# Patient Record
Sex: MALE | Race: WHITE | Employment: OTHER | URBAN - NONMETROPOLITAN AREA
[De-identification: names, ages, dates, MRNs, and addresses within clinical notes are randomized per-mention and may not be internally consistent; named-entity substitution may affect disease eponyms.]

---

## 2017-01-12 ENCOUNTER — OFFICE VISIT (OUTPATIENT)
Dept: UROLOGY | Age: 82
End: 2017-01-12

## 2017-01-12 ENCOUNTER — TELEPHONE (OUTPATIENT)
Dept: UROLOGY | Age: 82
End: 2017-01-12

## 2017-01-12 VITALS
DIASTOLIC BLOOD PRESSURE: 70 MMHG | SYSTOLIC BLOOD PRESSURE: 122 MMHG | BODY MASS INDEX: 19.18 KG/M2 | WEIGHT: 134 LBS | HEIGHT: 70 IN

## 2017-01-12 DIAGNOSIS — R39.198 URINARY STREAM SLOWING: Primary | ICD-10-CM

## 2017-01-12 DIAGNOSIS — R30.0 DYSURIA: ICD-10-CM

## 2017-01-12 LAB
BILIRUBIN, POC: NORMAL
BLOOD URINE, POC: NORMAL
CLARITY, POC: NORMAL
COLOR, POC: NORMAL
GLUCOSE URINE, POC: NORMAL
KETONES, POC: NORMAL
LEUKOCYTE EST, POC: NORMAL
NITRITE, POC: NORMAL
PH, POC: NORMAL
POST VOID RESIDUAL (PVR): 0 ML
PROTEIN, POC: NORMAL
SPECIFIC GRAVITY, POC: NORMAL
UROBILINOGEN, POC: NORMAL

## 2017-01-12 PROCEDURE — 81003 URINALYSIS AUTO W/O SCOPE: CPT | Performed by: NURSE PRACTITIONER

## 2017-01-12 PROCEDURE — 99213 OFFICE O/P EST LOW 20 MIN: CPT | Performed by: NURSE PRACTITIONER

## 2017-01-12 PROCEDURE — 1123F ACP DISCUSS/DSCN MKR DOCD: CPT | Performed by: NURSE PRACTITIONER

## 2017-01-12 PROCEDURE — G8419 CALC BMI OUT NRM PARAM NOF/U: HCPCS | Performed by: NURSE PRACTITIONER

## 2017-01-12 PROCEDURE — G8484 FLU IMMUNIZE NO ADMIN: HCPCS | Performed by: NURSE PRACTITIONER

## 2017-01-12 PROCEDURE — 51798 US URINE CAPACITY MEASURE: CPT | Performed by: NURSE PRACTITIONER

## 2017-01-12 PROCEDURE — G8427 DOCREV CUR MEDS BY ELIG CLIN: HCPCS | Performed by: NURSE PRACTITIONER

## 2017-01-12 PROCEDURE — 4040F PNEUMOC VAC/ADMIN/RCVD: CPT | Performed by: NURSE PRACTITIONER

## 2017-01-12 PROCEDURE — G8598 ASA/ANTIPLAT THER USED: HCPCS | Performed by: NURSE PRACTITIONER

## 2017-01-12 PROCEDURE — 1036F TOBACCO NON-USER: CPT | Performed by: NURSE PRACTITIONER

## 2017-01-12 ASSESSMENT — ENCOUNTER SYMPTOMS
EYE PAIN: 0
DIARRHEA: 0
CONSTIPATION: 0
BACK PAIN: 0
SHORTNESS OF BREATH: 1
EYE REDNESS: 0
CHEST TIGHTNESS: 0

## 2017-01-14 LAB — URINE CULTURE, ROUTINE: NORMAL

## 2017-01-18 ENCOUNTER — TELEPHONE (OUTPATIENT)
Dept: UROLOGY | Age: 82
End: 2017-01-18

## 2017-01-18 DIAGNOSIS — N21.0 BLADDER CALCULI: ICD-10-CM

## 2017-01-18 DIAGNOSIS — R31.9 HEMATURIA: Primary | ICD-10-CM

## 2017-01-25 ENCOUNTER — TELEPHONE (OUTPATIENT)
Dept: UROLOGY | Age: 82
End: 2017-01-25

## 2017-01-25 DIAGNOSIS — N21.0 BLADDER STONES: Primary | ICD-10-CM

## 2017-01-26 ENCOUNTER — TELEPHONE (OUTPATIENT)
Dept: UROLOGY | Age: 82
End: 2017-01-26

## 2017-01-26 DIAGNOSIS — N40.0 ENLARGED PROSTATE: Primary | ICD-10-CM

## 2017-01-26 DIAGNOSIS — R39.12 WEAK URINARY STREAM: ICD-10-CM

## 2017-01-26 RX ORDER — FINASTERIDE 5 MG/1
5 TABLET, FILM COATED ORAL DAILY
Qty: 90 TABLET | Refills: 3 | Status: SHIPPED | OUTPATIENT
Start: 2017-01-26 | End: 2018-01-16 | Stop reason: SDUPTHER

## 2017-01-31 ENCOUNTER — TELEPHONE (OUTPATIENT)
Dept: UROLOGY | Age: 82
End: 2017-01-31

## 2017-04-06 ENCOUNTER — OFFICE VISIT (OUTPATIENT)
Dept: FAMILY MEDICINE CLINIC | Age: 82
End: 2017-04-06

## 2017-04-06 VITALS
WEIGHT: 137 LBS | DIASTOLIC BLOOD PRESSURE: 70 MMHG | HEIGHT: 70 IN | SYSTOLIC BLOOD PRESSURE: 138 MMHG | RESPIRATION RATE: 16 BRPM | HEART RATE: 72 BPM | BODY MASS INDEX: 19.61 KG/M2

## 2017-04-06 DIAGNOSIS — R06.09 DYSPNEA ON EXERTION: ICD-10-CM

## 2017-04-06 DIAGNOSIS — R53.83 FATIGUE, UNSPECIFIED TYPE: Primary | ICD-10-CM

## 2017-04-06 PROCEDURE — 99213 OFFICE O/P EST LOW 20 MIN: CPT | Performed by: FAMILY MEDICINE

## 2017-04-06 PROCEDURE — 93000 ELECTROCARDIOGRAM COMPLETE: CPT | Performed by: FAMILY MEDICINE

## 2017-04-07 LAB
ABSOLUTE BASO #: 0 K/UL (ref 0–0.1)
ABSOLUTE EOS #: 0.1 K/UL (ref 0.1–0.4)
ABSOLUTE LYMPH #: 1.2 K/UL (ref 0.8–5.2)
ABSOLUTE MONO #: 0.5 K/UL (ref 0.1–0.9)
ABSOLUTE NEUT #: 4.1 K/UL (ref 1.3–9.1)
ALBUMIN SERPL-MCNC: 4.4 G/DL (ref 3.2–5.3)
ALK PHOSPHATASE: 82 IU/L (ref 35–121)
ALT SERPL-CCNC: 14 IU/L (ref 5–59)
ANION GAP SERPL CALCULATED.3IONS-SCNC: 14 MMOL/L
AST SERPL-CCNC: 25 IU/L (ref 10–42)
BASOPHILS RELATIVE PERCENT: 0.5 %
BILIRUB SERPL-MCNC: 1 MG/DL (ref 0.2–1.3)
BUN BLDV-MCNC: 22 MG/DL (ref 10–25)
CALCIUM SERPL-MCNC: 9.7 MG/DL (ref 8.7–10.8)
CHLORIDE BLD-SCNC: 103 MMOL/L (ref 95–111)
CO2: 31 MMOL/L (ref 21–32)
CREAT SERPL-MCNC: 1.1 MG/DL (ref 0.7–1.5)
EGFR AFRICAN AMERICAN: 76
EGFR IF NONAFRICAN AMERICAN: 63
EOSINOPHILS RELATIVE PERCENT: 1 %
GLUCOSE: 96 MG/DL (ref 70–100)
HCT VFR BLD CALC: 44.1 % (ref 41.4–51)
HEMOGLOBIN: 15 G/DL (ref 13.8–17)
LYMPHOCYTE %: 21.1 %
MCH RBC QN AUTO: 33.8 PG (ref 27–34)
MCHC RBC AUTO-ENTMCNC: 34 G/DL (ref 31–36)
MCV RBC AUTO: 99.3 FL (ref 80–100)
MONOCYTES # BLD: 7.8 %
NEUTROPHILS RELATIVE PERCENT: 69.3 %
PDW BLD-RTO: 11.8 % (ref 10.8–14.8)
PLATELETS: 203 K/UL (ref 150–450)
POTASSIUM SERPL-SCNC: 4.1 MMOL/L (ref 3.5–5.4)
RBC: 4.44 M/UL (ref 4–5.5)
SODIUM BLD-SCNC: 144 MMOL/L (ref 134–147)
TOTAL PROTEIN: 6.8 G/DL (ref 5.8–8)
TSH SERPL DL<=0.05 MIU/L-ACNC: 2.48 UIU/ML (ref 0.4–4.4)
VITAMIN B-12: 509 PG/ML (ref 211–911)
WBC: 5.9 K/UL (ref 3.7–10.8)

## 2017-04-10 ENCOUNTER — TELEPHONE (OUTPATIENT)
Dept: FAMILY MEDICINE CLINIC | Age: 82
End: 2017-04-10

## 2017-04-15 ASSESSMENT — ENCOUNTER SYMPTOMS
CONSTIPATION: 0
SHORTNESS OF BREATH: 1
SINUS PRESSURE: 0

## 2017-04-21 ENCOUNTER — TELEPHONE (OUTPATIENT)
Dept: FAMILY MEDICINE CLINIC | Age: 82
End: 2017-04-21

## 2017-04-27 ENCOUNTER — OFFICE VISIT (OUTPATIENT)
Dept: FAMILY MEDICINE CLINIC | Age: 82
End: 2017-04-27

## 2017-04-27 VITALS
DIASTOLIC BLOOD PRESSURE: 66 MMHG | WEIGHT: 137.25 LBS | SYSTOLIC BLOOD PRESSURE: 112 MMHG | HEIGHT: 70 IN | RESPIRATION RATE: 16 BRPM | BODY MASS INDEX: 19.65 KG/M2 | HEART RATE: 76 BPM

## 2017-04-27 DIAGNOSIS — J44.9 CHRONIC OBSTRUCTIVE PULMONARY DISEASE, UNSPECIFIED COPD TYPE (HCC): ICD-10-CM

## 2017-04-27 PROCEDURE — 99213 OFFICE O/P EST LOW 20 MIN: CPT | Performed by: FAMILY MEDICINE

## 2017-04-27 ASSESSMENT — ENCOUNTER SYMPTOMS
SHORTNESS OF BREATH: 1
CONSTIPATION: 0
SINUS PRESSURE: 0

## 2017-09-11 RX ORDER — TERAZOSIN 5 MG/1
CAPSULE ORAL
Qty: 90 CAPSULE | Refills: 3 | Status: SHIPPED | OUTPATIENT
Start: 2017-09-11 | End: 2018-09-10 | Stop reason: SDUPTHER

## 2017-10-26 ENCOUNTER — OFFICE VISIT (OUTPATIENT)
Dept: FAMILY MEDICINE CLINIC | Age: 82
End: 2017-10-26

## 2017-10-26 VITALS
BODY MASS INDEX: 19.76 KG/M2 | SYSTOLIC BLOOD PRESSURE: 102 MMHG | HEIGHT: 70 IN | DIASTOLIC BLOOD PRESSURE: 66 MMHG | WEIGHT: 138 LBS | RESPIRATION RATE: 16 BRPM | HEART RATE: 80 BPM

## 2017-10-26 DIAGNOSIS — Z23 NEED FOR IMMUNIZATION AGAINST INFLUENZA: ICD-10-CM

## 2017-10-26 DIAGNOSIS — J44.9 CHRONIC OBSTRUCTIVE PULMONARY DISEASE, UNSPECIFIED COPD TYPE (HCC): Primary | ICD-10-CM

## 2017-10-26 PROCEDURE — 99213 OFFICE O/P EST LOW 20 MIN: CPT | Performed by: FAMILY MEDICINE

## 2017-10-26 PROCEDURE — G0008 ADMIN INFLUENZA VIRUS VAC: HCPCS | Performed by: FAMILY MEDICINE

## 2017-10-26 RX ORDER — ALBUTEROL SULFATE 90 UG/1
2 AEROSOL, METERED RESPIRATORY (INHALATION) 2 TIMES DAILY
Qty: 1 INHALER | Refills: 11 | Status: SHIPPED | OUTPATIENT
Start: 2017-10-26 | End: 2019-01-29 | Stop reason: SDUPTHER

## 2017-10-26 ASSESSMENT — ENCOUNTER SYMPTOMS
CONSTIPATION: 0
SHORTNESS OF BREATH: 1
SINUS PRESSURE: 0

## 2017-10-26 NOTE — PROGRESS NOTES
Subjective:      Patient ID: Wellington Taveras is a 80 y.o. male. HPI  1. He states that the breathing seems no worse than 6 months ago. There is no daily sputum  2. His family would like him to have an inhaler as he seems SOB at times with exertion  Review of Systems   Constitutional: Negative for fatigue. HENT: Negative for sinus pressure. Eyes: Negative for visual disturbance. Respiratory: Positive for shortness of breath. Cardiovascular: Negative for chest pain. Gastrointestinal: Negative for constipation. Genitourinary: Positive for difficulty urinating. Musculoskeletal: Negative for arthralgias. Skin: Negative for rash. Neurological: Negative for headaches. The patient's medications, allergies, past medical problems, surgical, social, and family histories were reviewed and updated as needed. Objective:   Physical Exam   Constitutional: He is oriented to person, place, and time. He appears well-developed and well-nourished. No distress. HENT:   Head: Normocephalic and atraumatic. Eyes: Conjunctivae are normal. No scleral icterus. Neck: No tracheal deviation present. Cardiovascular: Normal rate, regular rhythm and normal heart sounds. Pulmonary/Chest: Effort normal and breath sounds normal.   Musculoskeletal: He exhibits no edema. Neurological: He is alert and oriented to person, place, and time. Skin: Skin is warm and dry. Psychiatric: He has a normal mood and affect. His behavior is normal.   Blood pressure 102/66, pulse 80, resp. rate 16, height 5' 10\" (1.778 m), weight 138 lb (62.6 kg). Assessment:          1. Chronic obstructive pulmonary disease, unspecified COPD type (HCC)  albuterol sulfate HFA (PROAIR HFA) 108 (90 Base) MCG/ACT inhaler   2.  Need for immunization against influenza  INFLUENZA, MDCK QUADV, 4 YRS AND OLDER, IM, PF, PREFILL SYR OR SDV, 0.5ML (FLUCELVAX QUADV, PF)       Plan:      See me in 6 months

## 2018-01-16 RX ORDER — FINASTERIDE 5 MG/1
5 TABLET, FILM COATED ORAL DAILY
Qty: 90 TABLET | Refills: 3 | Status: SHIPPED | OUTPATIENT
Start: 2018-01-16 | End: 2019-02-04 | Stop reason: SDUPTHER

## 2018-02-01 ENCOUNTER — TELEPHONE (OUTPATIENT)
Dept: FAMILY MEDICINE CLINIC | Age: 83
End: 2018-02-01

## 2018-02-01 RX ORDER — AZITHROMYCIN 250 MG/1
TABLET, FILM COATED ORAL
Qty: 1 PACKET | Refills: 0 | Status: SHIPPED | OUTPATIENT
Start: 2018-02-01 | End: 2018-02-11

## 2018-02-02 RX ORDER — DEXTROMETHORPHAN HYDROBROMIDE AND PROMETHAZINE HYDROCHLORIDE 15; 6.25 MG/5ML; MG/5ML
5 SYRUP ORAL 4 TIMES DAILY PRN
Qty: 150 ML | Refills: 0 | Status: SHIPPED | OUTPATIENT
Start: 2018-02-02 | End: 2018-02-09

## 2018-02-02 NOTE — TELEPHONE ENCOUNTER
Daughter called, pt took initial dose of Zithromax last night and he was up all night with diarrhea. Doesn't want to take anymore. Would like something different. Please call Arsalan So [phone].   Uses [facility] [place].

## 2018-04-17 ENCOUNTER — OFFICE VISIT (OUTPATIENT)
Dept: FAMILY MEDICINE CLINIC | Age: 83
End: 2018-04-17

## 2018-04-17 VITALS
HEART RATE: 72 BPM | BODY MASS INDEX: 19.9 KG/M2 | SYSTOLIC BLOOD PRESSURE: 110 MMHG | HEIGHT: 70 IN | WEIGHT: 139 LBS | DIASTOLIC BLOOD PRESSURE: 64 MMHG | RESPIRATION RATE: 16 BRPM

## 2018-04-17 DIAGNOSIS — J44.9 CHRONIC OBSTRUCTIVE PULMONARY DISEASE, UNSPECIFIED COPD TYPE (HCC): Primary | ICD-10-CM

## 2018-04-17 PROCEDURE — 99213 OFFICE O/P EST LOW 20 MIN: CPT | Performed by: FAMILY MEDICINE

## 2018-04-17 ASSESSMENT — ENCOUNTER SYMPTOMS
SINUS PRESSURE: 0
SHORTNESS OF BREATH: 1
CONSTIPATION: 0

## 2018-05-07 ENCOUNTER — OFFICE VISIT (OUTPATIENT)
Dept: FAMILY MEDICINE CLINIC | Age: 83
End: 2018-05-07

## 2018-05-07 VITALS
SYSTOLIC BLOOD PRESSURE: 126 MMHG | HEIGHT: 70 IN | BODY MASS INDEX: 19.98 KG/M2 | HEART RATE: 72 BPM | DIASTOLIC BLOOD PRESSURE: 70 MMHG | WEIGHT: 139.6 LBS | RESPIRATION RATE: 16 BRPM

## 2018-05-07 DIAGNOSIS — S33.6XXA SACROILIAC (LIGAMENT) SPRAIN, INITIAL ENCOUNTER: Primary | ICD-10-CM

## 2018-05-07 PROCEDURE — 99213 OFFICE O/P EST LOW 20 MIN: CPT | Performed by: FAMILY MEDICINE

## 2018-05-07 RX ORDER — TIZANIDINE HYDROCHLORIDE 2 MG/1
2 CAPSULE, GELATIN COATED ORAL 3 TIMES DAILY PRN
Qty: 30 CAPSULE | Refills: 1 | Status: SHIPPED | OUTPATIENT
Start: 2018-05-07 | End: 2018-07-23 | Stop reason: SDUPTHER

## 2018-05-07 RX ORDER — PREDNISONE 20 MG/1
TABLET ORAL
Qty: 10 TABLET | Refills: 0 | Status: SHIPPED | OUTPATIENT
Start: 2018-05-07 | End: 2018-12-13

## 2018-05-07 ASSESSMENT — ENCOUNTER SYMPTOMS
CONSTIPATION: 0
BLOOD IN STOOL: 0
CHEST TIGHTNESS: 0
SHORTNESS OF BREATH: 0
EYE PAIN: 0
COUGH: 0
SORE THROAT: 0
TROUBLE SWALLOWING: 0
BACK PAIN: 0
NAUSEA: 0
ABDOMINAL PAIN: 0

## 2018-07-23 ENCOUNTER — TELEPHONE (OUTPATIENT)
Dept: FAMILY MEDICINE CLINIC | Age: 83
End: 2018-07-23

## 2018-07-23 DIAGNOSIS — S33.6XXA SACROILIAC (LIGAMENT) SPRAIN, INITIAL ENCOUNTER: ICD-10-CM

## 2018-07-23 RX ORDER — TIZANIDINE HYDROCHLORIDE 2 MG/1
2 CAPSULE, GELATIN COATED ORAL 3 TIMES DAILY PRN
Qty: 30 CAPSULE | Refills: 1 | Status: SHIPPED | OUTPATIENT
Start: 2018-07-23 | End: 2018-12-13 | Stop reason: SDUPTHER

## 2018-07-23 NOTE — TELEPHONE ENCOUNTER
Vishnu Oleary called req a ref of Aviva to [facility].  He picked up a bag of garbage the other day.

## 2018-09-10 RX ORDER — TERAZOSIN 5 MG/1
CAPSULE ORAL
Qty: 90 CAPSULE | Refills: 0 | Status: SHIPPED | OUTPATIENT
Start: 2018-09-10 | End: 2018-12-05 | Stop reason: SDUPTHER

## 2018-09-21 ENCOUNTER — TELEPHONE (OUTPATIENT)
Dept: FAMILY MEDICINE CLINIC | Age: 83
End: 2018-09-21

## 2018-10-18 ENCOUNTER — OFFICE VISIT (OUTPATIENT)
Dept: FAMILY MEDICINE CLINIC | Age: 83
End: 2018-10-18

## 2018-10-18 VITALS
HEART RATE: 64 BPM | HEIGHT: 70 IN | WEIGHT: 133 LBS | SYSTOLIC BLOOD PRESSURE: 124 MMHG | DIASTOLIC BLOOD PRESSURE: 74 MMHG | RESPIRATION RATE: 14 BRPM | BODY MASS INDEX: 19.04 KG/M2

## 2018-10-18 DIAGNOSIS — L57.0 ACTINIC KERATOSIS: Primary | ICD-10-CM

## 2018-10-18 DIAGNOSIS — H61.23 BILATERAL IMPACTED CERUMEN: ICD-10-CM

## 2018-10-18 PROCEDURE — 1101F PT FALLS ASSESS-DOCD LE1/YR: CPT | Performed by: FAMILY MEDICINE

## 2018-10-18 PROCEDURE — G0008 ADMIN INFLUENZA VIRUS VAC: HCPCS | Performed by: FAMILY MEDICINE

## 2018-10-18 PROCEDURE — 90688 IIV4 VACCINE SPLT 0.5 ML IM: CPT | Performed by: FAMILY MEDICINE

## 2018-10-18 PROCEDURE — 69210 REMOVE IMPACTED EAR WAX UNI: CPT | Performed by: FAMILY MEDICINE

## 2018-10-18 PROCEDURE — 99214 OFFICE O/P EST MOD 30 MIN: CPT | Performed by: FAMILY MEDICINE

## 2018-10-18 ASSESSMENT — ENCOUNTER SYMPTOMS
SINUS PRESSURE: 0
CONSTIPATION: 0
SHORTNESS OF BREATH: 0

## 2018-10-18 NOTE — PROGRESS NOTES
Immunizations     Name Date Dose Route    Influenza, [place] Clamp, 6 mo and older, IM (Flulaval) 10/18/2018 0.5 mL Intramuscular    Site: Deltoid- Right    Lot: Ana Luisa Buenos    NDC: 19540-900-00

## 2018-10-18 NOTE — PROGRESS NOTES
Subjective:      Patient ID: Jose Lawrence is a 80 y.o. male. HPI  1. He has some arthritis troubles  2. There has been a spot on the back of the left hand for several months that is looking some better. Review of Systems   Constitutional: Negative for fatigue. HENT: Negative for sinus pressure. Eyes: Negative for visual disturbance. Respiratory: Negative for shortness of breath. Cardiovascular: Negative for chest pain. Gastrointestinal: Negative for constipation. Genitourinary: Negative. Musculoskeletal: Negative for arthralgias. Skin: Negative for rash. Neurological: Negative for headaches. The patient's medications, allergies, past medical problems, surgical, social, and family histories were reviewed and updated as needed. Objective:   Physical Exam   Constitutional: He is oriented to person, place, and time. He appears well-developed and well-nourished. No distress. HENT:   Head: Normocephalic and atraumatic. Bilateral cerumem impaction. Wax was removed from the affected ear(s) with a combination of the nurse using the water bottle, and myself using the curette. Inspection of the canal, and the tympanic membrane by myself afterward found no sign of injury. Eyes: Conjunctivae are normal. No scleral icterus. Neck: No tracheal deviation present. Cardiovascular: Normal rate, regular rhythm and normal heart sounds. Pulmonary/Chest: Effort normal and breath sounds normal.   Musculoskeletal: He exhibits no edema. Hands:  Neurological: He is alert and oriented to person, place, and time. Skin: Skin is warm and dry. Psychiatric: He has a normal mood and affect. His behavior is normal.   Blood pressure 124/74, pulse 64, resp. rate 14, height 5' 10\" (1.778 m), weight 133 lb (60.3 kg). Assessment:       Diagnosis Orders   1. Actinic keratosis     2.  Bilateral impacted cerumen  53612 - [place] REMOVE IMPACTED EAR WAX           Plan:      See me in 6 months Phoebe Plummer MD

## 2018-10-25 ENCOUNTER — NURSE ONLY (OUTPATIENT)
Dept: FAMILY MEDICINE CLINIC | Age: 83
End: 2018-10-25

## 2018-10-25 DIAGNOSIS — R35.0 URINARY FREQUENCY: Primary | ICD-10-CM

## 2018-10-25 LAB
BACTERIA URINE, POC: ABNORMAL
BILIRUBIN URINE: 0 MG/DL
BLOOD, URINE: POSITIVE
CASTS URINE, POC: ABNORMAL
CLARITY: ABNORMAL
COLOR: YELLOW
CRYSTALS URINE, POC: ABNORMAL
EPI CELLS URINE, POC: ABNORMAL
GLUCOSE URINE: NEGATIVE
KETONES, URINE: NEGATIVE
LEUKOCYTE EST, POC: POSITIVE
NITRITE, URINE: NEGATIVE
PH UA: 9 (ref 4.5–8)
PROTEIN UA: POSITIVE
RBC URINE, POC: ABNORMAL
SPECIFIC GRAVITY UA: 1.01 (ref 1–1.03)
UROBILINOGEN, URINE: NORMAL
WBC URINE, POC: ABNORMAL
YEAST URINE, POC: ABNORMAL

## 2018-10-25 PROCEDURE — 81000 URINALYSIS NONAUTO W/SCOPE: CPT | Performed by: FAMILY MEDICINE

## 2018-10-27 LAB — URINE CULTURE, ROUTINE: NORMAL

## 2018-10-29 ENCOUNTER — TELEPHONE (OUTPATIENT)
Dept: FAMILY MEDICINE CLINIC | Age: 83
End: 2018-10-29

## 2018-12-13 ENCOUNTER — OFFICE VISIT (OUTPATIENT)
Dept: FAMILY MEDICINE CLINIC | Age: 83
End: 2018-12-13

## 2018-12-13 VITALS
WEIGHT: 131.13 LBS | DIASTOLIC BLOOD PRESSURE: 70 MMHG | BODY MASS INDEX: 18.77 KG/M2 | RESPIRATION RATE: 14 BRPM | HEIGHT: 70 IN | HEART RATE: 80 BPM | SYSTOLIC BLOOD PRESSURE: 126 MMHG

## 2018-12-13 DIAGNOSIS — M53.3 SACROILIAC JOINT PAIN: ICD-10-CM

## 2018-12-13 DIAGNOSIS — N23 KIDNEY PAIN: ICD-10-CM

## 2018-12-13 DIAGNOSIS — M54.50 MIDLINE LOW BACK PAIN WITHOUT SCIATICA, UNSPECIFIED CHRONICITY: Primary | ICD-10-CM

## 2018-12-13 DIAGNOSIS — S33.6XXA SACROILIAC (LIGAMENT) SPRAIN, INITIAL ENCOUNTER: ICD-10-CM

## 2018-12-13 LAB
BACTERIA URINE, POC: ABNORMAL
BILIRUBIN URINE: 0 MG/DL
BLOOD, URINE: POSITIVE
CASTS URINE, POC: ABNORMAL
CLARITY: ABNORMAL
COLOR: YELLOW
CRYSTALS URINE, POC: ABNORMAL
EPI CELLS URINE, POC: ABNORMAL
GLUCOSE URINE: NEGATIVE
KETONES, URINE: POSITIVE
LEUKOCYTE EST, POC: NEGATIVE
NITRITE, URINE: POSITIVE
PH UA: 8 (ref 4.5–8)
PROTEIN UA: POSITIVE
RBC URINE, POC: ABNORMAL
SPECIFIC GRAVITY UA: 1.01 (ref 1–1.03)
UROBILINOGEN, URINE: NORMAL
WBC URINE, POC: ABNORMAL
YEAST URINE, POC: ABNORMAL

## 2018-12-13 PROCEDURE — 96372 THER/PROPH/DIAG INJ SC/IM: CPT | Performed by: EMERGENCY MEDICINE

## 2018-12-13 PROCEDURE — 99213 OFFICE O/P EST LOW 20 MIN: CPT | Performed by: EMERGENCY MEDICINE

## 2018-12-13 PROCEDURE — 1101F PT FALLS ASSESS-DOCD LE1/YR: CPT | Performed by: EMERGENCY MEDICINE

## 2018-12-13 PROCEDURE — 81000 URINALYSIS NONAUTO W/SCOPE: CPT | Performed by: EMERGENCY MEDICINE

## 2018-12-13 RX ORDER — TIZANIDINE HYDROCHLORIDE 2 MG/1
2 CAPSULE, GELATIN COATED ORAL 3 TIMES DAILY PRN
Qty: 30 CAPSULE | Refills: 0 | Status: SHIPPED | OUTPATIENT
Start: 2018-12-13 | End: 2019-01-23

## 2018-12-13 RX ORDER — KETOROLAC TROMETHAMINE 30 MG/ML
30 INJECTION, SOLUTION INTRAMUSCULAR; INTRAVENOUS ONCE
Status: COMPLETED | OUTPATIENT
Start: 2018-12-13 | End: 2018-12-13

## 2018-12-13 RX ORDER — METHYLPREDNISOLONE ACETATE 80 MG/ML
80 INJECTION, SUSPENSION INTRA-ARTICULAR; INTRALESIONAL; INTRAMUSCULAR; SOFT TISSUE ONCE
Status: COMPLETED | OUTPATIENT
Start: 2018-12-13 | End: 2018-12-13

## 2018-12-13 RX ORDER — PREDNISONE 10 MG/1
TABLET ORAL
Qty: 15 TABLET | Refills: 0 | Status: ON HOLD | OUTPATIENT
Start: 2018-12-13 | End: 2019-01-10 | Stop reason: HOSPADM

## 2018-12-13 RX ADMIN — METHYLPREDNISOLONE ACETATE 80 MG: 80 INJECTION, SUSPENSION INTRA-ARTICULAR; INTRALESIONAL; INTRAMUSCULAR; SOFT TISSUE at 16:25

## 2018-12-13 RX ADMIN — KETOROLAC TROMETHAMINE 30 MG: 30 INJECTION, SOLUTION INTRAMUSCULAR; INTRAVENOUS at 16:23

## 2018-12-13 ASSESSMENT — ENCOUNTER SYMPTOMS
DIARRHEA: 0
CONSTIPATION: 0
SINUS PRESSURE: 0
SORE THROAT: 0
TROUBLE SWALLOWING: 0
WHEEZING: 0
BACK PAIN: 1
VOMITING: 0
CHEST TIGHTNESS: 0
ABDOMINAL PAIN: 0
NAUSEA: 0
RHINORRHEA: 0
COUGH: 0
VOICE CHANGE: 0
SHORTNESS OF BREATH: 0

## 2018-12-13 NOTE — PROGRESS NOTES
Administrations This Visit     ketorolac (TORADOL) injection 30 mg     Admin Date  12/13/2018  16:23 Action  Given Dose  30 mg Route  Intramuscular Site  Dorsogluteal Left Administered By  Lupis Mobley    Ordering Provider:  Dilia De La Garza MD    Patient Supplied?:  No    Comments:  TFQ [id number]           methylPREDNISolone acetate (DEPO-MEDROL) injection 80 mg     Admin Date  12/13/2018  16:25 Action  Given Dose  80 mg Route  Intramuscular Site  Dorsogluteal Right Administered By  Lupis Mobley    Ordering Provider:  Dilia De La Garza MD    NDC:  4841-5431-83    Lot#:  [id number]    :  Ely [employer] [employer]    Patient Supplied?:  No

## 2018-12-17 ENCOUNTER — ANESTHESIA (OUTPATIENT)
Dept: OPERATING ROOM | Age: 83
DRG: 356 | End: 2018-12-17
Payer: MEDICARE

## 2018-12-17 ENCOUNTER — ANESTHESIA EVENT (OUTPATIENT)
Dept: OPERATING ROOM | Age: 83
DRG: 356 | End: 2018-12-17
Payer: MEDICARE

## 2018-12-17 ENCOUNTER — APPOINTMENT (OUTPATIENT)
Dept: GENERAL RADIOLOGY | Age: 83
DRG: 356 | End: 2018-12-17
Payer: MEDICARE

## 2018-12-17 ENCOUNTER — APPOINTMENT (OUTPATIENT)
Dept: CT IMAGING | Age: 83
DRG: 356 | End: 2018-12-17
Payer: MEDICARE

## 2018-12-17 ENCOUNTER — HOSPITAL ENCOUNTER (INPATIENT)
Age: 83
LOS: 7 days | Discharge: NURSING, REHAB OR LONG-TERM CARE | DRG: 356 | End: 2018-12-24
Attending: INTERNAL MEDICINE | Admitting: INTERNAL MEDICINE
Payer: MEDICARE

## 2018-12-17 VITALS
OXYGEN SATURATION: 85 % | SYSTOLIC BLOOD PRESSURE: 112 MMHG | TEMPERATURE: 97.2 F | DIASTOLIC BLOOD PRESSURE: 83 MMHG | RESPIRATION RATE: 7 BRPM

## 2018-12-17 DIAGNOSIS — K65.0 ACUTE PERITONITIS (HCC): Primary | ICD-10-CM

## 2018-12-17 LAB
ALBUMIN SERPL-MCNC: 4.3 G/DL (ref 3.5–5.1)
ALP BLD-CCNC: 137 U/L (ref 38–126)
ALT SERPL-CCNC: 19 U/L (ref 11–66)
ANION GAP SERPL CALCULATED.3IONS-SCNC: 18 MEQ/L (ref 8–16)
AST SERPL-CCNC: 35 U/L (ref 5–40)
BASOPHILS # BLD: 0.1 %
BASOPHILS ABSOLUTE: 0 THOU/MM3 (ref 0–0.1)
BILIRUB SERPL-MCNC: 0.8 MG/DL (ref 0.3–1.2)
BILIRUBIN DIRECT: < 0.2 MG/DL (ref 0–0.3)
BUN BLDV-MCNC: 40 MG/DL (ref 7–22)
CALCIUM SERPL-MCNC: 9.3 MG/DL (ref 8.5–10.5)
CHLORIDE BLD-SCNC: 92 MEQ/L (ref 98–111)
CO2: 28 MEQ/L (ref 23–33)
CREAT SERPL-MCNC: 1.3 MG/DL (ref 0.4–1.2)
EKG ATRIAL RATE: 105 BPM
EKG P AXIS: 85 DEGREES
EKG P-R INTERVAL: 132 MS
EKG Q-T INTERVAL: 356 MS
EKG QRS DURATION: 86 MS
EKG QTC CALCULATION (BAZETT): 470 MS
EKG R AXIS: 75 DEGREES
EKG T AXIS: 74 DEGREES
EKG VENTRICULAR RATE: 105 BPM
EOSINOPHIL # BLD: 0 %
EOSINOPHILS ABSOLUTE: 0 THOU/MM3 (ref 0–0.4)
ERYTHROCYTE [DISTWIDTH] IN BLOOD BY AUTOMATED COUNT: 13.7 % (ref 11.5–14.5)
ERYTHROCYTE [DISTWIDTH] IN BLOOD BY AUTOMATED COUNT: 52.3 FL (ref 35–45)
GFR SERPL CREATININE-BSD FRML MDRD: 52 ML/MIN/1.73M2
GLUCOSE BLD-MCNC: 209 MG/DL (ref 70–108)
HCT VFR BLD CALC: 44.1 % (ref 42–52)
HEMOGLOBIN: 14.7 GM/DL (ref 14–18)
IMMATURE GRANS (ABS): 0.01 THOU/MM3 (ref 0–0.07)
IMMATURE GRANULOCYTES: 0.1 %
LACTIC ACID: 3.9 MMOL/L (ref 0.5–2.2)
LIPASE: 22.3 U/L (ref 5.6–51.3)
LYMPHOCYTES # BLD: 5.9 %
LYMPHOCYTES ABSOLUTE: 0.5 THOU/MM3 (ref 1–4.8)
MCH RBC QN AUTO: 34 PG (ref 26–33)
MCHC RBC AUTO-ENTMCNC: 33.3 GM/DL (ref 32.2–35.5)
MCV RBC AUTO: 102.1 FL (ref 80–94)
MONOCYTES # BLD: 1.8 %
MONOCYTES ABSOLUTE: 0.1 THOU/MM3 (ref 0.4–1.3)
MRSA SCREEN RT-PCR: NEGATIVE
NUCLEATED RED BLOOD CELLS: 0 /100 WBC
OSMOLALITY CALCULATION: 291.6 MOSMOL/KG (ref 275–300)
PLATELET # BLD: 246 THOU/MM3 (ref 130–400)
PMV BLD AUTO: 9.8 FL (ref 9.4–12.4)
POTASSIUM SERPL-SCNC: 3.6 MEQ/L (ref 3.5–5.2)
PROSTATE SPECIFIC ANTIGEN: 1594 NG/ML (ref 0–1)
RBC # BLD: 4.32 MILL/MM3 (ref 4.7–6.1)
SEG NEUTROPHILS: 92.1 %
SEGMENTED NEUTROPHILS ABSOLUTE COUNT: 7.3 THOU/MM3 (ref 1.8–7.7)
SODIUM BLD-SCNC: 138 MEQ/L (ref 135–145)
TOTAL PROTEIN: 7.1 G/DL (ref 6.1–8)
VANCOMYCIN RESISTANT ENTEROCOCCUS: NEGATIVE
WBC # BLD: 7.9 THOU/MM3 (ref 4.8–10.8)

## 2018-12-17 PROCEDURE — 2000000000 HC ICU R&B

## 2018-12-17 PROCEDURE — 99222 1ST HOSP IP/OBS MODERATE 55: CPT | Performed by: SURGERY

## 2018-12-17 PROCEDURE — 74177 CT ABD & PELVIS W/CONTRAST: CPT

## 2018-12-17 PROCEDURE — 2580000003 HC RX 258: Performed by: SURGERY

## 2018-12-17 PROCEDURE — 74018 RADEX ABDOMEN 1 VIEW: CPT

## 2018-12-17 PROCEDURE — 93010 ELECTROCARDIOGRAM REPORT: CPT | Performed by: INTERNAL MEDICINE

## 2018-12-17 PROCEDURE — 0W9G00Z DRAINAGE OF PERITONEAL CAVITY WITH DRAINAGE DEVICE, OPEN APPROACH: ICD-10-PCS | Performed by: SURGERY

## 2018-12-17 PROCEDURE — 36415 COLL VENOUS BLD VENIPUNCTURE: CPT

## 2018-12-17 PROCEDURE — 87081 CULTURE SCREEN ONLY: CPT

## 2018-12-17 PROCEDURE — 85025 COMPLETE CBC W/AUTO DIFF WBC: CPT

## 2018-12-17 PROCEDURE — 3600000004 HC SURGERY LEVEL 4 BASE: Performed by: SURGERY

## 2018-12-17 PROCEDURE — 87086 URINE CULTURE/COLONY COUNT: CPT

## 2018-12-17 PROCEDURE — 49084 PERITONEAL LAVAGE: CPT | Performed by: SURGERY

## 2018-12-17 PROCEDURE — 2500000003 HC RX 250 WO HCPCS: Performed by: NURSE PRACTITIONER

## 2018-12-17 PROCEDURE — 83605 ASSAY OF LACTIC ACID: CPT

## 2018-12-17 PROCEDURE — 7100000001 HC PACU RECOVERY - ADDTL 15 MIN: Performed by: SURGERY

## 2018-12-17 PROCEDURE — 3700000000 HC ANESTHESIA ATTENDED CARE: Performed by: SURGERY

## 2018-12-17 PROCEDURE — 2700000000 HC OXYGEN THERAPY PER DAY

## 2018-12-17 PROCEDURE — 3700000001 HC ADD 15 MINUTES (ANESTHESIA): Performed by: SURGERY

## 2018-12-17 PROCEDURE — C9113 INJ PANTOPRAZOLE SODIUM, VIA: HCPCS | Performed by: SURGERY

## 2018-12-17 PROCEDURE — 2580000003 HC RX 258: Performed by: INTERNAL MEDICINE

## 2018-12-17 PROCEDURE — 99285 EMERGENCY DEPT VISIT HI MDM: CPT

## 2018-12-17 PROCEDURE — 83690 ASSAY OF LIPASE: CPT

## 2018-12-17 PROCEDURE — 2580000003 HC RX 258: Performed by: NURSE PRACTITIONER

## 2018-12-17 PROCEDURE — 2709999900 HC NON-CHARGEABLE SUPPLY

## 2018-12-17 PROCEDURE — 6360000002 HC RX W HCPCS: Performed by: ANESTHESIOLOGY

## 2018-12-17 PROCEDURE — 87641 MR-STAPH DNA AMP PROBE: CPT

## 2018-12-17 PROCEDURE — 6360000002 HC RX W HCPCS: Performed by: NURSE ANESTHETIST, CERTIFIED REGISTERED

## 2018-12-17 PROCEDURE — 94640 AIRWAY INHALATION TREATMENT: CPT

## 2018-12-17 PROCEDURE — G0103 PSA SCREENING: HCPCS

## 2018-12-17 PROCEDURE — 6360000002 HC RX W HCPCS: Performed by: NURSE PRACTITIONER

## 2018-12-17 PROCEDURE — 7100000000 HC PACU RECOVERY - FIRST 15 MIN: Performed by: SURGERY

## 2018-12-17 PROCEDURE — 96375 TX/PRO/DX INJ NEW DRUG ADDON: CPT

## 2018-12-17 PROCEDURE — 96374 THER/PROPH/DIAG INJ IV PUSH: CPT

## 2018-12-17 PROCEDURE — 2500000003 HC RX 250 WO HCPCS: Performed by: NURSE ANESTHETIST, CERTIFIED REGISTERED

## 2018-12-17 PROCEDURE — 6360000002 HC RX W HCPCS: Performed by: SURGERY

## 2018-12-17 PROCEDURE — 82248 BILIRUBIN DIRECT: CPT

## 2018-12-17 PROCEDURE — 6370000000 HC RX 637 (ALT 250 FOR IP): Performed by: INTERNAL MEDICINE

## 2018-12-17 PROCEDURE — 6360000004 HC RX CONTRAST MEDICATION: Performed by: NURSE PRACTITIONER

## 2018-12-17 PROCEDURE — 93005 ELECTROCARDIOGRAM TRACING: CPT | Performed by: INTERNAL MEDICINE

## 2018-12-17 PROCEDURE — P9045 ALBUMIN (HUMAN), 5%, 250 ML: HCPCS | Performed by: NURSE ANESTHETIST, CERTIFIED REGISTERED

## 2018-12-17 PROCEDURE — 3600000014 HC SURGERY LEVEL 4 ADDTL 15MIN: Performed by: SURGERY

## 2018-12-17 PROCEDURE — 80053 COMPREHEN METABOLIC PANEL: CPT

## 2018-12-17 PROCEDURE — 2709999900 HC NON-CHARGEABLE SUPPLY: Performed by: SURGERY

## 2018-12-17 PROCEDURE — 49000 EXPLORATION OF ABDOMEN: CPT | Performed by: SURGERY

## 2018-12-17 PROCEDURE — 2500000003 HC RX 250 WO HCPCS: Performed by: INTERNAL MEDICINE

## 2018-12-17 PROCEDURE — 87500 VANOMYCIN DNA AMP PROBE: CPT

## 2018-12-17 RX ORDER — LABETALOL HYDROCHLORIDE 5 MG/ML
INJECTION, SOLUTION INTRAVENOUS PRN
Status: DISCONTINUED | OUTPATIENT
Start: 2018-12-17 | End: 2018-12-17 | Stop reason: SDUPTHER

## 2018-12-17 RX ORDER — LIDOCAINE HYDROCHLORIDE 20 MG/ML
INJECTION, SOLUTION INFILTRATION; PERINEURAL PRN
Status: DISCONTINUED | OUTPATIENT
Start: 2018-12-17 | End: 2018-12-17 | Stop reason: SDUPTHER

## 2018-12-17 RX ORDER — SODIUM CHLORIDE 9 MG/ML
INJECTION, SOLUTION INTRAVENOUS CONTINUOUS
Status: DISCONTINUED | OUTPATIENT
Start: 2018-12-17 | End: 2018-12-17 | Stop reason: SDUPTHER

## 2018-12-17 RX ORDER — PANTOPRAZOLE SODIUM 40 MG/10ML
40 INJECTION, POWDER, LYOPHILIZED, FOR SOLUTION INTRAVENOUS DAILY
Status: DISCONTINUED | OUTPATIENT
Start: 2018-12-17 | End: 2018-12-17

## 2018-12-17 RX ORDER — PROPOFOL 10 MG/ML
INJECTION, EMULSION INTRAVENOUS PRN
Status: DISCONTINUED | OUTPATIENT
Start: 2018-12-17 | End: 2018-12-17 | Stop reason: SDUPTHER

## 2018-12-17 RX ORDER — METOPROLOL TARTRATE 5 MG/5ML
2.5 INJECTION INTRAVENOUS EVERY 8 HOURS
Status: DISCONTINUED | OUTPATIENT
Start: 2018-12-17 | End: 2018-12-22

## 2018-12-17 RX ORDER — ACETAMINOPHEN 325 MG/1
650 TABLET ORAL EVERY 4 HOURS PRN
Status: DISCONTINUED | OUTPATIENT
Start: 2018-12-17 | End: 2018-12-24 | Stop reason: HOSPADM

## 2018-12-17 RX ORDER — PANTOPRAZOLE SODIUM 40 MG/10ML
40 INJECTION, POWDER, LYOPHILIZED, FOR SOLUTION INTRAVENOUS DAILY
Status: DISCONTINUED | OUTPATIENT
Start: 2018-12-17 | End: 2018-12-20

## 2018-12-17 RX ORDER — SODIUM CHLORIDE 0.9 % (FLUSH) 0.9 %
10 SYRINGE (ML) INJECTION EVERY 12 HOURS SCHEDULED
Status: DISCONTINUED | OUTPATIENT
Start: 2018-12-17 | End: 2018-12-24 | Stop reason: HOSPADM

## 2018-12-17 RX ORDER — LABETALOL HYDROCHLORIDE 5 MG/ML
10 INJECTION, SOLUTION INTRAVENOUS EVERY 10 MIN PRN
Status: DISCONTINUED | OUTPATIENT
Start: 2018-12-17 | End: 2018-12-17 | Stop reason: HOSPADM

## 2018-12-17 RX ORDER — PROMETHAZINE HYDROCHLORIDE 25 MG/ML
12.5 INJECTION, SOLUTION INTRAMUSCULAR; INTRAVENOUS
Status: DISCONTINUED | OUTPATIENT
Start: 2018-12-17 | End: 2018-12-17 | Stop reason: HOSPADM

## 2018-12-17 RX ORDER — ONDANSETRON 2 MG/ML
4 INJECTION INTRAMUSCULAR; INTRAVENOUS ONCE
Status: COMPLETED | OUTPATIENT
Start: 2018-12-17 | End: 2018-12-17

## 2018-12-17 RX ORDER — MORPHINE SULFATE 2 MG/ML
2 INJECTION, SOLUTION INTRAMUSCULAR; INTRAVENOUS ONCE
Status: COMPLETED | OUTPATIENT
Start: 2018-12-17 | End: 2018-12-17

## 2018-12-17 RX ORDER — ONDANSETRON 2 MG/ML
4 INJECTION INTRAMUSCULAR; INTRAVENOUS EVERY 6 HOURS PRN
Status: DISCONTINUED | OUTPATIENT
Start: 2018-12-17 | End: 2018-12-24 | Stop reason: HOSPADM

## 2018-12-17 RX ORDER — ALBUMIN, HUMAN INJ 5% 5 %
SOLUTION INTRAVENOUS PRN
Status: DISCONTINUED | OUTPATIENT
Start: 2018-12-17 | End: 2018-12-17 | Stop reason: SDUPTHER

## 2018-12-17 RX ORDER — FENTANYL CITRATE 50 UG/ML
50 INJECTION, SOLUTION INTRAMUSCULAR; INTRAVENOUS EVERY 5 MIN PRN
Status: DISCONTINUED | OUTPATIENT
Start: 2018-12-17 | End: 2018-12-17 | Stop reason: HOSPADM

## 2018-12-17 RX ORDER — FENTANYL CITRATE 50 UG/ML
INJECTION, SOLUTION INTRAMUSCULAR; INTRAVENOUS PRN
Status: DISCONTINUED | OUTPATIENT
Start: 2018-12-17 | End: 2018-12-17 | Stop reason: SDUPTHER

## 2018-12-17 RX ORDER — ONDANSETRON 2 MG/ML
INJECTION INTRAMUSCULAR; INTRAVENOUS PRN
Status: DISCONTINUED | OUTPATIENT
Start: 2018-12-17 | End: 2018-12-17 | Stop reason: SDUPTHER

## 2018-12-17 RX ORDER — SODIUM CHLORIDE 9 MG/ML
INJECTION, SOLUTION INTRAVENOUS CONTINUOUS
Status: DISCONTINUED | OUTPATIENT
Start: 2018-12-17 | End: 2018-12-21

## 2018-12-17 RX ORDER — VASOPRESSIN 20 U/ML
INJECTION PARENTERAL PRN
Status: DISCONTINUED | OUTPATIENT
Start: 2018-12-17 | End: 2018-12-17 | Stop reason: SDUPTHER

## 2018-12-17 RX ORDER — IPRATROPIUM BROMIDE AND ALBUTEROL SULFATE 2.5; .5 MG/3ML; MG/3ML
1 SOLUTION RESPIRATORY (INHALATION) EVERY 4 HOURS PRN
Status: DISCONTINUED | OUTPATIENT
Start: 2018-12-17 | End: 2018-12-24 | Stop reason: HOSPADM

## 2018-12-17 RX ORDER — SUCCINYLCHOLINE CHLORIDE 20 MG/ML
INJECTION INTRAMUSCULAR; INTRAVENOUS PRN
Status: DISCONTINUED | OUTPATIENT
Start: 2018-12-17 | End: 2018-12-17 | Stop reason: SDUPTHER

## 2018-12-17 RX ORDER — IPRATROPIUM BROMIDE AND ALBUTEROL SULFATE 2.5; .5 MG/3ML; MG/3ML
1 SOLUTION RESPIRATORY (INHALATION) 4 TIMES DAILY
Status: DISCONTINUED | OUTPATIENT
Start: 2018-12-17 | End: 2018-12-19

## 2018-12-17 RX ORDER — SODIUM CHLORIDE 9 MG/ML
INJECTION, SOLUTION INTRAVENOUS CONTINUOUS
Status: DISCONTINUED | OUTPATIENT
Start: 2018-12-17 | End: 2018-12-18

## 2018-12-17 RX ORDER — 0.9 % SODIUM CHLORIDE 0.9 %
10 VIAL (ML) INJECTION DAILY
Status: DISCONTINUED | OUTPATIENT
Start: 2018-12-17 | End: 2018-12-23

## 2018-12-17 RX ORDER — SODIUM CHLORIDE 0.9 % (FLUSH) 0.9 %
10 SYRINGE (ML) INJECTION PRN
Status: DISCONTINUED | OUTPATIENT
Start: 2018-12-17 | End: 2018-12-24 | Stop reason: HOSPADM

## 2018-12-17 RX ORDER — 0.9 % SODIUM CHLORIDE 0.9 %
1000 INTRAVENOUS SOLUTION INTRAVENOUS ONCE
Status: COMPLETED | OUTPATIENT
Start: 2018-12-17 | End: 2018-12-17

## 2018-12-17 RX ORDER — ROCURONIUM BROMIDE 10 MG/ML
INJECTION, SOLUTION INTRAVENOUS PRN
Status: DISCONTINUED | OUTPATIENT
Start: 2018-12-17 | End: 2018-12-17 | Stop reason: SDUPTHER

## 2018-12-17 RX ADMIN — Medication 10 ML: at 20:24

## 2018-12-17 RX ADMIN — PHENYLEPHRINE HYDROCHLORIDE 200 MCG: 10 INJECTION INTRAVENOUS at 12:32

## 2018-12-17 RX ADMIN — SODIUM CHLORIDE: 9 INJECTION, SOLUTION INTRAVENOUS at 15:43

## 2018-12-17 RX ADMIN — PHENYLEPHRINE HYDROCHLORIDE 100 MCG: 10 INJECTION INTRAVENOUS at 12:28

## 2018-12-17 RX ADMIN — IPRATROPIUM BROMIDE AND ALBUTEROL SULFATE 1 AMPULE: .5; 3 SOLUTION RESPIRATORY (INHALATION) at 20:45

## 2018-12-17 RX ADMIN — MORPHINE SULFATE 2 MG: 2 INJECTION, SOLUTION INTRAMUSCULAR; INTRAVENOUS at 03:48

## 2018-12-17 RX ADMIN — FENTANYL CITRATE 50 MCG: 50 INJECTION, SOLUTION INTRAMUSCULAR; INTRAVENOUS at 14:18

## 2018-12-17 RX ADMIN — HYDROMORPHONE HYDROCHLORIDE 0.5 MG: 1 INJECTION, SOLUTION INTRAMUSCULAR; INTRAVENOUS; SUBCUTANEOUS at 22:55

## 2018-12-17 RX ADMIN — METRONIDAZOLE 500 MG: 500 INJECTION, SOLUTION INTRAVENOUS at 05:53

## 2018-12-17 RX ADMIN — MORPHINE SULFATE 2 MG: 2 INJECTION, SOLUTION INTRAMUSCULAR; INTRAVENOUS at 06:46

## 2018-12-17 RX ADMIN — IOPAMIDOL 80 ML: 755 INJECTION, SOLUTION INTRAVENOUS at 02:55

## 2018-12-17 RX ADMIN — FENTANYL CITRATE 50 MCG: 50 INJECTION, SOLUTION INTRAMUSCULAR; INTRAVENOUS at 14:23

## 2018-12-17 RX ADMIN — METRONIDAZOLE 500 MG: 500 INJECTION, SOLUTION INTRAVENOUS at 21:53

## 2018-12-17 RX ADMIN — PHENYLEPHRINE HYDROCHLORIDE 200 MCG: 10 INJECTION INTRAVENOUS at 12:30

## 2018-12-17 RX ADMIN — METRONIDAZOLE 500 MG: 500 INJECTION, SOLUTION INTRAVENOUS at 13:08

## 2018-12-17 RX ADMIN — PANTOPRAZOLE SODIUM 40 MG: 40 INJECTION, POWDER, FOR SOLUTION INTRAVENOUS at 18:33

## 2018-12-17 RX ADMIN — PROPOFOL 100 MG: 10 INJECTION, EMULSION INTRAVENOUS at 12:28

## 2018-12-17 RX ADMIN — FENTANYL CITRATE 50 MCG: 50 INJECTION INTRAMUSCULAR; INTRAVENOUS at 14:06

## 2018-12-17 RX ADMIN — ONDANSETRON 4 MG: 2 INJECTION INTRAMUSCULAR; INTRAVENOUS at 03:48

## 2018-12-17 RX ADMIN — HYDROCORTISONE SODIUM SUCCINATE 100 MG: 100 INJECTION, POWDER, FOR SOLUTION INTRAMUSCULAR; INTRAVENOUS at 13:27

## 2018-12-17 RX ADMIN — PIPERACILLIN SODIUM,TAZOBACTAM SODIUM 3.38 G: 3; .375 INJECTION, POWDER, FOR SOLUTION INTRAVENOUS at 06:47

## 2018-12-17 RX ADMIN — LIDOCAINE HYDROCHLORIDE 100 MG: 20 INJECTION, SOLUTION INFILTRATION; PERINEURAL at 12:28

## 2018-12-17 RX ADMIN — SUGAMMADEX 200 MG: 100 INJECTION, SOLUTION INTRAVENOUS at 13:48

## 2018-12-17 RX ADMIN — Medication 10 ML: at 18:33

## 2018-12-17 RX ADMIN — ALBUMIN (HUMAN) 12.5 G: 12.5 SOLUTION INTRAVENOUS at 12:57

## 2018-12-17 RX ADMIN — IPRATROPIUM BROMIDE AND ALBUTEROL SULFATE 1 AMPULE: .5; 3 SOLUTION RESPIRATORY (INHALATION) at 17:01

## 2018-12-17 RX ADMIN — METRONIDAZOLE 500 MG: 500 INJECTION, SOLUTION INTRAVENOUS at 14:00

## 2018-12-17 RX ADMIN — HYDROMORPHONE HYDROCHLORIDE 0.5 MG: 1 INJECTION, SOLUTION INTRAMUSCULAR; INTRAVENOUS; SUBCUTANEOUS at 17:57

## 2018-12-17 RX ADMIN — LABETALOL 20 MG/4 ML (5 MG/ML) INTRAVENOUS SYRINGE 5 MG: at 13:56

## 2018-12-17 RX ADMIN — SODIUM CHLORIDE 1000 ML: 9 INJECTION, SOLUTION INTRAVENOUS at 05:53

## 2018-12-17 RX ADMIN — FENTANYL CITRATE 50 MCG: 50 INJECTION INTRAMUSCULAR; INTRAVENOUS at 12:52

## 2018-12-17 RX ADMIN — ROCURONIUM BROMIDE 10 MG: 10 INJECTION INTRAVENOUS at 13:37

## 2018-12-17 RX ADMIN — FENTANYL CITRATE 100 MCG: 50 INJECTION INTRAMUSCULAR; INTRAVENOUS at 12:28

## 2018-12-17 RX ADMIN — SODIUM CHLORIDE: 9 INJECTION, SOLUTION INTRAVENOUS at 12:55

## 2018-12-17 RX ADMIN — HYDROMORPHONE HYDROCHLORIDE 0.5 MG: 1 INJECTION, SOLUTION INTRAMUSCULAR; INTRAVENOUS; SUBCUTANEOUS at 20:22

## 2018-12-17 RX ADMIN — VASOPRESSIN 2 UNITS: 20 INJECTION INTRAVENOUS at 12:35

## 2018-12-17 RX ADMIN — SUCCINYLCHOLINE CHLORIDE 120 MG: 20 INJECTION, SOLUTION INTRAMUSCULAR; INTRAVENOUS at 12:28

## 2018-12-17 RX ADMIN — ONDANSETRON HYDROCHLORIDE 4 MG: 4 INJECTION, SOLUTION INTRAMUSCULAR; INTRAVENOUS at 13:37

## 2018-12-17 RX ADMIN — PHENYLEPHRINE HYDROCHLORIDE 300 MCG: 10 INJECTION INTRAVENOUS at 12:35

## 2018-12-17 RX ADMIN — FLUCONAZOLE 100 MG: 2 INJECTION, SOLUTION INTRAVENOUS at 18:30

## 2018-12-17 ASSESSMENT — PAIN DESCRIPTION - LOCATION
LOCATION: ABDOMEN

## 2018-12-17 ASSESSMENT — PULMONARY FUNCTION TESTS
PIF_VALUE: 18
PIF_VALUE: 15
PIF_VALUE: 14
PIF_VALUE: 15
PIF_VALUE: 15
PIF_VALUE: 2
PIF_VALUE: 12
PIF_VALUE: 15
PIF_VALUE: 12
PIF_VALUE: 15
PIF_VALUE: 14
PIF_VALUE: 15
PIF_VALUE: 14
PIF_VALUE: 2
PIF_VALUE: 14
PIF_VALUE: 15
PIF_VALUE: 16
PIF_VALUE: 14
PIF_VALUE: 15
PIF_VALUE: 3
PIF_VALUE: 15
PIF_VALUE: 14
PIF_VALUE: 8
PIF_VALUE: 14
PIF_VALUE: 3
PIF_VALUE: 14
PIF_VALUE: 14
PIF_VALUE: 15
PIF_VALUE: 14
PIF_VALUE: 16
PIF_VALUE: 12
PIF_VALUE: 6
PIF_VALUE: 15
PIF_VALUE: 15
PIF_VALUE: 12
PIF_VALUE: 14
PIF_VALUE: 2
PIF_VALUE: 15
PIF_VALUE: 14
PIF_VALUE: 14
PIF_VALUE: 12
PIF_VALUE: 15
PIF_VALUE: 14
PIF_VALUE: 15
PIF_VALUE: 2
PIF_VALUE: 15
PIF_VALUE: 15
PIF_VALUE: 11
PIF_VALUE: 14
PIF_VALUE: 14
PIF_VALUE: 13
PIF_VALUE: 0
PIF_VALUE: 15
PIF_VALUE: 3
PIF_VALUE: 15
PIF_VALUE: 15
PIF_VALUE: 14
PIF_VALUE: 15
PIF_VALUE: 15
PIF_VALUE: 14
PIF_VALUE: 14
PIF_VALUE: 15
PIF_VALUE: 39
PIF_VALUE: 14
PIF_VALUE: 2
PIF_VALUE: 12
PIF_VALUE: 2
PIF_VALUE: 14
PIF_VALUE: 12
PIF_VALUE: 3
PIF_VALUE: 3
PIF_VALUE: 12
PIF_VALUE: 14
PIF_VALUE: 10
PIF_VALUE: 6
PIF_VALUE: 14
PIF_VALUE: 15
PIF_VALUE: 14
PIF_VALUE: 14
PIF_VALUE: 9
PIF_VALUE: 15
PIF_VALUE: 14
PIF_VALUE: 15
PIF_VALUE: 14
PIF_VALUE: 15
PIF_VALUE: 3
PIF_VALUE: 15
PIF_VALUE: 7
PIF_VALUE: 14
PIF_VALUE: 3
PIF_VALUE: 15
PIF_VALUE: 15
PIF_VALUE: 16
PIF_VALUE: 14
PIF_VALUE: 12

## 2018-12-17 ASSESSMENT — PAIN SCALES - GENERAL
PAINLEVEL_OUTOF10: 4
PAINLEVEL_OUTOF10: 9
PAINLEVEL_OUTOF10: 8
PAINLEVEL_OUTOF10: 8
PAINLEVEL_OUTOF10: 3
PAINLEVEL_OUTOF10: 8
PAINLEVEL_OUTOF10: 9
PAINLEVEL_OUTOF10: 7
PAINLEVEL_OUTOF10: 8
PAINLEVEL_OUTOF10: 8
PAINLEVEL_OUTOF10: 9

## 2018-12-17 ASSESSMENT — PAIN DESCRIPTION - PAIN TYPE
TYPE: ACUTE PAIN
TYPE: SURGICAL PAIN
TYPE: ACUTE PAIN

## 2018-12-17 ASSESSMENT — PAIN DESCRIPTION - ORIENTATION
ORIENTATION: LOWER

## 2018-12-17 ASSESSMENT — COPD QUESTIONNAIRES: CAT_SEVERITY: MODERATE

## 2018-12-17 NOTE — CONSULTS
evaluated and felt to have a muscle strain. Was given steroids. No change with lower back pain but developed new onset of generalized abdominal pain last night. Associated with nausea but no emesis. No change with bowel function. Admits to having had taken milk of magnesia prior to onset of pain. Pain is generalized. Severe. Worse with movement and palpation. No fever, chills or sweats. No shortness of breath. No chest pain. No lightheadedness or dizziness. No new urinary complaints. No hematochezia or melena. 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 Also some intraperitoneal fluid and upper abdomen with one small bubble of intraperitoneal free air. Patient and daughter deny previous gastric ulcerations or major abdominal surgery other than previous hernias. Patient and daughter unsure what they would like to do given all of the information as they did not know about the cancer either. Past Medical History:      Diagnosis Date    CAD (coronary artery disease)     CAD (coronary artery disease) 9/2/2014    COPD (chronic obstructive pulmonary disease) ([facility] [address]     Diverticulitis     Hiatal hernia     Hyperlipemia 8/12/2013    Hyperlipidemia     Pleural plaque 8/12/2013     Past Surgical History:      Procedure Laterality Date    APPENDECTOMY      CARDIAC CATHETERIZATION      CYSTOSCOPY  03/17/2015    Litholapaxy-Large    EYE SURGERY      cataracts    HERNIA REPAIR       Medications:  Prior to Admission medications    Medication Sig Start Date End Date Taking?  Authorizing Provider   predniSONE (DELTASONE) 10 MG tablet 2 tablets 2 times a day for 2 days then 1  Tablet 2 times a day for 2 days, then 1 tablet daily till gone 12/13/18  Yes Lori Rosario MD   tiZANidine (ZANAFLEX) 2 MG capsule Take 1 capsule by mouth 3 times daily as needed for Muscle spasms 12/13/18  Yes Lori Rosario MD   terazosin (HYTRIN) 5 MG capsule TAKE 1 CAPSULE BY MOUTH ONCE DAILY 12/6/18  Yes Silvano Machado MD   finasteride (PROSCAR) 5 MG tablet Take 1 tablet by mouth daily 1/16/18  Yes JAILYN Jimenez - CNP   albuterol sulfate HFA (PROAIR HFA) 108 (90 Base) MCG/ACT inhaler Inhale 2 puffs into the lungs 2 times daily 10/26/17  Yes Silvano Machado MD   aspirin 325 MG EC tablet Take 325 mg by mouth daily   Yes Historical Provider, MD   Multiple Vitamins-Minerals (THERAPEUTIC MULTIVITAMIN-MINERALS) tablet Take 1 tablet by mouth daily    Historical Provider, MD    Scheduled Meds:   piperacillin-tazobactam  3.375 g Intravenous Once     Continuous Infusions:  PRN Meds:. Allergies:  has No Known Allergies. Family History:  family history is not on file. Social History:   reports that he quit smoking about 52 years ago. His smoking use included Cigarettes. He smoked 1.00 pack per day. He has never used smokeless tobacco. He reports that he drinks alcohol. He reports that he does not use drugs. Review of Systems:  General Denies any fever or chills. No significant unexpected weight change. HEENT Denies any diplopia, tinnitus or vertigo. No chronic headaches. Resp Denies any shortness of breath, cough or wheezing. COPD. Cardiac Denies any chest pain, palpitations, claudication or edema. Coronary artery disease. GI Denies any melena, hematochezia, hematemesis or pyrosis. Abdominal pain as mentioned above.  Denies any new frequency, urgency, hesitancy or incontinence  Heme Denies bruising or bleeding easily  Endocrine Denies any history of diabetes or thyroid disease  Neuro Denies any new focal motor or sensory deficits  Musculoskeletal  Osteoarthritis. No gout. No weakness. Psychiatric  Denies any severe depression or agitation. No panic attacks. No suicidal ideation. OBJECTIVE:   CURRENT VITALS:  height is 5' 9\" (1.753 m) and weight is 132 lb (59.9 kg). His oral temperature is 97.5 °F (36.4 °C). His blood pressure is 106/78 and his pulse is 55. Stomach and duodenum are unremarkable. There is colonic diverticulosis without diverticulitis. There are mildly dilated air and fluid-filled small bowel loops throughout the abdomen and pelvis with mild wall thickening consistent with an ileus and/or enteritis.       Appendix: The appendix is absent and there are staples adjacent to the wall of the cecum consistent with prior appendectomy.       Omentum and mesentery: Unremarkable       Aorta, vascular: No aortic aneurysm or dissection. There are aortoiliofemoral atherosclerotic calcifications.        Reproductive: There is nonspecific enlargement of the prostate gland, correlate clinically. It projects into the base of the urinary bladder. There are nonspecific prostate calcifications.       Bladder: There is layering hyperdense material in the dependent portion of the bladder which may represent calculi, blood, or other hyperdense material.. No wall thickening or obvious mass.        Intraperitoneal/retroperitoneal Space: There is mild ascites in the abdomen adjacent to the liver and in the pelvis. There is a minimal pneumoperitoneum of uncertain etiology with a solitary air bubble in the ascites fluid anterior to the liver. There is    no abscess. There is bilateral pelvic adenopathy worrisome for metastatic disease. This may be secondary to metastatic prostate carcinoma.        Abdominal and pelvic body wall soft tissues: There is symmetric soft tissue masslike prominence in the inguinal canals which may be due to fluid or may be due to multiple testicles ascending into the inguinal canals which were not present previously.       Musculoskeletal structures: There is a sclerotic lesion in the posterior left 10th rib which was not imaged previously and is worrisome for a sclerotic metastasis. There are also new sclerotic lesions with probable pathologic compression fractures of L1,    L2, and L3, most severely involving L1.  Sclerotic foci are also seen in the

## 2018-12-17 NOTE — ANESTHESIA POSTPROCEDURE EVALUATION
Department of Anesthesiology  Postprocedure Note    Patient: Rosanna Redmond  MRN: [id number]  YOB: 1926  Date of evaluation: 12/17/2018  Time:  3:52 PM     Procedure Summary     Date:  12/17/18 Room / Location:  [facility] OR [address] [place] [address]    Anesthesia Start:  1215 Anesthesia Stop:  1406    Procedure:  EXPLORATORY LAPAROTOMY, WITH ABDOMINAL WASHOUT (N/A Abdomen) Diagnosis:  (PERITONITIS, ABDOMINAL PAIN)    Surgeon:  Annelle Spurling, MD Responsible Provider:  Rosi Oakes DO    Anesthesia Type:  general ASA Status:  3 - Emergent          Anesthesia Type: general    Sebas Phase I: Sebas Score: 7    Sebas Phase II:      Last vitals: Reviewed and per EMR flowsheets. Anesthesia Post Evaluation    Comments: Eric[address]gabbi [address]  POST-ANESTHESIA NOTE       Name:  Rosanna Redmond                                         Age:  80 y.o.   MRN:  [id number]      Last Vitals:  BP 93/64   Pulse 81   Temp 99.5 °F (37.5 °C) (Temporal)   Resp 20   Ht 5' 9\" (1.753 m)   Wt 133 lb (60.3 kg)   SpO2 95%   BMI 19.64 kg/m²   Patient Vitals in the past 4 hrs:  12/17/18 1515, BP:93/64, Pulse:81, Resp:20, SpO2:95 %  12/17/18 1510, Pulse:81, Resp:18  12/17/18 1505, BP:(!) 98/54, Pulse:78, Resp:24, SpO2:97 %  12/17/18 1500, BP:(!) 92/50, Pulse:79, Resp:16, SpO2:97 %  12/17/18 1455, BP:(!) 97/53, Pulse:85, Resp:12, SpO2:95 %  12/17/18 1450, BP:(!) 95/53, Pulse:97, Resp:20, SpO2:96 %  12/17/18 1445, BP:(!) 96/54, Pulse:77, Resp:24, SpO2:97 %  12/17/18 1440, BP:(!) 90/53, Pulse:81, Resp:10, SpO2:97 %  12/17/18 1435, BP:116/66, Pulse:85, Resp:11, SpO2:90 %  12/17/18 1430, BP:117/66, Pulse:91, Resp:13, SpO2:94 %  12/17/18 1425, BP:113/65, Pulse:88, Resp:19, SpO2:95 %  12/17/18 1420, Pulse:100, Resp:26, SpO2:92 %  12/17/18 1415, BP:(!) 107/57, Pulse:89, Resp:14, SpO2:96 %  12/17/18 1410, BP:112/73, Pulse:89, Resp:27, SpO2:96 %  12/17/18 1405, BP:(!) 109/54, Pulse:85, Resp:16, SpO2:97 %  12/17/18 1401, Temp:99.5 °F (37.5 °C), Temp src:Temporal, Pulse:85, Resp:14, SpO2:97 %    Level of Consciousness:  Awake    Respiratory:  Stable    Oxygen Saturation:  Stable    Cardiovascular:  Stable    Hydration:  Adequate    PONV:  Stable    Post-op Pain:  Adequate analgesia    Post-op Assessment:  No apparent anesthetic complications    Additional Follow-Up / Treatment / Comment:  None    Noemi Farris DO  December 17, 2018   3:52 PM

## 2018-12-17 NOTE — ED NOTES
Pt presents to the ED for abdominal pain with daughter. Pt states the pain started around 2100 tonight. Pt states he took milk of magnesium after the pain started. Pt states he is nauseous but denies emesis. Pt denies chest pain and SOB at this time. Respirations easy and unlabored.       Christi Mckeon RN  12/17/18 [id number]

## 2018-12-17 NOTE — H&P
[address] [place] [address]                              HISTORY AND PHYSICAL    PATIENT NAME: Anai Lynn                 :        1926  MED REC NO:   [id number]                           ROOM:       0019  ACCOUNT NO:   [de-identified]                           ADMIT DATE: 2018  PROVIDER:     Lili Primrose, M.D.    CHIEF COMPLAINT:  Abdominal pain. HISTORY OF PRESENT ILLNESS:  This is a 80-year-old male with past  medical history of COPD, previous history of coronary artery disease,  had one stent placed more than 10 years back, along with hyperlipidemia,  statins were stopped a few years back, has been complaining of pain in  his back radiating to his left groin for a week. He had seen his family  physician, was given steroid and a pain shot and placed on oral  steroids. The patient finally felt that his back pain was getting  better yesterday last night, but the patient then started having  significant abdominal pain and called his daughter at around 1:30 in the  morning, who eventually brought him to the hospital.  Workup in the ER,  his white count was normal at 7.9. CAT scan of the abdomen and pelvis  showed mild ascites adjacent to the liver and in the pelvis and minimal  pneumoperitoneum of uncertain etiology, with a solitary air bubble in  the ascitic fluid anterior to the liver was noted. There was no  abscess. There was bilateral pelvic adenopathy worrisome for metastatic  disease, multiple sclerotic metastatic compression fractures of L1 to L3  noted. Family initially informed ER that they did not want anything  aggressive done, but since he did have an acute abdomen, I did request  that at least give him the option of a surgical input. Dr. Chris Kendall  was consulted who had seen the patient right away and did give them the  option of starting him on fluids and antibiotics.   He did re-evaluate  the Bowel  sounds not appreciated. EXTREMITIES:  Warm. NEUROLOGIC:  He is oriented to person, place, and time. He is following  instructions. LABORATORY DATA:  His EKG is pending. Sodium was 138, potassium 3.6,  chloride of 92, CO2 is 28, BUN of 40, creatinine of 1.3. Lactic acid  was 3.9, calcium was 9.3, ALT was 19, AST was 13, alkaline phosphatase  was 137. WBC was 7.9, hemoglobin 14.7, hematocrit of 44.1, platelets of  534. IMPRESSION:  1. Acute abdomen with bowel perforation. The patient is now agreeable  for surgery. 2.  History of coronary artery disease with previous stent placement  years back. 3.  History of COPD. 4.  History of hyperlipidemia. 5.  Lytic compression fractures, need to rule out prostate cancer. RECOMMENDATIONS:  1. Continue antibiotics, fluids. The patient is being planned to go to  OR soon. 2.  We will get an EKG. 3.  GI prophylaxis, adequate pain control, antiemetics.         Lugenia Romberg, M.D.    D: 12/17/2018 10:12:31       T: 12/17/2018 11:11:20     [email or web address]  Job#: [id number]     Doc#: [id number]    CC:

## 2018-12-17 NOTE — BRIEF OP NOTE
Brief Postoperative Note  ______________________________________________________________    Patient: Elly Whitehead  YOB: 1926  MRN: [id number]  Date of Procedure: 12/17/2018    Pre-Op Diagnosis: PERITONITIS, ABDOMINAL PAIN    Post-Op Diagnosis: Same       Procedure(s):  EXPLORATORY LAPAROTOMY, WITH ABDOMINAL WASHOUT    Anesthesia: General    Surgeon(s):  Darion Ansari MD    Assistant: none    Estimated Blood Loss (mL): 50    Complications: None    Specimens:   * No specimens in log *    Implants:  * No implants in log *      Drains:   Closed/Suction Drain (Active)       Urethral Catheter (Active)       [REMOVED] Urethral Catheter 18 fr (Removed)       Findings: no bowel perforation found    Darion Ansari MD  Date: 12/17/2018  Time: 1:49 PM

## 2018-12-18 PROBLEM — E43 SEVERE MALNUTRITION (HCC): Status: ACTIVE | Noted: 2018-12-18

## 2018-12-18 LAB
ANION GAP SERPL CALCULATED.3IONS-SCNC: 12 MEQ/L (ref 8–16)
ANISOCYTOSIS: PRESENT
BASOPHILIA: ABNORMAL
BASOPHILS # BLD: 0.1 %
BASOPHILS ABSOLUTE: 0 THOU/MM3 (ref 0–0.1)
BUN BLDV-MCNC: 29 MG/DL (ref 7–22)
CALCIUM SERPL-MCNC: 8.1 MG/DL (ref 8.5–10.5)
CHLORIDE BLD-SCNC: 105 MEQ/L (ref 98–111)
CO2: 23 MEQ/L (ref 23–33)
CREAT SERPL-MCNC: 0.9 MG/DL (ref 0.4–1.2)
DIFFERENTIAL TYPE: ABNORMAL
EOSINOPHIL # BLD: 0 %
EOSINOPHILS ABSOLUTE: 0 THOU/MM3 (ref 0–0.4)
ERYTHROCYTE [DISTWIDTH] IN BLOOD BY AUTOMATED COUNT: 14.2 % (ref 11.5–14.5)
ERYTHROCYTE [DISTWIDTH] IN BLOOD BY AUTOMATED COUNT: 53.9 FL (ref 35–45)
GFR SERPL CREATININE-BSD FRML MDRD: 79 ML/MIN/1.73M2
GLUCOSE BLD-MCNC: 135 MG/DL (ref 70–108)
HCT VFR BLD CALC: 39.3 % (ref 42–52)
HEMOGLOBIN: 13 GM/DL (ref 14–18)
IMMATURE GRANS (ABS): 0.03 THOU/MM3 (ref 0–0.07)
IMMATURE GRANULOCYTES: 0.3 %
LYMPHOCYTES # BLD: 3.4 %
LYMPHOCYTES ABSOLUTE: 0.3 THOU/MM3 (ref 1–4.8)
MCH RBC QN AUTO: 34.2 PG (ref 26–33)
MCHC RBC AUTO-ENTMCNC: 33.1 GM/DL (ref 32.2–35.5)
MCV RBC AUTO: 103.4 FL (ref 80–94)
MONOCYTES # BLD: 2.5 %
MONOCYTES ABSOLUTE: 0.3 THOU/MM3 (ref 0.4–1.3)
NUCLEATED RED BLOOD CELLS: 0 /100 WBC
PATHOLOGIST REVIEW: ABNORMAL
PLATELET # BLD: 183 THOU/MM3 (ref 130–400)
PMV BLD AUTO: 10.1 FL (ref 9.4–12.4)
POTASSIUM SERPL-SCNC: 4.3 MEQ/L (ref 3.5–5.2)
RBC # BLD: 3.8 MILL/MM3 (ref 4.7–6.1)
SCAN OF BLOOD SMEAR: NORMAL
SEG NEUTROPHILS: 93.7 %
SEGMENTED NEUTROPHILS ABSOLUTE COUNT: 9.5 THOU/MM3 (ref 1.8–7.7)
SODIUM BLD-SCNC: 140 MEQ/L (ref 135–145)
WBC # BLD: 10.1 THOU/MM3 (ref 4.8–10.8)

## 2018-12-18 PROCEDURE — 36415 COLL VENOUS BLD VENIPUNCTURE: CPT

## 2018-12-18 PROCEDURE — C9113 INJ PANTOPRAZOLE SODIUM, VIA: HCPCS | Performed by: SURGERY

## 2018-12-18 PROCEDURE — 94640 AIRWAY INHALATION TREATMENT: CPT

## 2018-12-18 PROCEDURE — 6360000002 HC RX W HCPCS: Performed by: SURGERY

## 2018-12-18 PROCEDURE — 6360000002 HC RX W HCPCS: Performed by: PHARMACIST

## 2018-12-18 PROCEDURE — 2700000000 HC OXYGEN THERAPY PER DAY

## 2018-12-18 PROCEDURE — 2060000000 HC ICU INTERMEDIATE R&B

## 2018-12-18 PROCEDURE — 2580000003 HC RX 258: Performed by: SURGERY

## 2018-12-18 PROCEDURE — 85025 COMPLETE CBC W/AUTO DIFF WBC: CPT

## 2018-12-18 PROCEDURE — 80048 BASIC METABOLIC PNL TOTAL CA: CPT

## 2018-12-18 PROCEDURE — 99024 POSTOP FOLLOW-UP VISIT: CPT | Performed by: NURSE PRACTITIONER

## 2018-12-18 PROCEDURE — 2709999900 HC NON-CHARGEABLE SUPPLY

## 2018-12-18 PROCEDURE — 6360000002 HC RX W HCPCS: Performed by: NURSE PRACTITIONER

## 2018-12-18 PROCEDURE — 6370000000 HC RX 637 (ALT 250 FOR IP): Performed by: INTERNAL MEDICINE

## 2018-12-18 PROCEDURE — 2500000003 HC RX 250 WO HCPCS: Performed by: INTERNAL MEDICINE

## 2018-12-18 PROCEDURE — APPSS30 APP SPLIT SHARED TIME 16-30 MINUTES: Performed by: NURSE PRACTITIONER

## 2018-12-18 RX ADMIN — HYDROMORPHONE HYDROCHLORIDE 0.25 MG: 1 INJECTION, SOLUTION INTRAMUSCULAR; INTRAVENOUS; SUBCUTANEOUS at 20:53

## 2018-12-18 RX ADMIN — IPRATROPIUM BROMIDE AND ALBUTEROL SULFATE 1 AMPULE: .5; 3 SOLUTION RESPIRATORY (INHALATION) at 20:10

## 2018-12-18 RX ADMIN — IPRATROPIUM BROMIDE AND ALBUTEROL SULFATE 1 AMPULE: .5; 3 SOLUTION RESPIRATORY (INHALATION) at 08:19

## 2018-12-18 RX ADMIN — METOPROLOL TARTRATE 2.5 MG: 1 INJECTION, SOLUTION INTRAVENOUS at 19:10

## 2018-12-18 RX ADMIN — HYDROMORPHONE HYDROCHLORIDE 0.5 MG: 1 INJECTION, SOLUTION INTRAMUSCULAR; INTRAVENOUS; SUBCUTANEOUS at 11:30

## 2018-12-18 RX ADMIN — SODIUM CHLORIDE: 9 INJECTION, SOLUTION INTRAVENOUS at 18:30

## 2018-12-18 RX ADMIN — Medication 10 ML: at 20:54

## 2018-12-18 RX ADMIN — HYDROMORPHONE HYDROCHLORIDE 0.5 MG: 1 INJECTION, SOLUTION INTRAMUSCULAR; INTRAVENOUS; SUBCUTANEOUS at 03:23

## 2018-12-18 RX ADMIN — HYDROMORPHONE HYDROCHLORIDE 0.5 MG: 1 INJECTION, SOLUTION INTRAMUSCULAR; INTRAVENOUS; SUBCUTANEOUS at 08:56

## 2018-12-18 RX ADMIN — Medication 10 ML: at 08:57

## 2018-12-18 RX ADMIN — IPRATROPIUM BROMIDE AND ALBUTEROL SULFATE 1 AMPULE: .5; 3 SOLUTION RESPIRATORY (INHALATION) at 11:23

## 2018-12-18 RX ADMIN — ENOXAPARIN SODIUM 30 MG: 30 INJECTION SUBCUTANEOUS at 08:57

## 2018-12-18 RX ADMIN — HYDROMORPHONE HYDROCHLORIDE 0.5 MG: 1 INJECTION, SOLUTION INTRAMUSCULAR; INTRAVENOUS; SUBCUTANEOUS at 17:03

## 2018-12-18 RX ADMIN — HYDROMORPHONE HYDROCHLORIDE 0.5 MG: 1 INJECTION, SOLUTION INTRAMUSCULAR; INTRAVENOUS; SUBCUTANEOUS at 05:52

## 2018-12-18 RX ADMIN — METRONIDAZOLE 500 MG: 500 INJECTION, SOLUTION INTRAVENOUS at 05:52

## 2018-12-18 RX ADMIN — HYDROMORPHONE HYDROCHLORIDE 0.5 MG: 1 INJECTION, SOLUTION INTRAMUSCULAR; INTRAVENOUS; SUBCUTANEOUS at 14:28

## 2018-12-18 RX ADMIN — FLUCONAZOLE 100 MG: 2 INJECTION, SOLUTION INTRAVENOUS at 17:02

## 2018-12-18 RX ADMIN — IPRATROPIUM BROMIDE AND ALBUTEROL SULFATE 1 AMPULE: .5; 3 SOLUTION RESPIRATORY (INHALATION) at 15:35

## 2018-12-18 RX ADMIN — PANTOPRAZOLE SODIUM 40 MG: 40 INJECTION, POWDER, FOR SOLUTION INTRAVENOUS at 08:57

## 2018-12-18 RX ADMIN — PIPERACILLIN SODIUM,TAZOBACTAM SODIUM 3.38 G: 3; .375 INJECTION, POWDER, FOR SOLUTION INTRAVENOUS at 12:08

## 2018-12-18 RX ADMIN — PIPERACILLIN SODIUM,TAZOBACTAM SODIUM 3.38 G: 3; .375 INJECTION, POWDER, FOR SOLUTION INTRAVENOUS at 19:08

## 2018-12-18 ASSESSMENT — PAIN DESCRIPTION - PAIN TYPE
TYPE: SURGICAL PAIN

## 2018-12-18 ASSESSMENT — PAIN SCALES - GENERAL
PAINLEVEL_OUTOF10: 4
PAINLEVEL_OUTOF10: 4
PAINLEVEL_OUTOF10: 6
PAINLEVEL_OUTOF10: 8
PAINLEVEL_OUTOF10: 5
PAINLEVEL_OUTOF10: 7
PAINLEVEL_OUTOF10: 8
PAINLEVEL_OUTOF10: 8
PAINLEVEL_OUTOF10: 9
PAINLEVEL_OUTOF10: 4
PAINLEVEL_OUTOF10: 7
PAINLEVEL_OUTOF10: 4
PAINLEVEL_OUTOF10: 3
PAINLEVEL_OUTOF10: 3

## 2018-12-18 ASSESSMENT — PAIN DESCRIPTION - ONSET
ONSET: ON-GOING

## 2018-12-18 ASSESSMENT — PAIN DESCRIPTION - ORIENTATION
ORIENTATION: MID

## 2018-12-18 ASSESSMENT — PAIN DESCRIPTION - FREQUENCY
FREQUENCY: CONTINUOUS

## 2018-12-18 ASSESSMENT — PAIN DESCRIPTION - DESCRIPTORS
DESCRIPTORS: ACHING

## 2018-12-18 ASSESSMENT — PAIN DESCRIPTION - LOCATION
LOCATION: ABDOMEN

## 2018-12-18 ASSESSMENT — PAIN DESCRIPTION - PROGRESSION
CLINICAL_PROGRESSION: NOT CHANGED
CLINICAL_PROGRESSION: NOT CHANGED
CLINICAL_PROGRESSION: GRADUALLY WORSENING

## 2018-12-18 NOTE — OP NOTE
bleeding, infection,  the need for reoperation, severe chronic postoperative pain or numbness,  major vascular or nerve injury, cardiopulmonary complications,  anesthetic complications, seroma/hematoma formation, missed injury,  chronic pain, and death. After all of the questions were answered in  their entirety and the patient was completely aware of the current  situation, he and family wished to proceed with surgery. DESCRIPTION OF PROCEDURE:  After informed consent was signed and placed  on the chart, the patient was taken back to the operating room and  placed supine on the operating room table. General anesthesia was  induced. He tolerated this well throughout the case. All pressure  points were padded. He was on preoperative antibiotics. Bilateral  lower extremity sequential compression devices were placed prior to  incision. Arterial line placed by Anesthesia. His abdomen and pelvis  were prepped and draped in the usual sterile standard fashion. A  timeout occurred prior to the operation, which not only identified the  patient, but also the planned procedure to be performed. At the end of  the timeout, there were no questions or concerns. I began the operation first by making a vertical midline incision. Intraabdominal cavity was entered into with sharp dissection. There was  really no free air, but there was a fair amount of intraperitoneal  fluid. This was turbid in color. No foul smell in regards to stool. This appeared to be an upper GI source. The Bookwalter was placed for  better exposure after the incision was opened up with electrocautery. Fluid was throughout the abdomen in all quadrants including down in the  pelvis. There was some omentum that was tethered up to the anterior  abdominal wall and down in the right pelvis. In this area, there was a  lot of small bowel that was very inflamed. It was hyperemic and mildly  edematous.   There was a lot of film on the small

## 2018-12-18 NOTE — FLOWSHEET NOTE
1600 Mr Jose Roberto Sellers has nathaniel transferred to ICU from the OR. He is awake and alert, able to demonstrate orientation X 4. His family have come to see. He has a midline abd incision with 2 JULIO C drains. All dressings are clear. He has a corey cath patent to gravity. His abd is distended and firm. He has pain with light palpation. Ice has been applied to his incision. 1700 He appears to be resting without distress. His family have gone. His VS appear stable. 1800 He was medicated with dilaudid. He appears to be tolerating this well.

## 2018-12-18 NOTE — FLOWSHEET NOTE
12/18/18 1738   Encounter Summary   Services provided to: Patient and family together   Referral/Consult From: Rounding   Continue Visiting Yes  (12/18/18)   Complexity of Encounter Moderate   Length of Encounter 15 minutes   Routine   Type Initial   Assessment Calm; Approachable   Intervention Nurtured hope   Outcome Comfort   Advance Directives (For Healthcare)   Healthcare Directive No, patient does not have an advance directive for healthcare treatment   S- During my contact with the patient I wanted to assess the spiritual and emotional        needs. And to verify if the pt has a Advance Directive. O- The patient was in bed and was receptive to my presences. The pt didnt share any             major concerns at the time. The pt does have support through their family. A- I offered emotional support as well as words of encouragement. P-  Continued support would be helpful in order to meet the future Spiritual needs of the         patient. The pt does not have a Advance Directive on file at this time. Follow up would be helpful to see if          the pt is interested in completing the forms.

## 2018-12-19 LAB
ANION GAP SERPL CALCULATED.3IONS-SCNC: 10 MEQ/L (ref 8–16)
BUN BLDV-MCNC: 29 MG/DL (ref 7–22)
CALCIUM SERPL-MCNC: 8.2 MG/DL (ref 8.5–10.5)
CHLORIDE BLD-SCNC: 108 MEQ/L (ref 98–111)
CO2: 24 MEQ/L (ref 23–33)
CREAT SERPL-MCNC: 0.9 MG/DL (ref 0.4–1.2)
GFR SERPL CREATININE-BSD FRML MDRD: 79 ML/MIN/1.73M2
GLUCOSE BLD-MCNC: 110 MG/DL (ref 70–108)
HCT VFR BLD CALC: 36.9 % (ref 42–52)
HEMOGLOBIN: 11.9 GM/DL (ref 14–18)
MRSA SCREEN: NORMAL
POTASSIUM SERPL-SCNC: 4.1 MEQ/L (ref 3.5–5.2)
SODIUM BLD-SCNC: 142 MEQ/L (ref 135–145)
URINE CULTURE, ROUTINE: ABNORMAL
URINE CULTURE, ROUTINE: NORMAL

## 2018-12-19 PROCEDURE — G8979 MOBILITY GOAL STATUS: HCPCS

## 2018-12-19 PROCEDURE — G8987 SELF CARE CURRENT STATUS: HCPCS

## 2018-12-19 PROCEDURE — 97110 THERAPEUTIC EXERCISES: CPT

## 2018-12-19 PROCEDURE — 85018 HEMOGLOBIN: CPT

## 2018-12-19 PROCEDURE — 99223 1ST HOSP IP/OBS HIGH 75: CPT | Performed by: INTERNAL MEDICINE

## 2018-12-19 PROCEDURE — APPSS30 APP SPLIT SHARED TIME 16-30 MINUTES: Performed by: NURSE PRACTITIONER

## 2018-12-19 PROCEDURE — 97162 PT EVAL MOD COMPLEX 30 MIN: CPT

## 2018-12-19 PROCEDURE — 97530 THERAPEUTIC ACTIVITIES: CPT

## 2018-12-19 PROCEDURE — 6360000002 HC RX W HCPCS: Performed by: SURGERY

## 2018-12-19 PROCEDURE — 99024 POSTOP FOLLOW-UP VISIT: CPT | Performed by: NURSE PRACTITIONER

## 2018-12-19 PROCEDURE — 97166 OT EVAL MOD COMPLEX 45 MIN: CPT

## 2018-12-19 PROCEDURE — 2700000000 HC OXYGEN THERAPY PER DAY

## 2018-12-19 PROCEDURE — 2580000003 HC RX 258: Performed by: INTERNAL MEDICINE

## 2018-12-19 PROCEDURE — 6360000002 HC RX W HCPCS: Performed by: NURSE PRACTITIONER

## 2018-12-19 PROCEDURE — 6360000002 HC RX W HCPCS: Performed by: INTERNAL MEDICINE

## 2018-12-19 PROCEDURE — 99222 1ST HOSP IP/OBS MODERATE 55: CPT | Performed by: PHYSICIAN ASSISTANT

## 2018-12-19 PROCEDURE — 2580000003 HC RX 258: Performed by: SURGERY

## 2018-12-19 PROCEDURE — 99221 1ST HOSP IP/OBS SF/LOW 40: CPT | Performed by: NURSE PRACTITIONER

## 2018-12-19 PROCEDURE — 85014 HEMATOCRIT: CPT

## 2018-12-19 PROCEDURE — G8978 MOBILITY CURRENT STATUS: HCPCS

## 2018-12-19 PROCEDURE — C9113 INJ PANTOPRAZOLE SODIUM, VIA: HCPCS | Performed by: SURGERY

## 2018-12-19 PROCEDURE — 2500000003 HC RX 250 WO HCPCS: Performed by: INTERNAL MEDICINE

## 2018-12-19 PROCEDURE — 2060000000 HC ICU INTERMEDIATE R&B

## 2018-12-19 PROCEDURE — 6370000000 HC RX 637 (ALT 250 FOR IP): Performed by: NURSE PRACTITIONER

## 2018-12-19 PROCEDURE — 2709999900 HC NON-CHARGEABLE SUPPLY

## 2018-12-19 PROCEDURE — 80048 BASIC METABOLIC PNL TOTAL CA: CPT

## 2018-12-19 PROCEDURE — 36415 COLL VENOUS BLD VENIPUNCTURE: CPT

## 2018-12-19 PROCEDURE — G8988 SELF CARE GOAL STATUS: HCPCS

## 2018-12-19 PROCEDURE — 94640 AIRWAY INHALATION TREATMENT: CPT

## 2018-12-19 PROCEDURE — 6370000000 HC RX 637 (ALT 250 FOR IP): Performed by: INTERNAL MEDICINE

## 2018-12-19 RX ORDER — BICALUTAMIDE 50 MG/1
50 TABLET, FILM COATED ORAL DAILY
Status: DISCONTINUED | OUTPATIENT
Start: 2018-12-20 | End: 2018-12-24 | Stop reason: HOSPADM

## 2018-12-19 RX ORDER — FORMOTEROL FUMARATE 20 UG/2ML
20 SOLUTION RESPIRATORY (INHALATION) 2 TIMES DAILY
Status: DISCONTINUED | OUTPATIENT
Start: 2018-12-19 | End: 2018-12-24 | Stop reason: HOSPADM

## 2018-12-19 RX ORDER — FINASTERIDE 5 MG/1
5 TABLET, FILM COATED ORAL DAILY
Status: DISCONTINUED | OUTPATIENT
Start: 2018-12-19 | End: 2018-12-24 | Stop reason: HOSPADM

## 2018-12-19 RX ORDER — ALBUTEROL SULFATE 2.5 MG/3ML
2.5 SOLUTION RESPIRATORY (INHALATION)
Status: DISCONTINUED | OUTPATIENT
Start: 2018-12-19 | End: 2018-12-24

## 2018-12-19 RX ORDER — DOXAZOSIN MESYLATE 4 MG/1
4 TABLET ORAL DAILY
Status: DISCONTINUED | OUTPATIENT
Start: 2018-12-19 | End: 2018-12-24 | Stop reason: HOSPADM

## 2018-12-19 RX ADMIN — IPRATROPIUM BROMIDE AND ALBUTEROL SULFATE 1 AMPULE: .5; 3 SOLUTION RESPIRATORY (INHALATION) at 16:10

## 2018-12-19 RX ADMIN — HYDROMORPHONE HYDROCHLORIDE 0.25 MG: 1 INJECTION, SOLUTION INTRAMUSCULAR; INTRAVENOUS; SUBCUTANEOUS at 12:52

## 2018-12-19 RX ADMIN — DOXAZOSIN 4 MG: 4 TABLET ORAL at 17:07

## 2018-12-19 RX ADMIN — FORMOTEROL FUMARATE DIHYDRATE 20 MCG: 20 SOLUTION RESPIRATORY (INHALATION) at 22:15

## 2018-12-19 RX ADMIN — SODIUM CHLORIDE: 9 INJECTION, SOLUTION INTRAVENOUS at 23:31

## 2018-12-19 RX ADMIN — FINASTERIDE 5 MG: 5 TABLET, FILM COATED ORAL at 17:07

## 2018-12-19 RX ADMIN — SODIUM CHLORIDE: 9 INJECTION, SOLUTION INTRAVENOUS at 02:57

## 2018-12-19 RX ADMIN — FLUCONAZOLE 100 MG: 2 INJECTION, SOLUTION INTRAVENOUS at 17:06

## 2018-12-19 RX ADMIN — ALBUTEROL SULFATE 2.5 MG: 2.5 SOLUTION RESPIRATORY (INHALATION) at 22:10

## 2018-12-19 RX ADMIN — HYDROMORPHONE HYDROCHLORIDE 0.25 MG: 1 INJECTION, SOLUTION INTRAMUSCULAR; INTRAVENOUS; SUBCUTANEOUS at 09:22

## 2018-12-19 RX ADMIN — Medication 10 ML: at 11:42

## 2018-12-19 RX ADMIN — PIPERACILLIN SODIUM,TAZOBACTAM SODIUM 3.38 G: 3; .375 INJECTION, POWDER, FOR SOLUTION INTRAVENOUS at 20:14

## 2018-12-19 RX ADMIN — HYDROMORPHONE HYDROCHLORIDE 0.5 MG: 1 INJECTION, SOLUTION INTRAMUSCULAR; INTRAVENOUS; SUBCUTANEOUS at 06:56

## 2018-12-19 RX ADMIN — HYDROMORPHONE HYDROCHLORIDE 0.25 MG: 1 INJECTION, SOLUTION INTRAMUSCULAR; INTRAVENOUS; SUBCUTANEOUS at 03:42

## 2018-12-19 RX ADMIN — PIPERACILLIN SODIUM,TAZOBACTAM SODIUM 3.38 G: 3; .375 INJECTION, POWDER, FOR SOLUTION INTRAVENOUS at 03:41

## 2018-12-19 RX ADMIN — PANTOPRAZOLE SODIUM 40 MG: 40 INJECTION, POWDER, FOR SOLUTION INTRAVENOUS at 11:41

## 2018-12-19 RX ADMIN — SODIUM CHLORIDE: 9 INJECTION, SOLUTION INTRAVENOUS at 11:39

## 2018-12-19 RX ADMIN — IPRATROPIUM BROMIDE AND ALBUTEROL SULFATE 1 AMPULE: .5; 3 SOLUTION RESPIRATORY (INHALATION) at 08:26

## 2018-12-19 RX ADMIN — IPRATROPIUM BROMIDE AND ALBUTEROL SULFATE 1 AMPULE: .5; 3 SOLUTION RESPIRATORY (INHALATION) at 12:22

## 2018-12-19 RX ADMIN — METOPROLOL TARTRATE 2.5 MG: 1 INJECTION, SOLUTION INTRAVENOUS at 11:41

## 2018-12-19 RX ADMIN — Medication 10 ML: at 11:41

## 2018-12-19 RX ADMIN — HYDROMORPHONE HYDROCHLORIDE 0.5 MG: 1 INJECTION, SOLUTION INTRAMUSCULAR; INTRAVENOUS; SUBCUTANEOUS at 22:02

## 2018-12-19 RX ADMIN — ENOXAPARIN SODIUM 40 MG: 40 INJECTION SUBCUTANEOUS at 18:48

## 2018-12-19 RX ADMIN — METOPROLOL TARTRATE 2.5 MG: 1 INJECTION, SOLUTION INTRAVENOUS at 03:43

## 2018-12-19 RX ADMIN — PIPERACILLIN SODIUM,TAZOBACTAM SODIUM 3.38 G: 3; .375 INJECTION, POWDER, FOR SOLUTION INTRAVENOUS at 11:40

## 2018-12-19 RX ADMIN — METOPROLOL TARTRATE 2.5 MG: 1 INJECTION, SOLUTION INTRAVENOUS at 17:12

## 2018-12-19 ASSESSMENT — PAIN DESCRIPTION - FREQUENCY
FREQUENCY: CONTINUOUS
FREQUENCY: CONTINUOUS

## 2018-12-19 ASSESSMENT — PAIN SCALES - GENERAL
PAINLEVEL_OUTOF10: 0
PAINLEVEL_OUTOF10: 4
PAINLEVEL_OUTOF10: 7
PAINLEVEL_OUTOF10: 4
PAINLEVEL_OUTOF10: 5
PAINLEVEL_OUTOF10: 0
PAINLEVEL_OUTOF10: 7
PAINLEVEL_OUTOF10: 4

## 2018-12-19 ASSESSMENT — PAIN DESCRIPTION - PAIN TYPE
TYPE: SURGICAL PAIN

## 2018-12-19 ASSESSMENT — PAIN DESCRIPTION - PROGRESSION
CLINICAL_PROGRESSION: NOT CHANGED
CLINICAL_PROGRESSION: NOT CHANGED

## 2018-12-19 ASSESSMENT — PAIN DESCRIPTION - LOCATION
LOCATION: ABDOMEN

## 2018-12-19 ASSESSMENT — PAIN DESCRIPTION - ONSET
ONSET: ON-GOING
ONSET: ON-GOING

## 2018-12-19 ASSESSMENT — PAIN DESCRIPTION - DESCRIPTORS
DESCRIPTORS: ACHING
DESCRIPTORS: ACHING

## 2018-12-19 ASSESSMENT — PAIN DESCRIPTION - ORIENTATION
ORIENTATION: MID
ORIENTATION: MID

## 2018-12-19 NOTE — CONSULTS
[facility]    Patient - Allan Jean-Baptiste   MRN -  [id number]   [facility] # - [de-identified]   - 1926      Date of Admission -  2018  2:08 AM  Date of evaluation -  2018  Room - [facility]011-A   Hospital Day - Heriberto De La Cruz MD Primary Care Physician - Charisse Helms MD     Problem List      Active Hospital Problems    Diagnosis Date Noted    S/P exploratory laparotomy [Z98.890]     Prostate cancer metastatic to bone ([address] [C61, C79.51]     Elevated PSA [R97.20]     BPH with obstruction/lower urinary tract symptoms [N40.1, N13.8]     Trauma of urethra [S37.30XA]     Acute midline low back pain [M54.5]     Severe malnutrition ([address] [E43] 2018     Class: Acute    Acute peritonitis ([address] [K65.0] 2018     Reason for Consult    For management of COPD  HPI   History Obtained From: Patient and electronic medical record. Allan Jean-Baptiste is a 80 y.o. male  was initially admitted under Dr. Adria Ramsey service. Pulmonary medicine was consulted for further management of COPD. The patient is a 80 y.o. male who presented initially to ED with abdominal pain. He underwent CT of abdomen and Pelvis. He was found to have Intraperitoneal free air/peritonitis during exploratory laparotomy. He developed Acute post operative pulmonary insufficiency. Pulmonary medicine was called for further evalaution. 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 He used to smoke with 1PPD for 20years. He quit smoking in . He was diagnosed with severe COPD by Dr. Fadi Corrales in the past. He is currently using only Albuterol HFA 2 puffs Q6h prn. He is currently not using any oxygen supplementation at rest, exercise or during sleep/at night time. At the time of my initial evaluation, he denies any shortness of breath. No cough or wheezing. He denies any history of hemoptysis.  He denies any fever, chills or rigors at this time.       PMHx   Past Medical History      Diagnosis Date    CAD (coronary artery disease)     CAD (coronary artery disease) 9/2/2014    [facility]) [facility]     Diverticulitis     Hiatal hernia     Hyperlipemia 8/12/2013    Hyperlipidemia     Pleural plaque 8/12/2013      Past Surgical History        Procedure Laterality Date    APPENDECTOMY      CARDIAC CATHETERIZATION      CYSTOSCOPY  03/17/2015    Litholapaxy-Large    EYE SURGERY      cataracts    HERNIA REPAIR      LAPAROTOMY EXPLORATORY N/A 12/17/2018    EXPLORATORY LAPAROTOMY, WITH ABDOMINAL WASHOUT performed by Jenniffer White MD at [facility]    Current Medications    finasteride  5 mg Oral Daily    doxazosin  4 mg Oral Daily    [START ON 12/20/2018] bicalutamide  50 mg Oral Daily    piperacillin-tazobactam  3.375 g Intravenous Q8H    enoxaparin  40 mg Subcutaneous Daily    ipratropium-albuterol  1 ampule Inhalation 4x daily    metoprolol  2.5 mg Intravenous Q8H    sodium chloride flush  10 mL Intravenous 2 times per day    pantoprazole  40 mg Intravenous Daily    And    sodium chloride (PF)  10 mL Intravenous Daily    fluconazole  100 mg Intravenous Q24H     HYDROmorphone **OR** HYDROmorphone, ondansetron, ipratropium-albuterol, sodium chloride flush, acetaminophen, ondansetron  IV Drips/Infusions   sodium chloride 125 mL/hr at 12/19/18 1139     Home Medications  Prescriptions Prior to Admission: predniSONE (DELTASONE) 10 MG tablet, 2 tablets 2 times a day for 2 days then 1  Tablet 2 times a day for 2 days, then 1 tablet daily till gone  tiZANidine (ZANAFLEX) 2 MG capsule, Take 1 capsule by mouth 3 times daily as needed for Muscle spasms  terazosin (HYTRIN) 5 MG capsule, TAKE 1 CAPSULE BY MOUTH ONCE DAILY  finasteride (PROSCAR) 5 MG tablet, Take 1 tablet by mouth daily  albuterol sulfate HFA (PROAIR HFA) 108 (90 Base) MCG/ACT inhaler, Inhale 2 puffs into the lungs 2 times

## 2018-12-19 NOTE — CONSULTS
Date: 12/17/2018  PROCEDURE: XR ABDOMEN FOR NG/OG/NE TUBE PLACEMENT CLINICAL INFORMATION: Left mid abdominal pain for 3 weeks. COMPARISON: No prior study. TECHNIQUE: A single supine image of the abdomen was obtained to evaluate NG tube position. FINDINGS: Intestinal gas pattern is within normal limits. Postsurgical changes are present in the abdomen. Skin staples are present and there are drainage tubes. In addition, an NG tube has been inserted and passes into the stomach. A small amount of contrast is seen in the kidneys from recent CT scan. Kim Sieve Postoperative changes of the abdomen. NG tube passes into stomach. **This report has been created using voice recognition software. It may contain minor errors which are inherent in voice recognition technology. ** Final report electronically signed by Dr. Rashard Bazan on 12/17/2018 2:41 PM      Assessment/Recommendations    1. Probable Prostate Cancer with metastasis - PSA 1594.00, CT of ab/pelvis with enlarged heterogenous prostate and pelvic adenopathy with multiple skeletal sclerotic metastasis with pathologic fractures of L1-L3. Urology following, patient started on Casodex for 12/20/18 and to have prostate biopsy and begin Firmagon injections in office. Reviewed with Dr. Yared Garcia, agree with plan. Discussed with patient and daughters, any treatment may be palliative and that metastatic prostate cancer is not curable. -Patient will have follow up with Dr. Yared Garcia on 1/9/19. Discussed follow up in office once patient has recovered from surgery and hospitalization. Patient and daughters are in agreement with plan. 2. Pathologic Compression Fracture of L1-L3 - noted on CT of ab/pelvis. Calcium is 8.2. Recommend Radiation Oncology consult - Discussed with Attending Dr. Lindsay Miranda.  3. Peritonitis - S/P exploratory lap by Dr. Héctor Salamanca on 12/17/18. General surgery following. 4. Acute Respiratory Failure with Hypoxia - currently on 6L/min NC.  Did not require oxygen

## 2018-12-19 NOTE — PROGRESS NOTES
[address] ICU STEPDOWN TELEMETRY 4K  EVALUATION    Time:  Time In: 4030  Time Out: 1116  Timed Code Treatment Minutes: 19 Minutes  Minutes: 34          Date: 2018  Patient Name: Nasrin Zapata,   Gender: male      MRN: [id number]  : 1926  (80 y.o.)  Referring Practitioner: Dr. Bozena Mathews  Diagnosis: acute peritonitis  Additional Pertinent Hx: Per ER note on 18: Pt presents to the ED for abdominal pain with daughter.    s/p Intraperitoneal free air/peritonitis -  status post exploratory laparotomy with abdominal washout and drain placement on 18    Restrictions/Precautions:  Fall Risk, Surgical Protocols               Other position/activity restrictions: NG tube, confusion, abdominal incision, 2 JULIO C drains, bleeding around penis d/t Pt having pulled out corey       Past Medical History:   Diagnosis Date    CAD (coronary artery disease)     CAD (coronary artery disease) 2014    [facility]) [facility]     Diverticulitis     Hiatal hernia     Hyperlipemia 2013    Hyperlipidemia     Pleural plaque 2013     Past Surgical History:   Procedure Laterality Date    APPENDECTOMY      CARDIAC CATHETERIZATION      CYSTOSCOPY  2015    Litholapaxy-Large    EYE SURGERY      cataracts    HERNIA REPAIR      LAPAROTOMY EXPLORATORY N/A 2018    EXPLORATORY LAPAROTOMY, WITH ABDOMINAL WASHOUT performed by Estle Gilford, MD at [place] [address]  Chart Reviewed: Yes (orders, progress notes)  Patient assessed for rehabilitation services?: Yes  Family / Caregiver Present: Yes    Subjective: cooperative    General:     Vision: Within Functional Limits (with glasses)    Hearing: Within functional limits         Pain:  Pain Assessment  Patient Currently in Pain: No (\"not a lot\")       Social/Functional History:  Lives With: Alone  Type of Home: House  Home Layout: One level (with

## 2018-12-19 NOTE — PROGRESS NOTES
Will continue to monitor patients progress with therapy. Possible TCU when patient is more medically stable.

## 2018-12-19 NOTE — PROGRESS NOTES
0221   AST  35   ALT  19   BILITOT  0.8   ALKPHOS  137*     Troponin: No results for input(s): TROPONINI in the last 72 hours. BNP: No results for input(s): BNP in the last 72 hours. Lipids: No results for input(s): CHOL, HDL in the last 72 hours. Invalid input(s): LDLCALCU  INR: No results for input(s): INR in the last 72 hours.     Radiology    Objective:   Vitals: BP (!) 160/74   Pulse 96   Temp 99.3 °F (37.4 °C) (Oral)   Resp 18   Ht 5' 9\" (1.753 m)   Wt 136 lb 12.8 oz (62.1 kg)   SpO2 90%   BMI 20.20 kg/m²   HEENT: Head:pupils react  Neck: supple  Lungs: air entry appreciated bilat  Heart: regular rate and rhythm   Abdomen: soft BS not  heard dressing and drain appreciated tenderness improved  Extremities: warm  edema  Neurologic:  Alert, oriented X3    Impression:   :   Acute abd s/p exp lap with bowel washout  CAD h/o stent  COPD  Hyperlipidemia  Compression fracture with lytic lesion prob from mets  Probable Prostate cancer  Delirium prob metabolic encephalopathy  Acute pulm insufficiency        Plan:    Decrease IVF if ok with surgery   Watch urine output as corey out  Cont antibiotics and B Blockers  Cont bronchodilators  Consult pulm   Will have oncology consulted for palliative treatment in view of his back pain with  elevated PSA   PT Sabra Minaya MD

## 2018-12-19 NOTE — CONSULTS
external inspection. Pulmonary/Chest:  Normal respiratory rate and rhthym. No use of accessory muscles. Musculoskeletal:   Normal range of motion. He exhibits no edema or tenderness of lower extremities. Extremities:   No cyanosis, clubbing, or edema present. Neurological:   Alert and oriented. DATA:  CBC:   Lab Results   Component Value Date    WBC 10.1 12/18/2018    RBC 3.80 12/18/2018    RBC 4.44 04/06/2017    HGB 11.9 12/19/2018    HCT 36.9 12/19/2018    .4 12/18/2018    MCH 34.2 12/18/2018    MCHC 33.1 12/18/2018    RDW 11.8 04/06/2017     12/18/2018    MPV 10.1 12/18/2018     BMP:    Lab Results   Component Value Date     12/19/2018    K 4.1 12/19/2018     12/19/2018    CO2 24 12/19/2018    BUN 29 12/19/2018    CREATININE 0.9 12/19/2018    CALCIUM 8.2 12/19/2018    LABGLOM 79 12/19/2018    GLUCOSE 110 12/19/2018    GLUCOSE 96 04/06/2017     BUN/Creatinine:    Lab Results   Component Value Date    BUN 29 12/19/2018    CREATININE 0.9 12/19/2018     Magnesium:  No results found for: MG  Phosphorus:  No results found for: PHOS  PT/INR:  No results found for: PROTIME, INR  U/A:    Lab Results   Component Value Date    NITRITE pos 01/12/2017    COLORU Yellow 12/13/2018    PHUR 8.0 12/13/2018    RBCUA 0-1/HPO 12/13/2018    YEAST none 12/13/2018    BACTERIA moderate 12/13/2018    BACTERIA NEGATIVE 12/09/2016    CLARITYU Cloudy 12/13/2018    SPECGRAV 1.015 12/13/2018    LEUKOCYTESUR negative 12/13/2018    LEUKOCYTESUR 2+ 12/09/2016    UROBILINOGEN Normal 12/13/2018    BILIRUBINUR 0 12/13/2018    BLOODU Positive 12/13/2018    GLUCOSEU negative 12/13/2018       Imaging: The patient has had a CT Abdomen Pelvis with contrast which I have reviewed along with its accompanying report. The study demonstrates   FINDINGS:       Lower chest: There is bilateral lower lobe atelectasis and/or scarring. There is fluid in the distal esophagus likely due to GE reflux.       Liver:  There calculi, blood, or other hyperdense material.. No wall thickening or obvious mass.        Intraperitoneal/retroperitoneal Space: There is mild ascites in the abdomen adjacent to the liver and in the pelvis. There is a minimal pneumoperitoneum of uncertain etiology with a solitary air bubble in the ascites fluid anterior to the liver. There is    no abscess. There is bilateral pelvic adenopathy worrisome for metastatic disease. This may be secondary to metastatic prostate carcinoma.        Abdominal and pelvic body wall soft tissues: There is symmetric soft tissue masslike prominence in the inguinal canals which may be due to fluid or may be due to multiple testicles ascending into the inguinal canals which were not present previously.       Musculoskeletal structures: There is a sclerotic lesion in the posterior left 10th rib which was not imaged previously and is worrisome for a sclerotic metastasis. There are also new sclerotic lesions with probable pathologic compression fractures of L1,    L2, and L3, most severely involving L1. Sclerotic foci are also seen in the bilateral iliac bones,, the left T11 and T12 pedicles, the left superior pubic ramus, and the second sacral segment. Findings are consistent with metastatic disease.           Impression   Multiple skeletal sclerotic metastases with pathologic compression fractures of L1-L3. This may be on the basis of metastatic prostate carcinoma given the enlarged heterogeneous prostate and pelvic adenopathy. Mild ascites with a tiny pneumoperitoneum with a tiny air bubble anterior to the liver, etiology uncertain. Abnormal small bowel consistent with an ileus and/or enteritis. Colonic diverticulosis without diverticulitis. Distended gallbladder with 2 small gallstones. New mild intra and extrahepatic biliary dilatation. Small nonobstructing left renal calculi.    Layering dense material in the urinary bladder which may represent stones, blood, or other

## 2018-12-20 ENCOUNTER — APPOINTMENT (OUTPATIENT)
Dept: GENERAL RADIOLOGY | Age: 83
DRG: 356 | End: 2018-12-20
Payer: MEDICARE

## 2018-12-20 ENCOUNTER — HOSPITAL ENCOUNTER (OUTPATIENT)
Dept: RADIATION ONCOLOGY | Age: 83
Discharge: HOME | End: 2018-12-20
Payer: MEDICARE

## 2018-12-20 LAB
ANION GAP SERPL CALCULATED.3IONS-SCNC: 11 MEQ/L (ref 8–16)
BASOPHILS # BLD: 0.2 %
BASOPHILS ABSOLUTE: 0 THOU/MM3 (ref 0–0.1)
BUN BLDV-MCNC: 25 MG/DL (ref 7–22)
CALCIUM SERPL-MCNC: 8.3 MG/DL (ref 8.5–10.5)
CHLORIDE BLD-SCNC: 108 MEQ/L (ref 98–111)
CO2: 24 MEQ/L (ref 23–33)
CREAT SERPL-MCNC: 0.9 MG/DL (ref 0.4–1.2)
EOSINOPHIL # BLD: 0 %
EOSINOPHILS ABSOLUTE: 0 THOU/MM3 (ref 0–0.4)
ERYTHROCYTE [DISTWIDTH] IN BLOOD BY AUTOMATED COUNT: 14.2 % (ref 11.5–14.5)
ERYTHROCYTE [DISTWIDTH] IN BLOOD BY AUTOMATED COUNT: 55.1 FL (ref 35–45)
GFR SERPL CREATININE-BSD FRML MDRD: 79 ML/MIN/1.73M2
GLUCOSE BLD-MCNC: 130 MG/DL (ref 70–108)
HCT VFR BLD CALC: 34 % (ref 42–52)
HEMOGLOBIN: 11.2 GM/DL (ref 14–18)
IMMATURE GRANS (ABS): 0.15 THOU/MM3 (ref 0–0.07)
IMMATURE GRANULOCYTES: 1.2 %
LYMPHOCYTES # BLD: 3.2 %
LYMPHOCYTES ABSOLUTE: 0.4 THOU/MM3 (ref 1–4.8)
MCH RBC QN AUTO: 34.8 PG (ref 26–33)
MCHC RBC AUTO-ENTMCNC: 32.9 GM/DL (ref 32.2–35.5)
MCV RBC AUTO: 105.6 FL (ref 80–94)
MONOCYTES # BLD: 2.7 %
MONOCYTES ABSOLUTE: 0.4 THOU/MM3 (ref 0.4–1.3)
NUCLEATED RED BLOOD CELLS: 0 /100 WBC
PLATELET # BLD: 177 THOU/MM3 (ref 130–400)
PMV BLD AUTO: 9.9 FL (ref 9.4–12.4)
POTASSIUM SERPL-SCNC: 3.5 MEQ/L (ref 3.5–5.2)
RBC # BLD: 3.22 MILL/MM3 (ref 4.7–6.1)
SEG NEUTROPHILS: 92.7 %
SEGMENTED NEUTROPHILS ABSOLUTE COUNT: 12.1 THOU/MM3 (ref 1.8–7.7)
SODIUM BLD-SCNC: 143 MEQ/L (ref 135–145)
WBC # BLD: 13 THOU/MM3 (ref 4.8–10.8)

## 2018-12-20 PROCEDURE — 2060000000 HC ICU INTERMEDIATE R&B

## 2018-12-20 PROCEDURE — 85025 COMPLETE CBC W/AUTO DIFF WBC: CPT

## 2018-12-20 PROCEDURE — 6360000002 HC RX W HCPCS: Performed by: SURGERY

## 2018-12-20 PROCEDURE — 2700000000 HC OXYGEN THERAPY PER DAY

## 2018-12-20 PROCEDURE — 36415 COLL VENOUS BLD VENIPUNCTURE: CPT

## 2018-12-20 PROCEDURE — APPSS30 APP SPLIT SHARED TIME 16-30 MINUTES: Performed by: NURSE PRACTITIONER

## 2018-12-20 PROCEDURE — 97110 THERAPEUTIC EXERCISES: CPT

## 2018-12-20 PROCEDURE — 71045 X-RAY EXAM CHEST 1 VIEW: CPT

## 2018-12-20 PROCEDURE — 97530 THERAPEUTIC ACTIVITIES: CPT

## 2018-12-20 PROCEDURE — 94640 AIRWAY INHALATION TREATMENT: CPT

## 2018-12-20 PROCEDURE — 94761 N-INVAS EAR/PLS OXIMETRY MLT: CPT

## 2018-12-20 PROCEDURE — 6370000000 HC RX 637 (ALT 250 FOR IP): Performed by: NURSE PRACTITIONER

## 2018-12-20 PROCEDURE — 6360000002 HC RX W HCPCS: Performed by: INTERNAL MEDICINE

## 2018-12-20 PROCEDURE — 6360000002 HC RX W HCPCS: Performed by: NURSE PRACTITIONER

## 2018-12-20 PROCEDURE — 99232 SBSQ HOSP IP/OBS MODERATE 35: CPT | Performed by: INTERNAL MEDICINE

## 2018-12-20 PROCEDURE — C9113 INJ PANTOPRAZOLE SODIUM, VIA: HCPCS | Performed by: SURGERY

## 2018-12-20 PROCEDURE — 99024 POSTOP FOLLOW-UP VISIT: CPT | Performed by: NURSE PRACTITIONER

## 2018-12-20 PROCEDURE — 2580000003 HC RX 258: Performed by: SURGERY

## 2018-12-20 PROCEDURE — 80048 BASIC METABOLIC PNL TOTAL CA: CPT

## 2018-12-20 PROCEDURE — 2709999900 HC NON-CHARGEABLE SUPPLY

## 2018-12-20 PROCEDURE — 2580000003 HC RX 258: Performed by: INTERNAL MEDICINE

## 2018-12-20 PROCEDURE — 99232 SBSQ HOSP IP/OBS MODERATE 35: CPT | Performed by: NURSE PRACTITIONER

## 2018-12-20 PROCEDURE — 2500000003 HC RX 250 WO HCPCS: Performed by: INTERNAL MEDICINE

## 2018-12-20 PROCEDURE — 6370000000 HC RX 637 (ALT 250 FOR IP): Performed by: INTERNAL MEDICINE

## 2018-12-20 RX ORDER — PANTOPRAZOLE SODIUM 40 MG/1
40 TABLET, DELAYED RELEASE ORAL
Status: DISCONTINUED | OUTPATIENT
Start: 2018-12-21 | End: 2018-12-24 | Stop reason: HOSPADM

## 2018-12-20 RX ORDER — TRAMADOL HYDROCHLORIDE 50 MG/1
50 TABLET ORAL EVERY 6 HOURS PRN
Status: DISCONTINUED | OUTPATIENT
Start: 2018-12-20 | End: 2018-12-24 | Stop reason: HOSPADM

## 2018-12-20 RX ADMIN — HYDROMORPHONE HYDROCHLORIDE 0.25 MG: 1 INJECTION, SOLUTION INTRAMUSCULAR; INTRAVENOUS; SUBCUTANEOUS at 08:17

## 2018-12-20 RX ADMIN — Medication 10 ML: at 08:33

## 2018-12-20 RX ADMIN — FORMOTEROL FUMARATE DIHYDRATE 20 MCG: 20 SOLUTION RESPIRATORY (INHALATION) at 20:30

## 2018-12-20 RX ADMIN — FORMOTEROL FUMARATE DIHYDRATE 20 MCG: 20 SOLUTION RESPIRATORY (INHALATION) at 07:43

## 2018-12-20 RX ADMIN — PIPERACILLIN SODIUM,TAZOBACTAM SODIUM 3.38 G: 3; .375 INJECTION, POWDER, FOR SOLUTION INTRAVENOUS at 18:43

## 2018-12-20 RX ADMIN — SODIUM CHLORIDE: 9 INJECTION, SOLUTION INTRAVENOUS at 11:34

## 2018-12-20 RX ADMIN — ENOXAPARIN SODIUM 40 MG: 40 INJECTION SUBCUTANEOUS at 08:38

## 2018-12-20 RX ADMIN — ALBUTEROL SULFATE 2.5 MG: 2.5 SOLUTION RESPIRATORY (INHALATION) at 17:14

## 2018-12-20 RX ADMIN — PIPERACILLIN SODIUM,TAZOBACTAM SODIUM 3.38 G: 3; .375 INJECTION, POWDER, FOR SOLUTION INTRAVENOUS at 11:30

## 2018-12-20 RX ADMIN — BICALUTAMIDE 50 MG: 50 TABLET, FILM COATED ORAL at 08:33

## 2018-12-20 RX ADMIN — NALOXEGOL OXALATE 12.5 MG: 12.5 TABLET, FILM COATED ORAL at 11:30

## 2018-12-20 RX ADMIN — METOPROLOL TARTRATE 2.5 MG: 1 INJECTION, SOLUTION INTRAVENOUS at 11:30

## 2018-12-20 RX ADMIN — DOXAZOSIN 4 MG: 4 TABLET ORAL at 08:33

## 2018-12-20 RX ADMIN — METOPROLOL TARTRATE 2.5 MG: 1 INJECTION, SOLUTION INTRAVENOUS at 18:43

## 2018-12-20 RX ADMIN — ALBUTEROL SULFATE 2.5 MG: 2.5 SOLUTION RESPIRATORY (INHALATION) at 12:23

## 2018-12-20 RX ADMIN — FLUCONAZOLE 100 MG: 2 INJECTION, SOLUTION INTRAVENOUS at 17:15

## 2018-12-20 RX ADMIN — SODIUM CHLORIDE: 9 INJECTION, SOLUTION INTRAVENOUS at 08:31

## 2018-12-20 RX ADMIN — ALBUTEROL SULFATE 2.5 MG: 2.5 SOLUTION RESPIRATORY (INHALATION) at 07:41

## 2018-12-20 RX ADMIN — FINASTERIDE 5 MG: 5 TABLET, FILM COATED ORAL at 08:33

## 2018-12-20 RX ADMIN — PANTOPRAZOLE SODIUM 40 MG: 40 INJECTION, POWDER, FOR SOLUTION INTRAVENOUS at 08:33

## 2018-12-20 RX ADMIN — PIPERACILLIN SODIUM,TAZOBACTAM SODIUM 3.38 G: 3; .375 INJECTION, POWDER, FOR SOLUTION INTRAVENOUS at 03:10

## 2018-12-20 RX ADMIN — METOPROLOL TARTRATE 2.5 MG: 1 INJECTION, SOLUTION INTRAVENOUS at 04:21

## 2018-12-20 RX ADMIN — ALBUTEROL SULFATE 2.5 MG: 2.5 SOLUTION RESPIRATORY (INHALATION) at 20:27

## 2018-12-20 RX ADMIN — TIOTROPIUM BROMIDE 18 MCG: 18 CAPSULE ORAL; RESPIRATORY (INHALATION) at 07:44

## 2018-12-20 ASSESSMENT — PAIN DESCRIPTION - FREQUENCY
FREQUENCY: CONTINUOUS
FREQUENCY: CONTINUOUS

## 2018-12-20 ASSESSMENT — PAIN DESCRIPTION - DESCRIPTORS
DESCRIPTORS: ACHING
DESCRIPTORS: DISCOMFORT

## 2018-12-20 ASSESSMENT — PAIN DESCRIPTION - ORIENTATION
ORIENTATION: MID
ORIENTATION: MID

## 2018-12-20 ASSESSMENT — PAIN SCALES - GENERAL
PAINLEVEL_OUTOF10: 5
PAINLEVEL_OUTOF10: 1
PAINLEVEL_OUTOF10: 2
PAINLEVEL_OUTOF10: 0
PAINLEVEL_OUTOF10: 4
PAINLEVEL_OUTOF10: 0
PAINLEVEL_OUTOF10: 6

## 2018-12-20 ASSESSMENT — PAIN DESCRIPTION - PROGRESSION
CLINICAL_PROGRESSION: NOT CHANGED
CLINICAL_PROGRESSION: NOT CHANGED

## 2018-12-20 ASSESSMENT — PAIN DESCRIPTION - ONSET
ONSET: ON-GOING
ONSET: ON-GOING

## 2018-12-20 ASSESSMENT — PAIN DESCRIPTION - LOCATION
LOCATION: ABDOMEN

## 2018-12-20 ASSESSMENT — PAIN DESCRIPTION - PAIN TYPE
TYPE: SURGICAL PAIN

## 2018-12-20 NOTE — PROGRESS NOTES
Palliative care in to see pt. Pt currently sitting up in bedside chair, awake and alert, oriented to all spheres. Pt's sons are here. Pt's had delirium the previous days, that has cleared. NG tube is out, pt taking CL diet without difficulty. Symptoms reviewed, pt states that his pain is very well controlled, that he feels better each day. Plan of care reviewed, pt and family express understanding and are able to accurately explain plan of care for surgery recovery and plan of treatment treatment. Conversation concluded with pt and family stating understanding of issues discussed and no further questions. Support and encouragement given. Palliative care will continue to follow and assist with advance care planning as appropriate. Case discussed with pt's nurse,SEBASTIAN Lou- no needs from palliative care.

## 2018-12-20 NOTE — PROGRESS NOTES
Assessment  Patient Currently in Pain: Yes  Pain Level: 5 (wehn moving )  Pain Type: Surgical pain  Pain Location: Abdomen       Objective  Overall Cognitive Status: WFL     ADL  Grooming: Contact guard assistance (wash face sitting up in chair )          Bed mobility  Supine to Sit: Moderate assistance (cues for log rolling )    Transfers  Sit to stand: Contact guard assistance (from EOB )  Stand to sit: Minimal assistance (to recliner )       Balance  Sitting Balance: Contact guard assistance  Standing Balance: Contact guard assistance     Time: x1 min   Activity: prep for mobility      Functional Mobility  Functional - Mobility Device: Rolling Walker  Activity: Other  Assist Level: Contact guard assistance  Functional Mobility Comments: pt ambulated from EOB to door of room and back to recliner. Pt very SOB and required x2 standing rest breaks due to SOB and fatigue. Pt had no LOB, unsteady         Type of ROM/Therapeutic Exercise: AROM  Comment: BUE AROM sitting in chair using red theraband x10 reps x1 set. pt completed to increase sterngth and endurance for ADLS and transfers      Activity Tolerance:  Activity Tolerance: Patient limited by fatigue;Patient limited by pain    Assessment:     Performance deficits / Impairments: Decreased functional mobility , Decreased cognition, Decreased endurance, Decreased ADL status, Decreased balance, Decreased safe awareness  Prognosis: Fair    Discharge Recommendations:  Discharge Recommendations: [address], Patient would benefit from continued therapy after discharge (TCU)    Patient Education:  Patient Education: safety with mobility and endurance   Barriers to Learning: decreased cognition    Equipment Recommendations: Other: Continue to assess pending progress. Safety:  Safety Devices in place: Yes  Type of devices:  All fall risk precautions in place, Call light within reach, Gait belt, Left in chair, Chair alarm in place, Nurse

## 2018-12-20 NOTE — PROGRESS NOTES
[facility]    Patient - Cameron Huber,  Age - 80 y.o.    - 1926      Room Number - -11/011-A   Consulting - Goyo Avalos MD Primary Care Physician - Wyatt Trammell MD   MRN -  [id number]   [facility]  Date of Admission -  2018  2:08 AM  Hospital Day - 3    Chief Complaint   Following for hypoxia  HPI   Cameron Huber is a 80 y.o. male  was initially admitted under Dr. Shantell Ellis service. Pulmonary medicine was consulted for further management of COPD.     The patient is a 80 y.o. male who presented initially to ED with abdominal pain. He underwent CT of abdomen and Pelvis. He was found to have Intraperitoneal free air/peritonitis during exploratory laparotomy. He developed Acute post operative pulmonary insufficiency. Pulmonary medicine was called for further evalaution. 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 He used to smoke with 1PPD for 20years. He quit smoking in . He was diagnosed with severe COPD by Dr. Marquis Maldonado in the past. He is currently using only Albuterol HFA 2 puffs Q6h prn. He is currently not using any oxygen supplementation at rest, exercise or during sleep/at night time.     At the time of my initial evaluation, he denies any shortness of breath. No cough or wheezing. He denies any history of hemoptysis. He denies any fever, chills or rigors at this time.     Past 24 hours, ROS   Chart reviewed  Up in chair, doing well  94% on 4 Liters NC (no home 02)  Afebrile  Minimal cough    All other systems reviewed  Objective    Vitals    height is 5' 9\" (1.753 m) and weight is 141 lb (64 kg). His oral temperature is 98.1 °F (36.7 °C). His blood pressure is 144/88 (abnormal) and his pulse is 80. His respiration is 18 and oxygen saturation is 94%.      O2 Flow Rate (L/min): 4 L/min  I/O    Intake/Output Summary (Last 24 hours) at 18 5784  Last data filed at 12/20/18 1400   Gross per 24 hour   Intake          2466.55 ml   Output              820 ml   Net          1646.55 ml     Patient Vitals for the past 96 hrs (Last 3 readings):   Weight   12/20/18 0400 141 lb (64 kg)   12/19/18 0330 136 lb 12.8 oz (62.1 kg)   12/17/18 0915 133 lb (60.3 kg)     Exam    Physical Exam  Constitutional: Patient appears moderately built and moderately nourished. Head: Normocephalic and atraumatic. Neck: Neck supple. No JVD or tracheal deviation present. Cardiovascular: Regular rate, regular rhythm, S1 and S2 with no murmur. No peripheral edema. Pulmonary/Chest: Normal effort with diminished but clear breath sounds. No stridor. No respiratory distress. Abdominal: Soft. Bowel sounds audible. No distension or tenderness to palpation. Merril Lambert in place. Musculoskeletal: Moves all extremities. Neurological: Patient is alert and oriented to person, place, and time. Skin: Skin is warm and dry.     Meds       [START ON 12/21/2018] pantoprazole  40 mg Oral QAM AC    naloxegol  12.5 mg Oral QAM    finasteride  5 mg Oral Daily    doxazosin  4 mg Oral Daily    bicalutamide  50 mg Oral Daily    albuterol  2.5 mg Nebulization Q4H While awake    tiotropium  18 mcg Inhalation Daily    formoterol  20 mcg Nebulization BID    piperacillin-tazobactam  3.375 g Intravenous Q8H    enoxaparin  40 mg Subcutaneous Daily    metoprolol  2.5 mg Intravenous Q8H    sodium chloride flush  10 mL Intravenous 2 times per day    sodium chloride (PF)  10 mL Intravenous Daily    fluconazole  100 mg Intravenous Q24H      sodium chloride 50 mL/hr at 12/20/18 1134     traMADol, HYDROmorphone **OR** HYDROmorphone, ondansetron, ipratropium-albuterol, sodium chloride flush, acetaminophen, ondansetron  Labs   ABG  Lab Results   Component Value Date    PH 8.0 12/09/2016     No results found for: DIONE Zazueta  CBC  Recent Labs      12/18/18   0545  12/19/18   0414  12/20/18   0423   WBC

## 2018-12-20 NOTE — PROGRESS NOTES
ambulation. Patient requried min to mod A with bed mobility. Patient would benefit from contiued therapy to increase strength, balance, endurance, and functional mobility. Prognosis: Good     REQUIRES PT FOLLOW UP: Yes    Discharge Recommendations:  Discharge Recommendations: [address] (TCU)    Patient Education:  Patient Education: Role of PT, POC, transfers, gait, safety, log rolling    Equipment Recommendations:  Equipment Needed: No    Safety:  Type of devices: All fall risk precautions in place, Call light within reach, Nurse notified, Gait belt, Bed alarm in place, Left in bed    Plan:  Times per week: 5x GM  Times per day: Daily  Current Treatment Recommendations: Strengthening, Balance Training, Safety Education & Training, Endurance Training, Patient/Caregiver Education & Training, Functional Mobility Training, Equipment Evaluation, Education, & procurement, Transfer Training, Gait Training    Goals:  Patient goals : Go home    Short term goals  Time Frame for Short term goals: 2 weeks  Short term goal 1: Patient will transfer supine <--> sit with SBA in order to get into/out of bed. Short term goal 2: Patient will transfer sit <--> stand with SBA in order to get up to ambulate. Short term goal 3: Patient will ambulate 48' with SBA and a RW for household navigation.     Long term goals  Time Frame for Long term goals : no LTGs due to short ELOS

## 2018-12-20 NOTE — PROGRESS NOTES
in the last 72 hours. Troponin: No results for input(s): TROPONINI in the last 72 hours. BNP: No results for input(s): BNP in the last 72 hours. Lipids: No results for input(s): CHOL, HDL in the last 72 hours. Invalid input(s): LDLCALCU  INR: No results for input(s): INR in the last 72 hours.     Radiology    Objective:   Vitals: BP (!) 160/74   Pulse 90   Temp 98.1 °F (36.7 °C) (Oral)   Resp 18   Ht 5' 9\" (1.753 m)   Wt 141 lb (64 kg)   SpO2 92%   BMI 20.82 kg/m²   HEENT: Head:pupils react  Neck: supple  Lungs: air entry appreciated bilat  Heart: regular rate and rhythm   Abdomen: soft BS appreciated, sutures and  drain noted not much tender today  Extremities: warm  edema  Neurologic:  Alert, oriented X3    Impression:   :   Acute abd s/p exp lap with bowel washout  CAD h/o stent  COPD  Hyperlipidemia  Compression fracture with lytic lesion prob from mets  Probable Prostate cancer  Delirium prob metabolic encephalopathy  Acute pulm insufficiency        Plan:    Decrease IVF   PT OT  Radiation oncology consult while inpt aware pt with recent bowel surgery   Cont bronchodilators      Bruna Lawler MD

## 2018-12-21 ENCOUNTER — APPOINTMENT (OUTPATIENT)
Dept: MRI IMAGING | Age: 83
DRG: 356 | End: 2018-12-21
Payer: MEDICARE

## 2018-12-21 PROCEDURE — 97110 THERAPEUTIC EXERCISES: CPT

## 2018-12-21 PROCEDURE — 6360000002 HC RX W HCPCS: Performed by: SURGERY

## 2018-12-21 PROCEDURE — 99024 POSTOP FOLLOW-UP VISIT: CPT | Performed by: NURSE PRACTITIONER

## 2018-12-21 PROCEDURE — 97116 GAIT TRAINING THERAPY: CPT

## 2018-12-21 PROCEDURE — 2709999900 HC NON-CHARGEABLE SUPPLY

## 2018-12-21 PROCEDURE — 6370000000 HC RX 637 (ALT 250 FOR IP): Performed by: NURSE PRACTITIONER

## 2018-12-21 PROCEDURE — 2580000003 HC RX 258: Performed by: INTERNAL MEDICINE

## 2018-12-21 PROCEDURE — 94760 N-INVAS EAR/PLS OXIMETRY 1: CPT

## 2018-12-21 PROCEDURE — APPSS30 APP SPLIT SHARED TIME 16-30 MINUTES: Performed by: NURSE PRACTITIONER

## 2018-12-21 PROCEDURE — 72148 MRI LUMBAR SPINE W/O DYE: CPT

## 2018-12-21 PROCEDURE — 2580000003 HC RX 258: Performed by: SURGERY

## 2018-12-21 PROCEDURE — 97530 THERAPEUTIC ACTIVITIES: CPT

## 2018-12-21 PROCEDURE — 94640 AIRWAY INHALATION TREATMENT: CPT

## 2018-12-21 PROCEDURE — 6360000002 HC RX W HCPCS: Performed by: INTERNAL MEDICINE

## 2018-12-21 PROCEDURE — 99232 SBSQ HOSP IP/OBS MODERATE 35: CPT | Performed by: INTERNAL MEDICINE

## 2018-12-21 PROCEDURE — 2060000000 HC ICU INTERMEDIATE R&B

## 2018-12-21 PROCEDURE — 2500000003 HC RX 250 WO HCPCS: Performed by: INTERNAL MEDICINE

## 2018-12-21 PROCEDURE — 6370000000 HC RX 637 (ALT 250 FOR IP): Performed by: INTERNAL MEDICINE

## 2018-12-21 PROCEDURE — 2700000000 HC OXYGEN THERAPY PER DAY

## 2018-12-21 RX ADMIN — SODIUM CHLORIDE: 9 INJECTION, SOLUTION INTRAVENOUS at 11:52

## 2018-12-21 RX ADMIN — PIPERACILLIN SODIUM,TAZOBACTAM SODIUM 3.38 G: 3; .375 INJECTION, POWDER, FOR SOLUTION INTRAVENOUS at 11:55

## 2018-12-21 RX ADMIN — METOPROLOL TARTRATE 2.5 MG: 1 INJECTION, SOLUTION INTRAVENOUS at 04:48

## 2018-12-21 RX ADMIN — ENOXAPARIN SODIUM 40 MG: 40 INJECTION SUBCUTANEOUS at 08:12

## 2018-12-21 RX ADMIN — METOPROLOL TARTRATE 2.5 MG: 1 INJECTION, SOLUTION INTRAVENOUS at 20:07

## 2018-12-21 RX ADMIN — FLUCONAZOLE 100 MG: 2 INJECTION, SOLUTION INTRAVENOUS at 17:15

## 2018-12-21 RX ADMIN — TIOTROPIUM BROMIDE 18 MCG: 18 CAPSULE ORAL; RESPIRATORY (INHALATION) at 07:48

## 2018-12-21 RX ADMIN — FORMOTEROL FUMARATE DIHYDRATE 20 MCG: 20 SOLUTION RESPIRATORY (INHALATION) at 07:41

## 2018-12-21 RX ADMIN — TRAMADOL HYDROCHLORIDE 50 MG: 50 TABLET, FILM COATED ORAL at 08:12

## 2018-12-21 RX ADMIN — ALBUTEROL SULFATE 2.5 MG: 2.5 SOLUTION RESPIRATORY (INHALATION) at 20:52

## 2018-12-21 RX ADMIN — ALBUTEROL SULFATE 2.5 MG: 2.5 SOLUTION RESPIRATORY (INHALATION) at 11:08

## 2018-12-21 RX ADMIN — PIPERACILLIN SODIUM,TAZOBACTAM SODIUM 3.38 G: 3; .375 INJECTION, POWDER, FOR SOLUTION INTRAVENOUS at 02:50

## 2018-12-21 RX ADMIN — DOXAZOSIN 4 MG: 4 TABLET ORAL at 08:12

## 2018-12-21 RX ADMIN — PIPERACILLIN SODIUM,TAZOBACTAM SODIUM 3.38 G: 3; .375 INJECTION, POWDER, FOR SOLUTION INTRAVENOUS at 20:08

## 2018-12-21 RX ADMIN — Medication 10 ML: at 20:00

## 2018-12-21 RX ADMIN — PANTOPRAZOLE SODIUM 40 MG: 40 TABLET, DELAYED RELEASE ORAL at 06:34

## 2018-12-21 RX ADMIN — METOPROLOL TARTRATE 2.5 MG: 1 INJECTION, SOLUTION INTRAVENOUS at 11:52

## 2018-12-21 RX ADMIN — FORMOTEROL FUMARATE DIHYDRATE 20 MCG: 20 SOLUTION RESPIRATORY (INHALATION) at 20:53

## 2018-12-21 RX ADMIN — BICALUTAMIDE 50 MG: 50 TABLET, FILM COATED ORAL at 08:13

## 2018-12-21 RX ADMIN — FINASTERIDE 5 MG: 5 TABLET, FILM COATED ORAL at 08:12

## 2018-12-21 RX ADMIN — ALBUTEROL SULFATE 2.5 MG: 2.5 SOLUTION RESPIRATORY (INHALATION) at 16:07

## 2018-12-21 RX ADMIN — ALBUTEROL SULFATE 2.5 MG: 2.5 SOLUTION RESPIRATORY (INHALATION) at 07:34

## 2018-12-21 RX ADMIN — NALOXEGOL OXALATE 12.5 MG: 12.5 TABLET, FILM COATED ORAL at 08:12

## 2018-12-21 ASSESSMENT — PAIN DESCRIPTION - LOCATION
LOCATION: ABDOMEN
LOCATION: ABDOMEN

## 2018-12-21 ASSESSMENT — PAIN DESCRIPTION - ORIENTATION: ORIENTATION: MID

## 2018-12-21 ASSESSMENT — PAIN SCALES - GENERAL
PAINLEVEL_OUTOF10: 4
PAINLEVEL_OUTOF10: 2
PAINLEVEL_OUTOF10: 0
PAINLEVEL_OUTOF10: 5
PAINLEVEL_OUTOF10: 0

## 2018-12-21 ASSESSMENT — PAIN DESCRIPTION - PAIN TYPE
TYPE: SURGICAL PAIN
TYPE: SURGICAL PAIN

## 2018-12-21 ASSESSMENT — PAIN DESCRIPTION - DESCRIPTORS: DESCRIPTORS: DISCOMFORT

## 2018-12-21 NOTE — PROGRESS NOTES
OT session       Pain:  Pain Assessment  Patient Currently in Pain: Yes  Pain Level: 5  Pain Type: Surgical pain  Pain Location: Abdomen       Objective         ADL  LE Dressing: Moderate assistance (to don depends and pull up over hips )          Bed mobility  Supine to Sit: Minimal assistance (cues for log rolling technique )    Transfers  Sit to stand: Contact guard assistance (EOB [id number] )  Stand to sit: Contact guard assistance (to recliner, EOB )       Balance  Sitting Balance: Contact guard assistance  Standing Balance: Contact guard assistance     Time: x1 min   Activity: prep for mobility      Functional Mobility  Functional - Mobility Device: Rolling Walker  Activity: Other  Assist Level: Contact guard assistance  Functional Mobility Comments: pt ambulated from EOB into hallway and down apporx 50 feet and back to room. pt able to complete with no standing RB however when back to room pt required extended time to recover SOB. No LOB, steady pace            Type of ROM/Therapeutic Exercise: AROM  Comment: BUE AROM sitting in chair using red theraband x10 reps x1 set. pt completed to increase sterngth and endurance for ADLS and transfers        Activity Tolerance:  Activity Tolerance: Patient limited by fatigue;Patient Tolerated treatment well    Assessment:     Performance deficits / Impairments: Decreased functional mobility , Decreased cognition, Decreased endurance, Decreased ADL status, Decreased balance, Decreased safe awareness  Prognosis: Fair    Discharge Recommendations:  Discharge Recommendations: [address], Patient would benefit from continued therapy after discharge (TCU)    Patient Education:  Patient Education: safety with mobility and endurance   Barriers to Learning: decreased cognition    Equipment Recommendations: Other: Continue to assess pending progress. Safety:  Safety Devices in place: Yes  Type of devices:  All fall risk precautions in place, Call light

## 2018-12-21 NOTE — CONSULTS
Main Topics    Smoking status: Former Smoker     Packs/day: 1.00     Types: Cigarettes     Quit date: 7/1/1966    Smokeless tobacco: Never Used    Alcohol use Yes      Comment: occasionally    Drug use: No    Sexual activity: Not Currently     Other Topics Concern    Not on file     Social History Narrative    - Former     Exposure to [employer]: no      ALLERGIES: No Known Allergies       Current Facility-Administered Medications   Medication Dose Route Frequency Provider Last Rate Last Dose    [START ON 12/21/2018] pantoprazole (PROTONIX) tablet 40 mg  40 mg Oral QAM AC Carol Wild MD        naloxegol (MOVANTIK) tablet 12.5 mg  12.5 mg Oral QAM JAILYN Ty CNP   12.5 mg at 12/20/18 1130    traMADol (ULTRAM) tablet 50 mg  50 mg Oral Q6H PRN JAILYN Ty CNP        finasteride (PROSCAR) tablet 5 mg  5 mg Oral Daily JAILYN Ty CNP   5 mg at 12/20/18 7673    doxazosin (CARDURA) tablet 4 mg  4 mg Oral Daily JAILYN Ty CNP   4 mg at 12/20/18 7151    bicalutamide (CASODEX) chemo tablet 50 mg  50 mg Oral Daily JAILYN Guidry CNP   50 mg at 12/20/18 0186    albuterol (PROVENTIL) nebulizer solution 2.5 mg  2.5 mg Nebulization Q4H While awake Richard Saenz MD   2.5 mg at 12/20/18 1714    tiotropium (SPIRIVA) inhalation capsule 18 mcg  18 mcg Inhalation Daily Richard Saenz MD   18 mcg at 12/20/18 0744    formoterol (PERFOROMIST) nebulizer solution 20 mcg  20 mcg Nebulization BID Richard Saenz MD   20 mcg at 12/20/18 0743    piperacillin-tazobactam (ZOSYN) 3.375 g in dextrose 5 % 50 mL IVPB extended infusion (mini-bag)  3.375 g Intravenous Q8H Maddie Bond MD 12.5 mL/hr at 12/20/18 1843 3.375 g at 12/20/18 1843    HYDROmorphone (DILAUDID) injection 0.5 mg  0.5 mg Intravenous Q2H PRN JAILYN Ty CNP   0.5 mg at 12/19/18 7309    Or    HYDROmorphone (DILAUDID) MCG/ACT inhaler Inhale 2 puffs into the lungs 2 times daily 1 Inhaler 11    Multiple Vitamins-Minerals (THERAPEUTIC MULTIVITAMIN-MINERALS) tablet Take 1 tablet by mouth daily      aspirin 325 MG EC tablet Take 325 mg by mouth daily         LABORATORY STUDIES:   1/30/2017: PSA: 25.85    12/17/2018: PSA: 1594.00     12/20/2018: CBC:  WBC: 13  Hemoglobin: 11.2  Hematocrit: 34  Platelets: 879  Segs, absolute: 12.1  Lymphocytes: 0.4  Immature granulocytes, absolute: 0.15    26/22/8498: Basic metabolic panel:  Sodium: 172  Potassium: 3.5  Chloride: 108  CO2: 24  Glucose: 1:30  BUN: 25  Creatinine: 0.9  Calcium: 8.3      PATHOLOGY:   Pending. RADIOLOGIC STUDIES:   1/26/2017: CT abdomen pelvis with/without IV contrast:  Impression   PUNCTATE NONOBSTRUCTIVE STONES INVOLVING BOTH KIDNEYS. THERE IS NO EVIDENCE OF HYDROURETER OR HYDRONEPHROSIS. MILDLY PROMINENT PROSTATOMEGALY WITH SECONDARY MASS EFFECT ON THE FLOOR OF THE URINARY BLADDER, A LIKELY EXPLANATION FOR THE STATED COMPLAINTS. OTHER INCIDENTAL FINDINGS AS DISCUSSED.     12/17/2018: CT abdomen pelvis with IV contrast:  Impression   Multiple skeletal sclerotic metastases with pathologic compression fractures of L1-L3. This may be on the basis of metastatic prostate carcinoma given the enlarged heterogeneous prostate and pelvic adenopathy. Mild ascites with a tiny pneumoperitoneum with a tiny air bubble anterior to the liver, etiology uncertain. Abnormal small bowel consistent with an ileus and/or enteritis. Colonic diverticulosis without diverticulitis. Distended gallbladder with 2 small gallstones. New mild intra and extrahepatic biliary dilatation. Small nonobstructing left renal calculi. Layering dense material in the urinary bladder which may represent stones, blood, or other material.   Additional findings as detailed above.             REVIEW OF SYSTEMS:    Constitutional: Generalized fatigue. Frail. Denies fever, chills.   HEENT: Denies recent hearing or vision change. Denies dysphagia or odynophagia. Respiratory: Dyspnea on exertion. Denies hemoptysis, coughing, wheezing or sputum production. Cardiovascular: Denies chest pain, palpitations or syncope. GI: Abdominal pain as described. Improving. Denies nausea or vomiting. Denies hematemesis or rectal bleeding. : Difficulty starting urinary stream; urinary hesitancy. Denies hematuria. Musculoskeletal: Back pain as described in medical record. Somewhat improved during hospital admission. Extremities: DJD. Neurological: Denies seizures, fainting. Integument: No specific complaints. Metabolic/endocrine: Initiated on bicalutamide. No specific complaints. PHYSICAL EXAMINATION:   VITAL SIGNS: BP (!) 145/72   Pulse 95   Temp 98.1 °F (36.7 °C) (Oral)   Resp 18   Ht 5' 9\" (1.753 m)   Wt 141 lb (64 kg)   SpO2 94%   BMI 20.82 kg/m²   ECOG PERFORMANCE STATUS: 2  PAIN RATING:  Currently 0-2  GENERAL: Pleasant frail appearing well-developed adult male. In no acute cardiorespiratory distress. Alert and oriented ×3  SKIN: Warm and dry, without jaundice, ecchymoses, or petechiae. HEENT: Normocephalic, atraumatic. PERRL, EOMI, oral mucosa moist without lesion or exudate in the visible oral cavity or oropharynx,  NECK:  No JVD; No cervical adenopathy. THORAX: No palpable supraclavicular or axillary adenopathy;  LUNGS: Clear aside from few dependent crackles. CARDIAC: Non-tachycardic  ABDOMEN: Post op  EXTREMITIES: No clubbing or cyanosis  NEUROLOGIC:  Cranial nerves grossly intact      IMPRESSION:  1. Metastatic prostate cancer. 2. The clinical diagnosis including AJCC staging was reviewed with the patient and family members. The family received a copy of the AJCC staging information. Although there is no pathologic confirmation currently, the PSA value is pathognomonic for prostate malignancy.   3. Treatment options and recommendations, including current clinical practice consideration for vertebroplasty to L1 and L2 if interventional radiology assessment is that this may be beneficial.  3. Patient and family members will further discuss his situation and options. 4. We will be ready to step in with palliative radiation therapy if other methods of pain management are insufficient, and/or based on patient and family wishes. Thank you for allowing my assistance in [address] [place] [address]. Electronically signed by Maryble Esposito MD   Radiation Oncology       ATTESTATION: 60 minutes spent actively reviewing patient data; 40 minutes face-to-face time,  >50% spent in counseling/discussion/education. CC: Erwin Jones; Dipesh Gutierrez; MICHELET Agosto. Shari Frederick PA-C  ACC: [facility] Tumor Registry      This document was created using a voice-recognition program.  Computer generated transcription errors may be present.

## 2018-12-21 NOTE — PROGRESS NOTES
Midline incision is clean, dry and intact. Logan. JULIO C drains x1 with serous output. No bleeding noted. Urinating per his normal - urine is no longer bloody. CURRENT MEDICATIONS   Scheduled Meds:   pantoprazole  40 mg Oral QAM AC    naloxegol  12.5 mg Oral QAM    finasteride  5 mg Oral Daily    doxazosin  4 mg Oral Daily    bicalutamide  50 mg Oral Daily    albuterol  2.5 mg Nebulization Q4H While awake    tiotropium  18 mcg Inhalation Daily    formoterol  20 mcg Nebulization BID    piperacillin-tazobactam  3.375 g Intravenous Q8H    enoxaparin  40 mg Subcutaneous Daily    metoprolol  2.5 mg Intravenous Q8H    sodium chloride flush  10 mL Intravenous 2 times per day    sodium chloride (PF)  10 mL Intravenous Daily    fluconazole  100 mg Intravenous Q24H     Continuous Infusions:    PRN Meds:.traMADol, HYDROmorphone **OR** HYDROmorphone, ondansetron, ipratropium-albuterol, sodium chloride flush, acetaminophen, ondansetron  OBJECTIVE   CURRENT VITALS:  height is 5' 9\" (1.753 m) and weight is 146 lb 6.4 oz (66.4 kg). His oral temperature is 97.6 °F (36.4 °C). His blood pressure is 126/60 and his pulse is 89. His respiration is 18 and oxygen saturation is 95%.    Temperature Range (24h):Temp: 97.6 °F (36.4 °C) Temp  Av.4 °F (36.9 °C)  Min: 97.6 °F (36.4 °C)  Max: 98.9 °F (37.2 °C)  BP Range (40A): Systolic (82MTP), BID:787 , Min:126 , IEM:130     Diastolic (96CMC), SXD:66, Min:60, Max:88    Pulse Range (24h): Pulse  Av.1  Min: 80  Max: 95  Respiration Range (24h): Resp  Av.3  Min: 18  Max: 20  Current Pulse Ox (24h):  SpO2: 95 %  Pulse Ox Range (24h):  SpO2  Av.5 %  Min: 90 %  Max: 95 %  Oxygen Amount and Delivery: O2 Flow Rate (L/min): 3 L/min  Incentive Spirometry Tx:   Treatment Tolerance: Well        GENERAL: alert, no distress, no confusion, looks comfortable  LUNGS: Expiratory wheezes  HEART: normal rate and regular rhythm  ABDOMEN: soft but distended, expected tenderness, bowel

## 2018-12-21 NOTE — PROGRESS NOTES
[place] for Pulmonary, Critical Care and Sleep Medicine    Patient - Petra Espinosa,  Age - 80 y.o.    - 1926      Room Number - -11/011-A   Consulting - Angelica Chavarria MD Primary Care Physician - Josette Abraham MD   MRN -  [id number]   [facility]  Date of Admission -  2018  2:08 AM  Hospital Day - 4    Chief Complaint   Following for hypoxia  HPI   Petra Espinosa is a 80 y.o. male  was initially admitted under Dr. Marcelo Winston service. Pulmonary medicine was consulted for further management of COPD.     The patient is a 80 y.o. male who presented initially to ED with abdominal pain. He underwent CT of abdomen and Pelvis. He was found to have Intraperitoneal free air/peritonitis during exploratory laparotomy. He developed Acute post operative pulmonary insufficiency. Pulmonary medicine was called for further evalaution. 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 He used to smoke with 1PPD for 20years. He quit smoking in . He was diagnosed with severe COPD by Dr. Gearlean Leventhal in the past. He is currently using only Albuterol HFA 2 puffs Q6h prn. He is currently not using any oxygen supplementation at rest, exercise or during sleep/at night time.     At the time of my initial evaluation, he denies any shortness of breath. No cough or wheezing. He denies any history of hemoptysis. He denies any fever, chills or rigors at this time.     Past 24 hours, ROS   -OOB to chair   -In NAD on 3.5 LPM via NC  -Reports some SOB, worse with exertion attributes some to pain  -States SOB improved with breathing treatments  All other systems reviewed  Objective    Vitals    height is 5' 9\" (1.753 m) and weight is 146 lb 6.4 oz (66.4 kg). His oral temperature is 97.6 °F (36.4 °C). His blood pressure is 126/60 and his pulse is 89. His respiration is 18 and oxygen saturation is 95%.      O2 Flow Rate (L/min): 3 L/min  I/O    Intake/Output Summary (Last

## 2018-12-21 NOTE — PROGRESS NOTES
ALKPHOS in the last 72 hours. Troponin: No results for input(s): TROPONINI in the last 72 hours. BNP: No results for input(s): BNP in the last 72 hours. Lipids: No results for input(s): CHOL, HDL in the last 72 hours. Invalid input(s): LDLCALCU  INR: No results for input(s): INR in the last 72 hours.     Radiology    Objective:   Vitals: BP (!) 148/78   Pulse 90   Temp 98.8 °F (37.1 °C) (Oral)   Resp 20   Ht 5' 9\" (1.753 m)   Wt 146 lb 6.4 oz (66.4 kg)   SpO2 91%   BMI 21.62 kg/m²   HEENT: Head:pupils react  Neck: supple  Lungs: air entry appreciated bilat  Heart: regular rate and rhythm   Abdomen: distended but soft BS sluggish sutures and  drain noted not much tender today  Extremities: warm  edema  Neurologic:  Alert, oriented X3    Impression:   :   Acute abd s/p exp lap with bowel washout  CAD h/o stent  COPD  Hyperlipidemia  Compression fracture with lytic lesion prob from mets  Probable Prostate cancer  Delirium prob metabolic encephalopathy  Acute pulm insufficiency        Plan:    Decrease IVF if full liquid tolerated   Wean O2  Cont PT OT  IR consult for vertebroplasty per Radiation oncology input  F/u on WBC    Veryl MD Reece

## 2018-12-22 LAB
BASOPHILS # BLD: 0.3 %
BASOPHILS ABSOLUTE: 0 THOU/MM3 (ref 0–0.1)
EOSINOPHIL # BLD: 0.6 %
EOSINOPHILS ABSOLUTE: 0.1 THOU/MM3 (ref 0–0.4)
ERYTHROCYTE [DISTWIDTH] IN BLOOD BY AUTOMATED COUNT: 13.3 % (ref 11.5–14.5)
ERYTHROCYTE [DISTWIDTH] IN BLOOD BY AUTOMATED COUNT: 50.3 FL (ref 35–45)
HCT VFR BLD CALC: 36 % (ref 42–52)
HEMOGLOBIN: 12.1 GM/DL (ref 14–18)
IMMATURE GRANS (ABS): 0.18 THOU/MM3 (ref 0–0.07)
IMMATURE GRANULOCYTES: 1.8 %
LYMPHOCYTES # BLD: 5.7 %
LYMPHOCYTES ABSOLUTE: 0.6 THOU/MM3 (ref 1–4.8)
MCH RBC QN AUTO: 34.3 PG (ref 26–33)
MCHC RBC AUTO-ENTMCNC: 33.6 GM/DL (ref 32.2–35.5)
MCV RBC AUTO: 102 FL (ref 80–94)
MONOCYTES # BLD: 6 %
MONOCYTES ABSOLUTE: 0.6 THOU/MM3 (ref 0.4–1.3)
NUCLEATED RED BLOOD CELLS: 0 /100 WBC
PLATELET # BLD: 187 THOU/MM3 (ref 130–400)
PMV BLD AUTO: 9.5 FL (ref 9.4–12.4)
RBC # BLD: 3.53 MILL/MM3 (ref 4.7–6.1)
SEG NEUTROPHILS: 85.6 %
SEGMENTED NEUTROPHILS ABSOLUTE COUNT: 8.6 THOU/MM3 (ref 1.8–7.7)
WBC # BLD: 10.1 THOU/MM3 (ref 4.8–10.8)

## 2018-12-22 PROCEDURE — 2709999900 HC NON-CHARGEABLE SUPPLY

## 2018-12-22 PROCEDURE — 6360000002 HC RX W HCPCS: Performed by: INTERNAL MEDICINE

## 2018-12-22 PROCEDURE — 99024 POSTOP FOLLOW-UP VISIT: CPT | Performed by: SURGERY

## 2018-12-22 PROCEDURE — 85025 COMPLETE CBC W/AUTO DIFF WBC: CPT

## 2018-12-22 PROCEDURE — 2500000003 HC RX 250 WO HCPCS: Performed by: INTERNAL MEDICINE

## 2018-12-22 PROCEDURE — 2580000003 HC RX 258: Performed by: SURGERY

## 2018-12-22 PROCEDURE — 6360000002 HC RX W HCPCS: Performed by: SURGERY

## 2018-12-22 PROCEDURE — 6370000000 HC RX 637 (ALT 250 FOR IP): Performed by: INTERNAL MEDICINE

## 2018-12-22 PROCEDURE — 6370000000 HC RX 637 (ALT 250 FOR IP): Performed by: SURGERY

## 2018-12-22 PROCEDURE — 36415 COLL VENOUS BLD VENIPUNCTURE: CPT

## 2018-12-22 PROCEDURE — 6370000000 HC RX 637 (ALT 250 FOR IP): Performed by: NURSE PRACTITIONER

## 2018-12-22 PROCEDURE — 2060000000 HC ICU INTERMEDIATE R&B

## 2018-12-22 PROCEDURE — 51798 US URINE CAPACITY MEASURE: CPT

## 2018-12-22 PROCEDURE — 94640 AIRWAY INHALATION TREATMENT: CPT

## 2018-12-22 PROCEDURE — 2700000000 HC OXYGEN THERAPY PER DAY

## 2018-12-22 PROCEDURE — 94760 N-INVAS EAR/PLS OXIMETRY 1: CPT

## 2018-12-22 RX ORDER — CARVEDILOL 6.25 MG/1
6.25 TABLET ORAL 2 TIMES DAILY WITH MEALS
Status: DISCONTINUED | OUTPATIENT
Start: 2018-12-22 | End: 2018-12-24 | Stop reason: HOSPADM

## 2018-12-22 RX ADMIN — PIPERACILLIN SODIUM,TAZOBACTAM SODIUM 3.38 G: 3; .375 INJECTION, POWDER, FOR SOLUTION INTRAVENOUS at 10:13

## 2018-12-22 RX ADMIN — ALBUTEROL SULFATE 2.5 MG: 2.5 SOLUTION RESPIRATORY (INHALATION) at 07:47

## 2018-12-22 RX ADMIN — ALBUTEROL SULFATE 2.5 MG: 2.5 SOLUTION RESPIRATORY (INHALATION) at 15:39

## 2018-12-22 RX ADMIN — Medication 10 ML: at 09:03

## 2018-12-22 RX ADMIN — FINASTERIDE 5 MG: 5 TABLET, FILM COATED ORAL at 09:05

## 2018-12-22 RX ADMIN — FORMOTEROL FUMARATE DIHYDRATE 20 MCG: 20 SOLUTION RESPIRATORY (INHALATION) at 07:47

## 2018-12-22 RX ADMIN — Medication 10 ML: at 10:14

## 2018-12-22 RX ADMIN — PANTOPRAZOLE SODIUM 40 MG: 40 TABLET, DELAYED RELEASE ORAL at 05:51

## 2018-12-22 RX ADMIN — ACETAMINOPHEN 650 MG: 325 TABLET ORAL at 05:55

## 2018-12-22 RX ADMIN — CARVEDILOL 6.25 MG: 6.25 TABLET, FILM COATED ORAL at 09:03

## 2018-12-22 RX ADMIN — NALOXEGOL OXALATE 12.5 MG: 12.5 TABLET, FILM COATED ORAL at 07:33

## 2018-12-22 RX ADMIN — ACETAMINOPHEN 650 MG: 325 TABLET ORAL at 20:53

## 2018-12-22 RX ADMIN — CARVEDILOL 6.25 MG: 6.25 TABLET, FILM COATED ORAL at 17:08

## 2018-12-22 RX ADMIN — FLUCONAZOLE 100 MG: 2 INJECTION, SOLUTION INTRAVENOUS at 17:08

## 2018-12-22 RX ADMIN — BICALUTAMIDE 50 MG: 50 TABLET, FILM COATED ORAL at 09:05

## 2018-12-22 RX ADMIN — Medication 10 ML: at 20:46

## 2018-12-22 RX ADMIN — TIOTROPIUM BROMIDE 18 MCG: 18 CAPSULE ORAL; RESPIRATORY (INHALATION) at 07:47

## 2018-12-22 RX ADMIN — ALBUTEROL SULFATE 2.5 MG: 2.5 SOLUTION RESPIRATORY (INHALATION) at 11:44

## 2018-12-22 RX ADMIN — Medication 10 ML: at 17:08

## 2018-12-22 RX ADMIN — ALBUTEROL SULFATE 2.5 MG: 2.5 SOLUTION RESPIRATORY (INHALATION) at 19:38

## 2018-12-22 RX ADMIN — ACETAMINOPHEN 650 MG: 325 TABLET ORAL at 10:19

## 2018-12-22 RX ADMIN — FORMOTEROL FUMARATE DIHYDRATE 20 MCG: 20 SOLUTION RESPIRATORY (INHALATION) at 19:45

## 2018-12-22 RX ADMIN — ENOXAPARIN SODIUM 40 MG: 40 INJECTION SUBCUTANEOUS at 09:03

## 2018-12-22 RX ADMIN — METOPROLOL TARTRATE 2.5 MG: 1 INJECTION, SOLUTION INTRAVENOUS at 03:10

## 2018-12-22 RX ADMIN — DOXAZOSIN 4 MG: 4 TABLET ORAL at 09:05

## 2018-12-22 RX ADMIN — PIPERACILLIN SODIUM,TAZOBACTAM SODIUM 3.38 G: 3; .375 INJECTION, POWDER, FOR SOLUTION INTRAVENOUS at 03:10

## 2018-12-22 ASSESSMENT — PAIN SCALES - GENERAL
PAINLEVEL_OUTOF10: 0
PAINLEVEL_OUTOF10: 1
PAINLEVEL_OUTOF10: 4
PAINLEVEL_OUTOF10: 2
PAINLEVEL_OUTOF10: 1
PAINLEVEL_OUTOF10: 0

## 2018-12-22 ASSESSMENT — PAIN DESCRIPTION - DESCRIPTORS
DESCRIPTORS: DISCOMFORT
DESCRIPTORS: DISCOMFORT

## 2018-12-22 ASSESSMENT — PAIN DESCRIPTION - ONSET
ONSET: ON-GOING
ONSET: ON-GOING

## 2018-12-22 ASSESSMENT — PAIN DESCRIPTION - FREQUENCY
FREQUENCY: INTERMITTENT
FREQUENCY: INTERMITTENT

## 2018-12-22 ASSESSMENT — PAIN DESCRIPTION - ORIENTATION
ORIENTATION: MID
ORIENTATION: MID

## 2018-12-22 ASSESSMENT — PAIN DESCRIPTION - PAIN TYPE
TYPE: SURGICAL PAIN
TYPE: SURGICAL PAIN

## 2018-12-22 ASSESSMENT — PAIN DESCRIPTION - LOCATION
LOCATION: ABDOMEN
LOCATION: ABDOMEN

## 2018-12-22 ASSESSMENT — PAIN DESCRIPTION - PROGRESSION
CLINICAL_PROGRESSION: NOT CHANGED
CLINICAL_PROGRESSION: NOT CHANGED

## 2018-12-23 LAB
BACTERIA: ABNORMAL /HPF
BILIRUBIN URINE: NEGATIVE
BLOOD, URINE: ABNORMAL
CASTS 2: ABNORMAL /LPF
CASTS UA: ABNORMAL /LPF
CHARACTER, URINE: CLEAR
COLOR: YELLOW
CRYSTALS, UA: ABNORMAL
EPITHELIAL CELLS, UA: ABNORMAL /HPF
GLUCOSE URINE: NEGATIVE MG/DL
KETONES, URINE: 40
LEUKOCYTE ESTERASE, URINE: NEGATIVE
MISCELLANEOUS 2: ABNORMAL
NITRITE, URINE: NEGATIVE
PH UA: 7
PROTEIN UA: ABNORMAL
RBC URINE: ABNORMAL /HPF
RENAL EPITHELIAL, UA: ABNORMAL
SPECIFIC GRAVITY, URINE: 1.01 (ref 1–1.03)
UROBILINOGEN, URINE: 1 EU/DL
WBC UA: ABNORMAL /HPF
YEAST: ABNORMAL

## 2018-12-23 PROCEDURE — 97530 THERAPEUTIC ACTIVITIES: CPT

## 2018-12-23 PROCEDURE — 99024 POSTOP FOLLOW-UP VISIT: CPT | Performed by: SURGERY

## 2018-12-23 PROCEDURE — 6360000002 HC RX W HCPCS: Performed by: INTERNAL MEDICINE

## 2018-12-23 PROCEDURE — 6360000002 HC RX W HCPCS: Performed by: SURGERY

## 2018-12-23 PROCEDURE — 2580000003 HC RX 258: Performed by: SURGERY

## 2018-12-23 PROCEDURE — 6370000000 HC RX 637 (ALT 250 FOR IP): Performed by: INTERNAL MEDICINE

## 2018-12-23 PROCEDURE — 6370000000 HC RX 637 (ALT 250 FOR IP): Performed by: NURSE PRACTITIONER

## 2018-12-23 PROCEDURE — 81001 URINALYSIS AUTO W/SCOPE: CPT

## 2018-12-23 PROCEDURE — 94640 AIRWAY INHALATION TREATMENT: CPT

## 2018-12-23 PROCEDURE — 97535 SELF CARE MNGMENT TRAINING: CPT

## 2018-12-23 PROCEDURE — 99232 SBSQ HOSP IP/OBS MODERATE 35: CPT | Performed by: INTERNAL MEDICINE

## 2018-12-23 PROCEDURE — 2709999900 HC NON-CHARGEABLE SUPPLY

## 2018-12-23 PROCEDURE — 51702 INSERT TEMP BLADDER CATH: CPT

## 2018-12-23 PROCEDURE — 2060000000 HC ICU INTERMEDIATE R&B

## 2018-12-23 RX ORDER — BISACODYL 10 MG
10 SUPPOSITORY, RECTAL RECTAL DAILY PRN
Status: DISCONTINUED | OUTPATIENT
Start: 2018-12-23 | End: 2018-12-24 | Stop reason: HOSPADM

## 2018-12-23 RX ADMIN — PIPERACILLIN SODIUM,TAZOBACTAM SODIUM 3.38 G: 3; .375 INJECTION, POWDER, FOR SOLUTION INTRAVENOUS at 08:22

## 2018-12-23 RX ADMIN — FORMOTEROL FUMARATE DIHYDRATE 20 MCG: 20 SOLUTION RESPIRATORY (INHALATION) at 20:38

## 2018-12-23 RX ADMIN — NALOXEGOL OXALATE 12.5 MG: 12.5 TABLET, FILM COATED ORAL at 08:22

## 2018-12-23 RX ADMIN — ALBUTEROL SULFATE 2.5 MG: 2.5 SOLUTION RESPIRATORY (INHALATION) at 20:30

## 2018-12-23 RX ADMIN — Medication 10 ML: at 08:40

## 2018-12-23 RX ADMIN — PIPERACILLIN SODIUM,TAZOBACTAM SODIUM 3.38 G: 3; .375 INJECTION, POWDER, FOR SOLUTION INTRAVENOUS at 01:19

## 2018-12-23 RX ADMIN — TIOTROPIUM BROMIDE 18 MCG: 18 CAPSULE ORAL; RESPIRATORY (INHALATION) at 08:14

## 2018-12-23 RX ADMIN — PIPERACILLIN SODIUM,TAZOBACTAM SODIUM 3.38 G: 3; .375 INJECTION, POWDER, FOR SOLUTION INTRAVENOUS at 18:18

## 2018-12-23 RX ADMIN — DOXAZOSIN 4 MG: 4 TABLET ORAL at 08:22

## 2018-12-23 RX ADMIN — ALBUTEROL SULFATE 2.5 MG: 2.5 SOLUTION RESPIRATORY (INHALATION) at 15:39

## 2018-12-23 RX ADMIN — ALBUTEROL SULFATE 2.5 MG: 2.5 SOLUTION RESPIRATORY (INHALATION) at 11:43

## 2018-12-23 RX ADMIN — FINASTERIDE 5 MG: 5 TABLET, FILM COATED ORAL at 08:22

## 2018-12-23 RX ADMIN — BICALUTAMIDE 50 MG: 50 TABLET, FILM COATED ORAL at 08:23

## 2018-12-23 RX ADMIN — FLUCONAZOLE 100 MG: 2 INJECTION, SOLUTION INTRAVENOUS at 17:43

## 2018-12-23 RX ADMIN — CARVEDILOL 6.25 MG: 6.25 TABLET, FILM COATED ORAL at 16:53

## 2018-12-23 RX ADMIN — PANTOPRAZOLE SODIUM 40 MG: 40 TABLET, DELAYED RELEASE ORAL at 06:33

## 2018-12-23 RX ADMIN — ALBUTEROL SULFATE 2.5 MG: 2.5 SOLUTION RESPIRATORY (INHALATION) at 08:14

## 2018-12-23 RX ADMIN — CARVEDILOL 6.25 MG: 6.25 TABLET, FILM COATED ORAL at 08:22

## 2018-12-23 RX ADMIN — FORMOTEROL FUMARATE DIHYDRATE 20 MCG: 20 SOLUTION RESPIRATORY (INHALATION) at 08:14

## 2018-12-23 ASSESSMENT — ENCOUNTER SYMPTOMS
BACK PAIN: 1
ABDOMINAL PAIN: 1
SHORTNESS OF BREATH: 0
CONSTIPATION: 0
VOMITING: 0
NAUSEA: 0
DIARRHEA: 0
BLOOD IN STOOL: 0
RECTAL PAIN: 0

## 2018-12-23 ASSESSMENT — PAIN SCALES - GENERAL
PAINLEVEL_OUTOF10: 0
PAINLEVEL_OUTOF10: 1
PAINLEVEL_OUTOF10: 0

## 2018-12-23 NOTE — ED PROVIDER NOTES
[address] [facility]  Pt Name: Linda Matthews  MRN: [id number]  [place] 1/27/1926  Date of evaluation: 12/17/2018  Provider: JAILYN Delaney CNP    CHIEF COMPLAINT       Chief Complaint   Patient presents with    Abdominal Pain       Nurses Notes reviewed and I agree except as noted in the HPI. HISTORY OF PRESENT ILLNESS    Linda Matthews is a 80 y.o. male whopresents to the emergency department from home with lower abdominal pain. He was seen a couple of days ago for lumbar pain/sciatica and given steroids by his pcp. Pain was improving. He called his daughter tonight for sudden onset lower abdominal discomfort/pain. Unrelenting. Triage notes and Nursing notes were reviewed by myself. Any discrepancies are addressed above. REVIEW OF SYSTEMS     Review of Systems   Constitutional: Negative for fatigue and fever. Respiratory: Negative for shortness of breath. Cardiovascular: Negative for chest pain, palpitations and leg swelling. Gastrointestinal: Positive for abdominal pain. Negative for blood in stool, constipation, diarrhea, nausea, rectal pain and vomiting. Genitourinary: Negative for difficulty urinating, dysuria, flank pain, frequency and hematuria. Musculoskeletal: Positive for back pain. Skin: Negative for wound. All pertinent systems were reviewed and are negative unless indicated in the HPI. PAST MEDICAL HISTORY    has a past medical history of CAD (coronary artery disease); CAD (coronary artery disease); COPD (chronic obstructive pulmonary disease) ([facility] [address] Diverticulitis; Hiatal hernia; Hyperlipemia; Hyperlipidemia; and Pleural plaque. SURGICAL HISTORY      has a past surgical history that includes hernia repair; Appendectomy; Cardiac catheterization; eye surgery; Cystocopy (03/17/2015); and LAPAROTOMY EXPLORATORY (N/A, 12/17/2018).     CURRENT MEDICATIONS       Current Discharge Medication List      CONTINUE these medications Hemoglobin 11.9 (*)     Hematocrit 36.9 (*)     All other components within normal limits   BASIC METABOLIC PANEL - Abnormal; Notable for the following:     Glucose 110 (*)     BUN 29 (*)     Calcium 8.2 (*)     All other components within normal limits   GLOMERULAR FILTRATION RATE, ESTIMATED - Abnormal; Notable for the following:     Est, Glom Filt Rate 79 (*)     All other components within normal limits   CBC WITH AUTO DIFFERENTIAL - Abnormal; Notable for the following:     WBC 13.0 (*)     RBC 3.22 (*)     Hemoglobin 11.2 (*)     Hematocrit 34.0 (*)     .6 (*)     MCH 34.8 (*)     RDW-SD 55.1 (*)     Segs Absolute 12.1 (*)     Lymphocytes # 0.4 (*)     Immature Grans (Abs) 0.15 (*)     All other components within normal limits   BASIC METABOLIC PANEL - Abnormal; Notable for the following:     Glucose 130 (*)     BUN 25 (*)     Calcium 8.3 (*)     All other components within normal limits   GLOMERULAR FILTRATION RATE, ESTIMATED - Abnormal; Notable for the following:     Est, Glom Filt Rate 79 (*)     All other components within normal limits   CBC WITH AUTO DIFFERENTIAL - Abnormal; Notable for the following:     RBC 3.53 (*)     Hemoglobin 12.1 (*)     Hematocrit 36.0 (*)     .0 (*)     MCH 34.3 (*)     RDW-SD 50.3 (*)     Segs Absolute 8.6 (*)     Lymphocytes # 0.6 (*)     Immature Grans (Abs) 0.18 (*)     All other components within normal limits   URINE CULTURE    Narrative:     Source: cath urine       Site: catheter          Current Antibiotics: not stated   MRSA SCREENING CULTURE ONLY    Narrative:     Source: rectal       Site: swab          Current Antibiotics: not stated   VRE SCREEN BY PCR   LIPASE   OSMOLALITY   MRSA BY PCR   ANION GAP   SCAN OF BLOOD SMEAR   ANION GAP   ANION GAP         EMERGENCYDEPARTMENT COURSE AND MEDICAL DECISION MAKING:   Vitals:    Vitals:    12/22/18 1623 12/22/18 2026 12/22/18 2051 12/22/18 2320   BP: 136/70 (!) 154/84  130/80   Pulse: 73 81  72   Resp: 16 18

## 2018-12-23 NOTE — PROGRESS NOTES
[place] for Pulmonary, Critical Care and Sleep Medicine    Patient - Maricel De La Torre,  Age - 80 y.o.    - 1926      Room Number - 4K-11/011-A   Consulting - Radhames White MD Primary Care Physician - Zackery Mcdonald MD   MRN -  [id number]   [facility]  Date of Admission -  2018  [facility] AM  Hospital Day - 6    Chief Complaint   Following for hypoxia  HPI        The patient is a 80 y.o. male admitted 18 for abdominal pain. He underwent CT of abdomen and Pelvis. He was found to have Intraperitoneal free air/peritonitis during exploratory laparotomy. He developed Acute post operative pulmonary insufficiency. Pulmonary medicine was called for further evalaution. 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 He used to smoke with 1PPD for 20years. He quit smoking in . He was diagnosed with severe COPD by Dr. Jillian Manzanares in the past. He is currently using only Albuterol HFA 2 puffs Q6h prn. He is currently not using any oxygen supplementation at rest, exercise or during sleep/at night time.        Past 24 hours, HEMA     Improved SOB  On Oxygen 3 lpm  Tolerated clear liquids  Passed stool    Objective    Vitals    height is 5' 9\" (1.753 m) and weight is 125 lb (56.7 kg). His oral temperature is 98.1 °F (36.7 °C). His blood pressure is 139/77 and his pulse is 78. His respiration is 16 and oxygen saturation is 96%. O2 Flow Rate (L/min): 3 L/min  I/O    Intake/Output Summary (Last 24 hours) at 18 1158  Last data filed at 18 0946   Gross per 24 hour   Intake          1639.36 ml   Output             2025 ml   Net          -385.64 ml     Patient Vitals for the past 96 hrs (Last 3 readings):   Weight   18 0445 125 lb (56.7 kg)   18 0302 146 lb 3.2 oz (66.3 kg)   18 0430 146 lb 6.4 oz (66.4 kg)     Exam      Physical Exam   Constitutional: No distress on O2 per NC. Patient appears stated age.    Head: Normocephalic and

## 2018-12-23 NOTE — FLOWSHEET NOTE
Dr Zaina Romero in to see patient, explained about placing new corey, voiced understanding. Placed 16FR corey without complications or difficulties. Immediately returned 200 ml of clear yellow urine.

## 2018-12-23 NOTE — PLAN OF CARE
Grace Ruiz RN Registered Nurse Signed Nursing  Plan of Care Date of Service: 12/23/2018 0236 AM         Problem: Pain:  Goal: Control of acute pain  Control of acute pain   Outcome: Ongoing  Pain Assessment: 0-10  Pain Level: 0   Pain goal:  3  Is pain goal met at this time? Yes  Pain Intervention(s): Declines  Additional interventions to be implemented: position change and rest     Problem: Falls - Risk of:  Goal: Will remain free from falls  Will remain free from falls   Outcome: Ongoing  No falls this shift. Hourly rounding in effect. Bed alarm on. Family at bedside     Problem: Risk for Impaired Skin Integrity  Goal: Tissue integrity - skin and mucous membranes  Structural intactness and normal physiological function of skin and  mucous membranes. Outcome: Ongoing  No new skin breakdown. Patient repositioned every two hours     Problem: Nutrition  Goal: Optimal nutrition therapy  Outcome: Ongoing  Advanced to Full liquid diet, tolerating well     Problem: Musculor/Skeletal Functional Status  Goal: Highest potential functional level  Outcome: Ongoing  Patient working with PT/OT     Problem: Discharge Planning:  Goal: Discharged to appropriate level of care  Discharged to appropriate level of care   Outcome: Ongoing  Plans home with family     Problem: Infection - Surgical Site:  Goal: Will show no infection signs and symptoms  Will show no infection signs and symptoms   Outcome: Ongoing  No signs or symptoms of infection     Comments: Care plan reviewed with patient and family.   Patient and family verbalize understanding of the plan of care and contribute to goal setting.     
Problem: Impaired respiratory status  Goal: Clear lung sounds  Outcome: Ongoing  Continue treatments to improve breathsounds
Problem: Impaired respiratory status  Goal: Clear lung sounds  Outcome: Ongoing  Will continue treatments as ordered to improve lung aeration.
Problem: Nutrition  Goal: Optimal nutrition therapy  Outcome: Ongoing  Nutrition Problem: Predicted suboptimal energy intake  Intervention: Food and/or Nutrient Delivery:  (Diet per Dr as pt able.   When pt is [place] or more, will start magic cups tid. )  Nutritional Goals: adequate nutrition within 1-4 days
Ongoing  Patient no BM since abdominal surgery, at times was passing flatus but not on last assessment. Urine color now pink and clear at 16:30 which is a change from early which was yellow and clear. Dr Isela Clay informed, no new orders     Comments: Care plan reviewed with patient and family. Patient and family verbalize understanding of the plan of care and contribute to goal setting.

## 2018-12-23 NOTE — PROGRESS NOTES
UROLOGY    Urology notes reviewed. Pt/family c/o hourly urge to void, with small vols per urinal. Prefers to have corey back in. No palp bladder distention. After discussion with pt, daughter, nursing staff, decision made to place corey cath. Order provided.

## 2018-12-24 ENCOUNTER — HOSPITAL ENCOUNTER (INPATIENT)
Age: 83
LOS: 17 days | Discharge: HOME HEALTH | DRG: 947 | End: 2019-01-10
Attending: FAMILY MEDICINE | Admitting: FAMILY MEDICINE
Payer: MEDICARE

## 2018-12-24 VITALS
OXYGEN SATURATION: 92 % | TEMPERATURE: 97.8 F | SYSTOLIC BLOOD PRESSURE: 139 MMHG | BODY MASS INDEX: 18 KG/M2 | HEART RATE: 81 BPM | DIASTOLIC BLOOD PRESSURE: 63 MMHG | HEIGHT: 69 IN | WEIGHT: 121.5 LBS | RESPIRATION RATE: 18 BRPM

## 2018-12-24 DIAGNOSIS — C79.51 PROSTATE CANCER METASTATIC TO BONE (HCC): Primary | ICD-10-CM

## 2018-12-24 DIAGNOSIS — C61 PROSTATE CANCER METASTATIC TO BONE (HCC): Primary | ICD-10-CM

## 2018-12-24 PROBLEM — N18.2 CKD (CHRONIC KIDNEY DISEASE) STAGE 2, GFR 60-89 ML/MIN: Status: ACTIVE | Noted: 2018-12-24

## 2018-12-24 PROBLEM — J98.11 MILD BIBASILAR ATELECTASIS: Status: ACTIVE | Noted: 2018-12-24

## 2018-12-24 PROBLEM — M84.58XA PATHOLOGICAL FRACTURE OF SACRAL VERTEBRA DUE TO NEOPLASTIC DISEASE: Status: ACTIVE | Noted: 2018-12-24

## 2018-12-24 PROBLEM — M84.58XA PATHOLOGICAL FRACTURE OF LUMBAR VERTEBRA DUE TO NEOPLASTIC DISEASE: Status: ACTIVE | Noted: 2018-12-24

## 2018-12-24 PROBLEM — R53.81 DEBILITATED: Status: ACTIVE | Noted: 2018-12-24

## 2018-12-24 PROCEDURE — 94640 AIRWAY INHALATION TREATMENT: CPT

## 2018-12-24 PROCEDURE — 2709999900 HC NON-CHARGEABLE SUPPLY

## 2018-12-24 PROCEDURE — 6360000002 HC RX W HCPCS: Performed by: SURGERY

## 2018-12-24 PROCEDURE — 97110 THERAPEUTIC EXERCISES: CPT

## 2018-12-24 PROCEDURE — 6370000000 HC RX 637 (ALT 250 FOR IP): Performed by: INTERNAL MEDICINE

## 2018-12-24 PROCEDURE — 97530 THERAPEUTIC ACTIVITIES: CPT

## 2018-12-24 PROCEDURE — 0220000000 HC SKILLED NURSING FACILITY

## 2018-12-24 PROCEDURE — 6370000000 HC RX 637 (ALT 250 FOR IP): Performed by: NURSE PRACTITIONER

## 2018-12-24 PROCEDURE — APPSS30 APP SPLIT SHARED TIME 16-30 MINUTES: Performed by: NURSE PRACTITIONER

## 2018-12-24 PROCEDURE — 6360000002 HC RX W HCPCS: Performed by: INTERNAL MEDICINE

## 2018-12-24 PROCEDURE — 94760 N-INVAS EAR/PLS OXIMETRY 1: CPT

## 2018-12-24 PROCEDURE — 99232 SBSQ HOSP IP/OBS MODERATE 35: CPT | Performed by: INTERNAL MEDICINE

## 2018-12-24 PROCEDURE — 2580000003 HC RX 258: Performed by: SURGERY

## 2018-12-24 PROCEDURE — 99024 POSTOP FOLLOW-UP VISIT: CPT | Performed by: SURGERY

## 2018-12-24 PROCEDURE — 97535 SELF CARE MNGMENT TRAINING: CPT

## 2018-12-24 PROCEDURE — 1290000000 HC SEMI PRIVATE OTHER R&B

## 2018-12-24 PROCEDURE — 2700000000 HC OXYGEN THERAPY PER DAY

## 2018-12-24 RX ORDER — TRAMADOL HYDROCHLORIDE 50 MG/1
50 TABLET ORAL EVERY 6 HOURS PRN
Status: DISCONTINUED | OUTPATIENT
Start: 2018-12-24 | End: 2018-12-30

## 2018-12-24 RX ORDER — CARVEDILOL 6.25 MG/1
6.25 TABLET ORAL 2 TIMES DAILY WITH MEALS
Status: CANCELLED | OUTPATIENT
Start: 2018-12-24

## 2018-12-24 RX ORDER — ALBUTEROL SULFATE 2.5 MG/3ML
2.5 SOLUTION RESPIRATORY (INHALATION) EVERY 6 HOURS PRN
Status: DISCONTINUED | OUTPATIENT
Start: 2018-12-24 | End: 2018-12-24 | Stop reason: HOSPADM

## 2018-12-24 RX ORDER — DOXAZOSIN MESYLATE 4 MG/1
4 TABLET ORAL DAILY
Status: CANCELLED | OUTPATIENT
Start: 2018-12-25

## 2018-12-24 RX ORDER — BISACODYL 10 MG
10 SUPPOSITORY, RECTAL RECTAL DAILY PRN
Status: CANCELLED | OUTPATIENT
Start: 2018-12-24

## 2018-12-24 RX ORDER — ACETAMINOPHEN 325 MG/1
650 TABLET ORAL EVERY 4 HOURS PRN
Status: DISCONTINUED | OUTPATIENT
Start: 2018-12-24 | End: 2019-01-10 | Stop reason: HOSPADM

## 2018-12-24 RX ORDER — BICALUTAMIDE 50 MG/1
50 TABLET, FILM COATED ORAL DAILY
Status: DISCONTINUED | OUTPATIENT
Start: 2018-12-25 | End: 2019-01-10 | Stop reason: HOSPADM

## 2018-12-24 RX ORDER — ACETAMINOPHEN 325 MG/1
650 TABLET ORAL EVERY 4 HOURS PRN
Status: CANCELLED | OUTPATIENT
Start: 2018-12-24

## 2018-12-24 RX ORDER — FINASTERIDE 5 MG/1
5 TABLET, FILM COATED ORAL DAILY
Status: CANCELLED | OUTPATIENT
Start: 2018-12-25

## 2018-12-24 RX ORDER — IPRATROPIUM BROMIDE AND ALBUTEROL SULFATE 2.5; .5 MG/3ML; MG/3ML
1 SOLUTION RESPIRATORY (INHALATION) EVERY 4 HOURS PRN
Status: CANCELLED | OUTPATIENT
Start: 2018-12-24

## 2018-12-24 RX ORDER — ALBUTEROL SULFATE 2.5 MG/3ML
2.5 SOLUTION RESPIRATORY (INHALATION) EVERY 6 HOURS PRN
Status: CANCELLED | OUTPATIENT
Start: 2018-12-24

## 2018-12-24 RX ORDER — FINASTERIDE 5 MG/1
5 TABLET, FILM COATED ORAL DAILY
Status: DISCONTINUED | OUTPATIENT
Start: 2018-12-25 | End: 2019-01-10 | Stop reason: HOSPADM

## 2018-12-24 RX ORDER — CARVEDILOL 6.25 MG/1
6.25 TABLET ORAL 2 TIMES DAILY WITH MEALS
Status: DISCONTINUED | OUTPATIENT
Start: 2018-12-24 | End: 2019-01-10 | Stop reason: HOSPADM

## 2018-12-24 RX ORDER — ACETAMINOPHEN 325 MG/1
650 TABLET ORAL EVERY 4 HOURS PRN
Status: DISCONTINUED | OUTPATIENT
Start: 2018-12-24 | End: 2018-12-24 | Stop reason: SDUPTHER

## 2018-12-24 RX ORDER — DOXAZOSIN MESYLATE 4 MG/1
4 TABLET ORAL DAILY
Status: DISCONTINUED | OUTPATIENT
Start: 2018-12-25 | End: 2019-01-10 | Stop reason: HOSPADM

## 2018-12-24 RX ORDER — PANTOPRAZOLE SODIUM 40 MG/1
40 TABLET, DELAYED RELEASE ORAL
Status: CANCELLED | OUTPATIENT
Start: 2018-12-25

## 2018-12-24 RX ORDER — FORMOTEROL FUMARATE 20 UG/2ML
20 SOLUTION RESPIRATORY (INHALATION) 2 TIMES DAILY
Status: CANCELLED | OUTPATIENT
Start: 2018-12-24

## 2018-12-24 RX ORDER — FORMOTEROL FUMARATE 20 UG/2ML
20 SOLUTION RESPIRATORY (INHALATION) 2 TIMES DAILY
Status: DISCONTINUED | OUTPATIENT
Start: 2018-12-24 | End: 2018-12-28

## 2018-12-24 RX ORDER — IPRATROPIUM BROMIDE AND ALBUTEROL SULFATE 2.5; .5 MG/3ML; MG/3ML
1 SOLUTION RESPIRATORY (INHALATION) EVERY 4 HOURS PRN
Status: DISCONTINUED | OUTPATIENT
Start: 2018-12-24 | End: 2018-12-28

## 2018-12-24 RX ORDER — SENNA PLUS 8.6 MG/1
1 TABLET ORAL NIGHTLY PRN
Status: DISCONTINUED | OUTPATIENT
Start: 2018-12-24 | End: 2019-01-10 | Stop reason: HOSPADM

## 2018-12-24 RX ORDER — BICALUTAMIDE 50 MG/1
50 TABLET, FILM COATED ORAL DAILY
Status: CANCELLED | OUTPATIENT
Start: 2018-12-25

## 2018-12-24 RX ORDER — PANTOPRAZOLE SODIUM 40 MG/1
40 TABLET, DELAYED RELEASE ORAL
Status: DISCONTINUED | OUTPATIENT
Start: 2018-12-25 | End: 2019-01-10 | Stop reason: HOSPADM

## 2018-12-24 RX ORDER — TRAMADOL HYDROCHLORIDE 50 MG/1
50 TABLET ORAL EVERY 6 HOURS PRN
Status: CANCELLED | OUTPATIENT
Start: 2018-12-24

## 2018-12-24 RX ORDER — DOCUSATE SODIUM 100 MG/1
100 CAPSULE, LIQUID FILLED ORAL DAILY PRN
Status: DISCONTINUED | OUTPATIENT
Start: 2018-12-24 | End: 2019-01-10 | Stop reason: HOSPADM

## 2018-12-24 RX ORDER — ALBUTEROL SULFATE 2.5 MG/3ML
2.5 SOLUTION RESPIRATORY (INHALATION) EVERY 6 HOURS PRN
Status: DISCONTINUED | OUTPATIENT
Start: 2018-12-24 | End: 2019-01-10 | Stop reason: HOSPADM

## 2018-12-24 RX ORDER — BISACODYL 10 MG
10 SUPPOSITORY, RECTAL RECTAL DAILY PRN
Status: DISCONTINUED | OUTPATIENT
Start: 2018-12-24 | End: 2019-01-10 | Stop reason: HOSPADM

## 2018-12-24 RX ORDER — POLYETHYLENE GLYCOL 3350 17 G/17G
17 POWDER, FOR SOLUTION ORAL DAILY PRN
Status: DISCONTINUED | OUTPATIENT
Start: 2018-12-24 | End: 2019-01-10 | Stop reason: HOSPADM

## 2018-12-24 RX ADMIN — CARVEDILOL 6.25 MG: 6.25 TABLET, FILM COATED ORAL at 08:18

## 2018-12-24 RX ADMIN — ALBUTEROL SULFATE 2.5 MG: 2.5 SOLUTION RESPIRATORY (INHALATION) at 09:08

## 2018-12-24 RX ADMIN — PIPERACILLIN SODIUM,TAZOBACTAM SODIUM 3.38 G: 3; .375 INJECTION, POWDER, FOR SOLUTION INTRAVENOUS at 08:18

## 2018-12-24 RX ADMIN — FORMOTEROL FUMARATE DIHYDRATE 20 MCG: 20 SOLUTION RESPIRATORY (INHALATION) at 09:13

## 2018-12-24 RX ADMIN — PANTOPRAZOLE SODIUM 40 MG: 40 TABLET, DELAYED RELEASE ORAL at 06:18

## 2018-12-24 RX ADMIN — FINASTERIDE 5 MG: 5 TABLET, FILM COATED ORAL at 08:18

## 2018-12-24 RX ADMIN — ENOXAPARIN SODIUM 40 MG: 40 INJECTION SUBCUTANEOUS at 08:17

## 2018-12-24 RX ADMIN — DOXAZOSIN 4 MG: 4 TABLET ORAL at 08:18

## 2018-12-24 RX ADMIN — NALOXEGOL OXALATE 12.5 MG: 12.5 TABLET, FILM COATED ORAL at 10:47

## 2018-12-24 RX ADMIN — BICALUTAMIDE 50 MG: 50 TABLET, FILM COATED ORAL at 08:19

## 2018-12-24 RX ADMIN — PIPERACILLIN SODIUM,TAZOBACTAM SODIUM 3.38 G: 3; .375 INJECTION, POWDER, FOR SOLUTION INTRAVENOUS at 00:45

## 2018-12-24 RX ADMIN — CARVEDILOL 6.25 MG: 6.25 TABLET, FILM COATED ORAL at 17:45

## 2018-12-24 RX ADMIN — TIOTROPIUM BROMIDE 18 MCG: 18 CAPSULE ORAL; RESPIRATORY (INHALATION) at 09:16

## 2018-12-24 RX ADMIN — Medication 10 ML: at 08:17

## 2018-12-24 ASSESSMENT — PAIN SCALES - GENERAL
PAINLEVEL_OUTOF10: 0

## 2018-12-24 ASSESSMENT — PAIN SCALES - PAIN ASSESSMENT IN ADVANCED DEMENTIA (PAINAD)
BODYLANGUAGE: 0
NEGVOCALIZATION: 0
BREATHING: 0

## 2018-12-24 NOTE — PROGRESS NOTES
IM Progress Note  Dr. Emanuel Cheek  12/24/2018 10:10 AM      Patient name Beth Woodard  DOB1/27/1926  PCP: Basia Spear MD  Admit Date: 12/17/2018  Acct No. [de-identified]    Subjective: Interval History:   Had BM yesterday  Abd pain better  No cp  +flatus  No back pain for few days        Diet: Dietary Nutrition Supplements: Frozen Oral Supplement  DIET DENTAL SOFT;    I/O last 3 completed shifts: In: 1054.5 [P.O.:865; I.V.:189.5]  Out: 1040 [Urine:1000; Drains:40]  I/O this shift:  In: 10 [I.V.:10]  Out: -         Admission weight: 132 lb (59.9 kg) as of 12/17/2018  2:08 AM  Wt Readings from Last 3 Encounters:   12/24/18 121 lb 8 oz (55.1 kg)   12/13/18 131 lb 2 oz (59.5 kg)   10/18/18 133 lb (60.3 kg)     Body mass index is 17.94 kg/m². ROS   CVS;  no cp or palpitation  Resp: no SOB or cough  Neuro:  No numbness or weakness  Abd:  abd pain much better       Medications:   Scheduled Meds:   carvedilol  6.25 mg Oral BID WC    pantoprazole  40 mg Oral QAM AC    naloxegol  12.5 mg Oral QAM    finasteride  5 mg Oral Daily    doxazosin  4 mg Oral Daily    bicalutamide  50 mg Oral Daily    tiotropium  18 mcg Inhalation Daily    formoterol  20 mcg Nebulization BID    piperacillin-tazobactam  3.375 g Intravenous Q8H    enoxaparin  40 mg Subcutaneous Daily    sodium chloride flush  10 mL Intravenous 2 times per day    fluconazole  100 mg Intravenous Q24H     Continuous Infusions:      Labs :     CBC:   Recent Labs      12/22/18   0523   WBC  10.1   HGB  12.1*   PLT  187     BMP:    No results for input(s): NA, K, CL, CO2, BUN, CREATININE, GLUCOSE in the last 72 hours. Hepatic:   No results for input(s): AST, ALT, ALB, BILITOT, ALKPHOS in the last 72 hours. Troponin: No results for input(s): TROPONINI in the last 72 hours. BNP: No results for input(s): BNP in the last 72 hours. Lipids: No results for input(s): CHOL, HDL in the last 72 hours.     Invalid input(s): LDLCALCU  INR: No results for input(s): INR in the last 72 hours.     Radiology    Objective:   Vitals: /78   Pulse 91   Temp 97.3 °F (36.3 °C) (Axillary) Comment: patient eating   Resp 17   Ht 5' 9\" (1.753 m)   Wt 121 lb 8 oz (55.1 kg)   SpO2 99%   BMI 17.94 kg/m²   HEENT: Head:pupils react  Neck: supple  Lungs: air entry appreciated bilat  Heart: regular rate and rhythm   Abdomen: distended but soft BS appreciated  Extremities: warm  edema  Neurologic:  Alert, oriented X3    Impression:   :   Acute abd s/p exp lap with bowel washout  CAD h/o stent  COPD  Hyperlipidemia  Compression fracture with lytic lesion prob from mets  Probable Prostate cancer  Delirium prob metabolic encephalopathy  Acute pulm insufficiency        Plan:    Since pt with no back pain now and with recent surgery will hold off on MRI spine ,vertebroplasty   Transfer to TCU if ok with consultants for debilitation  D/w pt and family at bedside]    Karla Villanueva MD

## 2018-12-24 NOTE — PROGRESS NOTES
limits for size. 2. Moderate bibasilar atelectasis/pneumonia. Small effusion right side. CT abdomen 12/17/18  Multiple skeletal sclerotic metastases with pathologic compression fractures of L1-L3. This may be on the basis of metastatic prostate carcinoma given the enlarged heterogeneous prostate and pelvic adenopathy. Mild ascites with a tiny pneumoperitoneum with a tiny air bubble anterior to the liver, etiology uncertain. Abnormal small bowel consistent with an ileus and/or enteritis. Colonic diverticulosis without diverticulitis. Distended gallbladder with 2 small gallstones. New mild intra and extrahepatic biliary dilatation. Small nonobstructing left renal calculi. Layering dense material in the urinary bladder which may represent stones, blood, or other material.   Additional findings as detailed above.           (See actual reports for details)                  [address] Problems    Diagnosis Date Noted    Centrilobular emphysema ([address] [J43.2]     Acute and chronic respiratory failure with hypoxia ([address] [J96.21]     S/P exploratory laparotomy [Z98.890]     Prostate cancer metastatic to bone ([address] [C61, C79.51]     Elevated PSA [R97.20]     BPH with obstruction/lower urinary tract symptoms [N40.1, N13.8]     Trauma of urethra [S37.30XA]     Acute midline low back pain [M54.5]     Acute postoperative pulmonary insufficiency ([address] [J95.2]     Severe malnutrition ([address] [E43] 12/18/2018     Class: Acute    Acute peritonitis ([address] [K65.0] 12/17/2018     Assessment    Acute hypoxic respiratory failure stable   Severe COPD   Intraperitoneal free air/peritonitis S/P exploratory laparotomy with abdominal washout and drain placement POD 8  Prostate Ca with metastasis and pathologic Lumbar compression fracture:  Oncology following  CAD with stent  Deconditioning  Full Code  Plan   Wean oxygen as tolerated to keep SP02 at 90%  Home 02 evaluation at

## 2018-12-24 NOTE — PROGRESS NOTES
Elia Jaime[facility]  Covering for Dr. Kp Harris  Postoperative Progress Note    Pt Name: Rivas Nuñez Record Number: [id number]  Date of Birth 1/27/1926   Today's Date: 12/24/2018    ASSESSMENT   1. Intraperitoneal free air/peritonitis - POD #8 status post exploratory laparotomy with abdominal washout and drain placement Tolerating full liquids. No bowel movement yet. 2. Acute pulmonary insufficiency postoperatively  3. Metastatic prostate carcinoma  4. Back pain  5. Lumbar compression fractures  6. Acute kidney insufficiency - resolved   7. COPD  8. Urinary frequency- denies hematuria   has a past medical history of CAD (coronary artery disease); CAD (coronary artery disease); COPD (chronic obstructive pulmonary disease) ([facility] Diverticulitis; Hiatal hernia; Hyperlipemia; Hyperlipidemia; and Pleural plaque. PLAN   1. Geovany diet  2. Continue movantik . Patient would like to try a Dulcolax suppository. 3. PT/OT as able - up to chair with assistance   4. IV to INT  5. Pain control with Ultram   6. Continue antibiotics & diflucan   7. GI & DVT Prophylaxis  8. Remove last abdominal drain Outputs minimal serous  9. Pulmonary toileting. Duonebs, C&DB, incentive spirometer. Pulmonary following for COPD. Wean off supplemental oxygen. 10. Urology and oncology following along. Will have prostate biopsy outpatient. Check UA secondary to frequency. Patient takes Proscar. 11. Radiation oncology consultation. Evaluating for vertebroplasty. 12. Labs reviewing  13. Planning TCU when medically stable   14. Doing well from our standpoint. Okay with TCU when okay with medicine and consultants. Continue supportive care. SUBJECTIVE   Patient was stable overnight. Chart reviewed. Updated by nursing staff. Frail. . Doing well with dental soft. Denies N/V. Abdomen non distended . Bowel sounds present. Passing flatus. 1 BM. No nausea or vomiting.  Pain better controlled with Ultram. sounds present, JULIO C x 1 - serous  INCISION: midline incision with stapes - clean, dry and intact   EXTREMITY: no cyanosis, clubbing or edema  In: 1054.5 [P.O.:865; I.V.:189.5]  Out: 1040 [Urine:1000; Drains:40]    Date 12/24/18 0000 - 12/24/18 2359   Shift 6147-1698 1123-5783 1204-3878 24 Hour Total   I  N  T  A  K  E   P.O.  (mL/kg/hr) 125   125    I.V.  (mL/kg) 68.8  (1.2)   68.8  (1.2)    Shift Total  (mL/kg) 193.8  (3.5)   193.8  (3.5)   O  U  T  P  U  T   Urine  (mL/kg/hr) 400   400    Emesis/NG output  (mL/kg) 0  (0)   0  (0)    Drains  (mL/kg) 0  (0)   0  (0)    Other  (mL/kg) 0  (0)   0  (0)    Stool  (mL/kg) 0  (0)   0  (0)    Blood  (mL/kg) 0  (0)   0  (0)    Shift Total  (mL/kg) 400  (7.3)   400  (7.3)   Weight (kg) 55.1 55.1 55.1 55.1     LABS     Recent Labs      12/22/18   0523   WBC  10.1   HGB  12.1*   HCT  36.0*   PLT  187     RADIOLOGY   No New imaging.       Electronically signed by Gama Foster MD on 12/24/2018 at 6:42 AM

## 2018-12-24 NOTE — PROGRESS NOTES
[facility]    Covering for Dr. Rajesh Swartz ICU STEPDOWN TELEMETRY 4K       Patient: Barbi Mckee  Unit/Bed: 7H-36/448-L  YOB: 1926  MRN: [id number]  Acct: [de-identified]   Admitting Diagnosis: Acute peritonitis ([address] [K65.0]  Admit Date:  12/17/2018  Hospital Day: 6    Interval History:    Patient is on admission with a diagnosis of intraperitoneal peritonitis s/p exploratory laparotomy with abdominal washout and drain placement. He is also being treated for metastatic prostate cancer. The following Services are consulting: General Surgery, Radiation Oncology and Urology     Chart, Labs, Radiology studies, and Consults reviewed. Subjective:  Patient seen and examined   There are no new complaints today. Objective:   BP (!) 124/58   Pulse 88   Temp 97.8 °F (36.6 °C) (Oral)   Resp 16   Ht 5' 9\" (1.753 m)   Wt 125 lb (56.7 kg)   SpO2 94%   BMI 18.46 kg/m²       Intake/Output Summary (Last 24 hours) at 12/23/18 2024  Last data filed at 12/23/18 1453   Gross per 24 hour   Intake          1391.18 ml   Output             1590 ml   Net          -198.82 ml        General Appearance:  Frail looking, elderly male, in no apparent distress. Skin:  Skin has good color, good turgor, no rashes, no acute lesions  Lungs: CTA bilaterally, no wheeze, rales or rhonchi, good air entry, good respiratory effort  Heart: Regular rate and rhythm, S1 and S2 only without murmur, gallop or rub. Abdomen:  Distended, mildly tender with hypoactive bowel sounds. JULIO C drains noted. Extremities: Extremities warm to touch, pink, with no edema. Neurologic: Awake, alert and oriented.  No focal deficits  Affect: Neutral/Euthymic(normal)    Young:  Drains:  Central Line/port:   EKG:  Imaging:    Diet:  Dietary Nutrition Supplements: Frozen Oral Supplement  DIET DENTAL SOFT;    DVT Prophylaxis:    Data:  CBC:   Recent Labs      12/22/18   0523   WBC  10.1   RBC  3.53*   HGB  12.1*   HCT

## 2018-12-24 NOTE — FLOWSHEET NOTE
Patient asleep. Provided spiritual care experience for son, [facility]. Patient DOES have AD. [facility] will contact his sister to bring in the documents to be scanned into the patient record. 12/23/18 5791   Encounter Summary   Services provided to: Family   Referral/Consult From: Trina   Continue Visiting Yes  (12/23)   Complexity of Encounter Moderate   Length of Encounter 1 hour   Spiritual/Church   Type Spiritual struggle   Assessment Approachable;Unresolved grief;Helplessness   Intervention Active listening;Explored feelings, thoughts, concerns;Nurtured hope;Discussed illness/injury and it's impact; Discussed belief system/Cheondoism practices/susan;Discussed relationship with God;Discussed afterlife   Outcome Comfort;Expressed gratitude; Connection/belonging;Engaged in conversation;Expressed feelings/needs/concerns;New perspective;Encouraged

## 2018-12-25 PROCEDURE — 94640 AIRWAY INHALATION TREATMENT: CPT

## 2018-12-25 PROCEDURE — 2709999900 HC NON-CHARGEABLE SUPPLY

## 2018-12-25 PROCEDURE — 6370000000 HC RX 637 (ALT 250 FOR IP): Performed by: INTERNAL MEDICINE

## 2018-12-25 PROCEDURE — 1290000000 HC SEMI PRIVATE OTHER R&B

## 2018-12-25 PROCEDURE — 6360000002 HC RX W HCPCS: Performed by: INTERNAL MEDICINE

## 2018-12-25 PROCEDURE — 2500000003 HC RX 250 WO HCPCS: Performed by: INTERNAL MEDICINE

## 2018-12-25 RX ADMIN — BICALUTAMIDE 50 MG: 50 TABLET, FILM COATED ORAL at 08:32

## 2018-12-25 RX ADMIN — DOXAZOSIN 4 MG: 4 TABLET ORAL at 08:32

## 2018-12-25 RX ADMIN — ENOXAPARIN SODIUM 40 MG: 40 INJECTION SUBCUTANEOUS at 08:32

## 2018-12-25 RX ADMIN — CARVEDILOL 6.25 MG: 6.25 TABLET, FILM COATED ORAL at 08:32

## 2018-12-25 RX ADMIN — FORMOTEROL FUMARATE DIHYDRATE 20 MCG: 20 SOLUTION RESPIRATORY (INHALATION) at 17:15

## 2018-12-25 RX ADMIN — TIOTROPIUM BROMIDE 18 MCG: 18 CAPSULE ORAL; RESPIRATORY (INHALATION) at 06:19

## 2018-12-25 RX ADMIN — FORMOTEROL FUMARATE DIHYDRATE 20 MCG: 20 SOLUTION RESPIRATORY (INHALATION) at 06:12

## 2018-12-25 RX ADMIN — NALOXEGOL OXALATE 12.5 MG: 12.5 TABLET, FILM COATED ORAL at 08:32

## 2018-12-25 RX ADMIN — Medication 5 UNITS: at 08:32

## 2018-12-25 RX ADMIN — FINASTERIDE 5 MG: 5 TABLET, FILM COATED ORAL at 08:32

## 2018-12-25 RX ADMIN — CARVEDILOL 6.25 MG: 6.25 TABLET, FILM COATED ORAL at 17:07

## 2018-12-25 ASSESSMENT — PAIN SCALES - GENERAL
PAINLEVEL_OUTOF10: 0

## 2018-12-26 PROBLEM — E43 SEVERE MALNUTRITION (HCC): Status: ACTIVE | Noted: 2018-12-26

## 2018-12-26 PROCEDURE — 2709999900 HC NON-CHARGEABLE SUPPLY

## 2018-12-26 PROCEDURE — 97166 OT EVAL MOD COMPLEX 45 MIN: CPT

## 2018-12-26 PROCEDURE — 6360000002 HC RX W HCPCS: Performed by: INTERNAL MEDICINE

## 2018-12-26 PROCEDURE — 97110 THERAPEUTIC EXERCISES: CPT

## 2018-12-26 PROCEDURE — 97530 THERAPEUTIC ACTIVITIES: CPT

## 2018-12-26 PROCEDURE — 94640 AIRWAY INHALATION TREATMENT: CPT

## 2018-12-26 PROCEDURE — 97535 SELF CARE MNGMENT TRAINING: CPT

## 2018-12-26 PROCEDURE — 6370000000 HC RX 637 (ALT 250 FOR IP): Performed by: INTERNAL MEDICINE

## 2018-12-26 PROCEDURE — 97163 PT EVAL HIGH COMPLEX 45 MIN: CPT

## 2018-12-26 PROCEDURE — 97116 GAIT TRAINING THERAPY: CPT

## 2018-12-26 PROCEDURE — 1290000000 HC SEMI PRIVATE OTHER R&B

## 2018-12-26 PROCEDURE — 6370000000 HC RX 637 (ALT 250 FOR IP): Performed by: FAMILY MEDICINE

## 2018-12-26 PROCEDURE — 99231 SBSQ HOSP IP/OBS SF/LOW 25: CPT | Performed by: NURSE PRACTITIONER

## 2018-12-26 RX ORDER — LANOLIN ALCOHOL/MO/W.PET/CERES
9 CREAM (GRAM) TOPICAL NIGHTLY PRN
Status: DISCONTINUED | OUTPATIENT
Start: 2018-12-26 | End: 2019-01-10 | Stop reason: HOSPADM

## 2018-12-26 RX ADMIN — CARVEDILOL 6.25 MG: 6.25 TABLET, FILM COATED ORAL at 16:50

## 2018-12-26 RX ADMIN — TIOTROPIUM BROMIDE 18 MCG: 18 CAPSULE ORAL; RESPIRATORY (INHALATION) at 05:45

## 2018-12-26 RX ADMIN — ACETAMINOPHEN 650 MG: 325 TABLET ORAL at 19:42

## 2018-12-26 RX ADMIN — ENOXAPARIN SODIUM 40 MG: 40 INJECTION SUBCUTANEOUS at 09:10

## 2018-12-26 RX ADMIN — CARVEDILOL 6.25 MG: 6.25 TABLET, FILM COATED ORAL at 09:09

## 2018-12-26 RX ADMIN — FORMOTEROL FUMARATE DIHYDRATE 20 MCG: 20 SOLUTION RESPIRATORY (INHALATION) at 16:12

## 2018-12-26 RX ADMIN — PANTOPRAZOLE SODIUM 40 MG: 40 TABLET, DELAYED RELEASE ORAL at 06:15

## 2018-12-26 RX ADMIN — BICALUTAMIDE 50 MG: 50 TABLET, FILM COATED ORAL at 09:10

## 2018-12-26 RX ADMIN — POLYETHYLENE GLYCOL 3350 17 G: 17 POWDER, FOR SOLUTION ORAL at 09:08

## 2018-12-26 RX ADMIN — DOCUSATE SODIUM 100 MG: 100 CAPSULE, LIQUID FILLED ORAL at 09:08

## 2018-12-26 RX ADMIN — FINASTERIDE 5 MG: 5 TABLET, FILM COATED ORAL at 09:09

## 2018-12-26 RX ADMIN — DOXAZOSIN 4 MG: 4 TABLET ORAL at 09:09

## 2018-12-26 RX ADMIN — NALOXEGOL OXALATE 12.5 MG: 12.5 TABLET, FILM COATED ORAL at 09:09

## 2018-12-26 RX ADMIN — FORMOTEROL FUMARATE DIHYDRATE 20 MCG: 20 SOLUTION RESPIRATORY (INHALATION) at 05:45

## 2018-12-26 ASSESSMENT — PAIN DESCRIPTION - ORIENTATION: ORIENTATION: MID

## 2018-12-26 ASSESSMENT — PAIN SCALES - GENERAL
PAINLEVEL_OUTOF10: 4
PAINLEVEL_OUTOF10: 4

## 2018-12-26 ASSESSMENT — PAIN DESCRIPTION - LOCATION: LOCATION: ABDOMEN

## 2018-12-26 ASSESSMENT — PAIN DESCRIPTION - PAIN TYPE: TYPE: SURGICAL PAIN

## 2018-12-26 NOTE — DISCHARGE SUMMARY
[address] [place] [address]                               DISCHARGE SUMMARY    PATIENT NAME: Sapna Son                 :        1926  MED REC NO:   [id number]                           ROOM:       0011  ACCOUNT NO:   [de-identified]                           ADMIT DATE: 2018  PROVIDER:     TONIE Gaviria Cough: 2018      HOSPITAL COURSE:  This is a 58-year-old male who presented with  significant abdominal pain and workup in the ER, CAT scan was suggestive  of free air. The patient and family were agreeable to surgery and he  did undergo exploratory laparotomy with abdominal washout and drain  placement with no clear fluids of any bowel leak. The patient does have  underlying history of coronary artery disease and COPD. Did tolerate  procedures fairly well. He did have to be in ICU postop on nasal oxygen  and Pulmonary was consulted for his severe COPD and was placed on  bronchodilators. The patient did improve a lot. CAT scan also showed  positive compression fracture. The patient has been having back pain  prior to this admission. After bowel surgery, he continued to improve  steadily. He had no significant back pain. Abdominal pain was better. His diet was slowly advanced. He did have a bowel movement. While in  the hospital stay, Urology was consulted as he had CAT scan suggestive  of prostate cancer with metastasis to his bone and hence has been  started on Casodex more of palliative treatment. Radiation Oncology was  consulted who also recommended to _____ vertebroplasty prior to any  plans for any radiation. The patient with recent bowel surgery will be  unable to lie flat for the procedure and hence the MRI procedure will be  held at this time. In fact the patient does not have any significant  back pain at this time.   So since this is all more for palliative  treatment, we will get him this week to be more conditioned before he  undergoes further treatment. The patient was agreeable with plan of  care. He will continue with PT/OT. TCU evaluation has been requested  and will be transferred to [facility] when bed available. FINAL DIAGNOSES:  1.  Status post exploratory laparotomy with bowel washout, _____. 2.  Previous history of coronary artery disease. 3.  COPD. 4.  Hyperlipidemia. 5.  Compression fracture with lytic lesions probably from metastatic  prostate cancer. 6.  Delirium, resolved. 7.  Acute pulmonary insufficiency, improved. MEDICATIONS:  To continue as addressed in the med rec sheet. DISPOSITION:  To TCU. DIET:  Soft dental diet. ACTIVITY:  PT/OT to continue.         Ava Navarro M.D.    D: 12/24/2018 10:53:00       T: 12/25/2018 1:14:49     [email or web address]  Job#: [id number]     Doc#: [id number]    CC:

## 2018-12-27 PROCEDURE — 6370000000 HC RX 637 (ALT 250 FOR IP): Performed by: INTERNAL MEDICINE

## 2018-12-27 PROCEDURE — APPSS30 APP SPLIT SHARED TIME 16-30 MINUTES: Performed by: NURSE PRACTITIONER

## 2018-12-27 PROCEDURE — 99024 POSTOP FOLLOW-UP VISIT: CPT | Performed by: NURSE PRACTITIONER

## 2018-12-27 PROCEDURE — 6360000002 HC RX W HCPCS: Performed by: INTERNAL MEDICINE

## 2018-12-27 PROCEDURE — 94640 AIRWAY INHALATION TREATMENT: CPT

## 2018-12-27 PROCEDURE — 97110 THERAPEUTIC EXERCISES: CPT

## 2018-12-27 PROCEDURE — 1290000000 HC SEMI PRIVATE OTHER R&B

## 2018-12-27 PROCEDURE — 97530 THERAPEUTIC ACTIVITIES: CPT

## 2018-12-27 PROCEDURE — 94760 N-INVAS EAR/PLS OXIMETRY 1: CPT

## 2018-12-27 PROCEDURE — 2700000000 HC OXYGEN THERAPY PER DAY

## 2018-12-27 RX ADMIN — FORMOTEROL FUMARATE DIHYDRATE 20 MCG: 20 SOLUTION RESPIRATORY (INHALATION) at 17:25

## 2018-12-27 RX ADMIN — DOXAZOSIN 4 MG: 4 TABLET ORAL at 08:40

## 2018-12-27 RX ADMIN — ENOXAPARIN SODIUM 40 MG: 40 INJECTION SUBCUTANEOUS at 08:40

## 2018-12-27 RX ADMIN — PANTOPRAZOLE SODIUM 40 MG: 40 TABLET, DELAYED RELEASE ORAL at 06:00

## 2018-12-27 RX ADMIN — FORMOTEROL FUMARATE DIHYDRATE 20 MCG: 20 SOLUTION RESPIRATORY (INHALATION) at 06:10

## 2018-12-27 RX ADMIN — BICALUTAMIDE 50 MG: 50 TABLET, FILM COATED ORAL at 08:41

## 2018-12-27 RX ADMIN — CARVEDILOL 6.25 MG: 6.25 TABLET, FILM COATED ORAL at 16:07

## 2018-12-27 RX ADMIN — CARVEDILOL 6.25 MG: 6.25 TABLET, FILM COATED ORAL at 08:41

## 2018-12-27 RX ADMIN — TIOTROPIUM BROMIDE 18 MCG: 18 CAPSULE ORAL; RESPIRATORY (INHALATION) at 06:15

## 2018-12-27 RX ADMIN — FINASTERIDE 5 MG: 5 TABLET, FILM COATED ORAL at 08:40

## 2018-12-27 ASSESSMENT — PAIN SCALES - GENERAL
PAINLEVEL_OUTOF10: 0
PAINLEVEL_OUTOF10: 0

## 2018-12-28 ENCOUNTER — APPOINTMENT (OUTPATIENT)
Dept: GENERAL RADIOLOGY | Age: 83
DRG: 947 | End: 2018-12-28
Attending: FAMILY MEDICINE
Payer: MEDICARE

## 2018-12-28 PROCEDURE — 97110 THERAPEUTIC EXERCISES: CPT

## 2018-12-28 PROCEDURE — 97116 GAIT TRAINING THERAPY: CPT

## 2018-12-28 PROCEDURE — 99231 SBSQ HOSP IP/OBS SF/LOW 25: CPT | Performed by: NURSE PRACTITIONER

## 2018-12-28 PROCEDURE — 6370000000 HC RX 637 (ALT 250 FOR IP): Performed by: INTERNAL MEDICINE

## 2018-12-28 PROCEDURE — 2700000000 HC OXYGEN THERAPY PER DAY

## 2018-12-28 PROCEDURE — 97530 THERAPEUTIC ACTIVITIES: CPT

## 2018-12-28 PROCEDURE — 6360000002 HC RX W HCPCS: Performed by: INTERNAL MEDICINE

## 2018-12-28 PROCEDURE — 6370000000 HC RX 637 (ALT 250 FOR IP): Performed by: NURSE PRACTITIONER

## 2018-12-28 PROCEDURE — 1290000000 HC SEMI PRIVATE OTHER R&B

## 2018-12-28 PROCEDURE — 71046 X-RAY EXAM CHEST 2 VIEWS: CPT

## 2018-12-28 PROCEDURE — 94640 AIRWAY INHALATION TREATMENT: CPT

## 2018-12-28 PROCEDURE — 94760 N-INVAS EAR/PLS OXIMETRY 1: CPT

## 2018-12-28 RX ORDER — ALBUTEROL SULFATE 90 UG/1
2 AEROSOL, METERED RESPIRATORY (INHALATION) EVERY 6 HOURS PRN
Status: DISCONTINUED | OUTPATIENT
Start: 2018-12-28 | End: 2019-01-10 | Stop reason: HOSPADM

## 2018-12-28 RX ADMIN — PANTOPRAZOLE SODIUM 40 MG: 40 TABLET, DELAYED RELEASE ORAL at 05:57

## 2018-12-28 RX ADMIN — FINASTERIDE 5 MG: 5 TABLET, FILM COATED ORAL at 09:15

## 2018-12-28 RX ADMIN — ENOXAPARIN SODIUM 40 MG: 40 INJECTION SUBCUTANEOUS at 09:16

## 2018-12-28 RX ADMIN — CARVEDILOL 6.25 MG: 6.25 TABLET, FILM COATED ORAL at 17:05

## 2018-12-28 RX ADMIN — FORMOTEROL FUMARATE DIHYDRATE 20 MCG: 20 SOLUTION RESPIRATORY (INHALATION) at 06:15

## 2018-12-28 RX ADMIN — TIOTROPIUM BROMIDE 18 MCG: 18 CAPSULE ORAL; RESPIRATORY (INHALATION) at 06:13

## 2018-12-28 RX ADMIN — CARVEDILOL 6.25 MG: 6.25 TABLET, FILM COATED ORAL at 09:16

## 2018-12-28 RX ADMIN — DOXAZOSIN 4 MG: 4 TABLET ORAL at 09:15

## 2018-12-28 RX ADMIN — BICALUTAMIDE 50 MG: 50 TABLET, FILM COATED ORAL at 09:16

## 2018-12-28 ASSESSMENT — PAIN SCALES - GENERAL
PAINLEVEL_OUTOF10: 0
PAINLEVEL_OUTOF10: 0

## 2018-12-29 PROCEDURE — 6370000000 HC RX 637 (ALT 250 FOR IP): Performed by: INTERNAL MEDICINE

## 2018-12-29 PROCEDURE — 94667 MNPJ CHEST WALL 1ST: CPT

## 2018-12-29 PROCEDURE — 51798 US URINE CAPACITY MEASURE: CPT

## 2018-12-29 PROCEDURE — 6370000000 HC RX 637 (ALT 250 FOR IP): Performed by: NURSE PRACTITIONER

## 2018-12-29 PROCEDURE — 97110 THERAPEUTIC EXERCISES: CPT

## 2018-12-29 PROCEDURE — 97116 GAIT TRAINING THERAPY: CPT

## 2018-12-29 PROCEDURE — 97530 THERAPEUTIC ACTIVITIES: CPT

## 2018-12-29 PROCEDURE — 6360000002 HC RX W HCPCS: Performed by: INTERNAL MEDICINE

## 2018-12-29 PROCEDURE — 1290000000 HC SEMI PRIVATE OTHER R&B

## 2018-12-29 PROCEDURE — 94640 AIRWAY INHALATION TREATMENT: CPT

## 2018-12-29 PROCEDURE — 2709999900 HC NON-CHARGEABLE SUPPLY

## 2018-12-29 RX ADMIN — BICALUTAMIDE 50 MG: 50 TABLET, FILM COATED ORAL at 09:56

## 2018-12-29 RX ADMIN — GLYCOPYRROLATE AND FORMOTEROL FUMARATE 2 PUFF: 9; 4.8 AEROSOL, METERED RESPIRATORY (INHALATION) at 20:11

## 2018-12-29 RX ADMIN — GLYCOPYRROLATE AND FORMOTEROL FUMARATE 2 PUFF: 9; 4.8 AEROSOL, METERED RESPIRATORY (INHALATION) at 06:53

## 2018-12-29 RX ADMIN — ENOXAPARIN SODIUM 40 MG: 40 INJECTION SUBCUTANEOUS at 09:56

## 2018-12-29 RX ADMIN — DOXAZOSIN 4 MG: 4 TABLET ORAL at 09:56

## 2018-12-29 RX ADMIN — CARVEDILOL 6.25 MG: 6.25 TABLET, FILM COATED ORAL at 09:56

## 2018-12-29 RX ADMIN — CARVEDILOL 6.25 MG: 6.25 TABLET, FILM COATED ORAL at 18:19

## 2018-12-29 RX ADMIN — PANTOPRAZOLE SODIUM 40 MG: 40 TABLET, DELAYED RELEASE ORAL at 05:55

## 2018-12-29 RX ADMIN — FINASTERIDE 5 MG: 5 TABLET, FILM COATED ORAL at 09:56

## 2018-12-29 ASSESSMENT — PAIN SCALES - GENERAL: PAINLEVEL_OUTOF10: 0

## 2018-12-30 PROCEDURE — 6370000000 HC RX 637 (ALT 250 FOR IP): Performed by: INTERNAL MEDICINE

## 2018-12-30 PROCEDURE — 1290000000 HC SEMI PRIVATE OTHER R&B

## 2018-12-30 PROCEDURE — 6360000002 HC RX W HCPCS: Performed by: INTERNAL MEDICINE

## 2018-12-30 PROCEDURE — 94760 N-INVAS EAR/PLS OXIMETRY 1: CPT

## 2018-12-30 PROCEDURE — 94640 AIRWAY INHALATION TREATMENT: CPT

## 2018-12-30 PROCEDURE — 2700000000 HC OXYGEN THERAPY PER DAY

## 2018-12-30 PROCEDURE — 94668 MNPJ CHEST WALL SBSQ: CPT

## 2018-12-30 PROCEDURE — 6370000000 HC RX 637 (ALT 250 FOR IP): Performed by: FAMILY MEDICINE

## 2018-12-30 RX ORDER — M-VIT,TX,IRON,MINS/CALC/FOLIC 27MG-0.4MG
1 TABLET ORAL DAILY
Status: DISCONTINUED | OUTPATIENT
Start: 2018-12-30 | End: 2019-01-10 | Stop reason: HOSPADM

## 2018-12-30 RX ADMIN — MULTIPLE VITAMINS W/ MINERALS TAB 1 TABLET: TAB at 12:09

## 2018-12-30 RX ADMIN — ENOXAPARIN SODIUM 40 MG: 40 INJECTION SUBCUTANEOUS at 09:05

## 2018-12-30 RX ADMIN — CARVEDILOL 6.25 MG: 6.25 TABLET, FILM COATED ORAL at 09:04

## 2018-12-30 RX ADMIN — PANTOPRAZOLE SODIUM 40 MG: 40 TABLET, DELAYED RELEASE ORAL at 06:14

## 2018-12-30 RX ADMIN — GLYCOPYRROLATE AND FORMOTEROL FUMARATE 2 PUFF: 9; 4.8 AEROSOL, METERED RESPIRATORY (INHALATION) at 05:29

## 2018-12-30 RX ADMIN — DOXAZOSIN 4 MG: 4 TABLET ORAL at 09:05

## 2018-12-30 RX ADMIN — POLYETHYLENE GLYCOL 3350 17 G: 17 POWDER, FOR SOLUTION ORAL at 09:05

## 2018-12-30 RX ADMIN — GLYCOPYRROLATE AND FORMOTEROL FUMARATE 2 PUFF: 9; 4.8 AEROSOL, METERED RESPIRATORY (INHALATION) at 19:59

## 2018-12-30 RX ADMIN — BICALUTAMIDE 50 MG: 50 TABLET, FILM COATED ORAL at 09:07

## 2018-12-30 RX ADMIN — CARVEDILOL 6.25 MG: 6.25 TABLET, FILM COATED ORAL at 17:28

## 2018-12-30 RX ADMIN — FINASTERIDE 5 MG: 5 TABLET, FILM COATED ORAL at 09:05

## 2018-12-31 LAB
ANION GAP SERPL CALCULATED.3IONS-SCNC: 10 MEQ/L (ref 8–16)
AVERAGE GLUCOSE: 120 MG/DL (ref 70–126)
BASOPHILS # BLD: 0.4 %
BASOPHILS ABSOLUTE: 0 THOU/MM3 (ref 0–0.1)
BUN BLDV-MCNC: 18 MG/DL (ref 7–22)
CALCIUM SERPL-MCNC: 8.4 MG/DL (ref 8.5–10.5)
CHLORIDE BLD-SCNC: 97 MEQ/L (ref 98–111)
CO2: 32 MEQ/L (ref 23–33)
CREAT SERPL-MCNC: 0.8 MG/DL (ref 0.4–1.2)
EOSINOPHIL # BLD: 1.1 %
EOSINOPHILS ABSOLUTE: 0.1 THOU/MM3 (ref 0–0.4)
ERYTHROCYTE [DISTWIDTH] IN BLOOD BY AUTOMATED COUNT: 13.8 % (ref 11.5–14.5)
ERYTHROCYTE [DISTWIDTH] IN BLOOD BY AUTOMATED COUNT: 52.4 FL (ref 35–45)
GFR SERPL CREATININE-BSD FRML MDRD: > 90 ML/MIN/1.73M2
GLUCOSE BLD-MCNC: 110 MG/DL (ref 70–108)
HBA1C MFR BLD: 6 % (ref 4.4–6.4)
HCT VFR BLD CALC: 35.8 % (ref 42–52)
HEMOGLOBIN: 11.8 GM/DL (ref 14–18)
IMMATURE GRANS (ABS): 0.06 THOU/MM3 (ref 0–0.07)
IMMATURE GRANULOCYTES: 0.8 %
LYMPHOCYTES # BLD: 11 %
LYMPHOCYTES ABSOLUTE: 0.8 THOU/MM3 (ref 1–4.8)
MCH RBC QN AUTO: 33.9 PG (ref 26–33)
MCHC RBC AUTO-ENTMCNC: 33 GM/DL (ref 32.2–35.5)
MCV RBC AUTO: 102.9 FL (ref 80–94)
MONOCYTES # BLD: 7.9 %
MONOCYTES ABSOLUTE: 0.6 THOU/MM3 (ref 0.4–1.3)
NUCLEATED RED BLOOD CELLS: 0 /100 WBC
PLATELET # BLD: 210 THOU/MM3 (ref 130–400)
PMV BLD AUTO: 9.8 FL (ref 9.4–12.4)
POTASSIUM SERPL-SCNC: 3.8 MEQ/L (ref 3.5–5.2)
RBC # BLD: 3.48 MILL/MM3 (ref 4.7–6.1)
SEG NEUTROPHILS: 78.8 %
SEGMENTED NEUTROPHILS ABSOLUTE COUNT: 5.8 THOU/MM3 (ref 1.8–7.7)
SODIUM BLD-SCNC: 139 MEQ/L (ref 135–145)
WBC # BLD: 7.4 THOU/MM3 (ref 4.8–10.8)

## 2018-12-31 PROCEDURE — 80048 BASIC METABOLIC PNL TOTAL CA: CPT

## 2018-12-31 PROCEDURE — 97110 THERAPEUTIC EXERCISES: CPT

## 2018-12-31 PROCEDURE — 97530 THERAPEUTIC ACTIVITIES: CPT

## 2018-12-31 PROCEDURE — 36415 COLL VENOUS BLD VENIPUNCTURE: CPT

## 2018-12-31 PROCEDURE — 99024 POSTOP FOLLOW-UP VISIT: CPT | Performed by: NURSE PRACTITIONER

## 2018-12-31 PROCEDURE — APPSS30 APP SPLIT SHARED TIME 16-30 MINUTES: Performed by: NURSE PRACTITIONER

## 2018-12-31 PROCEDURE — 97116 GAIT TRAINING THERAPY: CPT

## 2018-12-31 PROCEDURE — 6370000000 HC RX 637 (ALT 250 FOR IP): Performed by: INTERNAL MEDICINE

## 2018-12-31 PROCEDURE — 2709999900 HC NON-CHARGEABLE SUPPLY

## 2018-12-31 PROCEDURE — 2700000000 HC OXYGEN THERAPY PER DAY

## 2018-12-31 PROCEDURE — 85025 COMPLETE CBC W/AUTO DIFF WBC: CPT

## 2018-12-31 PROCEDURE — 94760 N-INVAS EAR/PLS OXIMETRY 1: CPT

## 2018-12-31 PROCEDURE — 0220000000 HC SKILLED NURSING FACILITY

## 2018-12-31 PROCEDURE — 6370000000 HC RX 637 (ALT 250 FOR IP): Performed by: FAMILY MEDICINE

## 2018-12-31 PROCEDURE — 1290000000 HC SEMI PRIVATE OTHER R&B

## 2018-12-31 PROCEDURE — 6360000002 HC RX W HCPCS: Performed by: INTERNAL MEDICINE

## 2018-12-31 PROCEDURE — 83036 HEMOGLOBIN GLYCOSYLATED A1C: CPT

## 2018-12-31 PROCEDURE — 94640 AIRWAY INHALATION TREATMENT: CPT

## 2018-12-31 RX ADMIN — DOXAZOSIN 4 MG: 4 TABLET ORAL at 10:07

## 2018-12-31 RX ADMIN — MULTIPLE VITAMINS W/ MINERALS TAB 1 TABLET: TAB at 10:07

## 2018-12-31 RX ADMIN — CARVEDILOL 6.25 MG: 6.25 TABLET, FILM COATED ORAL at 18:18

## 2018-12-31 RX ADMIN — ENOXAPARIN SODIUM 40 MG: 40 INJECTION SUBCUTANEOUS at 10:03

## 2018-12-31 RX ADMIN — CARVEDILOL 6.25 MG: 6.25 TABLET, FILM COATED ORAL at 10:03

## 2018-12-31 RX ADMIN — BICALUTAMIDE 50 MG: 50 TABLET, FILM COATED ORAL at 10:07

## 2018-12-31 RX ADMIN — GLYCOPYRROLATE AND FORMOTEROL FUMARATE 2 PUFF: 9; 4.8 AEROSOL, METERED RESPIRATORY (INHALATION) at 17:30

## 2018-12-31 RX ADMIN — PANTOPRAZOLE SODIUM 40 MG: 40 TABLET, DELAYED RELEASE ORAL at 06:05

## 2018-12-31 RX ADMIN — FINASTERIDE 5 MG: 5 TABLET, FILM COATED ORAL at 10:07

## 2018-12-31 RX ADMIN — GLYCOPYRROLATE AND FORMOTEROL FUMARATE 2 PUFF: 9; 4.8 AEROSOL, METERED RESPIRATORY (INHALATION) at 05:30

## 2018-12-31 ASSESSMENT — PAIN SCALES - GENERAL
PAINLEVEL_OUTOF10: 0
PAINLEVEL_OUTOF10: 0

## 2019-01-01 PROCEDURE — 6370000000 HC RX 637 (ALT 250 FOR IP): Performed by: INTERNAL MEDICINE

## 2019-01-01 PROCEDURE — 1290000000 HC SEMI PRIVATE OTHER R&B

## 2019-01-01 PROCEDURE — 94640 AIRWAY INHALATION TREATMENT: CPT

## 2019-01-01 PROCEDURE — 6360000002 HC RX W HCPCS: Performed by: INTERNAL MEDICINE

## 2019-01-01 PROCEDURE — 2500000003 HC RX 250 WO HCPCS: Performed by: INTERNAL MEDICINE

## 2019-01-01 PROCEDURE — 6370000000 HC RX 637 (ALT 250 FOR IP): Performed by: FAMILY MEDICINE

## 2019-01-01 RX ADMIN — GLYCOPYRROLATE AND FORMOTEROL FUMARATE 2 PUFF: 9; 4.8 AEROSOL, METERED RESPIRATORY (INHALATION) at 17:48

## 2019-01-01 RX ADMIN — CARVEDILOL 6.25 MG: 6.25 TABLET, FILM COATED ORAL at 08:40

## 2019-01-01 RX ADMIN — FINASTERIDE 5 MG: 5 TABLET, FILM COATED ORAL at 08:40

## 2019-01-01 RX ADMIN — CARVEDILOL 6.25 MG: 6.25 TABLET, FILM COATED ORAL at 16:56

## 2019-01-01 RX ADMIN — MULTIPLE VITAMINS W/ MINERALS TAB 1 TABLET: TAB at 08:40

## 2019-01-01 RX ADMIN — GLYCOPYRROLATE AND FORMOTEROL FUMARATE 2 PUFF: 9; 4.8 AEROSOL, METERED RESPIRATORY (INHALATION) at 05:53

## 2019-01-01 RX ADMIN — BICALUTAMIDE 50 MG: 50 TABLET, FILM COATED ORAL at 08:40

## 2019-01-01 RX ADMIN — PANTOPRAZOLE SODIUM 40 MG: 40 TABLET, DELAYED RELEASE ORAL at 05:37

## 2019-01-01 RX ADMIN — DOXAZOSIN 4 MG: 4 TABLET ORAL at 08:39

## 2019-01-01 RX ADMIN — ENOXAPARIN SODIUM 40 MG: 40 INJECTION SUBCUTANEOUS at 08:40

## 2019-01-01 RX ADMIN — Medication 5 UNITS: at 09:53

## 2019-01-01 ASSESSMENT — PAIN SCALES - GENERAL: PAINLEVEL_OUTOF10: 0

## 2019-01-02 PROCEDURE — 97110 THERAPEUTIC EXERCISES: CPT

## 2019-01-02 PROCEDURE — 97530 THERAPEUTIC ACTIVITIES: CPT

## 2019-01-02 PROCEDURE — 6370000000 HC RX 637 (ALT 250 FOR IP): Performed by: FAMILY MEDICINE

## 2019-01-02 PROCEDURE — 97535 SELF CARE MNGMENT TRAINING: CPT

## 2019-01-02 PROCEDURE — 2700000000 HC OXYGEN THERAPY PER DAY

## 2019-01-02 PROCEDURE — 94760 N-INVAS EAR/PLS OXIMETRY 1: CPT

## 2019-01-02 PROCEDURE — 99232 SBSQ HOSP IP/OBS MODERATE 35: CPT | Performed by: INTERNAL MEDICINE

## 2019-01-02 PROCEDURE — 6360000002 HC RX W HCPCS: Performed by: INTERNAL MEDICINE

## 2019-01-02 PROCEDURE — 97116 GAIT TRAINING THERAPY: CPT

## 2019-01-02 PROCEDURE — APPSS30 APP SPLIT SHARED TIME 16-30 MINUTES: Performed by: NURSE PRACTITIONER

## 2019-01-02 PROCEDURE — 6370000000 HC RX 637 (ALT 250 FOR IP): Performed by: INTERNAL MEDICINE

## 2019-01-02 PROCEDURE — 1290000000 HC SEMI PRIVATE OTHER R&B

## 2019-01-02 PROCEDURE — 94640 AIRWAY INHALATION TREATMENT: CPT

## 2019-01-02 RX ADMIN — GLYCOPYRROLATE AND FORMOTEROL FUMARATE 2 PUFF: 9; 4.8 AEROSOL, METERED RESPIRATORY (INHALATION) at 17:42

## 2019-01-02 RX ADMIN — FINASTERIDE 5 MG: 5 TABLET, FILM COATED ORAL at 10:13

## 2019-01-02 RX ADMIN — CARVEDILOL 6.25 MG: 6.25 TABLET, FILM COATED ORAL at 10:12

## 2019-01-02 RX ADMIN — BICALUTAMIDE 50 MG: 50 TABLET, FILM COATED ORAL at 10:13

## 2019-01-02 RX ADMIN — PANTOPRAZOLE SODIUM 40 MG: 40 TABLET, DELAYED RELEASE ORAL at 10:13

## 2019-01-02 RX ADMIN — CARVEDILOL 6.25 MG: 6.25 TABLET, FILM COATED ORAL at 17:15

## 2019-01-02 RX ADMIN — GLYCOPYRROLATE AND FORMOTEROL FUMARATE 2 PUFF: 9; 4.8 AEROSOL, METERED RESPIRATORY (INHALATION) at 06:28

## 2019-01-02 RX ADMIN — DOXAZOSIN 4 MG: 4 TABLET ORAL at 10:12

## 2019-01-02 RX ADMIN — MULTIPLE VITAMINS W/ MINERALS TAB 1 TABLET: TAB at 10:12

## 2019-01-02 RX ADMIN — ENOXAPARIN SODIUM 40 MG: 40 INJECTION SUBCUTANEOUS at 10:13

## 2019-01-02 ASSESSMENT — PAIN SCALES - GENERAL
PAINLEVEL_OUTOF10: 0
PAINLEVEL_OUTOF10: 0

## 2019-01-03 PROCEDURE — 97530 THERAPEUTIC ACTIVITIES: CPT

## 2019-01-03 PROCEDURE — 99024 POSTOP FOLLOW-UP VISIT: CPT | Performed by: NURSE PRACTITIONER

## 2019-01-03 PROCEDURE — 1290000000 HC SEMI PRIVATE OTHER R&B

## 2019-01-03 PROCEDURE — 2709999900 HC NON-CHARGEABLE SUPPLY

## 2019-01-03 PROCEDURE — 97112 NEUROMUSCULAR REEDUCATION: CPT

## 2019-01-03 PROCEDURE — 6370000000 HC RX 637 (ALT 250 FOR IP): Performed by: INTERNAL MEDICINE

## 2019-01-03 PROCEDURE — 97110 THERAPEUTIC EXERCISES: CPT

## 2019-01-03 PROCEDURE — 97535 SELF CARE MNGMENT TRAINING: CPT

## 2019-01-03 PROCEDURE — APPSS30 APP SPLIT SHARED TIME 16-30 MINUTES: Performed by: NURSE PRACTITIONER

## 2019-01-03 PROCEDURE — 97116 GAIT TRAINING THERAPY: CPT

## 2019-01-03 PROCEDURE — 94640 AIRWAY INHALATION TREATMENT: CPT

## 2019-01-03 PROCEDURE — 6370000000 HC RX 637 (ALT 250 FOR IP): Performed by: FAMILY MEDICINE

## 2019-01-03 PROCEDURE — 6360000002 HC RX W HCPCS: Performed by: INTERNAL MEDICINE

## 2019-01-03 RX ADMIN — MULTIPLE VITAMINS W/ MINERALS TAB 1 TABLET: TAB at 08:56

## 2019-01-03 RX ADMIN — GLYCOPYRROLATE AND FORMOTEROL FUMARATE 2 PUFF: 9; 4.8 AEROSOL, METERED RESPIRATORY (INHALATION) at 05:54

## 2019-01-03 RX ADMIN — DOXAZOSIN 4 MG: 4 TABLET ORAL at 08:56

## 2019-01-03 RX ADMIN — GLYCOPYRROLATE AND FORMOTEROL FUMARATE 2 PUFF: 9; 4.8 AEROSOL, METERED RESPIRATORY (INHALATION) at 17:56

## 2019-01-03 RX ADMIN — CARVEDILOL 6.25 MG: 6.25 TABLET, FILM COATED ORAL at 18:05

## 2019-01-03 RX ADMIN — CARVEDILOL 6.25 MG: 6.25 TABLET, FILM COATED ORAL at 08:56

## 2019-01-03 RX ADMIN — BICALUTAMIDE 50 MG: 50 TABLET, FILM COATED ORAL at 08:56

## 2019-01-03 RX ADMIN — POLYETHYLENE GLYCOL 3350 17 G: 17 POWDER, FOR SOLUTION ORAL at 09:00

## 2019-01-03 RX ADMIN — FINASTERIDE 5 MG: 5 TABLET, FILM COATED ORAL at 08:56

## 2019-01-03 RX ADMIN — PANTOPRAZOLE SODIUM 40 MG: 40 TABLET, DELAYED RELEASE ORAL at 06:11

## 2019-01-03 RX ADMIN — ENOXAPARIN SODIUM 40 MG: 40 INJECTION SUBCUTANEOUS at 08:56

## 2019-01-03 ASSESSMENT — PAIN SCALES - GENERAL
PAINLEVEL_OUTOF10: 0
PAINLEVEL_OUTOF10: 0

## 2019-01-04 PROCEDURE — 1290000000 HC SEMI PRIVATE OTHER R&B

## 2019-01-04 PROCEDURE — APPSS30 APP SPLIT SHARED TIME 16-30 MINUTES: Performed by: NURSE PRACTITIONER

## 2019-01-04 PROCEDURE — 6370000000 HC RX 637 (ALT 250 FOR IP): Performed by: FAMILY MEDICINE

## 2019-01-04 PROCEDURE — 97530 THERAPEUTIC ACTIVITIES: CPT

## 2019-01-04 PROCEDURE — 94640 AIRWAY INHALATION TREATMENT: CPT

## 2019-01-04 PROCEDURE — 99232 SBSQ HOSP IP/OBS MODERATE 35: CPT | Performed by: INTERNAL MEDICINE

## 2019-01-04 PROCEDURE — 97535 SELF CARE MNGMENT TRAINING: CPT

## 2019-01-04 PROCEDURE — 6360000002 HC RX W HCPCS: Performed by: INTERNAL MEDICINE

## 2019-01-04 PROCEDURE — 6370000000 HC RX 637 (ALT 250 FOR IP): Performed by: INTERNAL MEDICINE

## 2019-01-04 PROCEDURE — 97110 THERAPEUTIC EXERCISES: CPT

## 2019-01-04 RX ADMIN — GLYCOPYRROLATE AND FORMOTEROL FUMARATE 2 PUFF: 9; 4.8 AEROSOL, METERED RESPIRATORY (INHALATION) at 05:54

## 2019-01-04 RX ADMIN — DOCUSATE SODIUM 100 MG: 100 CAPSULE, LIQUID FILLED ORAL at 08:54

## 2019-01-04 RX ADMIN — GLYCOPYRROLATE AND FORMOTEROL FUMARATE 2 PUFF: 9; 4.8 AEROSOL, METERED RESPIRATORY (INHALATION) at 18:10

## 2019-01-04 RX ADMIN — ENOXAPARIN SODIUM 40 MG: 40 INJECTION SUBCUTANEOUS at 08:49

## 2019-01-04 RX ADMIN — FINASTERIDE 5 MG: 5 TABLET, FILM COATED ORAL at 08:49

## 2019-01-04 RX ADMIN — DOXAZOSIN 4 MG: 4 TABLET ORAL at 08:49

## 2019-01-04 RX ADMIN — PANTOPRAZOLE SODIUM 40 MG: 40 TABLET, DELAYED RELEASE ORAL at 07:13

## 2019-01-04 RX ADMIN — CARVEDILOL 6.25 MG: 6.25 TABLET, FILM COATED ORAL at 08:49

## 2019-01-04 RX ADMIN — MULTIPLE VITAMINS W/ MINERALS TAB 1 TABLET: TAB at 08:49

## 2019-01-04 RX ADMIN — CARVEDILOL 6.25 MG: 6.25 TABLET, FILM COATED ORAL at 17:41

## 2019-01-04 RX ADMIN — BICALUTAMIDE 50 MG: 50 TABLET, FILM COATED ORAL at 08:49

## 2019-01-04 ASSESSMENT — PAIN SCALES - GENERAL
PAINLEVEL_OUTOF10: 0
PAINLEVEL_OUTOF10: 0

## 2019-01-05 PROBLEM — R31.29 MICROSCOPIC HEMATURIA: Status: ACTIVE | Noted: 2019-01-05

## 2019-01-05 PROCEDURE — 6370000000 HC RX 637 (ALT 250 FOR IP): Performed by: FAMILY MEDICINE

## 2019-01-05 PROCEDURE — 81001 URINALYSIS AUTO W/SCOPE: CPT

## 2019-01-05 PROCEDURE — 1290000000 HC SEMI PRIVATE OTHER R&B

## 2019-01-05 PROCEDURE — 6360000002 HC RX W HCPCS: Performed by: INTERNAL MEDICINE

## 2019-01-05 PROCEDURE — 6370000000 HC RX 637 (ALT 250 FOR IP): Performed by: INTERNAL MEDICINE

## 2019-01-05 PROCEDURE — 94640 AIRWAY INHALATION TREATMENT: CPT

## 2019-01-05 RX ADMIN — FINASTERIDE 5 MG: 5 TABLET, FILM COATED ORAL at 09:00

## 2019-01-05 RX ADMIN — ENOXAPARIN SODIUM 40 MG: 40 INJECTION SUBCUTANEOUS at 09:00

## 2019-01-05 RX ADMIN — PANTOPRAZOLE SODIUM 40 MG: 40 TABLET, DELAYED RELEASE ORAL at 09:00

## 2019-01-05 RX ADMIN — GLYCOPYRROLATE AND FORMOTEROL FUMARATE 2 PUFF: 9; 4.8 AEROSOL, METERED RESPIRATORY (INHALATION) at 18:49

## 2019-01-05 RX ADMIN — CARVEDILOL 6.25 MG: 6.25 TABLET, FILM COATED ORAL at 17:57

## 2019-01-05 RX ADMIN — BICALUTAMIDE 50 MG: 50 TABLET, FILM COATED ORAL at 09:00

## 2019-01-05 RX ADMIN — DOXAZOSIN 4 MG: 4 TABLET ORAL at 09:00

## 2019-01-05 RX ADMIN — MULTIPLE VITAMINS W/ MINERALS TAB 1 TABLET: TAB at 09:00

## 2019-01-05 RX ADMIN — CARVEDILOL 6.25 MG: 6.25 TABLET, FILM COATED ORAL at 09:00

## 2019-01-05 RX ADMIN — GLYCOPYRROLATE AND FORMOTEROL FUMARATE 2 PUFF: 9; 4.8 AEROSOL, METERED RESPIRATORY (INHALATION) at 05:41

## 2019-01-05 ASSESSMENT — PAIN SCALES - GENERAL
PAINLEVEL_OUTOF10: 0
PAINLEVEL_OUTOF10: 0

## 2019-01-06 LAB
ANION GAP SERPL CALCULATED.3IONS-SCNC: 13 MEQ/L (ref 8–16)
BACTERIA: ABNORMAL
BASOPHILS # BLD: 0.3 %
BASOPHILS ABSOLUTE: 0 THOU/MM3 (ref 0–0.1)
BILIRUBIN URINE: NEGATIVE
BLOOD, URINE: NEGATIVE
BUN BLDV-MCNC: 14 MG/DL (ref 7–22)
CALCIUM SERPL-MCNC: 8.9 MG/DL (ref 8.5–10.5)
CASTS: ABNORMAL /LPF
CHARACTER, URINE: CLEAR
CHLORIDE BLD-SCNC: 93 MEQ/L (ref 98–111)
CO2: 30 MEQ/L (ref 23–33)
COLOR: YELLOW
CREAT SERPL-MCNC: 0.9 MG/DL (ref 0.4–1.2)
CRYSTALS: ABNORMAL
EOSINOPHIL # BLD: 0.8 %
EOSINOPHILS ABSOLUTE: 0 THOU/MM3 (ref 0–0.4)
EPITHELIAL CELLS, UA: ABNORMAL /HPF
ERYTHROCYTE [DISTWIDTH] IN BLOOD BY AUTOMATED COUNT: 14.4 % (ref 11.5–14.5)
ERYTHROCYTE [DISTWIDTH] IN BLOOD BY AUTOMATED COUNT: 54.9 FL (ref 35–45)
GFR SERPL CREATININE-BSD FRML MDRD: 79 ML/MIN/1.73M2
GLUCOSE BLD-MCNC: 122 MG/DL (ref 70–108)
GLUCOSE, URINE: NEGATIVE MG/DL
HCT VFR BLD CALC: 36.1 % (ref 42–52)
HEMOGLOBIN: 12 GM/DL (ref 14–18)
IMMATURE GRANS (ABS): 0.03 THOU/MM3 (ref 0–0.07)
IMMATURE GRANULOCYTES: 0.5 %
KETONES, URINE: NEGATIVE
LEUKOCYTE EST, POC: ABNORMAL
LYMPHOCYTES # BLD: 13.7 %
LYMPHOCYTES ABSOLUTE: 0.8 THOU/MM3 (ref 1–4.8)
MCH RBC QN AUTO: 34.8 PG (ref 26–33)
MCHC RBC AUTO-ENTMCNC: 33.2 GM/DL (ref 32.2–35.5)
MCV RBC AUTO: 104.6 FL (ref 80–94)
MONOCYTES # BLD: 9.3 %
MONOCYTES ABSOLUTE: 0.6 THOU/MM3 (ref 0.4–1.3)
NITRITE, URINE: POSITIVE
NUCLEATED RED BLOOD CELLS: 0 /100 WBC
PH UA: 8
PLATELET # BLD: 206 THOU/MM3 (ref 130–400)
PMV BLD AUTO: 10.1 FL (ref 9.4–12.4)
POTASSIUM SERPL-SCNC: 4.3 MEQ/L (ref 3.5–5.2)
PROTEIN UA: NEGATIVE MG/DL
RBC # BLD: 3.45 MILL/MM3 (ref 4.7–6.1)
RBC URINE: ABNORMAL /HPF
SEG NEUTROPHILS: 75.4 %
SEGMENTED NEUTROPHILS ABSOLUTE COUNT: 4.7 THOU/MM3 (ref 1.8–7.7)
SODIUM BLD-SCNC: 136 MEQ/L (ref 135–145)
SPECIFIC GRAVITY UA: < 1.005 (ref 1–1.03)
UROBILINOGEN, URINE: 1 EU/DL
WBC # BLD: 6.2 THOU/MM3 (ref 4.8–10.8)
WBC UA: ABNORMAL /HPF

## 2019-01-06 PROCEDURE — 97535 SELF CARE MNGMENT TRAINING: CPT

## 2019-01-06 PROCEDURE — 2709999900 HC NON-CHARGEABLE SUPPLY

## 2019-01-06 PROCEDURE — 85025 COMPLETE CBC W/AUTO DIFF WBC: CPT

## 2019-01-06 PROCEDURE — 6370000000 HC RX 637 (ALT 250 FOR IP): Performed by: INTERNAL MEDICINE

## 2019-01-06 PROCEDURE — 0220000000 HC SKILLED NURSING FACILITY

## 2019-01-06 PROCEDURE — 6360000002 HC RX W HCPCS: Performed by: INTERNAL MEDICINE

## 2019-01-06 PROCEDURE — 80048 BASIC METABOLIC PNL TOTAL CA: CPT

## 2019-01-06 PROCEDURE — 1290000000 HC SEMI PRIVATE OTHER R&B

## 2019-01-06 PROCEDURE — 97110 THERAPEUTIC EXERCISES: CPT

## 2019-01-06 PROCEDURE — 36415 COLL VENOUS BLD VENIPUNCTURE: CPT

## 2019-01-06 PROCEDURE — 97116 GAIT TRAINING THERAPY: CPT

## 2019-01-06 PROCEDURE — 6370000000 HC RX 637 (ALT 250 FOR IP): Performed by: FAMILY MEDICINE

## 2019-01-06 RX ADMIN — CARVEDILOL 6.25 MG: 6.25 TABLET, FILM COATED ORAL at 17:21

## 2019-01-06 RX ADMIN — MULTIPLE VITAMINS W/ MINERALS TAB 1 TABLET: TAB at 08:31

## 2019-01-06 RX ADMIN — PANTOPRAZOLE SODIUM 40 MG: 40 TABLET, DELAYED RELEASE ORAL at 06:20

## 2019-01-06 RX ADMIN — FINASTERIDE 5 MG: 5 TABLET, FILM COATED ORAL at 08:31

## 2019-01-06 RX ADMIN — ENOXAPARIN SODIUM 40 MG: 40 INJECTION SUBCUTANEOUS at 08:31

## 2019-01-06 RX ADMIN — CARVEDILOL 6.25 MG: 6.25 TABLET, FILM COATED ORAL at 08:31

## 2019-01-06 RX ADMIN — DOXAZOSIN 4 MG: 4 TABLET ORAL at 08:31

## 2019-01-06 RX ADMIN — BICALUTAMIDE 50 MG: 50 TABLET, FILM COATED ORAL at 08:31

## 2019-01-06 ASSESSMENT — PAIN SCALES - GENERAL: PAINLEVEL_OUTOF10: 0

## 2019-01-07 PROCEDURE — 97530 THERAPEUTIC ACTIVITIES: CPT

## 2019-01-07 PROCEDURE — 94640 AIRWAY INHALATION TREATMENT: CPT

## 2019-01-07 PROCEDURE — 99232 SBSQ HOSP IP/OBS MODERATE 35: CPT | Performed by: INTERNAL MEDICINE

## 2019-01-07 PROCEDURE — 6360000002 HC RX W HCPCS: Performed by: INTERNAL MEDICINE

## 2019-01-07 PROCEDURE — 97116 GAIT TRAINING THERAPY: CPT

## 2019-01-07 PROCEDURE — APPSS30 APP SPLIT SHARED TIME 16-30 MINUTES: Performed by: NURSE PRACTITIONER

## 2019-01-07 PROCEDURE — 97110 THERAPEUTIC EXERCISES: CPT

## 2019-01-07 PROCEDURE — 6370000000 HC RX 637 (ALT 250 FOR IP): Performed by: INTERNAL MEDICINE

## 2019-01-07 PROCEDURE — 1290000000 HC SEMI PRIVATE OTHER R&B

## 2019-01-07 PROCEDURE — 6370000000 HC RX 637 (ALT 250 FOR IP): Performed by: FAMILY MEDICINE

## 2019-01-07 RX ADMIN — FINASTERIDE 5 MG: 5 TABLET, FILM COATED ORAL at 09:54

## 2019-01-07 RX ADMIN — ENOXAPARIN SODIUM 40 MG: 40 INJECTION SUBCUTANEOUS at 09:55

## 2019-01-07 RX ADMIN — MULTIPLE VITAMINS W/ MINERALS TAB 1 TABLET: TAB at 09:54

## 2019-01-07 RX ADMIN — BICALUTAMIDE 50 MG: 50 TABLET, FILM COATED ORAL at 09:54

## 2019-01-07 RX ADMIN — CARVEDILOL 6.25 MG: 6.25 TABLET, FILM COATED ORAL at 09:54

## 2019-01-07 RX ADMIN — PANTOPRAZOLE SODIUM 40 MG: 40 TABLET, DELAYED RELEASE ORAL at 06:24

## 2019-01-07 RX ADMIN — DOXAZOSIN 4 MG: 4 TABLET ORAL at 09:54

## 2019-01-07 RX ADMIN — CARVEDILOL 6.25 MG: 6.25 TABLET, FILM COATED ORAL at 17:03

## 2019-01-07 ASSESSMENT — PAIN SCALES - GENERAL: PAINLEVEL_OUTOF10: 0

## 2019-01-08 PROCEDURE — 1290000000 HC SEMI PRIVATE OTHER R&B

## 2019-01-08 PROCEDURE — 97116 GAIT TRAINING THERAPY: CPT

## 2019-01-08 PROCEDURE — 97110 THERAPEUTIC EXERCISES: CPT

## 2019-01-08 PROCEDURE — 97530 THERAPEUTIC ACTIVITIES: CPT

## 2019-01-08 PROCEDURE — 94640 AIRWAY INHALATION TREATMENT: CPT

## 2019-01-08 PROCEDURE — APPSS30 APP SPLIT SHARED TIME 16-30 MINUTES: Performed by: NURSE PRACTITIONER

## 2019-01-08 PROCEDURE — 6370000000 HC RX 637 (ALT 250 FOR IP): Performed by: FAMILY MEDICINE

## 2019-01-08 PROCEDURE — 99024 POSTOP FOLLOW-UP VISIT: CPT | Performed by: NURSE PRACTITIONER

## 2019-01-08 PROCEDURE — 6370000000 HC RX 637 (ALT 250 FOR IP): Performed by: NURSE PRACTITIONER

## 2019-01-08 PROCEDURE — 6360000002 HC RX W HCPCS: Performed by: INTERNAL MEDICINE

## 2019-01-08 PROCEDURE — 6370000000 HC RX 637 (ALT 250 FOR IP): Performed by: INTERNAL MEDICINE

## 2019-01-08 RX ADMIN — CARVEDILOL 6.25 MG: 6.25 TABLET, FILM COATED ORAL at 10:07

## 2019-01-08 RX ADMIN — BICALUTAMIDE 50 MG: 50 TABLET, FILM COATED ORAL at 10:08

## 2019-01-08 RX ADMIN — GLYCOPYRROLATE AND FORMOTEROL FUMARATE 2 PUFF: 9; 4.8 AEROSOL, METERED RESPIRATORY (INHALATION) at 18:24

## 2019-01-08 RX ADMIN — ENOXAPARIN SODIUM 40 MG: 40 INJECTION SUBCUTANEOUS at 10:11

## 2019-01-08 RX ADMIN — PANTOPRAZOLE SODIUM 40 MG: 40 TABLET, DELAYED RELEASE ORAL at 10:07

## 2019-01-08 RX ADMIN — CARVEDILOL 6.25 MG: 6.25 TABLET, FILM COATED ORAL at 17:35

## 2019-01-08 RX ADMIN — GLYCOPYRROLATE AND FORMOTEROL FUMARATE 2 PUFF: 9; 4.8 AEROSOL, METERED RESPIRATORY (INHALATION) at 05:50

## 2019-01-08 RX ADMIN — MULTIPLE VITAMINS W/ MINERALS TAB 1 TABLET: TAB at 10:07

## 2019-01-08 RX ADMIN — FINASTERIDE 5 MG: 5 TABLET, FILM COATED ORAL at 10:07

## 2019-01-08 RX ADMIN — DOXAZOSIN 4 MG: 4 TABLET ORAL at 10:07

## 2019-01-08 ASSESSMENT — PAIN SCALES - GENERAL
PAINLEVEL_OUTOF10: 0

## 2019-01-09 PROCEDURE — 97530 THERAPEUTIC ACTIVITIES: CPT

## 2019-01-09 PROCEDURE — 97535 SELF CARE MNGMENT TRAINING: CPT

## 2019-01-09 PROCEDURE — 6360000002 HC RX W HCPCS: Performed by: INTERNAL MEDICINE

## 2019-01-09 PROCEDURE — 1290000000 HC SEMI PRIVATE OTHER R&B

## 2019-01-09 PROCEDURE — 94761 N-INVAS EAR/PLS OXIMETRY MLT: CPT

## 2019-01-09 PROCEDURE — 6370000000 HC RX 637 (ALT 250 FOR IP): Performed by: INTERNAL MEDICINE

## 2019-01-09 PROCEDURE — 94640 AIRWAY INHALATION TREATMENT: CPT

## 2019-01-09 PROCEDURE — 6370000000 HC RX 637 (ALT 250 FOR IP): Performed by: FAMILY MEDICINE

## 2019-01-09 PROCEDURE — 97116 GAIT TRAINING THERAPY: CPT

## 2019-01-09 RX ADMIN — ENOXAPARIN SODIUM 40 MG: 40 INJECTION SUBCUTANEOUS at 09:23

## 2019-01-09 RX ADMIN — CARVEDILOL 6.25 MG: 6.25 TABLET, FILM COATED ORAL at 17:10

## 2019-01-09 RX ADMIN — FINASTERIDE 5 MG: 5 TABLET, FILM COATED ORAL at 09:23

## 2019-01-09 RX ADMIN — MULTIPLE VITAMINS W/ MINERALS TAB 1 TABLET: TAB at 09:23

## 2019-01-09 RX ADMIN — DOXAZOSIN 4 MG: 4 TABLET ORAL at 09:23

## 2019-01-09 RX ADMIN — CARVEDILOL 6.25 MG: 6.25 TABLET, FILM COATED ORAL at 09:22

## 2019-01-09 RX ADMIN — GLYCOPYRROLATE AND FORMOTEROL FUMARATE 2 PUFF: 9; 4.8 AEROSOL, METERED RESPIRATORY (INHALATION) at 05:31

## 2019-01-09 RX ADMIN — BICALUTAMIDE 50 MG: 50 TABLET, FILM COATED ORAL at 09:22

## 2019-01-09 RX ADMIN — PANTOPRAZOLE SODIUM 40 MG: 40 TABLET, DELAYED RELEASE ORAL at 05:47

## 2019-01-09 RX ADMIN — GLYCOPYRROLATE AND FORMOTEROL FUMARATE 2 PUFF: 9; 4.8 AEROSOL, METERED RESPIRATORY (INHALATION) at 18:08

## 2019-01-09 ASSESSMENT — PAIN SCALES - GENERAL
PAINLEVEL_OUTOF10: 0
PAINLEVEL_OUTOF10: 0

## 2019-01-10 ENCOUNTER — TELEPHONE (OUTPATIENT)
Dept: UROLOGY | Age: 84
End: 2019-01-10

## 2019-01-10 VITALS
HEART RATE: 87 BPM | BODY MASS INDEX: 16.78 KG/M2 | WEIGHT: 113.31 LBS | HEIGHT: 69 IN | SYSTOLIC BLOOD PRESSURE: 116 MMHG | DIASTOLIC BLOOD PRESSURE: 58 MMHG | TEMPERATURE: 96.1 F | RESPIRATION RATE: 20 BRPM | OXYGEN SATURATION: 95 %

## 2019-01-10 PROCEDURE — 6370000000 HC RX 637 (ALT 250 FOR IP): Performed by: INTERNAL MEDICINE

## 2019-01-10 PROCEDURE — 94640 AIRWAY INHALATION TREATMENT: CPT

## 2019-01-10 PROCEDURE — 6370000000 HC RX 637 (ALT 250 FOR IP): Performed by: FAMILY MEDICINE

## 2019-01-10 PROCEDURE — 6360000002 HC RX W HCPCS: Performed by: INTERNAL MEDICINE

## 2019-01-10 RX ORDER — LANOLIN ALCOHOL/MO/W.PET/CERES
9 CREAM (GRAM) TOPICAL NIGHTLY PRN
COMMUNITY
Start: 2019-01-10 | End: 2019-01-23

## 2019-01-10 RX ORDER — BICALUTAMIDE 50 MG/1
50 TABLET, FILM COATED ORAL DAILY
Qty: 30 TABLET | Refills: 0 | Status: SHIPPED | OUTPATIENT
Start: 2019-01-10 | End: 2019-02-04 | Stop reason: SDUPTHER

## 2019-01-10 RX ORDER — CARVEDILOL 6.25 MG/1
6.25 TABLET ORAL 2 TIMES DAILY WITH MEALS
Qty: 60 TABLET | Refills: 11 | Status: ON HOLD | OUTPATIENT
Start: 2019-01-10 | End: 2019-02-15 | Stop reason: HOSPADM

## 2019-01-10 RX ADMIN — CARVEDILOL 6.25 MG: 6.25 TABLET, FILM COATED ORAL at 08:56

## 2019-01-10 RX ADMIN — ENOXAPARIN SODIUM 40 MG: 40 INJECTION SUBCUTANEOUS at 09:36

## 2019-01-10 RX ADMIN — FINASTERIDE 5 MG: 5 TABLET, FILM COATED ORAL at 09:36

## 2019-01-10 RX ADMIN — BICALUTAMIDE 50 MG: 50 TABLET, FILM COATED ORAL at 09:35

## 2019-01-10 RX ADMIN — DOXAZOSIN 4 MG: 4 TABLET ORAL at 09:36

## 2019-01-10 RX ADMIN — PANTOPRAZOLE SODIUM 40 MG: 40 TABLET, DELAYED RELEASE ORAL at 05:34

## 2019-01-10 RX ADMIN — MULTIPLE VITAMINS W/ MINERALS TAB 1 TABLET: TAB at 09:36

## 2019-01-10 RX ADMIN — GLYCOPYRROLATE AND FORMOTEROL FUMARATE 2 PUFF: 9; 4.8 AEROSOL, METERED RESPIRATORY (INHALATION) at 06:06

## 2019-01-11 ENCOUNTER — TELEPHONE (OUTPATIENT)
Dept: FAMILY MEDICINE CLINIC | Age: 84
End: 2019-01-11

## 2019-01-16 ENCOUNTER — TELEPHONE (OUTPATIENT)
Dept: UROLOGY | Age: 84
End: 2019-01-16

## 2019-01-16 RX ORDER — CIPROFLOXACIN 500 MG/1
TABLET, FILM COATED ORAL
Qty: 6 TABLET | Refills: 0 | Status: ON HOLD | OUTPATIENT
Start: 2019-01-16 | End: 2019-02-15 | Stop reason: HOSPADM

## 2019-01-16 RX ORDER — GENTAMICIN SULFATE 40 MG/ML
80 INJECTION, SOLUTION INTRAMUSCULAR; INTRAVENOUS ONCE
Qty: 2 ML | Refills: 0 | Status: SHIPPED | OUTPATIENT
Start: 2019-01-16 | End: 2019-01-16

## 2019-01-23 ENCOUNTER — OFFICE VISIT (OUTPATIENT)
Dept: FAMILY MEDICINE CLINIC | Age: 84
End: 2019-01-23

## 2019-01-23 VITALS
DIASTOLIC BLOOD PRESSURE: 62 MMHG | WEIGHT: 120.25 LBS | HEART RATE: 76 BPM | RESPIRATION RATE: 12 BRPM | SYSTOLIC BLOOD PRESSURE: 106 MMHG | BODY MASS INDEX: 17.81 KG/M2 | HEIGHT: 69 IN

## 2019-01-23 DIAGNOSIS — E43 SEVERE MALNUTRITION (HCC): ICD-10-CM

## 2019-01-23 DIAGNOSIS — I25.10 CORONARY ARTERY DISEASE INVOLVING NATIVE HEART WITHOUT ANGINA PECTORIS, UNSPECIFIED VESSEL OR LESION TYPE: Primary | ICD-10-CM

## 2019-01-23 DIAGNOSIS — J44.9 STAGE 3 SEVERE COPD BY GOLD CLASSIFICATION (HCC): ICD-10-CM

## 2019-01-23 DIAGNOSIS — M84.58XA PATHOLOGICAL FRACTURE OF LUMBAR VERTEBRA DUE TO NEOPLASTIC DISEASE: ICD-10-CM

## 2019-01-23 DIAGNOSIS — J43.2 CENTRILOBULAR EMPHYSEMA (HCC): ICD-10-CM

## 2019-01-23 PROCEDURE — 99495 TRANSJ CARE MGMT MOD F2F 14D: CPT | Performed by: FAMILY MEDICINE

## 2019-01-23 PROCEDURE — 1111F DSCHRG MED/CURRENT MED MERGE: CPT | Performed by: FAMILY MEDICINE

## 2019-01-24 ENCOUNTER — PROCEDURE VISIT (OUTPATIENT)
Dept: UROLOGY | Age: 84
End: 2019-01-24
Payer: MEDICARE

## 2019-01-24 VITALS
HEIGHT: 69 IN | WEIGHT: 118 LBS | BODY MASS INDEX: 17.48 KG/M2 | SYSTOLIC BLOOD PRESSURE: 112 MMHG | DIASTOLIC BLOOD PRESSURE: 64 MMHG

## 2019-01-24 DIAGNOSIS — R97.20 ELEVATED PSA: Primary | ICD-10-CM

## 2019-01-24 PROCEDURE — 99999 PR OFFICE/OUTPT VISIT,PROCEDURE ONLY: CPT | Performed by: UROLOGY

## 2019-01-24 PROCEDURE — 99999 PR SONO GUIDE NEEDLE BIOPSY: CPT | Performed by: UROLOGY

## 2019-01-24 PROCEDURE — 76872 US TRANSRECTAL: CPT | Performed by: UROLOGY

## 2019-01-24 PROCEDURE — 96372 THER/PROPH/DIAG INJ SC/IM: CPT | Performed by: UROLOGY

## 2019-01-24 PROCEDURE — 55700 PR BIOPSY OF PROSTATE,NEEDLE/PUNCH: CPT | Performed by: UROLOGY

## 2019-01-24 RX ORDER — GENTAMICIN SULFATE 40 MG/ML
80 INJECTION, SOLUTION INTRAMUSCULAR; INTRAVENOUS ONCE
Status: COMPLETED | OUTPATIENT
Start: 2019-01-24 | End: 2019-01-24

## 2019-01-24 RX ADMIN — GENTAMICIN SULFATE 80 MG: 40 INJECTION, SOLUTION INTRAMUSCULAR; INTRAVENOUS at 14:21

## 2019-01-29 ENCOUNTER — OFFICE VISIT (OUTPATIENT)
Dept: FAMILY MEDICINE CLINIC | Age: 84
End: 2019-01-29

## 2019-01-29 VITALS
HEIGHT: 69 IN | SYSTOLIC BLOOD PRESSURE: 132 MMHG | BODY MASS INDEX: 18.27 KG/M2 | DIASTOLIC BLOOD PRESSURE: 70 MMHG | HEART RATE: 84 BPM | WEIGHT: 123.38 LBS | RESPIRATION RATE: 13 BRPM

## 2019-01-29 DIAGNOSIS — C79.51 PROSTATE CANCER METASTATIC TO BONE (HCC): Primary | ICD-10-CM

## 2019-01-29 DIAGNOSIS — C61 PROSTATE CANCER METASTATIC TO BONE (HCC): Primary | ICD-10-CM

## 2019-01-29 DIAGNOSIS — J44.9 CHRONIC OBSTRUCTIVE PULMONARY DISEASE, UNSPECIFIED COPD TYPE (HCC): ICD-10-CM

## 2019-01-29 PROCEDURE — G8427 DOCREV CUR MEDS BY ELIG CLIN: HCPCS | Performed by: FAMILY MEDICINE

## 2019-01-29 PROCEDURE — 1101F PT FALLS ASSESS-DOCD LE1/YR: CPT | Performed by: FAMILY MEDICINE

## 2019-01-29 PROCEDURE — 1123F ACP DISCUSS/DSCN MKR DOCD: CPT | Performed by: FAMILY MEDICINE

## 2019-01-29 PROCEDURE — 4040F PNEUMOC VAC/ADMIN/RCVD: CPT | Performed by: FAMILY MEDICINE

## 2019-01-29 PROCEDURE — G8419 CALC BMI OUT NRM PARAM NOF/U: HCPCS | Performed by: FAMILY MEDICINE

## 2019-01-29 PROCEDURE — 1111F DSCHRG MED/CURRENT MED MERGE: CPT | Performed by: FAMILY MEDICINE

## 2019-01-29 PROCEDURE — G8598 ASA/ANTIPLAT THER USED: HCPCS | Performed by: FAMILY MEDICINE

## 2019-01-29 PROCEDURE — 1036F TOBACCO NON-USER: CPT | Performed by: FAMILY MEDICINE

## 2019-01-29 PROCEDURE — 99213 OFFICE O/P EST LOW 20 MIN: CPT | Performed by: FAMILY MEDICINE

## 2019-01-29 RX ORDER — ALBUTEROL SULFATE 90 UG/1
2 AEROSOL, METERED RESPIRATORY (INHALATION) 2 TIMES DAILY
Qty: 1 INHALER | Refills: 11 | Status: ON HOLD | OUTPATIENT
Start: 2019-01-29 | End: 2019-02-15 | Stop reason: HOSPADM

## 2019-01-29 RX ORDER — ALBUTEROL SULFATE 90 UG/1
2 AEROSOL, METERED RESPIRATORY (INHALATION) 2 TIMES DAILY
Qty: 1 INHALER | Refills: 11 | Status: CANCELLED | OUTPATIENT
Start: 2019-01-29

## 2019-01-29 RX ORDER — BICALUTAMIDE 50 MG/1
50 TABLET, FILM COATED ORAL DAILY
Qty: 30 TABLET | Refills: 0 | Status: CANCELLED | OUTPATIENT
Start: 2019-01-29

## 2019-01-29 RX ORDER — FINASTERIDE 5 MG/1
5 TABLET, FILM COATED ORAL DAILY
Qty: 90 TABLET | Refills: 3 | Status: CANCELLED | OUTPATIENT
Start: 2019-01-29

## 2019-01-29 ASSESSMENT — ENCOUNTER SYMPTOMS
BACK PAIN: 1
SHORTNESS OF BREATH: 1
SINUS PRESSURE: 0
CONSTIPATION: 0

## 2019-01-30 ENCOUNTER — HOSPITAL ENCOUNTER (OUTPATIENT)
Dept: CT IMAGING | Age: 84
Discharge: HOME | End: 2019-01-30
Payer: MEDICARE

## 2019-01-30 DIAGNOSIS — C61 PROSTATE CANCER METASTATIC TO BONE (HCC): ICD-10-CM

## 2019-01-30 DIAGNOSIS — C79.51 PROSTATE CANCER METASTATIC TO BONE (HCC): ICD-10-CM

## 2019-01-30 PROCEDURE — 72128 CT CHEST SPINE W/O DYE: CPT

## 2019-01-31 ENCOUNTER — TELEPHONE (OUTPATIENT)
Dept: FAMILY MEDICINE CLINIC | Age: 84
End: 2019-01-31

## 2019-02-04 ENCOUNTER — TELEPHONE (OUTPATIENT)
Dept: UROLOGY | Age: 84
End: 2019-02-04

## 2019-02-04 ENCOUNTER — TELEPHONE (OUTPATIENT)
Dept: FAMILY MEDICINE CLINIC | Age: 84
End: 2019-02-04

## 2019-02-04 RX ORDER — BICALUTAMIDE 50 MG/1
50 TABLET, FILM COATED ORAL DAILY
Qty: 30 TABLET | Refills: 11 | Status: ON HOLD | OUTPATIENT
Start: 2019-02-04 | End: 2019-02-15 | Stop reason: HOSPADM

## 2019-02-04 RX ORDER — FINASTERIDE 5 MG/1
5 TABLET, FILM COATED ORAL DAILY
Qty: 90 TABLET | Refills: 3 | Status: ON HOLD | OUTPATIENT
Start: 2019-02-04 | End: 2019-02-15 | Stop reason: HOSPADM

## 2019-02-04 NOTE — TELEPHONE ENCOUNTER
Daughter Santo Brannon (on [employer]IP[employer])  wanting to know if we can have refills for Casodex and finasteride refills. Jose Antonio Small not have enough to get him until upcoming appointment. Daughter concerned with swelling in ankles and feet, started on Friday. Wants to know if this could be from the Casodex? Have not address this with PCP yet.

## 2019-02-04 NOTE — TELEPHONE ENCOUNTER
Refills given. Unlikely related to Casodex. Would have them contact PCP for evaluation.  Thanks    [employer]

## 2019-02-05 ENCOUNTER — APPOINTMENT (OUTPATIENT)
Dept: INTERVENTIONAL RADIOLOGY/VASCULAR | Age: 84
DRG: 477 | End: 2019-02-05
Payer: MEDICARE

## 2019-02-05 ENCOUNTER — HOSPITAL ENCOUNTER (INPATIENT)
Age: 84
LOS: 11 days | Discharge: NURSING, REHAB OR LONG-TERM CARE | DRG: 477 | End: 2019-02-16
Attending: EMERGENCY MEDICINE | Admitting: INTERNAL MEDICINE
Payer: MEDICARE

## 2019-02-05 ENCOUNTER — APPOINTMENT (OUTPATIENT)
Dept: CT IMAGING | Age: 84
DRG: 477 | End: 2019-02-05
Payer: MEDICARE

## 2019-02-05 DIAGNOSIS — R53.83 FATIGUE, UNSPECIFIED TYPE: ICD-10-CM

## 2019-02-05 DIAGNOSIS — N39.0 URINARY TRACT INFECTION WITHOUT HEMATURIA, SITE UNSPECIFIED: Primary | ICD-10-CM

## 2019-02-05 DIAGNOSIS — M84.58XA PATHOLOGICAL FRACTURE OF SACRAL VERTEBRA DUE TO NEOPLASTIC DISEASE: ICD-10-CM

## 2019-02-05 DIAGNOSIS — I26.99 OTHER PULMONARY EMBOLISM WITHOUT ACUTE COR PULMONALE, UNSPECIFIED CHRONICITY (HCC): ICD-10-CM

## 2019-02-05 DIAGNOSIS — C79.51 PROSTATE CANCER METASTATIC TO BONE (HCC): ICD-10-CM

## 2019-02-05 DIAGNOSIS — C61 PROSTATE CANCER METASTATIC TO BONE (HCC): ICD-10-CM

## 2019-02-05 DIAGNOSIS — R09.02 HYPOXIA: ICD-10-CM

## 2019-02-05 DIAGNOSIS — C41.9 METASTATIC BONE CANCER (HCC): ICD-10-CM

## 2019-02-05 LAB
ALBUMIN SERPL-MCNC: 4.1 G/DL (ref 3.5–5.1)
ALP BLD-CCNC: 181 U/L (ref 38–126)
ALT SERPL-CCNC: 14 U/L (ref 11–66)
AMMONIA: 25 UMOL/L (ref 11–60)
ANION GAP SERPL CALCULATED.3IONS-SCNC: 10 MEQ/L (ref 8–16)
AST SERPL-CCNC: 28 U/L (ref 5–40)
BACTERIA: ABNORMAL /HPF
BASOPHILS # BLD: 0.3 %
BASOPHILS ABSOLUTE: 0 THOU/MM3 (ref 0–0.1)
BILIRUB SERPL-MCNC: 0.7 MG/DL (ref 0.3–1.2)
BILIRUBIN DIRECT: < 0.2 MG/DL (ref 0–0.3)
BILIRUBIN URINE: NEGATIVE
BLOOD, URINE: ABNORMAL
BUN BLDV-MCNC: 13 MG/DL (ref 7–22)
CALCIUM SERPL-MCNC: 8.4 MG/DL (ref 8.5–10.5)
CASTS 2: ABNORMAL /LPF
CASTS UA: ABNORMAL /LPF
CHARACTER, URINE: ABNORMAL
CHLORIDE BLD-SCNC: 92 MEQ/L (ref 98–111)
CO2: 29 MEQ/L (ref 23–33)
COLOR: ABNORMAL
CREAT SERPL-MCNC: 0.7 MG/DL (ref 0.4–1.2)
CRYSTALS, UA: ABNORMAL
EOSINOPHIL # BLD: 1.4 %
EOSINOPHILS ABSOLUTE: 0.1 THOU/MM3 (ref 0–0.4)
EPITHELIAL CELLS, UA: ABNORMAL /HPF
ERYTHROCYTE [DISTWIDTH] IN BLOOD BY AUTOMATED COUNT: 13.5 % (ref 11.5–14.5)
ERYTHROCYTE [DISTWIDTH] IN BLOOD BY AUTOMATED COUNT: 13.5 % (ref 11.5–14.5)
ERYTHROCYTE [DISTWIDTH] IN BLOOD BY AUTOMATED COUNT: 51.3 FL (ref 35–45)
ERYTHROCYTE [DISTWIDTH] IN BLOOD BY AUTOMATED COUNT: 51.8 FL (ref 35–45)
FLU A ANTIGEN: NEGATIVE
FLU B ANTIGEN: NEGATIVE
GFR SERPL CREATININE-BSD FRML MDRD: > 90 ML/MIN/1.73M2
GLUCOSE BLD-MCNC: 112 MG/DL (ref 70–108)
GLUCOSE URINE: NEGATIVE MG/DL
HCT VFR BLD CALC: 38.9 % (ref 42–52)
HCT VFR BLD CALC: 39.8 % (ref 42–52)
HEMOGLOBIN: 13.1 GM/DL (ref 14–18)
HEMOGLOBIN: 13.2 GM/DL (ref 14–18)
IMMATURE GRANS (ABS): 0.03 THOU/MM3 (ref 0–0.07)
IMMATURE GRANULOCYTES: 0.4 %
KETONES, URINE: NEGATIVE
LEUKOCYTE ESTERASE, URINE: ABNORMAL
LIPASE: 11.5 U/L (ref 5.6–51.3)
LYMPHOCYTES # BLD: 9 %
LYMPHOCYTES ABSOLUTE: 0.6 THOU/MM3 (ref 1–4.8)
MAGNESIUM: 2 MG/DL (ref 1.6–2.4)
MCH RBC QN AUTO: 34.4 PG (ref 26–33)
MCH RBC QN AUTO: 34.5 PG (ref 26–33)
MCHC RBC AUTO-ENTMCNC: 33.2 GM/DL (ref 32.2–35.5)
MCHC RBC AUTO-ENTMCNC: 33.7 GM/DL (ref 32.2–35.5)
MCV RBC AUTO: 102.4 FL (ref 80–94)
MCV RBC AUTO: 103.6 FL (ref 80–94)
MISCELLANEOUS 2: ABNORMAL
MONOCYTES # BLD: 6.4 %
MONOCYTES ABSOLUTE: 0.4 THOU/MM3 (ref 0.4–1.3)
NITRITE, URINE: POSITIVE
NUCLEATED RED BLOOD CELLS: 0 /100 WBC
OSMOLALITY CALCULATION: 263.5 MOSMOL/KG (ref 275–300)
PH UA: 7
PLATELET # BLD: 210 THOU/MM3 (ref 130–400)
PLATELET # BLD: 219 THOU/MM3 (ref 130–400)
PMV BLD AUTO: 9.4 FL (ref 9.4–12.4)
PMV BLD AUTO: 9.6 FL (ref 9.4–12.4)
POTASSIUM SERPL-SCNC: 5.1 MEQ/L (ref 3.5–5.2)
PRO-BNP: 1070 PG/ML (ref 0–1800)
PROTEIN UA: NEGATIVE
RBC # BLD: 3.8 MILL/MM3 (ref 4.7–6.1)
RBC # BLD: 3.84 MILL/MM3 (ref 4.7–6.1)
RBC URINE: ABNORMAL /HPF
RENAL EPITHELIAL, UA: ABNORMAL
SEG NEUTROPHILS: 82.5 %
SEGMENTED NEUTROPHILS ABSOLUTE COUNT: 5.8 THOU/MM3 (ref 1.8–7.7)
SODIUM BLD-SCNC: 131 MEQ/L (ref 135–145)
SPECIFIC GRAVITY, URINE: 1.01 (ref 1–1.03)
TOTAL PROTEIN: 5.8 G/DL (ref 6.1–8)
TROPONIN T: < 0.01 NG/ML
UROBILINOGEN, URINE: 0.2 EU/DL
WBC # BLD: 4.9 THOU/MM3 (ref 4.8–10.8)
WBC # BLD: 7 THOU/MM3 (ref 4.8–10.8)
WBC UA: ABNORMAL /HPF
YEAST: ABNORMAL

## 2019-02-05 PROCEDURE — 84484 ASSAY OF TROPONIN QUANT: CPT

## 2019-02-05 PROCEDURE — 6360000002 HC RX W HCPCS: Performed by: INTERNAL MEDICINE

## 2019-02-05 PROCEDURE — 80048 BASIC METABOLIC PNL TOTAL CA: CPT

## 2019-02-05 PROCEDURE — 85025 COMPLETE CBC W/AUTO DIFF WBC: CPT

## 2019-02-05 PROCEDURE — 87801 DETECT AGNT MULT DNA AMPLI: CPT

## 2019-02-05 PROCEDURE — 93005 ELECTROCARDIOGRAM TRACING: CPT | Performed by: EMERGENCY MEDICINE

## 2019-02-05 PROCEDURE — 87086 URINE CULTURE/COLONY COUNT: CPT

## 2019-02-05 PROCEDURE — 96374 THER/PROPH/DIAG INJ IV PUSH: CPT

## 2019-02-05 PROCEDURE — 83690 ASSAY OF LIPASE: CPT

## 2019-02-05 PROCEDURE — 93970 EXTREMITY STUDY: CPT

## 2019-02-05 PROCEDURE — 2709999900 HC NON-CHARGEABLE SUPPLY

## 2019-02-05 PROCEDURE — 51702 INSERT TEMP BLADDER CATH: CPT

## 2019-02-05 PROCEDURE — 6370000000 HC RX 637 (ALT 250 FOR IP): Performed by: INTERNAL MEDICINE

## 2019-02-05 PROCEDURE — 85027 COMPLETE CBC AUTOMATED: CPT

## 2019-02-05 PROCEDURE — 83735 ASSAY OF MAGNESIUM: CPT

## 2019-02-05 PROCEDURE — 76376 3D RENDER W/INTRP POSTPROCES: CPT

## 2019-02-05 PROCEDURE — 2700000000 HC OXYGEN THERAPY PER DAY

## 2019-02-05 PROCEDURE — 6360000002 HC RX W HCPCS: Performed by: EMERGENCY MEDICINE

## 2019-02-05 PROCEDURE — 87804 INFLUENZA ASSAY W/OPTIC: CPT

## 2019-02-05 PROCEDURE — 74177 CT ABD & PELVIS W/CONTRAST: CPT

## 2019-02-05 PROCEDURE — 83880 ASSAY OF NATRIURETIC PEPTIDE: CPT

## 2019-02-05 PROCEDURE — 71275 CT ANGIOGRAPHY CHEST: CPT

## 2019-02-05 PROCEDURE — 36415 COLL VENOUS BLD VENIPUNCTURE: CPT

## 2019-02-05 PROCEDURE — 99285 EMERGENCY DEPT VISIT HI MDM: CPT

## 2019-02-05 PROCEDURE — 2580000003 HC RX 258: Performed by: EMERGENCY MEDICINE

## 2019-02-05 PROCEDURE — 87040 BLOOD CULTURE FOR BACTERIA: CPT

## 2019-02-05 PROCEDURE — 80076 HEPATIC FUNCTION PANEL: CPT

## 2019-02-05 PROCEDURE — 6360000004 HC RX CONTRAST MEDICATION: Performed by: EMERGENCY MEDICINE

## 2019-02-05 PROCEDURE — 87147 CULTURE TYPE IMMUNOLOGIC: CPT

## 2019-02-05 PROCEDURE — 94640 AIRWAY INHALATION TREATMENT: CPT

## 2019-02-05 PROCEDURE — 81001 URINALYSIS AUTO W/SCOPE: CPT

## 2019-02-05 PROCEDURE — 82140 ASSAY OF AMMONIA: CPT

## 2019-02-05 PROCEDURE — 6370000000 HC RX 637 (ALT 250 FOR IP): Performed by: EMERGENCY MEDICINE

## 2019-02-05 PROCEDURE — 1200000000 HC SEMI PRIVATE

## 2019-02-05 RX ORDER — HYDROCODONE BITARTRATE AND ACETAMINOPHEN 5; 325 MG/1; MG/1
1 TABLET ORAL ONCE
Status: DISCONTINUED | OUTPATIENT
Start: 2019-02-05 | End: 2019-02-05 | Stop reason: HOSPADM

## 2019-02-05 RX ORDER — TERAZOSIN 5 MG/1
5 CAPSULE ORAL NIGHTLY
Status: DISCONTINUED | OUTPATIENT
Start: 2019-02-05 | End: 2019-02-05 | Stop reason: CLARIF

## 2019-02-05 RX ORDER — FINASTERIDE 5 MG/1
5 TABLET, FILM COATED ORAL DAILY
Status: DISCONTINUED | OUTPATIENT
Start: 2019-02-06 | End: 2019-02-16 | Stop reason: HOSPADM

## 2019-02-05 RX ORDER — DOXAZOSIN MESYLATE 4 MG/1
4 TABLET ORAL NIGHTLY
Status: DISCONTINUED | OUTPATIENT
Start: 2019-02-06 | End: 2019-02-16 | Stop reason: HOSPADM

## 2019-02-05 RX ORDER — ACETAMINOPHEN 500 MG
500 TABLET ORAL EVERY 8 HOURS PRN
COMMUNITY

## 2019-02-05 RX ORDER — HEPARIN SODIUM 1000 [USP'U]/ML
40 INJECTION, SOLUTION INTRAVENOUS; SUBCUTANEOUS PRN
Status: DISCONTINUED | OUTPATIENT
Start: 2019-02-05 | End: 2019-02-05 | Stop reason: CLARIF

## 2019-02-05 RX ORDER — TRAMADOL HYDROCHLORIDE 50 MG/1
50 TABLET ORAL EVERY 8 HOURS PRN
Status: ON HOLD | COMMUNITY
End: 2019-02-15 | Stop reason: HOSPADM

## 2019-02-05 RX ORDER — HEPARIN SODIUM 1000 [USP'U]/ML
80 INJECTION, SOLUTION INTRAVENOUS; SUBCUTANEOUS PRN
Status: DISCONTINUED | OUTPATIENT
Start: 2019-02-05 | End: 2019-02-05 | Stop reason: CLARIF

## 2019-02-05 RX ORDER — HEPARIN SODIUM 1000 [USP'U]/ML
80 INJECTION, SOLUTION INTRAVENOUS; SUBCUTANEOUS ONCE
Status: COMPLETED | OUTPATIENT
Start: 2019-02-05 | End: 2019-02-05

## 2019-02-05 RX ORDER — ALBUTEROL SULFATE 90 UG/1
2 AEROSOL, METERED RESPIRATORY (INHALATION) 2 TIMES DAILY
Status: DISCONTINUED | OUTPATIENT
Start: 2019-02-05 | End: 2019-02-16 | Stop reason: HOSPADM

## 2019-02-05 RX ORDER — CARVEDILOL 6.25 MG/1
6.25 TABLET ORAL 2 TIMES DAILY WITH MEALS
Status: DISCONTINUED | OUTPATIENT
Start: 2019-02-05 | End: 2019-02-09

## 2019-02-05 RX ORDER — M-VIT,TX,IRON,MINS/CALC/FOLIC 27MG-0.4MG
1 TABLET ORAL DAILY
Status: DISCONTINUED | OUTPATIENT
Start: 2019-02-05 | End: 2019-02-16 | Stop reason: HOSPADM

## 2019-02-05 RX ORDER — CIPROFLOXACIN 250 MG/1
250 TABLET, FILM COATED ORAL EVERY 12 HOURS SCHEDULED
Status: DISCONTINUED | OUTPATIENT
Start: 2019-02-05 | End: 2019-02-16 | Stop reason: HOSPADM

## 2019-02-05 RX ORDER — HEPARIN SODIUM 10000 [USP'U]/100ML
15 INJECTION, SOLUTION INTRAVENOUS CONTINUOUS
Status: DISPENSED | OUTPATIENT
Start: 2019-02-05 | End: 2019-02-12

## 2019-02-05 RX ORDER — TRAMADOL HYDROCHLORIDE 50 MG/1
50 TABLET ORAL EVERY 8 HOURS PRN
Status: DISCONTINUED | OUTPATIENT
Start: 2019-02-05 | End: 2019-02-16 | Stop reason: HOSPADM

## 2019-02-05 RX ORDER — DEXAMETHASONE SODIUM PHOSPHATE 4 MG/ML
4 INJECTION, SOLUTION INTRA-ARTICULAR; INTRALESIONAL; INTRAMUSCULAR; INTRAVENOUS; SOFT TISSUE ONCE
Status: COMPLETED | OUTPATIENT
Start: 2019-02-05 | End: 2019-02-05

## 2019-02-05 RX ORDER — BICALUTAMIDE 50 MG/1
50 TABLET, FILM COATED ORAL DAILY
Status: DISCONTINUED | OUTPATIENT
Start: 2019-02-05 | End: 2019-02-16 | Stop reason: HOSPADM

## 2019-02-05 RX ORDER — TIZANIDINE 2 MG/1
2 TABLET ORAL EVERY 8 HOURS PRN
Status: ON HOLD | COMMUNITY
End: 2019-02-15 | Stop reason: HOSPADM

## 2019-02-05 RX ORDER — ACETAMINOPHEN 500 MG
500 TABLET ORAL EVERY 8 HOURS PRN
Status: DISCONTINUED | OUTPATIENT
Start: 2019-02-05 | End: 2019-02-13 | Stop reason: SDUPTHER

## 2019-02-05 RX ORDER — TIZANIDINE 4 MG/1
2 TABLET ORAL EVERY 8 HOURS PRN
Status: DISCONTINUED | OUTPATIENT
Start: 2019-02-05 | End: 2019-02-16 | Stop reason: HOSPADM

## 2019-02-05 RX ORDER — 0.9 % SODIUM CHLORIDE 0.9 %
1000 INTRAVENOUS SOLUTION INTRAVENOUS ONCE
Status: COMPLETED | OUTPATIENT
Start: 2019-02-05 | End: 2019-02-05

## 2019-02-05 RX ADMIN — Medication 2 PUFF: at 21:59

## 2019-02-05 RX ADMIN — CARVEDILOL 6.25 MG: 6.25 TABLET, FILM COATED ORAL at 20:55

## 2019-02-05 RX ADMIN — IOPAMIDOL 80 ML: 755 INJECTION, SOLUTION INTRAVENOUS at 12:42

## 2019-02-05 RX ADMIN — CEFTRIAXONE SODIUM 1 G: 1 INJECTION, POWDER, FOR SOLUTION INTRAMUSCULAR; INTRAVENOUS at 14:26

## 2019-02-05 RX ADMIN — ASPIRIN 325 MG: 325 TABLET, DELAYED RELEASE ORAL at 20:55

## 2019-02-05 RX ADMIN — ACETAMINOPHEN 500 MG: 500 TABLET, FILM COATED ORAL at 23:36

## 2019-02-05 RX ADMIN — HYDROCODONE BITARTRATE AND ACETAMINOPHEN 1 TABLET: 5; 325 TABLET ORAL at 17:35

## 2019-02-05 RX ADMIN — HEPARIN SODIUM 18 UNITS/KG/HR: 10000 INJECTION, SOLUTION INTRAVENOUS at 23:14

## 2019-02-05 RX ADMIN — MULTIPLE VITAMINS W/ MINERALS TAB 1 TABLET: TAB at 20:55

## 2019-02-05 RX ADMIN — CIPROFLOXACIN 250 MG: 250 TABLET, FILM COATED ORAL at 20:55

## 2019-02-05 RX ADMIN — BICALUTAMIDE 50 MG: 50 TABLET, FILM COATED ORAL at 20:55

## 2019-02-05 RX ADMIN — GLYCOPYRROLATE AND FORMOTEROL FUMARATE 2 PUFF: 9; 4.8 AEROSOL, METERED RESPIRATORY (INHALATION) at 21:59

## 2019-02-05 RX ADMIN — SODIUM CHLORIDE 1000 ML: 9 INJECTION, SOLUTION INTRAVENOUS at 11:14

## 2019-02-05 RX ADMIN — DEXAMETHASONE SODIUM PHOSPHATE 4 MG: 4 INJECTION, SOLUTION INTRAMUSCULAR; INTRAVENOUS at 11:12

## 2019-02-05 RX ADMIN — HEPARIN SODIUM 4500 UNITS: 1000 INJECTION, SOLUTION INTRAVENOUS; SUBCUTANEOUS at 23:15

## 2019-02-05 ASSESSMENT — PAIN DESCRIPTION - FREQUENCY: FREQUENCY: INTERMITTENT

## 2019-02-05 ASSESSMENT — PAIN SCALES - GENERAL
PAINLEVEL_OUTOF10: 2
PAINLEVEL_OUTOF10: 7

## 2019-02-05 ASSESSMENT — ENCOUNTER SYMPTOMS
VOICE CHANGE: 0
SHORTNESS OF BREATH: 1
DIARRHEA: 0
NAUSEA: 0
EYE PAIN: 0
SORE THROAT: 0
EYE ITCHING: 0
BLOOD IN STOOL: 0
PHOTOPHOBIA: 0
CONSTIPATION: 0
COUGH: 0
BACK PAIN: 0
WHEEZING: 0
CHOKING: 0
VOMITING: 0
CHEST TIGHTNESS: 0
SINUS PRESSURE: 0
ABDOMINAL DISTENTION: 0
EYE DISCHARGE: 0
EYE REDNESS: 0
ABDOMINAL PAIN: 0
RHINORRHEA: 0
TROUBLE SWALLOWING: 0

## 2019-02-05 ASSESSMENT — PAIN DESCRIPTION - LOCATION: LOCATION: BACK

## 2019-02-05 ASSESSMENT — PAIN DESCRIPTION - ORIENTATION: ORIENTATION: RIGHT

## 2019-02-05 ASSESSMENT — PAIN DESCRIPTION - PAIN TYPE: TYPE: CHRONIC PAIN

## 2019-02-05 NOTE — TELEPHONE ENCOUNTER
Patient daughter informed that medication refills were sent in, she stated that her father was being transported to the ER for the increase swelling with weakness in legs.

## 2019-02-06 PROBLEM — M84.58XA PATHOLOGICAL FRACTURE OF VERTEBRA DUE TO NEOPLASTIC DISEASE: Status: ACTIVE | Noted: 2019-02-06

## 2019-02-06 PROBLEM — E43 SEVERE MALNUTRITION (HCC): Chronic | Status: ACTIVE | Noted: 2019-02-06

## 2019-02-06 PROBLEM — C61 PRIMARY ADENOCARCINOMA OF PROSTATE (HCC): Status: ACTIVE | Noted: 2019-01-01

## 2019-02-06 PROBLEM — I82.409 ACUTE DEEP VEIN THROMBOSIS (DVT) OF LOWER EXTREMITY (HCC): Status: ACTIVE | Noted: 2019-02-06

## 2019-02-06 PROBLEM — I26.99 ACUTE PULMONARY EMBOLISM (HCC): Status: ACTIVE | Noted: 2019-02-06

## 2019-02-06 LAB
APTT: 65.3 SECONDS (ref 22–38)
APTT: 75.2 SECONDS (ref 22–38)
APTT: 98.7 SECONDS (ref 22–38)
EKG ATRIAL RATE: 73 BPM
EKG P AXIS: 61 DEGREES
EKG P-R INTERVAL: 148 MS
EKG Q-T INTERVAL: 416 MS
EKG QRS DURATION: 86 MS
EKG QTC CALCULATION (BAZETT): 458 MS
EKG R AXIS: 26 DEGREES
EKG T AXIS: 52 DEGREES
EKG VENTRICULAR RATE: 73 BPM
ORGANISM: ABNORMAL
URINE CULTURE REFLEX: ABNORMAL

## 2019-02-06 PROCEDURE — 93010 ELECTROCARDIOGRAM REPORT: CPT | Performed by: INTERNAL MEDICINE

## 2019-02-06 PROCEDURE — 1200000000 HC SEMI PRIVATE

## 2019-02-06 PROCEDURE — 6370000000 HC RX 637 (ALT 250 FOR IP): Performed by: INTERNAL MEDICINE

## 2019-02-06 PROCEDURE — 99223 1ST HOSP IP/OBS HIGH 75: CPT | Performed by: PHYSICAL MEDICINE & REHABILITATION

## 2019-02-06 PROCEDURE — 94640 AIRWAY INHALATION TREATMENT: CPT

## 2019-02-06 PROCEDURE — 36415 COLL VENOUS BLD VENIPUNCTURE: CPT

## 2019-02-06 PROCEDURE — 85730 THROMBOPLASTIN TIME PARTIAL: CPT

## 2019-02-06 RX ADMIN — TRAMADOL HYDROCHLORIDE 50 MG: 50 TABLET, FILM COATED ORAL at 08:05

## 2019-02-06 RX ADMIN — GLYCOPYRROLATE AND FORMOTEROL FUMARATE 2 PUFF: 9; 4.8 AEROSOL, METERED RESPIRATORY (INHALATION) at 09:03

## 2019-02-06 RX ADMIN — CARVEDILOL 6.25 MG: 6.25 TABLET, FILM COATED ORAL at 08:05

## 2019-02-06 RX ADMIN — ACETAMINOPHEN 500 MG: 500 TABLET, FILM COATED ORAL at 17:16

## 2019-02-06 RX ADMIN — Medication 2 PUFF: at 19:52

## 2019-02-06 RX ADMIN — MULTIPLE VITAMINS W/ MINERALS TAB 1 TABLET: TAB at 08:07

## 2019-02-06 RX ADMIN — ASPIRIN 325 MG: 325 TABLET, DELAYED RELEASE ORAL at 08:05

## 2019-02-06 RX ADMIN — Medication 2 PUFF: at 09:03

## 2019-02-06 RX ADMIN — CIPROFLOXACIN 250 MG: 250 TABLET, FILM COATED ORAL at 20:51

## 2019-02-06 RX ADMIN — BICALUTAMIDE 50 MG: 50 TABLET, FILM COATED ORAL at 08:07

## 2019-02-06 RX ADMIN — CARVEDILOL 6.25 MG: 6.25 TABLET, FILM COATED ORAL at 17:11

## 2019-02-06 RX ADMIN — CIPROFLOXACIN 250 MG: 250 TABLET, FILM COATED ORAL at 08:06

## 2019-02-06 RX ADMIN — DOXAZOSIN 4 MG: 4 TABLET ORAL at 20:51

## 2019-02-06 RX ADMIN — FINASTERIDE 5 MG: 5 TABLET, FILM COATED ORAL at 08:06

## 2019-02-06 RX ADMIN — GLYCOPYRROLATE AND FORMOTEROL FUMARATE 2 PUFF: 9; 4.8 AEROSOL, METERED RESPIRATORY (INHALATION) at 19:54

## 2019-02-06 ASSESSMENT — PAIN SCALES - GENERAL
PAINLEVEL_OUTOF10: 4
PAINLEVEL_OUTOF10: 6
PAINLEVEL_OUTOF10: 4
PAINLEVEL_OUTOF10: 0

## 2019-02-07 ENCOUNTER — HOSPITAL ENCOUNTER (OUTPATIENT)
Dept: RADIATION ONCOLOGY | Age: 84
Discharge: HOME | End: 2019-02-07
Payer: MEDICARE

## 2019-02-07 ENCOUNTER — APPOINTMENT (OUTPATIENT)
Dept: MRI IMAGING | Age: 84
DRG: 477 | End: 2019-02-07
Payer: MEDICARE

## 2019-02-07 LAB
ACINETOBACTER BAUMANNII FILM ARRAY: NOT DETECTED
APTT: 62 SECONDS (ref 22–38)
APTT: 93.4 SECONDS (ref 22–38)
APTT: 95.9 SECONDS (ref 22–38)
BOTTLE TYPE: ABNORMAL
CANDIDA ALBICANS FILM ARRAY: NOT DETECTED
CANDIDA GLABRATA FILM ARRAY: NOT DETECTED
CANDIDA KRUSEI FILM ARRAY: NOT DETECTED
CANDIDA PARAPSILOSIS FILM ARRAY: NOT DETECTED
CANDIDA TROPICALIS FILM ARRAY: NOT DETECTED
CARBAPENEM RESITANT FILM ARRAY: ABNORMAL
ENTERBACTER CLOACAE FILM ARRAY: NOT DETECTED
ENTERBACTERIACEAE FILM ARRAY: NOT DETECTED
ENTEROCOCCUS FILM ARRAY: NOT DETECTED
ESCHERICHIA COLI FILM ARRAY: NOT DETECTED
HAEMOPHILUS INFLUENZA FILM ARRAY: NOT DETECTED
KLEBSIELLA OXYTOCA FILM ARRAY: NOT DETECTED
KLEBSIELLA PNEUMONIAE FILM ARRAY: NOT DETECTED
LISTERIA MONOCYTOGENES FILM ARRAY: NOT DETECTED
METHICILLIN RESISTANT FILM ARRAY: NOT DETECTED
NEISSERIA MENIGITIDIS FILM ARRAY: NOT DETECTED
PLATELET # BLD: 254 THOU/MM3 (ref 130–400)
PROTEUS FILM ARRAY: NOT DETECTED
PSEUDOMONAS AERUGINOSA FILM ARRAY: NOT DETECTED
SERRATIA MARCESCENS FILM ARRAY: NOT DETECTED
SOURCE OF BLOOD CULTURE: ABNORMAL
STAPH AUREUS FILM ARRAY: NOT DETECTED
STAPHYLOCOCCUS FILM ARRAY: DETECTED
STREP AGALACTIAE FILM ARRAY: NOT DETECTED
STREP PNEUMONIAE FILM ARRAY: NOT DETECTED
STREP PYOCGENES FILM ARRAY: NOT DETECTED
STREPTOCOCCUS FILM ARRAY: NOT DETECTED
VANCOMYCIN RESISTANT FILM ARRAY: ABNORMAL

## 2019-02-07 PROCEDURE — 2700000000 HC OXYGEN THERAPY PER DAY

## 2019-02-07 PROCEDURE — 1200000000 HC SEMI PRIVATE

## 2019-02-07 PROCEDURE — 2709999900 HC NON-CHARGEABLE SUPPLY

## 2019-02-07 PROCEDURE — 6370000000 HC RX 637 (ALT 250 FOR IP): Performed by: INTERNAL MEDICINE

## 2019-02-07 PROCEDURE — 36415 COLL VENOUS BLD VENIPUNCTURE: CPT

## 2019-02-07 PROCEDURE — 6360000002 HC RX W HCPCS: Performed by: INTERNAL MEDICINE

## 2019-02-07 PROCEDURE — 2580000003 HC RX 258: Performed by: INTERNAL MEDICINE

## 2019-02-07 PROCEDURE — 94640 AIRWAY INHALATION TREATMENT: CPT

## 2019-02-07 PROCEDURE — 72157 MRI CHEST SPINE W/O & W/DYE: CPT

## 2019-02-07 PROCEDURE — 85049 AUTOMATED PLATELET COUNT: CPT

## 2019-02-07 PROCEDURE — A9579 GAD-BASE MR CONTRAST NOS,1ML: HCPCS | Performed by: INTERNAL MEDICINE

## 2019-02-07 PROCEDURE — 85730 THROMBOPLASTIN TIME PARTIAL: CPT

## 2019-02-07 PROCEDURE — 6360000004 HC RX CONTRAST MEDICATION: Performed by: INTERNAL MEDICINE

## 2019-02-07 PROCEDURE — 94760 N-INVAS EAR/PLS OXIMETRY 1: CPT

## 2019-02-07 RX ORDER — POLYETHYLENE GLYCOL 3350 17 G/17G
17 POWDER, FOR SOLUTION ORAL DAILY
Status: DISCONTINUED | OUTPATIENT
Start: 2019-02-07 | End: 2019-02-16 | Stop reason: HOSPADM

## 2019-02-07 RX ADMIN — GADOTERIDOL 10 ML: 279.3 INJECTION, SOLUTION INTRAVENOUS at 19:57

## 2019-02-07 RX ADMIN — HEPARIN SODIUM 16 UNITS/KG/HR: 10000 INJECTION, SOLUTION INTRAVENOUS at 02:44

## 2019-02-07 RX ADMIN — GLYCOPYRROLATE AND FORMOTEROL FUMARATE 2 PUFF: 9; 4.8 AEROSOL, METERED RESPIRATORY (INHALATION) at 21:14

## 2019-02-07 RX ADMIN — CIPROFLOXACIN 250 MG: 250 TABLET, FILM COATED ORAL at 21:03

## 2019-02-07 RX ADMIN — CIPROFLOXACIN 250 MG: 250 TABLET, FILM COATED ORAL at 09:52

## 2019-02-07 RX ADMIN — FINASTERIDE 5 MG: 5 TABLET, FILM COATED ORAL at 09:52

## 2019-02-07 RX ADMIN — CARVEDILOL 6.25 MG: 6.25 TABLET, FILM COATED ORAL at 09:52

## 2019-02-07 RX ADMIN — TRAMADOL HYDROCHLORIDE 50 MG: 50 TABLET, FILM COATED ORAL at 09:52

## 2019-02-07 RX ADMIN — TRAMADOL HYDROCHLORIDE 50 MG: 50 TABLET, FILM COATED ORAL at 17:48

## 2019-02-07 RX ADMIN — MULTIPLE VITAMINS W/ MINERALS TAB 1 TABLET: TAB at 09:52

## 2019-02-07 RX ADMIN — ASPIRIN 325 MG: 325 TABLET, DELAYED RELEASE ORAL at 09:53

## 2019-02-07 RX ADMIN — GLYCOPYRROLATE AND FORMOTEROL FUMARATE 2 PUFF: 9; 4.8 AEROSOL, METERED RESPIRATORY (INHALATION) at 08:19

## 2019-02-07 RX ADMIN — DOXAZOSIN 4 MG: 4 TABLET ORAL at 21:03

## 2019-02-07 RX ADMIN — Medication 2 PUFF: at 08:19

## 2019-02-07 RX ADMIN — BICALUTAMIDE 50 MG: 50 TABLET, FILM COATED ORAL at 09:53

## 2019-02-07 RX ADMIN — VANCOMYCIN HYDROCHLORIDE 750 MG: 750 INJECTION, POWDER, LYOPHILIZED, FOR SOLUTION INTRAVENOUS at 23:51

## 2019-02-07 RX ADMIN — Medication 2 PUFF: at 21:14

## 2019-02-07 ASSESSMENT — PAIN SCALES - GENERAL
PAINLEVEL_OUTOF10: 4
PAINLEVEL_OUTOF10: 4
PAINLEVEL_OUTOF10: 7
PAINLEVEL_OUTOF10: 0
PAINLEVEL_OUTOF10: 8

## 2019-02-08 ENCOUNTER — APPOINTMENT (OUTPATIENT)
Dept: MRI IMAGING | Age: 84
DRG: 477 | End: 2019-02-08
Payer: MEDICARE

## 2019-02-08 LAB
ANION GAP SERPL CALCULATED.3IONS-SCNC: 8 MEQ/L (ref 8–16)
APTT: 105.9 SECONDS (ref 22–38)
APTT: 70.5 SECONDS (ref 22–38)
APTT: 90.8 SECONDS (ref 22–38)
BASOPHILS # BLD: 0.5 %
BASOPHILS ABSOLUTE: 0 THOU/MM3 (ref 0–0.1)
BUN BLDV-MCNC: 15 MG/DL (ref 7–22)
CALCIUM SERPL-MCNC: 8.6 MG/DL (ref 8.5–10.5)
CHLORIDE BLD-SCNC: 97 MEQ/L (ref 98–111)
CO2: 33 MEQ/L (ref 23–33)
CREAT SERPL-MCNC: 0.8 MG/DL (ref 0.4–1.2)
EOSINOPHIL # BLD: 3 %
EOSINOPHILS ABSOLUTE: 0.2 THOU/MM3 (ref 0–0.4)
ERYTHROCYTE [DISTWIDTH] IN BLOOD BY AUTOMATED COUNT: 14.1 % (ref 11.5–14.5)
ERYTHROCYTE [DISTWIDTH] IN BLOOD BY AUTOMATED COUNT: 54.8 FL (ref 35–45)
GFR SERPL CREATININE-BSD FRML MDRD: > 90 ML/MIN/1.73M2
GLUCOSE BLD-MCNC: 92 MG/DL (ref 70–108)
HCT VFR BLD CALC: 36.1 % (ref 42–52)
HEMOGLOBIN: 11.9 GM/DL (ref 14–18)
IMMATURE GRANS (ABS): 0.04 THOU/MM3 (ref 0–0.07)
IMMATURE GRANULOCYTES: 0.5 %
LYMPHOCYTES # BLD: 21.6 %
LYMPHOCYTES ABSOLUTE: 1.7 THOU/MM3 (ref 1–4.8)
MCH RBC QN AUTO: 34.8 PG (ref 26–33)
MCHC RBC AUTO-ENTMCNC: 33 GM/DL (ref 32.2–35.5)
MCV RBC AUTO: 105.6 FL (ref 80–94)
MONOCYTES # BLD: 10.9 %
MONOCYTES ABSOLUTE: 0.8 THOU/MM3 (ref 0.4–1.3)
NUCLEATED RED BLOOD CELLS: 0 /100 WBC
PLATELET # BLD: 222 THOU/MM3 (ref 130–400)
PMV BLD AUTO: 9.1 FL (ref 9.4–12.4)
POTASSIUM SERPL-SCNC: 4.6 MEQ/L (ref 3.5–5.2)
RBC # BLD: 3.42 MILL/MM3 (ref 4.7–6.1)
SEG NEUTROPHILS: 63.5 %
SEGMENTED NEUTROPHILS ABSOLUTE COUNT: 4.9 THOU/MM3 (ref 1.8–7.7)
SODIUM BLD-SCNC: 138 MEQ/L (ref 135–145)
WBC # BLD: 7.7 THOU/MM3 (ref 4.8–10.8)

## 2019-02-08 PROCEDURE — 72158 MRI LUMBAR SPINE W/O & W/DYE: CPT

## 2019-02-08 PROCEDURE — 94640 AIRWAY INHALATION TREATMENT: CPT

## 2019-02-08 PROCEDURE — 6360000004 HC RX CONTRAST MEDICATION: Performed by: NEUROLOGICAL SURGERY

## 2019-02-08 PROCEDURE — 85730 THROMBOPLASTIN TIME PARTIAL: CPT

## 2019-02-08 PROCEDURE — 6370000000 HC RX 637 (ALT 250 FOR IP): Performed by: INTERNAL MEDICINE

## 2019-02-08 PROCEDURE — 6360000002 HC RX W HCPCS: Performed by: PHARMACIST

## 2019-02-08 PROCEDURE — 99222 1ST HOSP IP/OBS MODERATE 55: CPT | Performed by: NURSE PRACTITIONER

## 2019-02-08 PROCEDURE — 85025 COMPLETE CBC W/AUTO DIFF WBC: CPT

## 2019-02-08 PROCEDURE — 2709999900 HC NON-CHARGEABLE SUPPLY

## 2019-02-08 PROCEDURE — 94761 N-INVAS EAR/PLS OXIMETRY MLT: CPT

## 2019-02-08 PROCEDURE — 1200000000 HC SEMI PRIVATE

## 2019-02-08 PROCEDURE — 2700000000 HC OXYGEN THERAPY PER DAY

## 2019-02-08 PROCEDURE — 80048 BASIC METABOLIC PNL TOTAL CA: CPT

## 2019-02-08 PROCEDURE — 99222 1ST HOSP IP/OBS MODERATE 55: CPT | Performed by: NEUROLOGICAL SURGERY

## 2019-02-08 PROCEDURE — 36415 COLL VENOUS BLD VENIPUNCTURE: CPT

## 2019-02-08 PROCEDURE — A9579 GAD-BASE MR CONTRAST NOS,1ML: HCPCS | Performed by: NEUROLOGICAL SURGERY

## 2019-02-08 RX ORDER — HEPARIN SODIUM 1000 [USP'U]/ML
2200 INJECTION, SOLUTION INTRAVENOUS; SUBCUTANEOUS ONCE
Status: COMPLETED | OUTPATIENT
Start: 2019-02-08 | End: 2019-02-08

## 2019-02-08 RX ADMIN — GLYCOPYRROLATE AND FORMOTEROL FUMARATE 2 PUFF: 9; 4.8 AEROSOL, METERED RESPIRATORY (INHALATION) at 20:08

## 2019-02-08 RX ADMIN — CIPROFLOXACIN 250 MG: 250 TABLET, FILM COATED ORAL at 09:49

## 2019-02-08 RX ADMIN — TRAMADOL HYDROCHLORIDE 50 MG: 50 TABLET, FILM COATED ORAL at 09:49

## 2019-02-08 RX ADMIN — GLYCOPYRROLATE AND FORMOTEROL FUMARATE 2 PUFF: 9; 4.8 AEROSOL, METERED RESPIRATORY (INHALATION) at 10:37

## 2019-02-08 RX ADMIN — FINASTERIDE 5 MG: 5 TABLET, FILM COATED ORAL at 09:49

## 2019-02-08 RX ADMIN — POLYETHYLENE GLYCOL (3350) 17 G: 17 POWDER, FOR SOLUTION ORAL at 14:48

## 2019-02-08 RX ADMIN — ASPIRIN 325 MG: 325 TABLET, DELAYED RELEASE ORAL at 09:49

## 2019-02-08 RX ADMIN — MULTIPLE VITAMINS W/ MINERALS TAB 1 TABLET: TAB at 09:49

## 2019-02-08 RX ADMIN — CIPROFLOXACIN 250 MG: 250 TABLET, FILM COATED ORAL at 20:53

## 2019-02-08 RX ADMIN — BICALUTAMIDE 50 MG: 50 TABLET, FILM COATED ORAL at 09:49

## 2019-02-08 RX ADMIN — Medication 2 PUFF: at 20:07

## 2019-02-08 RX ADMIN — Medication 2 PUFF: at 10:37

## 2019-02-08 RX ADMIN — HEPARIN SODIUM 2200 UNITS: 1000 INJECTION INTRAVENOUS; SUBCUTANEOUS at 14:37

## 2019-02-08 RX ADMIN — GADOTERIDOL 10 ML: 279.3 INJECTION, SOLUTION INTRAVENOUS at 14:01

## 2019-02-08 RX ADMIN — HEPARIN SODIUM 14 UNITS/KG/HR: 10000 INJECTION, SOLUTION INTRAVENOUS at 07:38

## 2019-02-08 RX ADMIN — CARVEDILOL 6.25 MG: 6.25 TABLET, FILM COATED ORAL at 09:49

## 2019-02-08 ASSESSMENT — PAIN SCALES - GENERAL
PAINLEVEL_OUTOF10: 6
PAINLEVEL_OUTOF10: 4
PAINLEVEL_OUTOF10: 3
PAINLEVEL_OUTOF10: 0

## 2019-02-08 ASSESSMENT — ENCOUNTER SYMPTOMS
SHORTNESS OF BREATH: 0
ABDOMINAL DISTENTION: 0
CHEST TIGHTNESS: 0
ABDOMINAL PAIN: 0
BACK PAIN: 1

## 2019-02-09 LAB
APTT: 60 SECONDS (ref 22–38)
APTT: 76.5 SECONDS (ref 22–38)
APTT: 85.8 SECONDS (ref 22–38)
BLOOD CULTURE, ROUTINE: ABNORMAL
ORGANISM: ABNORMAL
ORGANISM: ABNORMAL
PLATELET # BLD: 229 THOU/MM3 (ref 130–400)

## 2019-02-09 PROCEDURE — 85049 AUTOMATED PLATELET COUNT: CPT

## 2019-02-09 PROCEDURE — 94640 AIRWAY INHALATION TREATMENT: CPT

## 2019-02-09 PROCEDURE — 97166 OT EVAL MOD COMPLEX 45 MIN: CPT

## 2019-02-09 PROCEDURE — 85730 THROMBOPLASTIN TIME PARTIAL: CPT

## 2019-02-09 PROCEDURE — 2709999900 HC NON-CHARGEABLE SUPPLY

## 2019-02-09 PROCEDURE — 2700000000 HC OXYGEN THERAPY PER DAY

## 2019-02-09 PROCEDURE — 6360000002 HC RX W HCPCS: Performed by: PHARMACIST

## 2019-02-09 PROCEDURE — 36415 COLL VENOUS BLD VENIPUNCTURE: CPT

## 2019-02-09 PROCEDURE — 99223 1ST HOSP IP/OBS HIGH 75: CPT | Performed by: INTERNAL MEDICINE

## 2019-02-09 PROCEDURE — 1200000000 HC SEMI PRIVATE

## 2019-02-09 PROCEDURE — 6370000000 HC RX 637 (ALT 250 FOR IP): Performed by: INTERNAL MEDICINE

## 2019-02-09 PROCEDURE — 97163 PT EVAL HIGH COMPLEX 45 MIN: CPT

## 2019-02-09 PROCEDURE — 97116 GAIT TRAINING THERAPY: CPT

## 2019-02-09 RX ORDER — CARVEDILOL 3.12 MG/1
3.12 TABLET ORAL 2 TIMES DAILY WITH MEALS
Status: DISCONTINUED | OUTPATIENT
Start: 2019-02-09 | End: 2019-02-16 | Stop reason: HOSPADM

## 2019-02-09 RX ADMIN — MULTIPLE VITAMINS W/ MINERALS TAB 1 TABLET: TAB at 07:57

## 2019-02-09 RX ADMIN — CIPROFLOXACIN 250 MG: 250 TABLET, FILM COATED ORAL at 21:38

## 2019-02-09 RX ADMIN — ASPIRIN 325 MG: 325 TABLET, DELAYED RELEASE ORAL at 07:56

## 2019-02-09 RX ADMIN — POLYETHYLENE GLYCOL (3350) 17 G: 17 POWDER, FOR SOLUTION ORAL at 08:06

## 2019-02-09 RX ADMIN — Medication 2 PUFF: at 09:45

## 2019-02-09 RX ADMIN — BICALUTAMIDE 50 MG: 50 TABLET, FILM COATED ORAL at 07:57

## 2019-02-09 RX ADMIN — HEPARIN SODIUM 14 UNITS/KG/HR: 10000 INJECTION, SOLUTION INTRAVENOUS at 16:41

## 2019-02-09 RX ADMIN — CARVEDILOL 6.25 MG: 6.25 TABLET, FILM COATED ORAL at 07:57

## 2019-02-09 RX ADMIN — CIPROFLOXACIN 250 MG: 250 TABLET, FILM COATED ORAL at 07:57

## 2019-02-09 RX ADMIN — Medication 2 PUFF: at 20:57

## 2019-02-09 RX ADMIN — DOXAZOSIN 4 MG: 4 TABLET ORAL at 21:38

## 2019-02-09 RX ADMIN — GLYCOPYRROLATE AND FORMOTEROL FUMARATE 2 PUFF: 9; 4.8 AEROSOL, METERED RESPIRATORY (INHALATION) at 09:46

## 2019-02-09 RX ADMIN — FINASTERIDE 5 MG: 5 TABLET, FILM COATED ORAL at 07:57

## 2019-02-09 RX ADMIN — GLYCOPYRROLATE AND FORMOTEROL FUMARATE 2 PUFF: 9; 4.8 AEROSOL, METERED RESPIRATORY (INHALATION) at 20:57

## 2019-02-09 RX ADMIN — TRAMADOL HYDROCHLORIDE 50 MG: 50 TABLET, FILM COATED ORAL at 07:56

## 2019-02-09 RX ADMIN — ACETAMINOPHEN 500 MG: 500 TABLET, FILM COATED ORAL at 13:46

## 2019-02-09 ASSESSMENT — PAIN SCALES - GENERAL
PAINLEVEL_OUTOF10: 0
PAINLEVEL_OUTOF10: 5
PAINLEVEL_OUTOF10: 0
PAINLEVEL_OUTOF10: 7
PAINLEVEL_OUTOF10: 0
PAINLEVEL_OUTOF10: 5

## 2019-02-10 LAB
APTT: 62 SECONDS (ref 22–38)
APTT: 72.7 SECONDS (ref 22–38)
APTT: 97.4 SECONDS (ref 22–38)

## 2019-02-10 PROCEDURE — 6370000000 HC RX 637 (ALT 250 FOR IP): Performed by: INTERNAL MEDICINE

## 2019-02-10 PROCEDURE — 85730 THROMBOPLASTIN TIME PARTIAL: CPT

## 2019-02-10 PROCEDURE — 87040 BLOOD CULTURE FOR BACTERIA: CPT

## 2019-02-10 PROCEDURE — 94760 N-INVAS EAR/PLS OXIMETRY 1: CPT

## 2019-02-10 PROCEDURE — 1200000000 HC SEMI PRIVATE

## 2019-02-10 PROCEDURE — 94640 AIRWAY INHALATION TREATMENT: CPT

## 2019-02-10 PROCEDURE — 2709999900 HC NON-CHARGEABLE SUPPLY

## 2019-02-10 PROCEDURE — 36415 COLL VENOUS BLD VENIPUNCTURE: CPT

## 2019-02-10 PROCEDURE — 2700000000 HC OXYGEN THERAPY PER DAY

## 2019-02-10 RX ADMIN — Medication 2 PUFF: at 22:23

## 2019-02-10 RX ADMIN — ASPIRIN 325 MG: 325 TABLET, DELAYED RELEASE ORAL at 09:03

## 2019-02-10 RX ADMIN — BICALUTAMIDE 50 MG: 50 TABLET, FILM COATED ORAL at 09:03

## 2019-02-10 RX ADMIN — GLYCOPYRROLATE AND FORMOTEROL FUMARATE 2 PUFF: 9; 4.8 AEROSOL, METERED RESPIRATORY (INHALATION) at 22:24

## 2019-02-10 RX ADMIN — ACETAMINOPHEN 500 MG: 500 TABLET, FILM COATED ORAL at 03:03

## 2019-02-10 RX ADMIN — CARVEDILOL 3.12 MG: 3.12 TABLET, FILM COATED ORAL at 09:09

## 2019-02-10 RX ADMIN — POLYETHYLENE GLYCOL (3350) 17 G: 17 POWDER, FOR SOLUTION ORAL at 09:03

## 2019-02-10 RX ADMIN — CIPROFLOXACIN 250 MG: 250 TABLET, FILM COATED ORAL at 23:00

## 2019-02-10 RX ADMIN — CIPROFLOXACIN 250 MG: 250 TABLET, FILM COATED ORAL at 09:04

## 2019-02-10 RX ADMIN — ACETAMINOPHEN 500 MG: 500 TABLET, FILM COATED ORAL at 22:25

## 2019-02-10 RX ADMIN — MULTIPLE VITAMINS W/ MINERALS TAB 1 TABLET: TAB at 09:05

## 2019-02-10 RX ADMIN — DOXAZOSIN 4 MG: 4 TABLET ORAL at 22:25

## 2019-02-10 RX ADMIN — Medication 2 PUFF: at 08:56

## 2019-02-10 RX ADMIN — TRAMADOL HYDROCHLORIDE 50 MG: 50 TABLET, FILM COATED ORAL at 09:13

## 2019-02-10 RX ADMIN — GLYCOPYRROLATE AND FORMOTEROL FUMARATE 2 PUFF: 9; 4.8 AEROSOL, METERED RESPIRATORY (INHALATION) at 08:56

## 2019-02-10 RX ADMIN — FINASTERIDE 5 MG: 5 TABLET, FILM COATED ORAL at 09:05

## 2019-02-10 ASSESSMENT — PAIN DESCRIPTION - PAIN TYPE: TYPE: ACUTE PAIN

## 2019-02-10 ASSESSMENT — PAIN DESCRIPTION - PROGRESSION
CLINICAL_PROGRESSION: GRADUALLY IMPROVING
CLINICAL_PROGRESSION: GRADUALLY IMPROVING

## 2019-02-10 ASSESSMENT — PAIN SCALES - GENERAL
PAINLEVEL_OUTOF10: 2
PAINLEVEL_OUTOF10: 3
PAINLEVEL_OUTOF10: 5

## 2019-02-10 ASSESSMENT — PAIN DESCRIPTION - FREQUENCY: FREQUENCY: INTERMITTENT

## 2019-02-10 ASSESSMENT — PAIN DESCRIPTION - ORIENTATION: ORIENTATION: RIGHT

## 2019-02-10 ASSESSMENT — PAIN DESCRIPTION - DESCRIPTORS: DESCRIPTORS: ACHING

## 2019-02-10 ASSESSMENT — PAIN DESCRIPTION - LOCATION: LOCATION: BACK

## 2019-02-10 ASSESSMENT — PAIN DESCRIPTION - ONSET: ONSET: ON-GOING

## 2019-02-11 ENCOUNTER — TELEPHONE (OUTPATIENT)
Dept: NEUROSURGERY | Age: 84
End: 2019-02-11

## 2019-02-11 LAB
APTT: 55.6 SECONDS (ref 22–38)
APTT: 76.6 SECONDS (ref 22–38)
APTT: 91.8 SECONDS (ref 22–38)
BLOOD CULTURE, ROUTINE: NORMAL
LV EF: 55 %
LVEF MODALITY: NORMAL
PLATELET # BLD: 231 THOU/MM3 (ref 130–400)
PROSTATE SPECIFIC ANTIGEN: 2.09 NG/ML (ref 0–1)

## 2019-02-11 PROCEDURE — 94640 AIRWAY INHALATION TREATMENT: CPT

## 2019-02-11 PROCEDURE — 97110 THERAPEUTIC EXERCISES: CPT

## 2019-02-11 PROCEDURE — 6370000000 HC RX 637 (ALT 250 FOR IP): Performed by: INTERNAL MEDICINE

## 2019-02-11 PROCEDURE — 36415 COLL VENOUS BLD VENIPUNCTURE: CPT

## 2019-02-11 PROCEDURE — 2709999900 HC NON-CHARGEABLE SUPPLY

## 2019-02-11 PROCEDURE — 97116 GAIT TRAINING THERAPY: CPT

## 2019-02-11 PROCEDURE — 6360000002 HC RX W HCPCS: Performed by: INTERNAL MEDICINE

## 2019-02-11 PROCEDURE — 1200000000 HC SEMI PRIVATE

## 2019-02-11 PROCEDURE — 94760 N-INVAS EAR/PLS OXIMETRY 1: CPT

## 2019-02-11 PROCEDURE — 93306 TTE W/DOPPLER COMPLETE: CPT

## 2019-02-11 PROCEDURE — 99232 SBSQ HOSP IP/OBS MODERATE 35: CPT | Performed by: NEUROLOGICAL SURGERY

## 2019-02-11 PROCEDURE — 85730 THROMBOPLASTIN TIME PARTIAL: CPT

## 2019-02-11 PROCEDURE — 99232 SBSQ HOSP IP/OBS MODERATE 35: CPT | Performed by: PHYSICIAN ASSISTANT

## 2019-02-11 PROCEDURE — 84153 ASSAY OF PSA TOTAL: CPT

## 2019-02-11 PROCEDURE — 85049 AUTOMATED PLATELET COUNT: CPT

## 2019-02-11 RX ADMIN — CIPROFLOXACIN 250 MG: 250 TABLET, FILM COATED ORAL at 08:37

## 2019-02-11 RX ADMIN — CIPROFLOXACIN 250 MG: 250 TABLET, FILM COATED ORAL at 20:23

## 2019-02-11 RX ADMIN — MULTIPLE VITAMINS W/ MINERALS TAB 1 TABLET: TAB at 08:36

## 2019-02-11 RX ADMIN — POLYETHYLENE GLYCOL (3350) 17 G: 17 POWDER, FOR SOLUTION ORAL at 08:35

## 2019-02-11 RX ADMIN — ACETAMINOPHEN 500 MG: 500 TABLET, FILM COATED ORAL at 06:35

## 2019-02-11 RX ADMIN — TRAMADOL HYDROCHLORIDE 50 MG: 50 TABLET, FILM COATED ORAL at 08:34

## 2019-02-11 RX ADMIN — Medication 2 PUFF: at 09:06

## 2019-02-11 RX ADMIN — GLYCOPYRROLATE AND FORMOTEROL FUMARATE 2 PUFF: 9; 4.8 AEROSOL, METERED RESPIRATORY (INHALATION) at 09:06

## 2019-02-11 RX ADMIN — BICALUTAMIDE 50 MG: 50 TABLET, FILM COATED ORAL at 08:38

## 2019-02-11 RX ADMIN — Medication 2 PUFF: at 20:51

## 2019-02-11 RX ADMIN — CARVEDILOL 3.12 MG: 3.12 TABLET, FILM COATED ORAL at 08:36

## 2019-02-11 RX ADMIN — HEPARIN SODIUM 13 UNITS/KG/HR: 10000 INJECTION, SOLUTION INTRAVENOUS at 04:00

## 2019-02-11 RX ADMIN — DOXAZOSIN 4 MG: 4 TABLET ORAL at 20:23

## 2019-02-11 RX ADMIN — ASPIRIN 325 MG: 325 TABLET, DELAYED RELEASE ORAL at 08:34

## 2019-02-11 RX ADMIN — GLYCOPYRROLATE AND FORMOTEROL FUMARATE 2 PUFF: 9; 4.8 AEROSOL, METERED RESPIRATORY (INHALATION) at 20:51

## 2019-02-11 RX ADMIN — FINASTERIDE 5 MG: 5 TABLET, FILM COATED ORAL at 08:36

## 2019-02-11 ASSESSMENT — PAIN DESCRIPTION - PROGRESSION

## 2019-02-11 ASSESSMENT — PAIN SCALES - GENERAL
PAINLEVEL_OUTOF10: 6
PAINLEVEL_OUTOF10: 3

## 2019-02-12 LAB
APTT: 74.3 SECONDS (ref 22–38)
APTT: 77.5 SECONDS (ref 22–38)
APTT: 90.7 SECONDS (ref 22–38)

## 2019-02-12 PROCEDURE — 94760 N-INVAS EAR/PLS OXIMETRY 1: CPT

## 2019-02-12 PROCEDURE — 94640 AIRWAY INHALATION TREATMENT: CPT

## 2019-02-12 PROCEDURE — 97110 THERAPEUTIC EXERCISES: CPT

## 2019-02-12 PROCEDURE — 36415 COLL VENOUS BLD VENIPUNCTURE: CPT

## 2019-02-12 PROCEDURE — 85730 THROMBOPLASTIN TIME PARTIAL: CPT

## 2019-02-12 PROCEDURE — 6370000000 HC RX 637 (ALT 250 FOR IP): Performed by: INTERNAL MEDICINE

## 2019-02-12 PROCEDURE — 97116 GAIT TRAINING THERAPY: CPT

## 2019-02-12 PROCEDURE — 6360000002 HC RX W HCPCS: Performed by: INTERNAL MEDICINE

## 2019-02-12 PROCEDURE — 97530 THERAPEUTIC ACTIVITIES: CPT

## 2019-02-12 PROCEDURE — 99232 SBSQ HOSP IP/OBS MODERATE 35: CPT | Performed by: PHYSICIAN ASSISTANT

## 2019-02-12 PROCEDURE — 1200000000 HC SEMI PRIVATE

## 2019-02-12 RX ADMIN — FINASTERIDE 5 MG: 5 TABLET, FILM COATED ORAL at 09:28

## 2019-02-12 RX ADMIN — Medication 2 PUFF: at 21:07

## 2019-02-12 RX ADMIN — MULTIPLE VITAMINS W/ MINERALS TAB 1 TABLET: TAB at 09:28

## 2019-02-12 RX ADMIN — HEPARIN SODIUM 15 UNITS/KG/HR: 10000 INJECTION, SOLUTION INTRAVENOUS at 09:32

## 2019-02-12 RX ADMIN — GLYCOPYRROLATE AND FORMOTEROL FUMARATE 2 PUFF: 9; 4.8 AEROSOL, METERED RESPIRATORY (INHALATION) at 07:41

## 2019-02-12 RX ADMIN — CIPROFLOXACIN 250 MG: 250 TABLET, FILM COATED ORAL at 09:28

## 2019-02-12 RX ADMIN — GLYCOPYRROLATE AND FORMOTEROL FUMARATE 2 PUFF: 9; 4.8 AEROSOL, METERED RESPIRATORY (INHALATION) at 21:07

## 2019-02-12 RX ADMIN — CARVEDILOL 3.12 MG: 3.12 TABLET, FILM COATED ORAL at 09:28

## 2019-02-12 RX ADMIN — BICALUTAMIDE 50 MG: 50 TABLET, FILM COATED ORAL at 09:28

## 2019-02-12 RX ADMIN — CIPROFLOXACIN 250 MG: 250 TABLET, FILM COATED ORAL at 20:21

## 2019-02-12 RX ADMIN — ASPIRIN 325 MG: 325 TABLET, DELAYED RELEASE ORAL at 09:28

## 2019-02-12 RX ADMIN — Medication 2 PUFF: at 07:39

## 2019-02-12 RX ADMIN — DOXAZOSIN 4 MG: 4 TABLET ORAL at 20:21

## 2019-02-12 RX ADMIN — ACETAMINOPHEN 500 MG: 500 TABLET, FILM COATED ORAL at 10:36

## 2019-02-12 RX ADMIN — CARVEDILOL 3.12 MG: 3.12 TABLET, FILM COATED ORAL at 16:25

## 2019-02-12 ASSESSMENT — PAIN SCALES - GENERAL
PAINLEVEL_OUTOF10: 3
PAINLEVEL_OUTOF10: 6

## 2019-02-13 ENCOUNTER — APPOINTMENT (OUTPATIENT)
Dept: GENERAL RADIOLOGY | Age: 84
DRG: 477 | End: 2019-02-13
Payer: MEDICARE

## 2019-02-13 ENCOUNTER — PREP FOR PROCEDURE (OUTPATIENT)
Dept: NEUROSURGERY | Age: 84
End: 2019-02-13

## 2019-02-13 ENCOUNTER — ANESTHESIA EVENT (OUTPATIENT)
Dept: OPERATING ROOM | Age: 84
DRG: 477 | End: 2019-02-13
Payer: MEDICARE

## 2019-02-13 ENCOUNTER — ANESTHESIA (OUTPATIENT)
Dept: OPERATING ROOM | Age: 84
DRG: 477 | End: 2019-02-13
Payer: MEDICARE

## 2019-02-13 VITALS
RESPIRATION RATE: 2 BRPM | TEMPERATURE: 87.1 F | SYSTOLIC BLOOD PRESSURE: 112 MMHG | DIASTOLIC BLOOD PRESSURE: 61 MMHG | OXYGEN SATURATION: 100 %

## 2019-02-13 LAB
APTT: 27.3 SECONDS (ref 22–38)
APTT: 30.6 SECONDS (ref 22–38)
PLATELET # BLD: 232 THOU/MM3 (ref 130–400)

## 2019-02-13 PROCEDURE — 2709999900 HC NON-CHARGEABLE SUPPLY

## 2019-02-13 PROCEDURE — 85049 AUTOMATED PLATELET COUNT: CPT

## 2019-02-13 PROCEDURE — P9045 ALBUMIN (HUMAN), 5%, 250 ML: HCPCS | Performed by: REGISTERED NURSE

## 2019-02-13 PROCEDURE — 6360000004 HC RX CONTRAST MEDICATION: Performed by: NEUROLOGICAL SURGERY

## 2019-02-13 PROCEDURE — 88305 TISSUE EXAM BY PATHOLOGIST: CPT

## 2019-02-13 PROCEDURE — 2580000003 HC RX 258: Performed by: REGISTERED NURSE

## 2019-02-13 PROCEDURE — 3209999900 FLUORO FOR SURGICAL PROCEDURES

## 2019-02-13 PROCEDURE — 1200000000 HC SEMI PRIVATE

## 2019-02-13 PROCEDURE — 2709999900 HC NON-CHARGEABLE SUPPLY: Performed by: NEUROLOGICAL SURGERY

## 2019-02-13 PROCEDURE — 36415 COLL VENOUS BLD VENIPUNCTURE: CPT

## 2019-02-13 PROCEDURE — 94640 AIRWAY INHALATION TREATMENT: CPT

## 2019-02-13 PROCEDURE — 2500000003 HC RX 250 WO HCPCS: Performed by: REGISTERED NURSE

## 2019-02-13 PROCEDURE — 7100000000 HC PACU RECOVERY - FIRST 15 MIN: Performed by: NEUROLOGICAL SURGERY

## 2019-02-13 PROCEDURE — 0PS43ZZ REPOSITION THORACIC VERTEBRA, PERCUTANEOUS APPROACH: ICD-10-PCS | Performed by: NEUROLOGICAL SURGERY

## 2019-02-13 PROCEDURE — 85730 THROMBOPLASTIN TIME PARTIAL: CPT

## 2019-02-13 PROCEDURE — 6370000000 HC RX 637 (ALT 250 FOR IP): Performed by: PHYSICIAN ASSISTANT

## 2019-02-13 PROCEDURE — 6370000000 HC RX 637 (ALT 250 FOR IP): Performed by: INTERNAL MEDICINE

## 2019-02-13 PROCEDURE — 3600000004 HC SURGERY LEVEL 4 BASE: Performed by: NEUROLOGICAL SURGERY

## 2019-02-13 PROCEDURE — C1894 INTRO/SHEATH, NON-LASER: HCPCS | Performed by: NEUROLOGICAL SURGERY

## 2019-02-13 PROCEDURE — 2580000003 HC RX 258: Performed by: NEUROLOGICAL SURGERY

## 2019-02-13 PROCEDURE — 0P543ZZ DESTRUCTION OF THORACIC VERTEBRA, PERCUTANEOUS APPROACH: ICD-10-PCS | Performed by: NEUROLOGICAL SURGERY

## 2019-02-13 PROCEDURE — 2580000003 HC RX 258: Performed by: PHYSICIAN ASSISTANT

## 2019-02-13 PROCEDURE — 88342 IMHCHEM/IMCYTCHM 1ST ANTB: CPT

## 2019-02-13 PROCEDURE — 2720000010 HC SURG SUPPLY STERILE: Performed by: NEUROLOGICAL SURGERY

## 2019-02-13 PROCEDURE — 3700000000 HC ANESTHESIA ATTENDED CARE: Performed by: NEUROLOGICAL SURGERY

## 2019-02-13 PROCEDURE — 6360000002 HC RX W HCPCS: Performed by: REGISTERED NURSE

## 2019-02-13 PROCEDURE — 3600000014 HC SURGERY LEVEL 4 ADDTL 15MIN: Performed by: NEUROLOGICAL SURGERY

## 2019-02-13 PROCEDURE — 20982 ABLATE BONE TUMOR(S) PERQ: CPT | Performed by: NEUROLOGICAL SURGERY

## 2019-02-13 PROCEDURE — C1713 ANCHOR/SCREW BN/BN,TIS/BN: HCPCS | Performed by: NEUROLOGICAL SURGERY

## 2019-02-13 PROCEDURE — 6360000002 HC RX W HCPCS: Performed by: PHYSICIAN ASSISTANT

## 2019-02-13 PROCEDURE — 7100000001 HC PACU RECOVERY - ADDTL 15 MIN: Performed by: NEUROLOGICAL SURGERY

## 2019-02-13 PROCEDURE — 22515 PERQ VERTEBRAL AUGMENTATION: CPT | Performed by: NEUROLOGICAL SURGERY

## 2019-02-13 PROCEDURE — 0PU43JZ SUPPLEMENT THORACIC VERTEBRA WITH SYNTHETIC SUBSTITUTE, PERCUTANEOUS APPROACH: ICD-10-PCS | Performed by: NEUROLOGICAL SURGERY

## 2019-02-13 PROCEDURE — 0PB43ZX EXCISION OF THORACIC VERTEBRA, PERCUTANEOUS APPROACH, DIAGNOSTIC: ICD-10-PCS | Performed by: NEUROLOGICAL SURGERY

## 2019-02-13 PROCEDURE — 3700000001 HC ADD 15 MINUTES (ANESTHESIA): Performed by: NEUROLOGICAL SURGERY

## 2019-02-13 PROCEDURE — 72072 X-RAY EXAM THORAC SPINE 3VWS: CPT

## 2019-02-13 PROCEDURE — 22513 PERQ VERTEBRAL AUGMENTATION: CPT | Performed by: NEUROLOGICAL SURGERY

## 2019-02-13 DEVICE — BONE CEMENT CX01B KYPHON XPEDE W MXR US
Type: IMPLANTABLE DEVICE | Status: FUNCTIONAL
Brand: KYPHON® XPEDE™ BONE CEMENT AND KYPHON® MIXER PACK

## 2019-02-13 RX ORDER — ACETAMINOPHEN 325 MG/1
650 TABLET ORAL EVERY 4 HOURS PRN
Status: DISCONTINUED | OUTPATIENT
Start: 2019-02-13 | End: 2019-02-16 | Stop reason: HOSPADM

## 2019-02-13 RX ORDER — DOCUSATE SODIUM 100 MG/1
100 CAPSULE, LIQUID FILLED ORAL 2 TIMES DAILY
Status: DISCONTINUED | OUTPATIENT
Start: 2019-02-13 | End: 2019-02-16 | Stop reason: HOSPADM

## 2019-02-13 RX ORDER — ALBUMIN, HUMAN INJ 5% 5 %
SOLUTION INTRAVENOUS PRN
Status: DISCONTINUED | OUTPATIENT
Start: 2019-02-13 | End: 2019-02-13 | Stop reason: SDUPTHER

## 2019-02-13 RX ORDER — ROCURONIUM BROMIDE 10 MG/ML
INJECTION, SOLUTION INTRAVENOUS PRN
Status: DISCONTINUED | OUTPATIENT
Start: 2019-02-13 | End: 2019-02-13 | Stop reason: SDUPTHER

## 2019-02-13 RX ORDER — LIDOCAINE HYDROCHLORIDE 20 MG/ML
INJECTION, SOLUTION INFILTRATION; PERINEURAL PRN
Status: DISCONTINUED | OUTPATIENT
Start: 2019-02-13 | End: 2019-02-13 | Stop reason: SDUPTHER

## 2019-02-13 RX ORDER — LABETALOL HYDROCHLORIDE 5 MG/ML
5 INJECTION, SOLUTION INTRAVENOUS EVERY 10 MIN PRN
Status: DISCONTINUED | OUTPATIENT
Start: 2019-02-13 | End: 2019-02-13

## 2019-02-13 RX ORDER — METOCLOPRAMIDE HYDROCHLORIDE 5 MG/ML
INJECTION INTRAMUSCULAR; INTRAVENOUS PRN
Status: DISCONTINUED | OUTPATIENT
Start: 2019-02-13 | End: 2019-02-13 | Stop reason: SDUPTHER

## 2019-02-13 RX ORDER — SODIUM CHLORIDE, SODIUM LACTATE, POTASSIUM CHLORIDE, CALCIUM CHLORIDE 600; 310; 30; 20 MG/100ML; MG/100ML; MG/100ML; MG/100ML
INJECTION, SOLUTION INTRAVENOUS CONTINUOUS PRN
Status: DISCONTINUED | OUTPATIENT
Start: 2019-02-13 | End: 2019-02-13 | Stop reason: SDUPTHER

## 2019-02-13 RX ORDER — SODIUM CHLORIDE 0.9 % (FLUSH) 0.9 %
10 SYRINGE (ML) INJECTION EVERY 12 HOURS SCHEDULED
Status: DISCONTINUED | OUTPATIENT
Start: 2019-02-13 | End: 2019-02-16 | Stop reason: HOSPADM

## 2019-02-13 RX ORDER — CYCLOBENZAPRINE HCL 10 MG
10 TABLET ORAL 3 TIMES DAILY PRN
Status: DISCONTINUED | OUTPATIENT
Start: 2019-02-13 | End: 2019-02-13

## 2019-02-13 RX ORDER — FENTANYL CITRATE 50 UG/ML
25 INJECTION, SOLUTION INTRAMUSCULAR; INTRAVENOUS EVERY 5 MIN PRN
Status: DISCONTINUED | OUTPATIENT
Start: 2019-02-13 | End: 2019-02-13

## 2019-02-13 RX ORDER — SODIUM CHLORIDE 9 MG/ML
INJECTION, SOLUTION INTRAVENOUS CONTINUOUS
Status: DISCONTINUED | OUTPATIENT
Start: 2019-02-13 | End: 2019-02-16 | Stop reason: HOSPADM

## 2019-02-13 RX ORDER — GLYCOPYRROLATE 1 MG/5 ML
SYRINGE (ML) INTRAVENOUS PRN
Status: DISCONTINUED | OUTPATIENT
Start: 2019-02-13 | End: 2019-02-13 | Stop reason: SDUPTHER

## 2019-02-13 RX ORDER — CEFAZOLIN SODIUM 1 G/3ML
INJECTION, POWDER, FOR SOLUTION INTRAMUSCULAR; INTRAVENOUS PRN
Status: DISCONTINUED | OUTPATIENT
Start: 2019-02-13 | End: 2019-02-13 | Stop reason: SDUPTHER

## 2019-02-13 RX ORDER — PROPOFOL 10 MG/ML
INJECTION, EMULSION INTRAVENOUS PRN
Status: DISCONTINUED | OUTPATIENT
Start: 2019-02-13 | End: 2019-02-13 | Stop reason: SDUPTHER

## 2019-02-13 RX ORDER — MEPERIDINE HYDROCHLORIDE 25 MG/ML
12.5 INJECTION INTRAMUSCULAR; INTRAVENOUS; SUBCUTANEOUS EVERY 5 MIN PRN
Status: DISCONTINUED | OUTPATIENT
Start: 2019-02-13 | End: 2019-02-13

## 2019-02-13 RX ORDER — ONDANSETRON 2 MG/ML
4 INJECTION INTRAMUSCULAR; INTRAVENOUS
Status: DISCONTINUED | OUTPATIENT
Start: 2019-02-13 | End: 2019-02-13

## 2019-02-13 RX ORDER — SODIUM CHLORIDE 9 MG/ML
INJECTION, SOLUTION INTRAVENOUS CONTINUOUS
Status: DISCONTINUED | OUTPATIENT
Start: 2019-02-13 | End: 2019-02-13 | Stop reason: SDUPTHER

## 2019-02-13 RX ORDER — ONDANSETRON 2 MG/ML
INJECTION INTRAMUSCULAR; INTRAVENOUS PRN
Status: DISCONTINUED | OUTPATIENT
Start: 2019-02-13 | End: 2019-02-13 | Stop reason: SDUPTHER

## 2019-02-13 RX ORDER — FENTANYL CITRATE 50 UG/ML
50 INJECTION, SOLUTION INTRAMUSCULAR; INTRAVENOUS EVERY 5 MIN PRN
Status: DISCONTINUED | OUTPATIENT
Start: 2019-02-13 | End: 2019-02-13

## 2019-02-13 RX ORDER — DEXAMETHASONE SODIUM PHOSPHATE 4 MG/ML
INJECTION, SOLUTION INTRA-ARTICULAR; INTRALESIONAL; INTRAMUSCULAR; INTRAVENOUS; SOFT TISSUE PRN
Status: DISCONTINUED | OUTPATIENT
Start: 2019-02-13 | End: 2019-02-13 | Stop reason: SDUPTHER

## 2019-02-13 RX ORDER — KETOROLAC TROMETHAMINE 30 MG/ML
INJECTION, SOLUTION INTRAMUSCULAR; INTRAVENOUS PRN
Status: DISCONTINUED | OUTPATIENT
Start: 2019-02-13 | End: 2019-02-13 | Stop reason: SDUPTHER

## 2019-02-13 RX ORDER — SODIUM CHLORIDE 0.9 % (FLUSH) 0.9 %
10 SYRINGE (ML) INJECTION PRN
Status: DISCONTINUED | OUTPATIENT
Start: 2019-02-13 | End: 2019-02-16 | Stop reason: HOSPADM

## 2019-02-13 RX ORDER — ONDANSETRON 2 MG/ML
4 INJECTION INTRAMUSCULAR; INTRAVENOUS EVERY 6 HOURS PRN
Status: DISCONTINUED | OUTPATIENT
Start: 2019-02-13 | End: 2019-02-16 | Stop reason: HOSPADM

## 2019-02-13 RX ORDER — FENTANYL CITRATE 50 UG/ML
INJECTION, SOLUTION INTRAMUSCULAR; INTRAVENOUS PRN
Status: DISCONTINUED | OUTPATIENT
Start: 2019-02-13 | End: 2019-02-13 | Stop reason: SDUPTHER

## 2019-02-13 RX ADMIN — ALBUMIN (HUMAN) 250 ML: 12.5 SOLUTION INTRAVENOUS at 14:23

## 2019-02-13 RX ADMIN — PROPOFOL 80 MG: 10 INJECTION, EMULSION INTRAVENOUS at 15:00

## 2019-02-13 RX ADMIN — PHENYLEPHRINE HYDROCHLORIDE 200 MCG: 10 INJECTION INTRAVENOUS at 13:52

## 2019-02-13 RX ADMIN — SODIUM CHLORIDE: 9 INJECTION, SOLUTION INTRAVENOUS at 16:51

## 2019-02-13 RX ADMIN — PHENYLEPHRINE HYDROCHLORIDE 200 MCG: 10 INJECTION INTRAVENOUS at 13:48

## 2019-02-13 RX ADMIN — ONDANSETRON HYDROCHLORIDE 4 MG: 4 INJECTION, SOLUTION INTRAMUSCULAR; INTRAVENOUS at 15:34

## 2019-02-13 RX ADMIN — SUGAMMADEX 111 MG: 100 INJECTION, SOLUTION INTRAVENOUS at 15:37

## 2019-02-13 RX ADMIN — GLYCOPYRROLATE AND FORMOTEROL FUMARATE 2 PUFF: 9; 4.8 AEROSOL, METERED RESPIRATORY (INHALATION) at 21:16

## 2019-02-13 RX ADMIN — PHENYLEPHRINE HYDROCHLORIDE 200 MCG: 10 INJECTION INTRAVENOUS at 14:47

## 2019-02-13 RX ADMIN — PHENYLEPHRINE HYDROCHLORIDE 200 MCG: 10 INJECTION INTRAVENOUS at 14:28

## 2019-02-13 RX ADMIN — Medication 2 PUFF: at 21:16

## 2019-02-13 RX ADMIN — PHENYLEPHRINE HYDROCHLORIDE 200 MCG: 10 INJECTION INTRAVENOUS at 13:44

## 2019-02-13 RX ADMIN — SODIUM CHLORIDE, POTASSIUM CHLORIDE, SODIUM LACTATE AND CALCIUM CHLORIDE: 600; 310; 30; 20 INJECTION, SOLUTION INTRAVENOUS at 14:05

## 2019-02-13 RX ADMIN — Medication 0.5 MCG/MIN: at 14:44

## 2019-02-13 RX ADMIN — CIPROFLOXACIN 250 MG: 250 TABLET, FILM COATED ORAL at 22:43

## 2019-02-13 RX ADMIN — PHENYLEPHRINE HYDROCHLORIDE 200 MCG: 10 INJECTION INTRAVENOUS at 13:55

## 2019-02-13 RX ADMIN — PHENYLEPHRINE HYDROCHLORIDE 200 MCG: 10 INJECTION INTRAVENOUS at 13:40

## 2019-02-13 RX ADMIN — PHENYLEPHRINE HYDROCHLORIDE 200 MCG: 10 INJECTION INTRAVENOUS at 14:36

## 2019-02-13 RX ADMIN — BICALUTAMIDE 50 MG: 50 TABLET, FILM COATED ORAL at 09:10

## 2019-02-13 RX ADMIN — ROCURONIUM BROMIDE 30 MG: 10 INJECTION INTRAVENOUS at 13:36

## 2019-02-13 RX ADMIN — PHENYLEPHRINE HYDROCHLORIDE 200 MCG: 10 INJECTION INTRAVENOUS at 14:03

## 2019-02-13 RX ADMIN — ROCURONIUM BROMIDE 20 MG: 10 INJECTION INTRAVENOUS at 13:53

## 2019-02-13 RX ADMIN — PHENYLEPHRINE HYDROCHLORIDE 200 MCG: 10 INJECTION INTRAVENOUS at 14:16

## 2019-02-13 RX ADMIN — LIDOCAINE HYDROCHLORIDE 40 MG: 20 INJECTION, SOLUTION INFILTRATION; PERINEURAL at 13:36

## 2019-02-13 RX ADMIN — FENTANYL CITRATE 50 MCG: 50 INJECTION INTRAMUSCULAR; INTRAVENOUS at 13:36

## 2019-02-13 RX ADMIN — DEXAMETHASONE SODIUM PHOSPHATE 10 MG: 4 INJECTION, SOLUTION INTRAMUSCULAR; INTRAVENOUS at 13:44

## 2019-02-13 RX ADMIN — PHENYLEPHRINE HYDROCHLORIDE 200 MCG: 10 INJECTION INTRAVENOUS at 14:41

## 2019-02-13 RX ADMIN — SODIUM CHLORIDE: 9 INJECTION, SOLUTION INTRAVENOUS at 00:15

## 2019-02-13 RX ADMIN — METOCLOPRAMIDE 10 MG: 5 INJECTION, SOLUTION INTRAMUSCULAR; INTRAVENOUS at 13:44

## 2019-02-13 RX ADMIN — DOCUSATE SODIUM 100 MG: 100 CAPSULE, LIQUID FILLED ORAL at 22:43

## 2019-02-13 RX ADMIN — PHENYLEPHRINE HYDROCHLORIDE 200 MCG: 10 INJECTION INTRAVENOUS at 14:25

## 2019-02-13 RX ADMIN — FINASTERIDE 5 MG: 5 TABLET, FILM COATED ORAL at 09:11

## 2019-02-13 RX ADMIN — Medication 2 PUFF: at 09:19

## 2019-02-13 RX ADMIN — DOXAZOSIN 4 MG: 4 TABLET ORAL at 22:43

## 2019-02-13 RX ADMIN — GLYCOPYRROLATE AND FORMOTEROL FUMARATE 2 PUFF: 9; 4.8 AEROSOL, METERED RESPIRATORY (INHALATION) at 08:54

## 2019-02-13 RX ADMIN — CARVEDILOL 3.12 MG: 3.12 TABLET, FILM COATED ORAL at 16:54

## 2019-02-13 RX ADMIN — PHENYLEPHRINE HYDROCHLORIDE 200 MCG: 10 INJECTION INTRAVENOUS at 14:01

## 2019-02-13 RX ADMIN — KETOROLAC TROMETHAMINE 15 MG: 30 INJECTION, SOLUTION INTRAMUSCULAR; INTRAVENOUS at 13:44

## 2019-02-13 RX ADMIN — PHENYLEPHRINE HYDROCHLORIDE 200 MCG: 10 INJECTION INTRAVENOUS at 14:06

## 2019-02-13 RX ADMIN — PHENYLEPHRINE HYDROCHLORIDE 200 MCG: 10 INJECTION INTRAVENOUS at 14:50

## 2019-02-13 RX ADMIN — CEFAZOLIN 2000 MG: 1 INJECTION, POWDER, FOR SOLUTION INTRAMUSCULAR; INTRAVENOUS; PARENTERAL at 13:59

## 2019-02-13 RX ADMIN — FENTANYL CITRATE 50 MCG: 50 INJECTION INTRAMUSCULAR; INTRAVENOUS at 15:02

## 2019-02-13 RX ADMIN — Medication 0.2 MG: at 13:33

## 2019-02-13 RX ADMIN — MULTIPLE VITAMINS W/ MINERALS TAB 1 TABLET: TAB at 09:10

## 2019-02-13 RX ADMIN — CIPROFLOXACIN 250 MG: 250 TABLET, FILM COATED ORAL at 09:11

## 2019-02-13 RX ADMIN — PHENYLEPHRINE HYDROCHLORIDE 200 MCG: 10 INJECTION INTRAVENOUS at 13:42

## 2019-02-13 RX ADMIN — PHENYLEPHRINE HYDROCHLORIDE 200 MCG: 10 INJECTION INTRAVENOUS at 14:13

## 2019-02-13 RX ADMIN — CARVEDILOL 3.12 MG: 3.12 TABLET, FILM COATED ORAL at 09:11

## 2019-02-13 RX ADMIN — ALBUMIN (HUMAN) 250 ML: 12.5 SOLUTION INTRAVENOUS at 14:30

## 2019-02-13 RX ADMIN — ACETAMINOPHEN 650 MG: 325 TABLET ORAL at 22:43

## 2019-02-13 RX ADMIN — PROPOFOL 70 MG: 10 INJECTION, EMULSION INTRAVENOUS at 13:36

## 2019-02-13 RX ADMIN — Medication 2 G: at 22:51

## 2019-02-13 RX ADMIN — ROCURONIUM BROMIDE 20 MG: 10 INJECTION INTRAVENOUS at 15:00

## 2019-02-13 ASSESSMENT — PULMONARY FUNCTION TESTS
PIF_VALUE: 15
PIF_VALUE: 14
PIF_VALUE: 1
PIF_VALUE: 15
PIF_VALUE: 12
PIF_VALUE: 15
PIF_VALUE: 13
PIF_VALUE: 15
PIF_VALUE: 19
PIF_VALUE: 15
PIF_VALUE: 1
PIF_VALUE: 14
PIF_VALUE: 1
PIF_VALUE: 15
PIF_VALUE: 14
PIF_VALUE: 15
PIF_VALUE: 17
PIF_VALUE: 16
PIF_VALUE: 15
PIF_VALUE: 18
PIF_VALUE: 15
PIF_VALUE: 14
PIF_VALUE: 16
PIF_VALUE: 15
PIF_VALUE: 10
PIF_VALUE: 15
PIF_VALUE: 13
PIF_VALUE: 15
PIF_VALUE: 16
PIF_VALUE: 15
PIF_VALUE: 15
PIF_VALUE: 14
PIF_VALUE: 15
PIF_VALUE: 15
PIF_VALUE: 13
PIF_VALUE: 15
PIF_VALUE: 15
PIF_VALUE: 14
PIF_VALUE: 15
PIF_VALUE: 10
PIF_VALUE: 15
PIF_VALUE: 23
PIF_VALUE: 8
PIF_VALUE: 15
PIF_VALUE: 4
PIF_VALUE: 15
PIF_VALUE: 10
PIF_VALUE: 13
PIF_VALUE: 1
PIF_VALUE: 15
PIF_VALUE: 2
PIF_VALUE: 12
PIF_VALUE: 12
PIF_VALUE: 16
PIF_VALUE: 15
PIF_VALUE: 2
PIF_VALUE: 13
PIF_VALUE: 14
PIF_VALUE: 10
PIF_VALUE: 14
PIF_VALUE: 15
PIF_VALUE: 1
PIF_VALUE: 15
PIF_VALUE: 13
PIF_VALUE: 15
PIF_VALUE: 15
PIF_VALUE: 14
PIF_VALUE: 15
PIF_VALUE: 15
PIF_VALUE: 1
PIF_VALUE: 15
PIF_VALUE: 10
PIF_VALUE: 3
PIF_VALUE: 10
PIF_VALUE: 15
PIF_VALUE: 10
PIF_VALUE: 15
PIF_VALUE: 15
PIF_VALUE: 10
PIF_VALUE: 16
PIF_VALUE: 10
PIF_VALUE: 14
PIF_VALUE: 28
PIF_VALUE: 15
PIF_VALUE: 14
PIF_VALUE: 15
PIF_VALUE: 14
PIF_VALUE: 15
PIF_VALUE: 2
PIF_VALUE: 15

## 2019-02-13 ASSESSMENT — PAIN DESCRIPTION - ORIENTATION
ORIENTATION: MID;UPPER

## 2019-02-13 ASSESSMENT — PAIN DESCRIPTION - FREQUENCY
FREQUENCY: CONTINUOUS

## 2019-02-13 ASSESSMENT — PAIN DESCRIPTION - DESCRIPTORS
DESCRIPTORS: ACHING;DULL
DESCRIPTORS: ACHING
DESCRIPTORS: ACHING;DULL

## 2019-02-13 ASSESSMENT — PAIN DESCRIPTION - PAIN TYPE
TYPE: ACUTE PAIN

## 2019-02-13 ASSESSMENT — PAIN DESCRIPTION - LOCATION
LOCATION: BACK

## 2019-02-13 ASSESSMENT — PAIN SCALES - GENERAL
PAINLEVEL_OUTOF10: 1
PAINLEVEL_OUTOF10: 1
PAINLEVEL_OUTOF10: 2
PAINLEVEL_OUTOF10: 1
PAINLEVEL_OUTOF10: 0
PAINLEVEL_OUTOF10: 1
PAINLEVEL_OUTOF10: 1
PAINLEVEL_OUTOF10: 5
PAINLEVEL_OUTOF10: 1

## 2019-02-13 ASSESSMENT — PAIN DESCRIPTION - ONSET
ONSET: ON-GOING

## 2019-02-13 ASSESSMENT — PAIN DESCRIPTION - PROGRESSION
CLINICAL_PROGRESSION: NOT CHANGED
CLINICAL_PROGRESSION: GRADUALLY IMPROVING

## 2019-02-14 ENCOUNTER — HOSPITAL ENCOUNTER (INPATIENT)
Dept: CT IMAGING | Age: 84
Discharge: HOME | DRG: 477 | End: 2019-02-14
Payer: MEDICARE

## 2019-02-14 LAB
APTT: 25.7 SECONDS (ref 22–38)
APTT: 27.8 SECONDS (ref 22–38)
BASOPHILS # BLD: 0.1 %
BASOPHILS ABSOLUTE: 0 THOU/MM3 (ref 0–0.1)
EOSINOPHIL # BLD: 0 %
EOSINOPHILS ABSOLUTE: 0 THOU/MM3 (ref 0–0.4)
ERYTHROCYTE [DISTWIDTH] IN BLOOD BY AUTOMATED COUNT: 14.2 % (ref 11.5–14.5)
ERYTHROCYTE [DISTWIDTH] IN BLOOD BY AUTOMATED COUNT: 55.1 FL (ref 35–45)
HCT VFR BLD CALC: 35.7 % (ref 42–52)
HEMOGLOBIN: 11.7 GM/DL (ref 14–18)
IMMATURE GRANS (ABS): 0.07 THOU/MM3 (ref 0–0.07)
IMMATURE GRANULOCYTES: 0.8 %
LYMPHOCYTES # BLD: 6.4 %
LYMPHOCYTES ABSOLUTE: 0.6 THOU/MM3 (ref 1–4.8)
MCH RBC QN AUTO: 34.3 PG (ref 26–33)
MCHC RBC AUTO-ENTMCNC: 32.8 GM/DL (ref 32.2–35.5)
MCV RBC AUTO: 104.7 FL (ref 80–94)
MONOCYTES # BLD: 2.8 %
MONOCYTES ABSOLUTE: 0.2 THOU/MM3 (ref 0.4–1.3)
NUCLEATED RED BLOOD CELLS: 0 /100 WBC
PLATELET # BLD: 221 THOU/MM3 (ref 130–400)
PMV BLD AUTO: 9.3 FL (ref 9.4–12.4)
RBC # BLD: 3.41 MILL/MM3 (ref 4.7–6.1)
SEG NEUTROPHILS: 89.9 %
SEGMENTED NEUTROPHILS ABSOLUTE COUNT: 7.7 THOU/MM3 (ref 1.8–7.7)
WBC # BLD: 8.6 THOU/MM3 (ref 4.8–10.8)

## 2019-02-14 PROCEDURE — 99024 POSTOP FOLLOW-UP VISIT: CPT | Performed by: NEUROLOGICAL SURGERY

## 2019-02-14 PROCEDURE — 6360000002 HC RX W HCPCS: Performed by: PHYSICIAN ASSISTANT

## 2019-02-14 PROCEDURE — 77290 THER RAD SIMULAJ FIELD CPLX: CPT | Performed by: RADIOLOGY

## 2019-02-14 PROCEDURE — 6360000002 HC RX W HCPCS: Performed by: NEUROLOGICAL SURGERY

## 2019-02-14 PROCEDURE — 97116 GAIT TRAINING THERAPY: CPT

## 2019-02-14 PROCEDURE — 97530 THERAPEUTIC ACTIVITIES: CPT

## 2019-02-14 PROCEDURE — 85730 THROMBOPLASTIN TIME PARTIAL: CPT

## 2019-02-14 PROCEDURE — 97110 THERAPEUTIC EXERCISES: CPT

## 2019-02-14 PROCEDURE — 6370000000 HC RX 637 (ALT 250 FOR IP): Performed by: INTERNAL MEDICINE

## 2019-02-14 PROCEDURE — 51798 US URINE CAPACITY MEASURE: CPT

## 2019-02-14 PROCEDURE — 6370000000 HC RX 637 (ALT 250 FOR IP): Performed by: PHYSICIAN ASSISTANT

## 2019-02-14 PROCEDURE — 85025 COMPLETE CBC W/AUTO DIFF WBC: CPT

## 2019-02-14 PROCEDURE — 36415 COLL VENOUS BLD VENIPUNCTURE: CPT

## 2019-02-14 PROCEDURE — 2580000003 HC RX 258: Performed by: PHYSICIAN ASSISTANT

## 2019-02-14 PROCEDURE — 77334 RADIATION TREATMENT AID(S): CPT | Performed by: RADIOLOGY

## 2019-02-14 PROCEDURE — 1200000000 HC SEMI PRIVATE

## 2019-02-14 PROCEDURE — 2709999900 HC NON-CHARGEABLE SUPPLY

## 2019-02-14 PROCEDURE — 3209999900 CT GUIDE RADIATION THERAPY NO CHARGE

## 2019-02-14 PROCEDURE — 94640 AIRWAY INHALATION TREATMENT: CPT

## 2019-02-14 RX ORDER — HEPARIN SODIUM 1000 [USP'U]/ML
80 INJECTION, SOLUTION INTRAVENOUS; SUBCUTANEOUS PRN
Status: DISCONTINUED | OUTPATIENT
Start: 2019-02-14 | End: 2019-02-14 | Stop reason: ALTCHOICE

## 2019-02-14 RX ORDER — HEPARIN SODIUM 10000 [USP'U]/100ML
15 INJECTION, SOLUTION INTRAVENOUS CONTINUOUS
Status: DISCONTINUED | OUTPATIENT
Start: 2019-02-14 | End: 2019-02-14

## 2019-02-14 RX ORDER — HEPARIN SODIUM 1000 [USP'U]/ML
80 INJECTION, SOLUTION INTRAVENOUS; SUBCUTANEOUS ONCE
Status: COMPLETED | OUTPATIENT
Start: 2019-02-14 | End: 2019-02-14

## 2019-02-14 RX ORDER — HEPARIN SODIUM 1000 [USP'U]/ML
40 INJECTION, SOLUTION INTRAVENOUS; SUBCUTANEOUS PRN
Status: DISCONTINUED | OUTPATIENT
Start: 2019-02-14 | End: 2019-02-14 | Stop reason: ALTCHOICE

## 2019-02-14 RX ADMIN — DOCUSATE SODIUM 100 MG: 100 CAPSULE, LIQUID FILLED ORAL at 10:19

## 2019-02-14 RX ADMIN — GLYCOPYRROLATE AND FORMOTEROL FUMARATE 2 PUFF: 9; 4.8 AEROSOL, METERED RESPIRATORY (INHALATION) at 09:11

## 2019-02-14 RX ADMIN — HEPARIN SODIUM AND DEXTROSE 15 UNITS/KG/HR: 10000; 5 INJECTION INTRAVENOUS at 10:20

## 2019-02-14 RX ADMIN — ACETAMINOPHEN 650 MG: 325 TABLET ORAL at 13:43

## 2019-02-14 RX ADMIN — Medication 2 PUFF: at 21:27

## 2019-02-14 RX ADMIN — CARVEDILOL 3.12 MG: 3.12 TABLET, FILM COATED ORAL at 17:43

## 2019-02-14 RX ADMIN — FINASTERIDE 5 MG: 5 TABLET, FILM COATED ORAL at 10:13

## 2019-02-14 RX ADMIN — DOCUSATE SODIUM 100 MG: 100 CAPSULE, LIQUID FILLED ORAL at 22:44

## 2019-02-14 RX ADMIN — CARVEDILOL 3.12 MG: 3.12 TABLET, FILM COATED ORAL at 10:13

## 2019-02-14 RX ADMIN — CIPROFLOXACIN 250 MG: 250 TABLET, FILM COATED ORAL at 10:13

## 2019-02-14 RX ADMIN — CIPROFLOXACIN 250 MG: 250 TABLET, FILM COATED ORAL at 22:44

## 2019-02-14 RX ADMIN — SODIUM CHLORIDE: 9 INJECTION, SOLUTION INTRAVENOUS at 22:44

## 2019-02-14 RX ADMIN — SODIUM CHLORIDE: 9 INJECTION, SOLUTION INTRAVENOUS at 06:18

## 2019-02-14 RX ADMIN — Medication 2 G: at 06:17

## 2019-02-14 RX ADMIN — Medication 10 ML: at 10:13

## 2019-02-14 RX ADMIN — DOXAZOSIN 4 MG: 4 TABLET ORAL at 22:44

## 2019-02-14 RX ADMIN — BICALUTAMIDE 50 MG: 50 TABLET, FILM COATED ORAL at 10:19

## 2019-02-14 RX ADMIN — MULTIPLE VITAMINS W/ MINERALS TAB 1 TABLET: TAB at 10:13

## 2019-02-14 RX ADMIN — Medication 2 PUFF: at 09:11

## 2019-02-14 RX ADMIN — GLYCOPYRROLATE AND FORMOTEROL FUMARATE 2 PUFF: 9; 4.8 AEROSOL, METERED RESPIRATORY (INHALATION) at 21:27

## 2019-02-14 RX ADMIN — APIXABAN 10 MG: 5 TABLET, FILM COATED ORAL at 22:44

## 2019-02-14 RX ADMIN — APIXABAN 10 MG: 5 TABLET, FILM COATED ORAL at 13:59

## 2019-02-14 RX ADMIN — POLYETHYLENE GLYCOL (3350) 17 G: 17 POWDER, FOR SOLUTION ORAL at 17:43

## 2019-02-14 RX ADMIN — HEPARIN SODIUM 4420 UNITS: 1000 INJECTION INTRAVENOUS; SUBCUTANEOUS at 10:20

## 2019-02-14 ASSESSMENT — PAIN SCALES - GENERAL
PAINLEVEL_OUTOF10: 0
PAINLEVEL_OUTOF10: 3
PAINLEVEL_OUTOF10: 0

## 2019-02-15 LAB
APTT: 31.4 SECONDS (ref 22–38)
PLATELET # BLD: 218 THOU/MM3 (ref 130–400)

## 2019-02-15 PROCEDURE — 97116 GAIT TRAINING THERAPY: CPT

## 2019-02-15 PROCEDURE — 77387 GUIDANCE FOR RADJ TX DLVR: CPT | Performed by: RADIOLOGY

## 2019-02-15 PROCEDURE — 2709999900 HC NON-CHARGEABLE SUPPLY

## 2019-02-15 PROCEDURE — 1200000000 HC SEMI PRIVATE

## 2019-02-15 PROCEDURE — 85730 THROMBOPLASTIN TIME PARTIAL: CPT

## 2019-02-15 PROCEDURE — 6370000000 HC RX 637 (ALT 250 FOR IP): Performed by: PHYSICIAN ASSISTANT

## 2019-02-15 PROCEDURE — 2580000003 HC RX 258: Performed by: PHYSICIAN ASSISTANT

## 2019-02-15 PROCEDURE — 85049 AUTOMATED PLATELET COUNT: CPT

## 2019-02-15 PROCEDURE — 77402 RADIATION TX DELIVERY LVL 1: CPT | Performed by: RADIOLOGY

## 2019-02-15 PROCEDURE — 51798 US URINE CAPACITY MEASURE: CPT

## 2019-02-15 PROCEDURE — 94640 AIRWAY INHALATION TREATMENT: CPT

## 2019-02-15 PROCEDURE — 97110 THERAPEUTIC EXERCISES: CPT

## 2019-02-15 PROCEDURE — 6370000000 HC RX 637 (ALT 250 FOR IP): Performed by: INTERNAL MEDICINE

## 2019-02-15 PROCEDURE — 99024 POSTOP FOLLOW-UP VISIT: CPT | Performed by: PHYSICIAN ASSISTANT

## 2019-02-15 PROCEDURE — 36415 COLL VENOUS BLD VENIPUNCTURE: CPT

## 2019-02-15 PROCEDURE — 77306 TELETHX ISODOSE PLAN SIMPLE: CPT | Performed by: RADIOLOGY

## 2019-02-15 RX ORDER — M-VIT,TX,IRON,MINS/CALC/FOLIC 27MG-0.4MG
1 TABLET ORAL DAILY
DISCHARGE
Start: 2019-02-16

## 2019-02-15 RX ORDER — POLYETHYLENE GLYCOL 3350 17 G/17G
17 POWDER, FOR SOLUTION ORAL DAILY
Qty: 527 G | Refills: 1 | DISCHARGE
Start: 2019-02-16 | End: 2019-03-13

## 2019-02-15 RX ORDER — PSEUDOEPHEDRINE HCL 30 MG
100 TABLET ORAL 2 TIMES DAILY
DISCHARGE
Start: 2019-02-15

## 2019-02-15 RX ORDER — ALBUTEROL SULFATE 90 UG/1
2 AEROSOL, METERED RESPIRATORY (INHALATION) 2 TIMES DAILY
Qty: 1 INHALER | Refills: 3 | DISCHARGE
Start: 2019-02-15 | End: 2019-04-30 | Stop reason: ALTCHOICE

## 2019-02-15 RX ORDER — BICALUTAMIDE 50 MG/1
50 TABLET, FILM COATED ORAL DAILY
Qty: 90 TABLET | Refills: 3 | DISCHARGE
Start: 2019-02-16 | End: 2019-03-26

## 2019-02-15 RX ORDER — CIPROFLOXACIN 250 MG/1
250 TABLET, FILM COATED ORAL EVERY 12 HOURS SCHEDULED
Qty: 20 TABLET | Refills: 0 | DISCHARGE
Start: 2019-02-15 | End: 2019-02-25

## 2019-02-15 RX ORDER — DOXAZOSIN MESYLATE 4 MG/1
4 TABLET ORAL NIGHTLY
Qty: 30 TABLET | Refills: 3 | DISCHARGE
Start: 2019-02-15 | End: 2019-03-11 | Stop reason: SDUPTHER

## 2019-02-15 RX ORDER — FINASTERIDE 5 MG/1
5 TABLET, FILM COATED ORAL DAILY
Qty: 30 TABLET | Refills: 3 | DISCHARGE
Start: 2019-02-16 | End: 2019-11-22 | Stop reason: SDUPTHER

## 2019-02-15 RX ORDER — CARVEDILOL 3.12 MG/1
3.12 TABLET ORAL 2 TIMES DAILY WITH MEALS
Qty: 60 TABLET | Refills: 3 | DISCHARGE
Start: 2019-02-15 | End: 2019-12-12 | Stop reason: DRUGHIGH

## 2019-02-15 RX ORDER — TIZANIDINE 2 MG/1
2 TABLET ORAL EVERY 8 HOURS PRN
DISCHARGE
Start: 2019-02-15 | End: 2021-05-04

## 2019-02-15 RX ORDER — TRAMADOL HYDROCHLORIDE 50 MG/1
50 TABLET ORAL EVERY 8 HOURS PRN
Qty: 9 TABLET | Refills: 0 | Status: SHIPPED | OUTPATIENT
Start: 2019-02-15 | End: 2019-02-18

## 2019-02-15 RX ADMIN — CARVEDILOL 3.12 MG: 3.12 TABLET, FILM COATED ORAL at 18:33

## 2019-02-15 RX ADMIN — Medication 2 PUFF: at 08:48

## 2019-02-15 RX ADMIN — ACETAMINOPHEN 650 MG: 325 TABLET ORAL at 21:41

## 2019-02-15 RX ADMIN — FINASTERIDE 5 MG: 5 TABLET, FILM COATED ORAL at 09:41

## 2019-02-15 RX ADMIN — GLYCOPYRROLATE AND FORMOTEROL FUMARATE 2 PUFF: 9; 4.8 AEROSOL, METERED RESPIRATORY (INHALATION) at 20:45

## 2019-02-15 RX ADMIN — APIXABAN 10 MG: 5 TABLET, FILM COATED ORAL at 21:34

## 2019-02-15 RX ADMIN — DOXAZOSIN 4 MG: 4 TABLET ORAL at 21:34

## 2019-02-15 RX ADMIN — Medication 2 PUFF: at 20:45

## 2019-02-15 RX ADMIN — BICALUTAMIDE 50 MG: 50 TABLET, FILM COATED ORAL at 09:42

## 2019-02-15 RX ADMIN — CIPROFLOXACIN 250 MG: 250 TABLET, FILM COATED ORAL at 21:34

## 2019-02-15 RX ADMIN — MULTIPLE VITAMINS W/ MINERALS TAB 1 TABLET: TAB at 09:41

## 2019-02-15 RX ADMIN — Medication 10 ML: at 21:34

## 2019-02-15 RX ADMIN — Medication 10 ML: at 09:42

## 2019-02-15 RX ADMIN — CIPROFLOXACIN 250 MG: 250 TABLET, FILM COATED ORAL at 09:41

## 2019-02-15 RX ADMIN — CARVEDILOL 3.12 MG: 3.12 TABLET, FILM COATED ORAL at 09:41

## 2019-02-15 RX ADMIN — GLYCOPYRROLATE AND FORMOTEROL FUMARATE 2 PUFF: 9; 4.8 AEROSOL, METERED RESPIRATORY (INHALATION) at 08:49

## 2019-02-15 RX ADMIN — APIXABAN 10 MG: 5 TABLET, FILM COATED ORAL at 09:41

## 2019-02-15 ASSESSMENT — PAIN SCALES - GENERAL
PAINLEVEL_OUTOF10: 0
PAINLEVEL_OUTOF10: 9
PAINLEVEL_OUTOF10: 4
PAINLEVEL_OUTOF10: 0

## 2019-02-15 ASSESSMENT — ENCOUNTER SYMPTOMS
BACK PAIN: 0
ABDOMINAL PAIN: 0
CHEST TIGHTNESS: 0
SHORTNESS OF BREATH: 0
ABDOMINAL DISTENTION: 0

## 2019-02-16 VITALS
WEIGHT: 121.9 LBS | SYSTOLIC BLOOD PRESSURE: 154 MMHG | RESPIRATION RATE: 16 BRPM | OXYGEN SATURATION: 96 % | HEART RATE: 62 BPM | BODY MASS INDEX: 18.05 KG/M2 | TEMPERATURE: 97.3 F | DIASTOLIC BLOOD PRESSURE: 67 MMHG | HEIGHT: 69 IN

## 2019-02-16 LAB
APTT: 34.2 SECONDS (ref 22–38)
BLOOD CULTURE, ROUTINE: NORMAL
PLATELET # BLD: 197 THOU/MM3 (ref 130–400)

## 2019-02-16 PROCEDURE — 36415 COLL VENOUS BLD VENIPUNCTURE: CPT

## 2019-02-16 PROCEDURE — 94640 AIRWAY INHALATION TREATMENT: CPT

## 2019-02-16 PROCEDURE — 2709999900 HC NON-CHARGEABLE SUPPLY

## 2019-02-16 PROCEDURE — 6370000000 HC RX 637 (ALT 250 FOR IP): Performed by: INTERNAL MEDICINE

## 2019-02-16 PROCEDURE — 85730 THROMBOPLASTIN TIME PARTIAL: CPT

## 2019-02-16 PROCEDURE — 6370000000 HC RX 637 (ALT 250 FOR IP): Performed by: PHYSICIAN ASSISTANT

## 2019-02-16 PROCEDURE — 77402 RADIATION TX DELIVERY LVL 1: CPT | Performed by: RADIOLOGY

## 2019-02-16 PROCEDURE — 85049 AUTOMATED PLATELET COUNT: CPT

## 2019-02-16 PROCEDURE — 77387 GUIDANCE FOR RADJ TX DLVR: CPT | Performed by: RADIOLOGY

## 2019-02-16 RX ADMIN — TRAMADOL HYDROCHLORIDE 50 MG: 50 TABLET, FILM COATED ORAL at 01:46

## 2019-02-16 RX ADMIN — CIPROFLOXACIN 250 MG: 250 TABLET, FILM COATED ORAL at 09:24

## 2019-02-16 RX ADMIN — APIXABAN 10 MG: 5 TABLET, FILM COATED ORAL at 09:23

## 2019-02-16 RX ADMIN — MULTIPLE VITAMINS W/ MINERALS TAB 1 TABLET: TAB at 09:24

## 2019-02-16 RX ADMIN — FINASTERIDE 5 MG: 5 TABLET, FILM COATED ORAL at 09:24

## 2019-02-16 RX ADMIN — CARVEDILOL 3.12 MG: 3.12 TABLET, FILM COATED ORAL at 09:24

## 2019-02-16 RX ADMIN — Medication 2 PUFF: at 08:41

## 2019-02-16 RX ADMIN — GLYCOPYRROLATE AND FORMOTEROL FUMARATE 2 PUFF: 9; 4.8 AEROSOL, METERED RESPIRATORY (INHALATION) at 08:41

## 2019-02-16 RX ADMIN — BICALUTAMIDE 50 MG: 50 TABLET, FILM COATED ORAL at 09:28

## 2019-02-16 ASSESSMENT — PAIN SCALES - GENERAL: PAINLEVEL_OUTOF10: 6

## 2019-02-18 ENCOUNTER — OFFICE VISIT (OUTPATIENT)
Dept: UROLOGY | Age: 84
End: 2019-02-18
Payer: MEDICARE

## 2019-02-18 VITALS — BODY MASS INDEX: 19.61 KG/M2 | WEIGHT: 122 LBS | HEIGHT: 66 IN

## 2019-02-18 DIAGNOSIS — C61 PROSTATE CANCER METASTATIC TO BONE (HCC): Primary | ICD-10-CM

## 2019-02-18 DIAGNOSIS — C79.51 PROSTATE CANCER METASTATIC TO BONE (HCC): Primary | ICD-10-CM

## 2019-02-18 DIAGNOSIS — N13.8 BPH WITH OBSTRUCTION/LOWER URINARY TRACT SYMPTOMS: ICD-10-CM

## 2019-02-18 DIAGNOSIS — Z12.5 ENCOUNTER FOR SCREENING FOR MALIGNANT NEOPLASM OF PROSTATE: ICD-10-CM

## 2019-02-18 DIAGNOSIS — N40.1 BPH WITH OBSTRUCTION/LOWER URINARY TRACT SYMPTOMS: ICD-10-CM

## 2019-02-18 LAB
BILIRUBIN URINE: NEGATIVE
BLOOD URINE, POC: ABNORMAL
CHARACTER, URINE: CLEAR
COLOR, URINE: YELLOW
GLUCOSE URINE: NEGATIVE MG/DL
KETONES, URINE: NEGATIVE
LEUKOCYTE CLUMPS, URINE: NEGATIVE
NITRITE, URINE: NEGATIVE
PH, URINE: 7
PROTEIN, URINE: NEGATIVE MG/DL
SPECIFIC GRAVITY, URINE: 1.01 (ref 1–1.03)
UROBILINOGEN, URINE: 0.2 EU/DL

## 2019-02-18 PROCEDURE — G8420 CALC BMI NORM PARAMETERS: HCPCS | Performed by: NURSE PRACTITIONER

## 2019-02-18 PROCEDURE — 1101F PT FALLS ASSESS-DOCD LE1/YR: CPT | Performed by: NURSE PRACTITIONER

## 2019-02-18 PROCEDURE — 77387 GUIDANCE FOR RADJ TX DLVR: CPT | Performed by: RADIOLOGY

## 2019-02-18 PROCEDURE — 1036F TOBACCO NON-USER: CPT | Performed by: NURSE PRACTITIONER

## 2019-02-18 PROCEDURE — 77336 RADIATION PHYSICS CONSULT: CPT | Performed by: RADIOLOGY

## 2019-02-18 PROCEDURE — 99214 OFFICE O/P EST MOD 30 MIN: CPT | Performed by: NURSE PRACTITIONER

## 2019-02-18 PROCEDURE — 1123F ACP DISCUSS/DSCN MKR DOCD: CPT | Performed by: NURSE PRACTITIONER

## 2019-02-18 PROCEDURE — G8482 FLU IMMUNIZE ORDER/ADMIN: HCPCS | Performed by: NURSE PRACTITIONER

## 2019-02-18 PROCEDURE — 4040F PNEUMOC VAC/ADMIN/RCVD: CPT | Performed by: NURSE PRACTITIONER

## 2019-02-18 PROCEDURE — G8428 CUR MEDS NOT DOCUMENT: HCPCS | Performed by: NURSE PRACTITIONER

## 2019-02-18 PROCEDURE — G8598 ASA/ANTIPLAT THER USED: HCPCS | Performed by: NURSE PRACTITIONER

## 2019-02-18 PROCEDURE — 77402 RADIATION TX DELIVERY LVL 1: CPT | Performed by: RADIOLOGY

## 2019-02-18 PROCEDURE — 1111F DSCHRG MED/CURRENT MED MERGE: CPT | Performed by: NURSE PRACTITIONER

## 2019-02-19 ENCOUNTER — HOSPITAL ENCOUNTER (OUTPATIENT)
Age: 84
Discharge: HOME | End: 2019-02-19
Payer: MEDICARE

## 2019-02-19 PROCEDURE — 77402 RADIATION TX DELIVERY LVL 1: CPT | Performed by: RADIOLOGY

## 2019-02-19 PROCEDURE — 77387 GUIDANCE FOR RADJ TX DLVR: CPT | Performed by: RADIOLOGY

## 2019-02-20 ENCOUNTER — HOSPITAL ENCOUNTER (OUTPATIENT)
Age: 84
Discharge: HOME | End: 2019-02-20
Payer: COMMERCIAL

## 2019-02-20 DIAGNOSIS — C61 PROSTATE CANCER METASTATIC TO BONE (HCC): Primary | ICD-10-CM

## 2019-02-20 DIAGNOSIS — C61 PROSTATE CANCER METASTATIC TO BONE (HCC): ICD-10-CM

## 2019-02-20 DIAGNOSIS — C79.51 PROSTATE CANCER METASTATIC TO BONE (HCC): Primary | ICD-10-CM

## 2019-02-20 DIAGNOSIS — C79.51 PROSTATE CANCER METASTATIC TO BONE (HCC): ICD-10-CM

## 2019-02-20 LAB — PROSTATE SPECIFIC ANTIGEN: 2.12 NG/ML (ref 0–1)

## 2019-02-20 PROCEDURE — 84153 ASSAY OF PSA TOTAL: CPT

## 2019-02-20 PROCEDURE — 77402 RADIATION TX DELIVERY LVL 1: CPT | Performed by: RADIOLOGY

## 2019-02-20 PROCEDURE — 77387 GUIDANCE FOR RADJ TX DLVR: CPT | Performed by: RADIOLOGY

## 2019-02-20 PROCEDURE — 36415 COLL VENOUS BLD VENIPUNCTURE: CPT

## 2019-02-21 ENCOUNTER — CLINICAL DOCUMENTATION (OUTPATIENT)
Dept: NUTRITION | Age: 84
End: 2019-02-21

## 2019-02-21 PROCEDURE — 77402 RADIATION TX DELIVERY LVL 1: CPT | Performed by: RADIOLOGY

## 2019-02-21 PROCEDURE — 77387 GUIDANCE FOR RADJ TX DLVR: CPT | Performed by: RADIOLOGY

## 2019-02-22 PROCEDURE — 77387 GUIDANCE FOR RADJ TX DLVR: CPT | Performed by: RADIOLOGY

## 2019-02-22 PROCEDURE — 77402 RADIATION TX DELIVERY LVL 1: CPT | Performed by: RADIOLOGY

## 2019-02-25 PROCEDURE — 77336 RADIATION PHYSICS CONSULT: CPT | Performed by: RADIOLOGY

## 2019-02-26 ENCOUNTER — OFFICE VISIT (OUTPATIENT)
Dept: UROLOGY | Age: 84
End: 2019-02-26
Payer: COMMERCIAL

## 2019-02-26 VITALS
DIASTOLIC BLOOD PRESSURE: 58 MMHG | HEIGHT: 67 IN | WEIGHT: 116.8 LBS | BODY MASS INDEX: 18.33 KG/M2 | SYSTOLIC BLOOD PRESSURE: 88 MMHG

## 2019-02-26 DIAGNOSIS — C61 PROSTATE CANCER (HCC): ICD-10-CM

## 2019-02-26 DIAGNOSIS — C79.51 SECONDARY MALIGNANT NEOPLASM OF BONE AND BONE MARROW (HCC): Primary | ICD-10-CM

## 2019-02-26 DIAGNOSIS — M80.00XA AGE-RELATED OSTEOPOROSIS WITH CURRENT PATHOLOGICAL FRACTURE, INITIAL ENCOUNTER: ICD-10-CM

## 2019-02-26 DIAGNOSIS — C79.52 SECONDARY MALIGNANT NEOPLASM OF BONE AND BONE MARROW (HCC): Primary | ICD-10-CM

## 2019-02-26 PROCEDURE — G8598 ASA/ANTIPLAT THER USED: HCPCS | Performed by: NURSE PRACTITIONER

## 2019-02-26 PROCEDURE — G8482 FLU IMMUNIZE ORDER/ADMIN: HCPCS | Performed by: NURSE PRACTITIONER

## 2019-02-26 PROCEDURE — 96401 CHEMO ANTI-NEOPL SQ/IM: CPT | Performed by: NURSE PRACTITIONER

## 2019-02-26 PROCEDURE — 99999 PR OFFICE/OUTPT VISIT,PROCEDURE ONLY: CPT | Performed by: NURSE PRACTITIONER

## 2019-02-26 PROCEDURE — 4040F PNEUMOC VAC/ADMIN/RCVD: CPT | Performed by: NURSE PRACTITIONER

## 2019-02-26 PROCEDURE — G8419 CALC BMI OUT NRM PARAM NOF/U: HCPCS | Performed by: NURSE PRACTITIONER

## 2019-02-26 PROCEDURE — G8427 DOCREV CUR MEDS BY ELIG CLIN: HCPCS | Performed by: NURSE PRACTITIONER

## 2019-02-26 PROCEDURE — 77387 GUIDANCE FOR RADJ TX DLVR: CPT | Performed by: RADIOLOGY

## 2019-02-26 PROCEDURE — 1101F PT FALLS ASSESS-DOCD LE1/YR: CPT | Performed by: NURSE PRACTITIONER

## 2019-02-26 PROCEDURE — 96402 CHEMO HORMON ANTINEOPL SQ/IM: CPT | Performed by: NURSE PRACTITIONER

## 2019-02-26 PROCEDURE — 1111F DSCHRG MED/CURRENT MED MERGE: CPT | Performed by: NURSE PRACTITIONER

## 2019-02-26 PROCEDURE — 77402 RADIATION TX DELIVERY LVL 1: CPT | Performed by: RADIOLOGY

## 2019-02-26 PROCEDURE — 1123F ACP DISCUSS/DSCN MKR DOCD: CPT | Performed by: NURSE PRACTITIONER

## 2019-02-26 PROCEDURE — 99214 OFFICE O/P EST MOD 30 MIN: CPT | Performed by: NURSE PRACTITIONER

## 2019-02-26 PROCEDURE — 1036F TOBACCO NON-USER: CPT | Performed by: NURSE PRACTITIONER

## 2019-02-26 RX ORDER — TRAMADOL HYDROCHLORIDE 50 MG/1
50 TABLET ORAL EVERY 8 HOURS PRN
COMMUNITY
End: 2019-03-13 | Stop reason: SDUPTHER

## 2019-02-28 PROCEDURE — 77402 RADIATION TX DELIVERY LVL 1: CPT | Performed by: RADIOLOGY

## 2019-02-28 PROCEDURE — 77387 GUIDANCE FOR RADJ TX DLVR: CPT | Performed by: RADIOLOGY

## 2019-03-01 ENCOUNTER — HOSPITAL ENCOUNTER (OUTPATIENT)
Dept: RADIATION ONCOLOGY | Age: 84
Discharge: HOME | End: 2019-03-01
Payer: MEDICARE

## 2019-03-01 PROCEDURE — 77387 GUIDANCE FOR RADJ TX DLVR: CPT | Performed by: RADIOLOGY

## 2019-03-01 PROCEDURE — 77402 RADIATION TX DELIVERY LVL 1: CPT | Performed by: RADIOLOGY

## 2019-03-07 PROBLEM — N39.0 URINARY TRACT INFECTION: Status: RESOLVED | Noted: 2019-02-05 | Resolved: 2019-03-07

## 2019-03-08 ENCOUNTER — CLINICAL DOCUMENTATION (OUTPATIENT)
Dept: ONCOLOGY | Age: 84
End: 2019-03-08

## 2019-03-11 ENCOUNTER — PROCEDURE VISIT (OUTPATIENT)
Dept: UROLOGY | Age: 84
End: 2019-03-11
Payer: MEDICARE

## 2019-03-11 VITALS
HEIGHT: 67 IN | WEIGHT: 115 LBS | SYSTOLIC BLOOD PRESSURE: 112 MMHG | DIASTOLIC BLOOD PRESSURE: 58 MMHG | BODY MASS INDEX: 18.05 KG/M2

## 2019-03-11 DIAGNOSIS — C61 PROSTATE CANCER (HCC): ICD-10-CM

## 2019-03-11 DIAGNOSIS — N40.1 BPH WITH OBSTRUCTION/LOWER URINARY TRACT SYMPTOMS: Primary | ICD-10-CM

## 2019-03-11 DIAGNOSIS — N13.8 BPH WITH OBSTRUCTION/LOWER URINARY TRACT SYMPTOMS: Primary | ICD-10-CM

## 2019-03-11 LAB
BILIRUBIN URINE: NEGATIVE
BLOOD URINE, POC: ABNORMAL
CHARACTER, URINE: CLEAR
COLOR, URINE: YELLOW
GLUCOSE URINE: NEGATIVE MG/DL
KETONES, URINE: NEGATIVE
LEUKOCYTE CLUMPS, URINE: ABNORMAL
NITRITE, URINE: NEGATIVE
PH, URINE: 8.5 (ref 5–9)
POST VOID RESIDUAL (PVR): 162 ML
PROTEIN, URINE: 30 MG/DL
SPECIFIC GRAVITY, URINE: 1.01 (ref 1–1.03)
UROBILINOGEN, URINE: 0.2 EU/DL (ref 0–1)

## 2019-03-11 PROCEDURE — 81003 URINALYSIS AUTO W/O SCOPE: CPT | Performed by: UROLOGY

## 2019-03-11 PROCEDURE — 99999 PR OFFICE/OUTPT VISIT,PROCEDURE ONLY: CPT | Performed by: UROLOGY

## 2019-03-11 PROCEDURE — 51798 US URINE CAPACITY MEASURE: CPT | Performed by: UROLOGY

## 2019-03-11 PROCEDURE — 52000 CYSTOURETHROSCOPY: CPT | Performed by: UROLOGY

## 2019-03-11 RX ORDER — DOXAZOSIN MESYLATE 4 MG/1
4 TABLET ORAL NIGHTLY
Qty: 30 TABLET | Refills: 3 | Status: SHIPPED | OUTPATIENT
Start: 2019-03-11 | End: 2019-07-26 | Stop reason: SDUPTHER

## 2019-03-11 NOTE — PROGRESS NOTES
Mr. Milligan was seen in follow up for cystoscopy as per plan developed by JAILYN Singh. Cystoscopy was performed without difficulty and it was well tolerated. Past Medical History:   Diagnosis Date    Blood circulation, collateral     CAD (coronary artery disease)     CAD (coronary artery disease) 9/2/2014    Cancer (HCC)     prostate to bone    CKD (chronic kidney disease) stage 2, GFR 60-89 ml/min 12/24/2018    COPD (chronic obstructive pulmonary disease) (HCC)     Diverticulitis     GERD (gastroesophageal reflux disease)     Hiatal hernia     Hyperlipemia 8/12/2013    Hyperlipidemia     Other disorders of kidney and ureter in diseases classified elsewhere     Pleural plaque 8/12/2013    Primary adenocarcinoma of prostate ([facility] [address] [address] 01/2019    high grade       Past Surgical History:   Procedure Laterality Date    ABDOMEN SURGERY      APPENDECTOMY      CARDIAC CATHETERIZATION      CYSTOSCOPY  03/17/2015    Litholapaxy-Large    EYE SURGERY      cataracts    HERNIA REPAIR      KYPHOSIS SURGERY N/A 2/13/2019    ABLATION AND KYPHOPLASTY AT T8 AND ABLATION AND KYPHOPLASTY AT T6 performed by India Bernabe MD at [facility] N/A 12/17/2018    EXPLORATORY LAPAROTOMY, WITH ABDOMINAL WASHOUT performed by Patricia Cast MD at [facility]       Current Outpatient Medications on File Prior to Visit   Medication Sig Dispense Refill    traMADol (ULTRAM) 50 MG tablet Take 50 mg by mouth every 8 hours as needed for Pain. Cecily Alvarado calcium carbonate-vitamin D (CALCIUM 600 + D) 600-400 MG-UNIT TABS per tab Take 1 tablet by mouth 2 times daily 60 tablet 5    apixaban (ELIQUIS) 5 MG TABS tablet Take 1 tablet by mouth 2 times daily 60 tablet 0    Multiple Vitamins-Minerals (THERAPEUTIC MULTIVITAMIN-MINERALS) tablet Take 1 tablet by mouth daily      carvedilol (COREG) 3.125 MG tablet Take 1 tablet by mouth 2 times daily (with meals) 60 tablet 3    albuterol sulfate HFA (PROAIR phone: Not on file     Gets together: Not on file     Attends [employer] service: Not on file     Active member of club or organization: Not on file     Attends meetings of clubs or organizations: Not on file     Relationship status: Not on file    Intimate partner violence:     Fear of current or ex partner: Not on file     Emotionally abused: Not on file     Physically abused: Not on file     Forced sexual activity: Not on file   Other Topics Concern    Not on file   Social History Narrative    - Former       Review of Systems  No problems with ears, nose or throat. No problems with eyes. No chest pain, shortness of breath, abdominal pain, extremity pain or weakness, and no neurological deficits. No rashes. No swollen glands or lymph nodes.  symptoms per HPI. The remainder of the review of symptoms is negative. Exam  General: alert and oriented. Cooperative. HENT: Normocephalic, Atraumatic. Eyes: No scleral icterus, mucous membranes moist.  Respiratory: Effort normal.   Skin: No rashes or obvious lesions.     Labs    Results for POC orders placed in visit on 03/11/19   POCT Urinalysis No Micro (Auto)   Result Value Ref Range    Glucose, Ur Negative NEGATIVE mg/dl    Bilirubin Urine Negative     Ketones, Urine Negative NEGATIVE    Specific Gravity, Urine 1.015 1.002 - 1.03    Blood, UA POC Trace-lysed NEGATIVE    pH, Urine 8.50 5.0 - 9.0    Protein, Urine 30 (A) NEGATIVE mg/dl    Urobilinogen, Urine 0.20 0.0 - 1.0 eu/dl    Nitrite, Urine Negative NEGATIVE    Leukocyte Clumps, Urine Trace (A) NEGATIVE    Color, Urine Yellow YELLOW-STR    Character, Urine Clear [id number]   poct post void residual   Result Value Ref Range    post void residual 162 ml       Lab Results   Component Value Date    CREATININE 0.8 02/08/2019    BUN 15 02/08/2019     02/08/2019    K 4.6 02/08/2019    CL 97 (L) 02/08/2019    CO2 33 02/08/2019       Lab Results   Component Value Date    PSA 2.12 (H) 02/20/2019 disease. Likely pathologic fractures as previously described at L1, L2 and L3. When compared to prior study    no significant change. New rounded sclerotic lesions T10-T12 as well as pelvis concerning for advancing metastatic disease. Previously described pelvic adenopathy has diminished and is not as prominent as on prior study. Continued follow-up is advised.           Impression       1. Distended gallbladder and small gallstones. 2. Bilateral lower lobe small pleural effusions with adjacent consolidation, scarring or atelectasis. 3. Moderate amount of stool throughout the colon with advanced diverticulosis. No definite evidence for diverticulitis at this time. 4. Advanced bony metastatic disease. Multiple sclerotic lesions with likely pathologic mild compression fractures L1-L3 similar in appearance to prior study. New rounded sclerotic lesions T10-T12 as well as pelvis concerning for advancing metastatic    disease. If acute neurologic changes are clinically suspected consider lumbar MRI. 5. Distended bladder with layering debris or small stones within the posterior bladder. Fullness of both collecting systems and nonobstructing left renal calculus.           Cystoscopy  After obtaining informed consent and prepping the urethral meatus, a 16-Wolof flexible cystoscope was passed per urethra into the bladder. The urethra was evaluated on the way in and then again on the way out and was found to be normal.  The prostate was significantly obstructing. The bladder was evaluated in its endoscopic entirety and found to have some debris moderate trabeculations. There were no tumors, stones, ulcers or foreign bodies. There were no mucosal abnormalities. The ureteral orifices were seen and were normal.  The scope was removed. The patient tolerated the procedure and there were no complications. A dose of 500 mg ciprofloxacin was given for the procedure. Impression: Significantly obstructing prostate. Bladder had mild trabeculations and debris    Ct of the abd/pelvis on 2/5/19 with distended bladder with layering debris or small stones within the posterior bladder.   Daughter is very concerned about the presence of a bladder stone potentially contributing to pt's urinary symptoms        Plan:  Follow up with [facility] as scheduled

## 2019-03-13 ENCOUNTER — OFFICE VISIT (OUTPATIENT)
Dept: FAMILY MEDICINE CLINIC | Age: 84
End: 2019-03-13

## 2019-03-13 VITALS
WEIGHT: 118.13 LBS | HEIGHT: 67 IN | SYSTOLIC BLOOD PRESSURE: 128 MMHG | RESPIRATION RATE: 13 BRPM | DIASTOLIC BLOOD PRESSURE: 70 MMHG | HEART RATE: 60 BPM | BODY MASS INDEX: 18.54 KG/M2

## 2019-03-13 DIAGNOSIS — C79.51 PROSTATE CANCER METASTATIC TO BONE (HCC): Primary | ICD-10-CM

## 2019-03-13 DIAGNOSIS — C61 PROSTATE CANCER METASTATIC TO BONE (HCC): Primary | ICD-10-CM

## 2019-03-13 DIAGNOSIS — I82.4Y3 ACUTE DEEP VEIN THROMBOSIS (DVT) OF PROXIMAL VEIN OF BOTH LOWER EXTREMITIES (HCC): ICD-10-CM

## 2019-03-13 DIAGNOSIS — I26.99 OTHER ACUTE PULMONARY EMBOLISM WITHOUT ACUTE COR PULMONALE (HCC): ICD-10-CM

## 2019-03-13 PROBLEM — I82.403 DVT OF LOWER EXTREMITY, BILATERAL (HCC): Status: ACTIVE | Noted: 2019-02-01

## 2019-03-13 PROCEDURE — 4040F PNEUMOC VAC/ADMIN/RCVD: CPT | Performed by: FAMILY MEDICINE

## 2019-03-13 PROCEDURE — 99213 OFFICE O/P EST LOW 20 MIN: CPT | Performed by: FAMILY MEDICINE

## 2019-03-13 PROCEDURE — 1123F ACP DISCUSS/DSCN MKR DOCD: CPT | Performed by: FAMILY MEDICINE

## 2019-03-13 PROCEDURE — G8420 CALC BMI NORM PARAMETERS: HCPCS | Performed by: FAMILY MEDICINE

## 2019-03-13 PROCEDURE — G8427 DOCREV CUR MEDS BY ELIG CLIN: HCPCS | Performed by: FAMILY MEDICINE

## 2019-03-13 PROCEDURE — 1101F PT FALLS ASSESS-DOCD LE1/YR: CPT | Performed by: FAMILY MEDICINE

## 2019-03-13 PROCEDURE — 1111F DSCHRG MED/CURRENT MED MERGE: CPT | Performed by: FAMILY MEDICINE

## 2019-03-13 PROCEDURE — G8598 ASA/ANTIPLAT THER USED: HCPCS | Performed by: FAMILY MEDICINE

## 2019-03-13 PROCEDURE — 1036F TOBACCO NON-USER: CPT | Performed by: FAMILY MEDICINE

## 2019-03-13 RX ORDER — CARVEDILOL 6.25 MG/1
TABLET ORAL
COMMUNITY
Start: 2019-03-04 | End: 2019-04-30 | Stop reason: ALTCHOICE

## 2019-03-13 RX ORDER — TRAMADOL HYDROCHLORIDE 50 MG/1
50 TABLET ORAL EVERY 8 HOURS PRN
Qty: 90 TABLET | Refills: 0 | Status: SHIPPED | OUTPATIENT
Start: 2019-03-13 | End: 2019-05-06 | Stop reason: SDUPTHER

## 2019-03-13 RX ORDER — POLYETHYLENE GLYCOL 3350 17 G/17G
17 POWDER, FOR SOLUTION ORAL 2 TIMES DAILY
COMMUNITY
Start: 2019-03-13 | End: 2019-04-12

## 2019-03-13 ASSESSMENT — ENCOUNTER SYMPTOMS
SINUS PRESSURE: 0
SHORTNESS OF BREATH: 0
CONSTIPATION: 0

## 2019-03-18 ENCOUNTER — TELEPHONE (OUTPATIENT)
Dept: FAMILY MEDICINE CLINIC | Age: 84
End: 2019-03-18

## 2019-03-18 RX ORDER — AMOXICILLIN AND CLAVULANATE POTASSIUM 875; 125 MG/1; MG/1
1 TABLET, FILM COATED ORAL 2 TIMES DAILY
Qty: 20 TABLET | Refills: 0 | Status: SHIPPED | OUTPATIENT
Start: 2019-03-18 | End: 2019-03-28

## 2019-03-20 ENCOUNTER — CARE COORDINATION (OUTPATIENT)
Dept: CASE MANAGEMENT | Age: 84
End: 2019-03-20

## 2019-03-21 ENCOUNTER — HOSPITAL ENCOUNTER (OUTPATIENT)
Age: 84
Discharge: HOME | End: 2019-03-21
Payer: MEDICARE

## 2019-03-21 DIAGNOSIS — M80.00XA AGE-RELATED OSTEOPOROSIS WITH CURRENT PATHOLOGICAL FRACTURE, INITIAL ENCOUNTER: ICD-10-CM

## 2019-03-21 DIAGNOSIS — C61 PROSTATE CANCER (HCC): ICD-10-CM

## 2019-03-21 LAB
CALCIUM IONIZED: 1.09 MMOL/L (ref 1.12–1.32)
MISC REFERENCE: NORMAL
PROSTATE SPECIFIC ANTIGEN: 1.33 NG/ML (ref 0–1)
VITAMIN D 25-HYDROXY: 27 NG/ML (ref 30–100)

## 2019-03-21 PROCEDURE — 84153 ASSAY OF PSA TOTAL: CPT

## 2019-03-21 PROCEDURE — 82306 VITAMIN D 25 HYDROXY: CPT

## 2019-03-21 PROCEDURE — 36415 COLL VENOUS BLD VENIPUNCTURE: CPT

## 2019-03-21 PROCEDURE — 82330 ASSAY OF CALCIUM: CPT

## 2019-03-21 PROCEDURE — 84403 ASSAY OF TOTAL TESTOSTERONE: CPT

## 2019-03-24 LAB — TESTOSTERONE TOTAL: < 3 NG/DL (ref 300–720)

## 2019-03-26 ENCOUNTER — NURSE ONLY (OUTPATIENT)
Dept: UROLOGY | Age: 84
End: 2019-03-26
Payer: MEDICARE

## 2019-03-26 VITALS — SYSTOLIC BLOOD PRESSURE: 112 MMHG | DIASTOLIC BLOOD PRESSURE: 58 MMHG

## 2019-03-26 DIAGNOSIS — Z51.81 THERAPEUTIC DRUG MONITORING: ICD-10-CM

## 2019-03-26 DIAGNOSIS — C61 PROSTATE CANCER (HCC): Primary | ICD-10-CM

## 2019-03-26 DIAGNOSIS — C79.51 SECONDARY MALIGNANT NEOPLASM OF BONE AND BONE MARROW (HCC): ICD-10-CM

## 2019-03-26 DIAGNOSIS — C79.52 SECONDARY MALIGNANT NEOPLASM OF BONE AND BONE MARROW (HCC): ICD-10-CM

## 2019-03-26 DIAGNOSIS — E83.51 HYPOCALCEMIA: ICD-10-CM

## 2019-03-26 PROCEDURE — 96401 CHEMO ANTI-NEOPL SQ/IM: CPT | Performed by: NURSE PRACTITIONER

## 2019-03-26 PROCEDURE — 96402 CHEMO HORMON ANTINEOPL SQ/IM: CPT | Performed by: NURSE PRACTITIONER

## 2019-03-26 PROCEDURE — 99999 PR OFFICE/OUTPT VISIT,PROCEDURE ONLY: CPT | Performed by: NURSE PRACTITIONER

## 2019-04-01 ENCOUNTER — TELEPHONE (OUTPATIENT)
Dept: UROLOGY | Age: 84
End: 2019-04-01

## 2019-04-01 DIAGNOSIS — E63.9 NUTRITIONAL DEFICIENCY, UNSPECIFIED: Primary | ICD-10-CM

## 2019-04-01 DIAGNOSIS — C61 PROSTATE CANCER (HCC): ICD-10-CM

## 2019-04-01 NOTE — TELEPHONE ENCOUNTER
Henrietta Craig referred Cynthia Cardenas to the Diabetic clinic for Nutrition needs for prostate cancer. I spoke with them and they said that they do deal with patients who have prostate cancer needing nutrition education. I spoke to [facility] and she said that there is not anyone at the [facility] center that is doing this right now. If you need me to do anything else please let me know.  Thank you

## 2019-04-02 ENCOUNTER — TELEPHONE (OUTPATIENT)
Dept: UROLOGY | Age: 84
End: 2019-04-02

## 2019-04-02 NOTE — TELEPHONE ENCOUNTER
India Woods daughter called and said that her dad already has seen a nutritionist over at [facility]. They are trying what she suggested to help get calories in him. I will cancel the referral per the daughter.

## 2019-04-08 ENCOUNTER — HOSPITAL ENCOUNTER (OUTPATIENT)
Dept: RADIATION ONCOLOGY | Age: 84
Discharge: HOME | End: 2019-04-08
Payer: MEDICARE

## 2019-04-08 PROCEDURE — 99211 OFF/OP EST MAY X REQ PHY/QHP: CPT

## 2019-04-08 NOTE — PROGRESS NOTES
[facility], [address] [place]  Phone: [phone]   Toll Free: [phone]   Fax: [phone]    RADIATION ONCOLOGY FOLLOW UP REPORT    PATIENT NAME:  Lisa Flowers     : 1926  MEDICAL RECORD NO: [id number]    LOCATION: [facility] NO: [id number]      PROVIDER: JAILYN Lambert CNP        DATE OF SERVICE: 2019    FOLLOW UP PHYSICIANS: Dr. Alphonse Jenkins, Dr. Brayden Holly, Dr. Aureliano Figueroa, Dr. Oswaldo Duval. DIAGNOSIS:   1. C61: Malignant neoplasm of the prostate, high-grade; clinical T3b N1 M1b, Stage IVB; PSA at diagnosis 1,594.    2. C79.51: Secondary malignant neoplasm of bone    DATE OF DIAGNOSIS:   1. 2019  2. 2018- found on CT scan    END OF TREATMENT DATE: 3/1/2019    ECOG PERFORMANCE STATUS: 1    PAIN: Denies    CHAPERONE: Daughter      HPI:  Honey Castleman has a previous history of bladder stones requiring urology intervention. Shobha Figueroa was initially followed by Dr. Hanane Maher, and then after that particular's alf he was followed by various nurse practitioners in urology.  In 2017, he was being followed for urinary hesitancy.  A PSA was obtained, and was elevated at 25.85.  The patient's daughter was advised of the PSA and the possibility of prostate cancer.  Per discussion with nurse practitioner staff, the decision was to not pursue the elevated PSA further, and to not obtain any additional PSA tests, as Honey Castleman was declining in health. CT scans at that time showed an enlarged and heterogeneous prostate, and there was also some pelvic lymphadenopathy present.  Mr. Caldwell's daughter recounted that earlier this summer, he was mowing his lawn on his lawn tractor, which involves a fair amount of being jounced up and down. Shobha Figueroa reported increased mid to low back pain after that incident.      Honey Castleman was admitted to the hospital 18 with complaints of abdominal pain and clinical examination consistent with peritonitis. CT imaging showed a tiny amount of free abdominal air anterior to the liver as well as loops of dilated small and large bowel.  The scans also continued to show a markedly enlarged prostate with CT evidence suggesting a soft tissue mass involving the right seminal vesicle and enlargement of the previously seen pelvic lymphadenopathy. Bárbara Crook also were numerous sclerotic lesions in the visualized skeletal structures, and there was evidence of compression fracture in the upper lumbar spine. Deana Mariscal underwent exploratory laparotomy, and an obvious injury/perforation was not identified. Bárbara Crook was an area of small bowel in the right lower quadrant with fibrosis and tethering suggestive of inflammatory response, possibly related to the current abdominal symptoms.      A repeat PSA was obtained, and was profoundly elevated at over 1500. He was initiated on Casodex and received recommendation for biopsy as an outpatient. He spent time in TCU for therapy prior to going home. He had an excellent biochemical response and initially had improvement with his bone pain after starting Casodex. He underwent prostate biopsy on 1/24/19 which was positive for high grade prostatic carcinoma. Unfortunately a few days after the biopsy he was home and getting up from his recliner and developed sudden back pain. He seen his PCP on 1/29/19 and CT Thorax was ordered. This was completed on 1/30/19 which returned showing pathologic vertebral fractures. He was subsequently admitted to the hospital for further management and evaluation.      He was admitted to the hospital on 2/5/19 presenting with bilateral leg swelling, fatigue, and back pain. Further work up showed   acute to subacute pathologic compression fracture deformity of the T8 vertebral body with approximately 60% loss of height and mild surrounding soft tissue swelling. Retropulsion of bone is also noted which results in mild spinal canal narrowing.  T4 vertebral body which likely corresponds to an age indeterminant pathologic compression fracture. Pathologic compression fracture deformities are also present at L1 and L2 which were seen on the prior MRI. Ibis Heath underwent neurosurgical evaluation and was agreeable to undergoing radiofrequency ablation and balloon kyphoplasty for the T6 and T8 lesions. He also received a recommendation for radiation therapy to the sites of thoracic spine disease (T4, T6 and T8) to assist in maintaining local control of disease in the thoracic spine for which he was agreeable. Ibis Heath tolerated radiation therapy very well throughout the majority of his treatment course. As he was nearing completion of therapy, with about 2 fractions remaining he developed acute onset of esophagitis pain. He had a brief treatment rest, and was initiated on symptomatic management with MLB solution. He then returned and completed his treatments without additional problems. He also had a mild skin reaction on the posterior thorax. There were no overtly unexpected complications due to radiation therapy. INTERVAL HISTORY: Ibis Heath returns to [address] [place] [address] 160 today for a post treatment follow up after undergoing radiofrequency ablation and balloon kyphoplasty for the T6 and T8 lesions as well as EBRT to T3-9. Since completing treatment and starting on Casodex, Mariam Grams and Jose Mulligan he admits to improved pain and mobility. Daughter stated they spoke with Urology end of Agustín winston asked to hold the Casodex for a while due to him not eating well. Since they stopped it his appetite has improved and his continues to supplement with boost. He is down to taking 1.5 tramadol per day and 1 extra strength tylenol which is effective at controlling his discomfort. He underwent cystoscopy on 3/11/19 which daughter states was negative. Recent PSA on 3/21/19 shows a further decline in his PSA from February when it was 2.12.  Ibis Heath states he does not have pain in the area oriented x3 in no acute distress. VITAL SIGNS:  Weight 54.7kg, temperature: 36.5 celsius, pulse 97, respirations 18, /69, p ox 91% room air. HEENT: Normocephalic, atraumatic. PERRL. EOMI. Ears, nose and lips are within normal limits on external examination. Oropharynx unremarkable. NECK: Without palpable adenopathy. LYMPH: Without palpable supraclavicular or axillary adenopathy. LUNGS: Clear without adventitious sounds. Respirations easy and unlabored. HEART: Regular rate and rhythm without murmur. ABDOMEN: Soft, flat, nontender. Active bowel sounds x4. EXTREMITIES: Without clubbing or cyanosis. No edema noted. NEUROLOGIC EXAMINATION: Cranial nerves grossly intact. ASSESSMENT AND PLAN: Arlene Faith was diagnosed with metastatic prostate cancer. He was found to have multiple skeletal sclerotic metastases with pathologic compression fractures of L1-L3, enlarged heterogeneous prostate, and pelvic adenopathy on 12/17/18 when admitted to the hospital for abdominal pain. PSA was  found to be 1594. Arlene Faith was started on Casodex 12/20/18. Pt recovered on TCU 12/24/19-1/10/19. He underwent TRUS prostate biopsy 1/24/19 by Dr. Gregorio Stern with findings of high grade prostatic adenocarcinoma. Arlene Faith was admitted back to the hospital 2/5/19-2/15/19 and treated for acute PE and DVT and pathologic T6 and T8 fractures. He underwent T6 and T8 vertebral bone biopsy. Dr. Rohit Luevano performed RFA for spine metastatic lesion at the levels of T6 and T8, and vertebral augmentation for T6 and T8 on 2/13/19. He then underwent palliative radiation to T3-T9. He started [place] on 2/26/19. Since undergoing treatment he has seen a dramatic decline in his PSA to 1.33. He has also had a great improvement with pain and with his quality of life. He recently stopped taking the Casodex due to not eating well, since doing this he has had improvement with his appetite.  He is working with Urology on possibly restarting it. Cystoscopy on 3/11/19 was negative. His next PSA is scheduled to be drawn in April. Plan:  1. Improved pain and quality of life since completing treatment. 2. Casodex on hold for now due to not having an appetite while being on it. Appetite has improved since stopping the medication. He is working with Urology on when to restart this. 3. Continues on [place]  4. Follow up with other providers as scheduled.    5. Follow up here on a PRN basis as per daughter and patient request.         Electronically signed by JAILYN Snell CNP on 4/8/19 at 3:48 PM

## 2019-04-09 NOTE — TELEPHONE ENCOUNTER
Rukhsana Draper called requesting a refill of the below medication which has been pended for you:     Requested Prescriptions     Pending Prescriptions Disp Refills    apixaban (ELIQUIS) 5 MG TABS tablet 180 tablet 1     Sig: Take 1 tablet by mouth 2 times daily       Last Appointment Date: 3/13/2019  Next Appointment Date: 9/16/2019    No Known Allergies     Rukhsana Draper called and said that she had asked [facility] for a refill on Friday. She called this morning and said that they told her they have not heard from us. She needs a dose for tonight to give to the pt.

## 2019-04-16 ENCOUNTER — TELEPHONE (OUTPATIENT)
Dept: FAMILY MEDICINE CLINIC | Age: 84
End: 2019-04-16

## 2019-04-16 RX ORDER — AMOXICILLIN AND CLAVULANATE POTASSIUM 875; 125 MG/1; MG/1
1 TABLET, FILM COATED ORAL 2 TIMES DAILY
Qty: 20 TABLET | Refills: 0 | Status: SHIPPED | OUTPATIENT
Start: 2019-04-16 | End: 2019-04-26

## 2019-04-18 ENCOUNTER — TELEPHONE (OUTPATIENT)
Dept: FAMILY MEDICINE CLINIC | Age: 84
End: 2019-04-18

## 2019-04-18 ENCOUNTER — OFFICE VISIT (OUTPATIENT)
Dept: FAMILY MEDICINE CLINIC | Age: 84
End: 2019-04-18

## 2019-04-18 ENCOUNTER — HOSPITAL ENCOUNTER (OUTPATIENT)
Dept: GENERAL RADIOLOGY | Age: 84
Discharge: HOME | End: 2019-04-18
Payer: MEDICARE

## 2019-04-18 ENCOUNTER — HOSPITAL ENCOUNTER (OUTPATIENT)
Age: 84
Discharge: HOME | End: 2019-04-18
Payer: MEDICARE

## 2019-04-18 VITALS
HEIGHT: 67 IN | SYSTOLIC BLOOD PRESSURE: 106 MMHG | BODY MASS INDEX: 18.65 KG/M2 | WEIGHT: 118.8 LBS | RESPIRATION RATE: 20 BRPM | DIASTOLIC BLOOD PRESSURE: 68 MMHG | HEART RATE: 104 BPM

## 2019-04-18 DIAGNOSIS — C79.51 PROSTATE CANCER METASTATIC TO BONE (HCC): ICD-10-CM

## 2019-04-18 DIAGNOSIS — J44.9 CHRONIC OBSTRUCTIVE PULMONARY DISEASE, UNSPECIFIED COPD TYPE (HCC): Primary | ICD-10-CM

## 2019-04-18 DIAGNOSIS — J44.9 CHRONIC OBSTRUCTIVE PULMONARY DISEASE, UNSPECIFIED COPD TYPE (HCC): ICD-10-CM

## 2019-04-18 DIAGNOSIS — C61 PROSTATE CANCER METASTATIC TO BONE (HCC): ICD-10-CM

## 2019-04-18 DIAGNOSIS — R63.0 ANOREXIA: ICD-10-CM

## 2019-04-18 PROCEDURE — 71046 X-RAY EXAM CHEST 2 VIEWS: CPT

## 2019-04-18 PROCEDURE — 4040F PNEUMOC VAC/ADMIN/RCVD: CPT | Performed by: EMERGENCY MEDICINE

## 2019-04-18 PROCEDURE — 1123F ACP DISCUSS/DSCN MKR DOCD: CPT | Performed by: EMERGENCY MEDICINE

## 2019-04-18 PROCEDURE — 1036F TOBACCO NON-USER: CPT | Performed by: EMERGENCY MEDICINE

## 2019-04-18 PROCEDURE — G8598 ASA/ANTIPLAT THER USED: HCPCS | Performed by: EMERGENCY MEDICINE

## 2019-04-18 PROCEDURE — G8420 CALC BMI NORM PARAMETERS: HCPCS | Performed by: EMERGENCY MEDICINE

## 2019-04-18 PROCEDURE — G8427 DOCREV CUR MEDS BY ELIG CLIN: HCPCS | Performed by: EMERGENCY MEDICINE

## 2019-04-18 PROCEDURE — 99214 OFFICE O/P EST MOD 30 MIN: CPT | Performed by: EMERGENCY MEDICINE

## 2019-04-18 RX ORDER — AZITHROMYCIN 250 MG/1
TABLET, FILM COATED ORAL
Qty: 1 PACKET | Refills: 0 | Status: SHIPPED | OUTPATIENT
Start: 2019-04-18 | End: 2019-04-28

## 2019-04-18 RX ORDER — PREDNISONE 1 MG/1
TABLET ORAL
Qty: 21 TABLET | Refills: 0 | Status: SHIPPED | OUTPATIENT
Start: 2019-04-18 | End: 2019-04-30 | Stop reason: ALTCHOICE

## 2019-04-26 ENCOUNTER — HOSPITAL ENCOUNTER (OUTPATIENT)
Age: 84
Discharge: HOME | End: 2019-04-26
Payer: MEDICARE

## 2019-04-26 DIAGNOSIS — E83.51 HYPOCALCEMIA: ICD-10-CM

## 2019-04-26 DIAGNOSIS — Z51.81 THERAPEUTIC DRUG MONITORING: ICD-10-CM

## 2019-04-26 DIAGNOSIS — C61 PROSTATE CANCER (HCC): ICD-10-CM

## 2019-04-26 LAB
CALCIUM IONIZED: 1.06 MMOL/L (ref 1.12–1.32)
PROSTATE SPECIFIC ANTIGEN: 0.62 NG/ML (ref 0–1)

## 2019-04-26 PROCEDURE — 82330 ASSAY OF CALCIUM: CPT

## 2019-04-26 PROCEDURE — 84153 ASSAY OF PSA TOTAL: CPT

## 2019-04-26 PROCEDURE — 84403 ASSAY OF TOTAL TESTOSTERONE: CPT

## 2019-04-26 PROCEDURE — 36415 COLL VENOUS BLD VENIPUNCTURE: CPT

## 2019-04-28 LAB — TESTOSTERONE TOTAL: < 3 NG/DL (ref 300–720)

## 2019-04-30 ENCOUNTER — OFFICE VISIT (OUTPATIENT)
Dept: UROLOGY | Age: 84
End: 2019-04-30
Payer: MEDICARE

## 2019-04-30 ENCOUNTER — OFFICE VISIT (OUTPATIENT)
Dept: PULMONOLOGY | Age: 84
End: 2019-04-30
Payer: MEDICARE

## 2019-04-30 VITALS
BODY MASS INDEX: 18.36 KG/M2 | DIASTOLIC BLOOD PRESSURE: 78 MMHG | SYSTOLIC BLOOD PRESSURE: 114 MMHG | WEIGHT: 117 LBS | HEIGHT: 67 IN

## 2019-04-30 VITALS
WEIGHT: 119.4 LBS | BODY MASS INDEX: 18.74 KG/M2 | OXYGEN SATURATION: 93 % | TEMPERATURE: 96.3 F | DIASTOLIC BLOOD PRESSURE: 60 MMHG | HEIGHT: 67 IN | SYSTOLIC BLOOD PRESSURE: 102 MMHG | HEART RATE: 79 BPM

## 2019-04-30 DIAGNOSIS — E83.51 HYPOCALCEMIA: ICD-10-CM

## 2019-04-30 DIAGNOSIS — C61 PROSTATE CANCER (HCC): ICD-10-CM

## 2019-04-30 DIAGNOSIS — J43.9 PULMONARY EMPHYSEMA, UNSPECIFIED EMPHYSEMA TYPE (HCC): ICD-10-CM

## 2019-04-30 DIAGNOSIS — R05.9 COUGH: Primary | ICD-10-CM

## 2019-04-30 DIAGNOSIS — R13.10 DYSPHAGIA, UNSPECIFIED TYPE: ICD-10-CM

## 2019-04-30 DIAGNOSIS — Z51.81 THERAPEUTIC DRUG MONITORING: ICD-10-CM

## 2019-04-30 DIAGNOSIS — C61 PROSTATE CANCER (HCC): Primary | ICD-10-CM

## 2019-04-30 LAB
BILIRUBIN URINE: NEGATIVE
BLOOD URINE, POC: ABNORMAL
CHARACTER, URINE: CLEAR
COLOR, URINE: YELLOW
GLUCOSE URINE: NEGATIVE MG/DL
KETONES, URINE: NEGATIVE
LEUKOCYTE CLUMPS, URINE: NEGATIVE
NITRITE, URINE: NEGATIVE
PH, URINE: 8.5 (ref 5–9)
PROTEIN, URINE: 30 MG/DL
SPECIFIC GRAVITY, URINE: 1.01 (ref 1–1.03)
UROBILINOGEN, URINE: 0.2 EU/DL (ref 0–1)

## 2019-04-30 PROCEDURE — G8598 ASA/ANTIPLAT THER USED: HCPCS | Performed by: NURSE PRACTITIONER

## 2019-04-30 PROCEDURE — 1123F ACP DISCUSS/DSCN MKR DOCD: CPT | Performed by: NURSE PRACTITIONER

## 2019-04-30 PROCEDURE — 99214 OFFICE O/P EST MOD 30 MIN: CPT | Performed by: NURSE PRACTITIONER

## 2019-04-30 PROCEDURE — G8926 SPIRO NO PERF OR DOC: HCPCS | Performed by: NURSE PRACTITIONER

## 2019-04-30 PROCEDURE — G8420 CALC BMI NORM PARAMETERS: HCPCS | Performed by: NURSE PRACTITIONER

## 2019-04-30 PROCEDURE — G8427 DOCREV CUR MEDS BY ELIG CLIN: HCPCS | Performed by: NURSE PRACTITIONER

## 2019-04-30 PROCEDURE — 3023F SPIROM DOC REV: CPT | Performed by: NURSE PRACTITIONER

## 2019-04-30 PROCEDURE — 96402 CHEMO HORMON ANTINEOPL SQ/IM: CPT | Performed by: NURSE PRACTITIONER

## 2019-04-30 PROCEDURE — 4040F PNEUMOC VAC/ADMIN/RCVD: CPT | Performed by: NURSE PRACTITIONER

## 2019-04-30 PROCEDURE — 1036F TOBACCO NON-USER: CPT | Performed by: NURSE PRACTITIONER

## 2019-04-30 PROCEDURE — 95012 NITRIC OXIDE EXP GAS DETER: CPT | Performed by: NURSE PRACTITIONER

## 2019-04-30 RX ORDER — ALBUTEROL SULFATE 2.5 MG/3ML
2.5 SOLUTION RESPIRATORY (INHALATION) EVERY 6 HOURS PRN
Qty: 120 VIAL | Refills: 11
Start: 2019-04-30 | End: 2019-05-06 | Stop reason: SDUPTHER

## 2019-04-30 NOTE — PROGRESS NOTES
Benny Gutierrez is a 80 y.o. male seen in follow up for prostate cancer. Mr. Qasim Andre has a hx of PSA 1/31/17 was 25 at which time family was notified of risk for prostate cancer but did not wish to pursue further testing. CT of the abdomen and pelvis performed for abdominal pain 12/17/18 noted multiple skeletal sclerotic metastases with pathologic compression fractures of L1-L3, enlarged hterogeneous prostate, and pelvic adenopathy. PSA performed 12/17/18 was 1,594. Pt was started on Casodex 12/20/18. Pt recovered on TCU 12/24/19-1/10/19. He underwent TRUS prostate biopsy 1/24/19 by Dr. Kristy Cedillo with findings of high grade prostatic adenocarcinoma. Pt was scheduled for review of biopsy results 2/11/19 however pt was admitted to the hospital 2/5/19-2/15/19 and treated for acute PE and DVT and pathologic T6 and T8 fractures. He underwent T6 and T8 vertebral bone biopsy, RFA for spine metastatic lesion at the levels of T6 and T8, and vertebral augmentation (balloon kyphoplasty) for T6 and T8 on 2/13/19 by Dr. Torres Douglas. He has completed palliative radiation therapy. Pt started monthly Firmagon and Xgeva 2/26/19. Pt has reportedly been holding Casodex secondary to anorexia. Pt is on calcium carbonate and vitam D supplementation (600-400 mg) 1 tab BID. Ionized calcium 1.06 4/26/19 and vitamin D 27 3/21/19. Pt underwent cystoscopy in the office 3/11/19 by Dr. Kristy Cedillo with findings of a significantly obstructing prostate and mild bladder wall trabeculations and debris. Pt is feeling stronger. Reports he is urinating well without feeling of incomplete emptying. Pain improved. None today. Current Outpatient Medications   Medication Sig Dispense Refill    albuterol (PROVENTIL) (2.5 MG/3ML) 0.083% nebulizer solution Take 3 mLs by nebulization every 6 hours as needed for Wheezing or Shortness of Breath 120 vial 11    Misc.  Devices (ACAPELLA) MISC 1 Device by Does not apply route 4 times daily 1 each 0    Fluticasone-Umeclidin-Vilant (TRELEGY ELLIPTA) 100-62.5-25 MCG/INH AEPB Inhale 1 puff into the lungs daily 30 each 11    apixaban (ELIQUIS) 5 MG TABS tablet Take 1 tablet by mouth 2 times daily 180 tablet 1    lidocaine viscous (XYLOCAINE) 2 % solution       doxazosin (CARDURA) 4 MG tablet Take 1 tablet by mouth nightly 30 tablet 3    calcium carbonate-vitamin D (CALCIUM 600 + D) 600-400 MG-UNIT TABS per tab Take 1 tablet by mouth 2 times daily 60 tablet 5    Multiple Vitamins-Minerals (THERAPEUTIC MULTIVITAMIN-MINERALS) tablet Take 1 tablet by mouth daily      carvedilol (COREG) 3.125 MG tablet Take 1 tablet by mouth 2 times daily (with meals) 60 tablet 3    finasteride (PROSCAR) 5 MG tablet Take 1 tablet by mouth daily 30 tablet 3    tiZANidine (ZANAFLEX) 2 MG tablet Take 1 tablet by mouth every 8 hours as needed (spasm)      docusate sodium (COLACE, DULCOLAX) 100 MG CAPS Take 100 mg by mouth 2 times daily      acetaminophen (TYLENOL) 500 MG tablet Take 500 mg by mouth every 8 hours as needed for Pain      aspirin 325 MG EC tablet Take 1 tablet by mouth daily       No current facility-administered medications for this visit. Past Medical History  Linda Moise  has a past medical history of Blood circulation, collateral, CAD (coronary artery disease), CAD (coronary artery disease), Cancer ([address] CKD (chronic kidney disease) stage 2, GFR 60-89 ml/min, COPD (chronic obstructive pulmonary disease) ([address] Diverticulitis, DVT of lower extremity, bilateral ([address] GERD (gastroesophageal reflux disease), Hiatal hernia, Hyperlipemia, Hyperlipidemia, Other disorders of kidney and ureter in diseases classified elsewhere, Pleural plaque, Primary adenocarcinoma of prostate ([address] and Pulmonary emboli ([address] Past Surgical History  The patient  has a past surgical history that includes hernia repair; Appendectomy; Cardiac catheterization; eye surgery;  Cystocopy (03/17/2015); LAPAROTOMY

## 2019-04-30 NOTE — PROGRESS NOTES
Following [facility] plan of care. LUPRON 45 MG GIVEN I.M RIGHT UOQ HIP  Lot Number: [id number]  Expiration Date: 9/24/2021  [facility] #: [id number]    After Injection was given there were no reactions at injection site and patient was feeling well. Patient was notified that possible side effects from injections include: Redness, swelling and itching at the injection site. Possible side effects of androgen deprivation therapy, including hot flashes, flushing of the skin, increased weight, decreased sex drive, and difficulties with ED. Patient was instructed to call the office with any further questions or concerns. Date of last Calcium/Vit D level: 04/26/2019  Is patient on Calcium/Vit D replacement? YES  Date of last Dexa Scan: NA  Testosterone Level of 3 done on 4/28/19    Psa level of .62 done on 04/26/19    //Patient supplied their own medications No      Pt L[facility] therapy first initiated on 04/30/2019.

## 2019-04-30 NOTE — PROGRESS NOTES
[facility]    Patient: German Casanova, 80 y.o.   : 1926    Pt of Dr. Lawton Levels   Patient presents with    Cough     cough that has been going for quite awhile two rounds of antibiotics and a round prednisone,         HPI  Benita Johnson is here for follow up for at the request of his daughter for cough. He is known to our group when hospitalized in December for hypoxia in relation to right sided pleural effusion due to peritonitis. He has a history of severe COPD, followed by his PCP with FEV1 49%. He has a 20 pack year smoking history quitting in . He is currently on Stiolto with RAAD PRN, no home oxygen. He has prostate Cancer with metastasis to spine and underwent radiation treatment. At the time he had dysphagia with modified diet. Since completion, he has had persistent cough in which he describes as mucus getting caught in his throat. Sometimes productive of thick white to clear mucus. He was treated with 2 rounds of Augmentin and then most recently Z-pack with steroid taper with minimal improvement. CXR showed chronic findings, no acute infiltrates, small effusion. He has no associated nasal congestion, PND nor allergy symptoms. He had not been on PPI therapy. Reports improved dysphagia, occasional trouble with large pills. Back to regular diet. Cough is worse in the morning, sporadic throughout the day. No increased SOB, except with coughing spells. Progress History:   Since last visit any new medical issues? No   New ER or hospitlal visits? Yes Had Kyphoplasty and radiation to back  Any new or changes in medicines? No  Using inhalers? Yes Stiolto  Are they helpful?  No  Past Medical hx   PMH:  Past Medical History:   Diagnosis Date    Blood circulation, collateral     CAD (coronary artery disease)     CAD (coronary artery disease) 2014    Cancer [facility])     prostate to bone    CKD (chronic kidney disease) stage 2, GFR 60-89 ml/min 2018    COPD (chronic obstructive pulmonary disease) (HCC)     Diverticulitis     DVT of lower extremity, bilateral (HCC) 2019    GERD (gastroesophageal reflux disease)     Hiatal hernia     Hyperlipemia 2013    Hyperlipidemia     Other disorders of kidney and ureter in diseases classified elsewhere     Pleural plaque 2013    Primary adenocarcinoma of prostate ([facility] 2019    high grade    Pulmonary emboli ([facility] 2019     SURGICAL HISTORY:  Past Surgical History:   Procedure Laterality Date    ABDOMEN SURGERY      APPENDECTOMY      CARDIAC CATHETERIZATION      CYSTOSCOPY  2015    Litholapaxy-Large    EYE SURGERY      cataracts    HERNIA REPAIR      KYPHOSIS SURGERY N/A 2019    ABLATION AND KYPHOPLASTY AT T8 AND ABLATION AND KYPHOPLASTY AT T6 performed by Luisa Jo MD at [facility] N/A 2018    EXPLORATORY LAPAROTOMY, WITH ABDOMINAL WASHOUT performed by Chito Mojica MD at [address] [place] [address] Road:  Social History     Tobacco Use    Smoking status: Former Smoker     Packs/day: 1.00     Types: Cigarettes     Last attempt to quit: 1966     Years since quittin.8    Smokeless tobacco: Never Used   Substance Use Topics    Alcohol use: Yes     Comment: occasionally    Drug use: No     ALLERGIES:No Known Allergies  FAMILY HISTORY:History reviewed. No pertinent family history. CURRENT MEDICATIONS:  Current Outpatient Medications   Medication Sig Dispense Refill    albuterol (PROVENTIL) (2.5 MG/3ML) 0.083% nebulizer solution Take 3 mLs by nebulization every 6 hours as needed for Wheezing or Shortness of Breath 120 vial 11    Misc.  Devices (ACAPELLA) MISC 1 Device by Does not apply route 4 times daily 1 each 0    Fluticasone-Umeclidin-Vilant (TRELEGY ELLIPTA) 100-62.5-25 MCG/INH AEPB Inhale 1 puff into the lungs daily 30 each 11    apixaban (ELIQUIS) 5 MG TABS tablet Take 1 tablet by mouth 2 and atraumatic. Mouth/Throat: Oropharynx is clear and moist. No oral thrush. Neck: Neck supple. No tracheal deviation present. Cardiovascular: Regular rate, regular rhythm, S1 and S2 with no murmur. No peripheral edema. Pulmonary/Chest: Normal effort with diminished but clear breath sounds. No stridor. No respiratory distress. Abdominal: Soft. No distension or tenderness to palpation. Musculoskeletal: Moves all extremities. Lymphadenopathy:  No cervical adenopathy. Neurological: Patient is alert and oriented to person, place, and time. No focal deficits. Skin: Warm and dry. No clubbing or cyanosis. Test results   Lung Nodule Screening     [] Qualifies    [x] Does not qualify   [] Declined    [] Completed     4/18/19    1. Lungs hyperinflated and fibroemphysematous in appearance. Cardiac silhouette small in size. There are a few mild scattered sclerotic foci in the thoracic spine, consistent with history of prostate cancer. Evidence for kyphoplasty at 2 mid thoracic    fracture levels. There are also old mild compression fractures T4 T11 which were seen on prior MRI thoracic spine, 2/7/2019.   2. Questionable tiny effusion left side. Small fibrocalcified nodule right midlung, unchanged, likely a granuloma. 3. Small 3.1 cm nodule left lower lung field, not definitely seen previously. This could conceivably represent loculated fluid in the major fissure but other possibilities not excluded. CT thorax recommended for further evaluation.                            Assessment      Diagnosis Orders   1. Cough  POCT Nitric Oxide    FL Modified Barium Swallow W Video    Suspect silent aspiration   2. Dysphagia, unspecified type     3. Prostate cancer ([address]      With bone mets and recent radiation with associated dysphagia   4. Pulmonary emphysema, unspecified emphysema type ([address]           Plan       Although his NIOX is normal, will add ICS for possible RAD component.  Stop Stiolto and trial Trelegy 1 puff daily rinse and spit. (1 sample given with demonstration). Try holding Ventolin HFA and use Ventolin Neb up to 4 times per day PRN along with resumption of acapella to IS for pulmonary hygiene clearance. He shows no signs of acute infection or exacerbation. My suspicion is aspiration (possibly silent) from radiation treatment. Will obtain MBS. May need to consider PPI. Follow up after testing and evaluate response to ICS with further recommendations to follow.       Will see Deana Hagan back in: 2 weeks    Electronically signed by JAILYN Parks CNP on 4/30/2019 at 12:49 PM'

## 2019-05-02 ENCOUNTER — TELEPHONE (OUTPATIENT)
Dept: PULMONOLOGY | Age: 84
End: 2019-05-02

## 2019-05-02 NOTE — TELEPHONE ENCOUNTER
Florentino Mullen called in and stated that the insurance will pay for  Only Dx Dysphagia,  please make changes and she will contact pt.

## 2019-05-06 ENCOUNTER — TELEPHONE (OUTPATIENT)
Dept: PULMONOLOGY | Age: 84
End: 2019-05-06

## 2019-05-06 DIAGNOSIS — J44.9 CHRONIC OBSTRUCTIVE PULMONARY DISEASE, UNSPECIFIED COPD TYPE (HCC): Primary | ICD-10-CM

## 2019-05-06 DIAGNOSIS — C61 PROSTATE CANCER METASTATIC TO BONE (HCC): ICD-10-CM

## 2019-05-06 DIAGNOSIS — C79.51 PROSTATE CANCER METASTATIC TO BONE (HCC): ICD-10-CM

## 2019-05-06 RX ORDER — ALBUTEROL SULFATE 2.5 MG/3ML
2.5 SOLUTION RESPIRATORY (INHALATION) EVERY 6 HOURS PRN
Qty: 120 VIAL | Refills: 11 | Status: SHIPPED | OUTPATIENT
Start: 2019-05-06 | End: 2019-09-27 | Stop reason: SINTOL

## 2019-05-06 RX ORDER — TRAMADOL HYDROCHLORIDE 50 MG/1
TABLET ORAL
Qty: 90 TABLET | Refills: 0 | Status: SHIPPED | OUTPATIENT
Start: 2019-05-06 | End: 2019-06-10 | Stop reason: SDUPTHER

## 2019-05-06 NOTE — TELEPHONE ENCOUNTER
Yasir Caldwell's Albuterol neb solution RX and Trelegy was not submitted to [employer]. Please send to [address]. Thank you.

## 2019-05-07 ASSESSMENT — ENCOUNTER SYMPTOMS
RHINORRHEA: 0
WHEEZING: 0
SINUS PRESSURE: 0
ABDOMINAL PAIN: 0
VOMITING: 0
CHEST TIGHTNESS: 0
CONSTIPATION: 0
BACK PAIN: 0
SHORTNESS OF BREATH: 0
TROUBLE SWALLOWING: 0
VOICE CHANGE: 0
DIARRHEA: 0
COUGH: 1
NAUSEA: 0
SORE THROAT: 0

## 2019-05-09 ENCOUNTER — HOSPITAL ENCOUNTER (OUTPATIENT)
Dept: GENERAL RADIOLOGY | Age: 84
Discharge: HOME | End: 2019-05-09
Payer: MEDICARE

## 2019-05-09 DIAGNOSIS — R13.10 DYSPHAGIA, UNSPECIFIED TYPE: ICD-10-CM

## 2019-05-09 PROCEDURE — 6360000004 HC RX CONTRAST MEDICATION: Performed by: NURSE PRACTITIONER

## 2019-05-09 PROCEDURE — 92611 MOTION FLUOROSCOPY/SWALLOW: CPT

## 2019-05-09 PROCEDURE — 2500000003 HC RX 250 WO HCPCS: Performed by: NURSE PRACTITIONER

## 2019-05-09 PROCEDURE — 74230 X-RAY XM SWLNG FUNCJ C+: CPT

## 2019-05-09 PROCEDURE — A4641 RADIOPHARM DX AGENT NOC: HCPCS | Performed by: NURSE PRACTITIONER

## 2019-05-09 RX ADMIN — BARIUM SULFATE 1 TABLET: 700 TABLET ORAL at 11:23

## 2019-05-09 RX ADMIN — BARIUM SULFATE 80 ML: 0.81 POWDER, FOR SUSPENSION ORAL at 11:14

## 2019-05-09 RX ADMIN — BARIUM SULFATE 20 ML: 400 PASTE ORAL at 11:14

## 2019-05-09 RX ADMIN — BARIUM SULFATE 40 ML: 400 SUSPENSION ORAL at 11:14

## 2019-05-09 NOTE — PROGRESS NOTES
[address]  Modified Barium Swallow    SLP Individual Minutes  Time In: 1100  Time Out: 1130  Minutes: 30  Timed Code Treatment Minutes: 0 Minutes       [x] [facility]      Date: 2019  Patient Name: Beatriz Cedeno      CSN: [id number]   : 1926  (80 y.o.)  Gender: male   Referring Physician:  Kasandra Denver, ARPN-CNP   Diagnosis: Dysphagia  Secondary Diagnosis:  Dysphagia   Date of Last MBS:  N/a   Diet:  General with thin   History of Present Illness/Injury: Patient seen within the OP setting; MBS specifically ordered by Kasandra Denver APRN-CNP \"to evaluate swallowing function d/t dysphagia and r/o potential aspiration, including silent d/t abnormal CT chest.\" Patient's daughter reporting, \"patient with hx of radiation d/t prostate cancer and intermittent dysphagia following radiation. \" Patient and complete deny need to modify diet at this time; \"although pills are extremely difficulty. \" MBS to be completed and determine safety of PO diet level and need for further ST services. Patient  has a past medical history of Blood circulation, collateral, CAD (coronary artery disease), CAD (coronary artery disease), Cancer ([address] CKD (chronic kidney disease) stage 2, GFR 60-89 ml/min, COPD (chronic obstructive pulmonary disease) ([address] Diverticulitis, DVT of lower extremity, bilateral ([address] GERD (gastroesophageal reflux disease), Hiatal hernia, Hyperlipemia, Hyperlipidemia, Other disorders of kidney and ureter in diseases classified elsewhere, Pleural plaque, Primary adenocarcinoma of prostate ([address] and Pulmonary emboli ([address] \",\"Please see medical history questionnaire for information related to prior medical history, allergies and medications\"}  Pain:  0/10    Current Diet: Regular with thin     Respiratory Status: [x] Independent     Behavioral Observation: [x] Alert [x] Oriented [x] Other: Fair-good mobility     PATIENT WAS EVALUATED USING: Thin via cup and straw, nectar thick liquids via cup and straw, puree, soft solids (peaches), hard solids (cracker), 1/2 barium pill with thin liquids via cup     ORAL PREPARATION PHASE: [] WFL  [x] Impaired   [x] Slow mastication-mild [] Uncoordinated mastication [] Decreased bolus control   [] Decreased bolus formation [] Loss of bolus from lips / Anterior spillage   [] OTHER:    ORAL PHASE: [x] WFL  [] Impaired   [] Residue in the anterior sulcus [] Residue in the lateral sulcus - right   [] Residue in the lateral sulcus - left [] Uncoordinated AP movement   [] Slow AP movement   [] Decreased lingual elevation   [] Decreased tongue base retraction [] Uncontrolled bolus / diffuse fall lover tongue base   [] Piecemeal deglutition    [] OTHER:      ORAL PHASE AISLINN SCORE: (Dysphagia outcome and severity scale)  [x] 6 = WFL / Mod I; Normal diet; May have mild oral delay    PHARYNGEAL PHASE: [] WFL  [x] Impaired   [] Absent Swallow  [] Delayed Swallow [x] Decreased airway protection   [x] Decreased epiglottic inversion [x] Decreased hyolaryngeal elevation   [x] Residue in the valleculae-mild [] Residue in the pyriform sinus    [] Cricopharyngeal dysfunction  [x] Residue along posterior pharyngeal wall   [] Residue along the ariepiglottic folds. [] OTHER:    PHARYNGEAL PHASE AISLINN SCORE: (Dysphagia outcome and severity scale)  [x] 4 = Mild - Moderate dysphagia: One or two consistencies restricted; May exhibit one or more of the following:  Residue clears with cue; Aspiration of one consistency with weak or no reflexive cough; Laryngeal penetration to the vocal cords with cough with two consistencies; Laryngeal penetration to the vocal cords without cough on one consistency.       SIGNS AND SYMPTOMS OF LARYNGEAL PENETRATION / ASPIRATION:  [x] Audible aspiration evident with: thin liquids via straw     PENETRATION-ASPIRATION SCALE (PAS):  [x] 7 = Material enters the airway, passes below the vocal folds, and is not ejected from the trachea despite effort    ESOPHAGEAL PHASE:   [x] No significant findings  [x] See Radiology Report for details  [] Recommend further esophageal testing    ATTEMPTED TECHNIQUES:      Comments:  [x]Small bolus size [x] Effective [] Not Effective    [x]Straw [] Effective [x] Not Effective POOR coordination; resulting in aspiration    [x]Cup [x] Effective [] Not Effective    [x]Chin tuck [] Effective [x] Not Effective POOR coordination    [x] Spontaneous cough [x] Partially Effective [] Not Effective      DIAGNOSTIC IMPRESSIONS:  Patient with oral phase to be mildly delayed secondary to slow mastication; however, overall functional with dentures in place and additional time. Patient presents with mild-moderate pharyngeal dysphagia as evidence by the findings outlined above. Patient with consistent premature spillage to the level of the vallecula. Patient with good hyolaryngeal elevation with limited hyolaryngeal protrusion and  fair thryo-hoyid approximation overall resulting in decreased epiglottic inversion and poor airway protection with thin and nectar thick liquids via straw. Patient with consistent aspiration with thin liquids via straw and flash penetration of nectar thick liquids via straw; spontaneous cough noted, patient able to partially clear material. NO penetration or aspiration noted with multiple PO trials of consecutive cup drinks of thin liquids and all textures completed. Patient also with decreased pharyngeal constriction and limited UES opening resulting in mild pharyngeal residue within the vallecular space and along the posterior pharyngeal wall; patient able to clear material to trace amounts provided verbal cues to initiate \"dry swallow\" and thin liquid wash. Due to specific c/o \"trouble with pills\" patient did successfully complete PO trial of 1/2 barium pill with thin liquids via cup; no difficulty or aspiration noted.   ST attempted chin tuck maneuver to potentially enhance overall airway protection; however, chin tuck to be ineffective secondary to POOR coordination within execution of strategy. Recommend regular diet with thin liquids, NO STRAWS. Education provided to patient and patient's daughter re: review of anatomy physiology of the swallow, diet recommendations and strong recommendations to avoid all straws with recommendation of [facility] services to follow. Patient and daughter verbalized understanding and agreement       DIET RECOMMENDATIONS:  Regular with thin liquids, NO STRAWS     STRATEGIES:   Full Upright position, ABSOLUTELY NO STRAWS. EDUCATION:   Learner: [x]Patient [] Significant other [x] Daughter [] Parent     [] Other:   Education: [x] Reviewed results and recommendations of this evaluation     [x] Reviewed diet and strategies     [x] Reviewed signs, symptoms and risk of aspiration     [] Demonstrated how to thick liquid appropriately. [x] Reviewed goals and Plan of Care     [] OTHER:   Method: [x] Discussion [x] Demonstration [] Hand-out     [x] OTHER: [facility]    Evaluation of Education:     [x] Verbalizes understanding [x] Demonstrates with assistance     [] Demonstrates without assistance []Needs further instruction     [] No evidence of learning  [] Family not present    PATIENT GOALS: [] Pt did not state; Will further assess during treatment. [] Return to the least restricted diet possible     [x] Return to previous level of function     [] OTHER:    PLAN / TREATMENT RECOMMENDATIONS:  [x] Skilled SLP intervention recommend with the Home Health setting. Patient's daughter specifically requesting home health ST through [facility] as they are already established with [facility] nursing services.   Skilled dysphagia intervention recommended; duration and frequency to be determined by evaluating and treating SLP       ANA ROSA Salas

## 2019-05-10 NOTE — TELEPHONE ENCOUNTER
Karla Wyatt also stated that since Ewa Lau has been using Trelegy he was having itching especially on his back. Wondered if this could be from using the Trelegy. Started about 3 days after usage. Ewa Lau is scheduled for follow up this Monday, 5/13/19 and will discuss with you further. Thank you.

## 2019-05-13 ENCOUNTER — OFFICE VISIT (OUTPATIENT)
Dept: PULMONOLOGY | Age: 84
End: 2019-05-13
Payer: MEDICARE

## 2019-05-13 ENCOUNTER — TELEPHONE (OUTPATIENT)
Dept: PULMONOLOGY | Age: 84
End: 2019-05-13

## 2019-05-13 VITALS
WEIGHT: 122.4 LBS | SYSTOLIC BLOOD PRESSURE: 122 MMHG | RESPIRATION RATE: 20 BRPM | BODY MASS INDEX: 19.21 KG/M2 | HEIGHT: 67 IN | HEART RATE: 102 BPM | OXYGEN SATURATION: 94 % | DIASTOLIC BLOOD PRESSURE: 74 MMHG | TEMPERATURE: 98.6 F

## 2019-05-13 DIAGNOSIS — J44.9 STAGE 3 SEVERE COPD BY GOLD CLASSIFICATION (HCC): ICD-10-CM

## 2019-05-13 DIAGNOSIS — R13.10 DYSPHAGIA, UNSPECIFIED TYPE: ICD-10-CM

## 2019-05-13 DIAGNOSIS — T17.908D ASPIRATION INTO AIRWAY, SUBSEQUENT ENCOUNTER: ICD-10-CM

## 2019-05-13 DIAGNOSIS — J43.9 PULMONARY EMPHYSEMA, UNSPECIFIED EMPHYSEMA TYPE (HCC): ICD-10-CM

## 2019-05-13 DIAGNOSIS — R05.9 COUGH: Primary | ICD-10-CM

## 2019-05-13 PROCEDURE — G8427 DOCREV CUR MEDS BY ELIG CLIN: HCPCS | Performed by: NURSE PRACTITIONER

## 2019-05-13 PROCEDURE — 1123F ACP DISCUSS/DSCN MKR DOCD: CPT | Performed by: NURSE PRACTITIONER

## 2019-05-13 PROCEDURE — G8420 CALC BMI NORM PARAMETERS: HCPCS | Performed by: NURSE PRACTITIONER

## 2019-05-13 PROCEDURE — 1036F TOBACCO NON-USER: CPT | Performed by: NURSE PRACTITIONER

## 2019-05-13 PROCEDURE — G8926 SPIRO NO PERF OR DOC: HCPCS | Performed by: NURSE PRACTITIONER

## 2019-05-13 PROCEDURE — G8598 ASA/ANTIPLAT THER USED: HCPCS | Performed by: NURSE PRACTITIONER

## 2019-05-13 PROCEDURE — 99213 OFFICE O/P EST LOW 20 MIN: CPT | Performed by: NURSE PRACTITIONER

## 2019-05-13 PROCEDURE — 4040F PNEUMOC VAC/ADMIN/RCVD: CPT | Performed by: NURSE PRACTITIONER

## 2019-05-13 PROCEDURE — 3023F SPIROM DOC REV: CPT | Performed by: NURSE PRACTITIONER

## 2019-05-13 NOTE — PROGRESS NOTES
inhalers? Yes Ventolin  Are they helpful? 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    CKD (chronic kidney disease) stage 2, GFR 60-89 ml/min 2018    COPD (chronic obstructive pulmonary disease) (HCC)     Diverticulitis     DVT of lower extremity, bilateral ([address] 2019    GERD (gastroesophageal reflux disease)     Hiatal hernia     Hyperlipemia 2013    Hyperlipidemia     Other disorders of kidney and ureter in diseases classified elsewhere     Pleural plaque 2013    Primary adenocarcinoma of prostate ([facility] 2019    high grade    Pulmonary emboli ([facility] 2019     SURGICAL HISTORY:  Past Surgical History:   Procedure Laterality Date    ABDOMEN SURGERY      APPENDECTOMY      CARDIAC CATHETERIZATION      CYSTOSCOPY  2015    Litholapaxy-Large    EYE SURGERY      cataracts    HERNIA REPAIR      KYPHOSIS SURGERY N/A 2019    ABLATION AND KYPHOPLASTY AT T8 AND ABLATION AND KYPHOPLASTY AT T6 performed by Byron Cook MD at [address] [place] [address] N/A 2018    EXPLORATORY LAPAROTOMY, WITH ABDOMINAL WASHOUT performed by Stuart Decker MD at [address] [place] [address] Road:  Social History     Tobacco Use    Smoking status: Former Smoker     Packs/day: 1.00     Types: Cigarettes     Last attempt to quit: 1966     Years since quittin.9    Smokeless tobacco: Never Used   Substance Use Topics    Alcohol use: Yes     Comment: occasionally    Drug use: No     ALLERGIES:No Known Allergies  FAMILY HISTORY:No family history on file.   CURRENT MEDICATIONS:  Current Outpatient Medications   Medication Sig Dispense Refill    Tiotropium Bromide-Olodaterol (STIOLTO RESPIMAT) 2.5-2.5 MCG/ACT AERS Inhale 2 puffs into the lungs daily 3 Inhaler 3    traMADol (ULTRAM) 50 MG tablet TAKE 1 TABLET BY MOUTH EVERY 8 HOURS AS NEEDED FOR PAIN 90 tablet 0    albuterol (PROVENTIL) (2.5 MG/3ML) 0.083% nebulizer solution Take 3 mLs by nebulization every 6 hours as needed for Wheezing or Shortness of Breath 120 vial 11    Misc. Devices (ACAPELLA) MISC 1 Device by Does not apply route 4 times daily 1 each 0    apixaban (ELIQUIS) 5 MG TABS tablet Take 1 tablet by mouth 2 times daily 180 tablet 1    lidocaine viscous (XYLOCAINE) 2 % solution       doxazosin (CARDURA) 4 MG tablet Take 1 tablet by mouth nightly 30 tablet 3    calcium carbonate-vitamin D (CALCIUM 600 + D) 600-400 MG-UNIT TABS per tab Take 1 tablet by mouth 2 times daily 60 tablet 5    Multiple Vitamins-Minerals (THERAPEUTIC MULTIVITAMIN-MINERALS) tablet Take 1 tablet by mouth daily      carvedilol (COREG) 3.125 MG tablet Take 1 tablet by mouth 2 times daily (with meals) 60 tablet 3    finasteride (PROSCAR) 5 MG tablet Take 1 tablet by mouth daily 30 tablet 3    tiZANidine (ZANAFLEX) 2 MG tablet Take 1 tablet by mouth every 8 hours as needed (spasm)      docusate sodium (COLACE, DULCOLAX) 100 MG CAPS Take 100 mg by mouth 2 times daily      acetaminophen (TYLENOL) 500 MG tablet Take 500 mg by mouth every 8 hours as needed for Pain      aspirin 325 MG EC tablet Take 1 tablet by mouth daily       No current facility-administered medications for this visit. Rosario GARRIDO   General/Constitutional: No recent loss of weight or appetite changes. No fever or chills. HENT: Negative. Eyes: Negative. Upper respiratory tract: No nasal stuffiness or post nasal drip. Lower respiratory tract/ lungs: Frequent cough with minimal sputum production. No hemoptysis. Cardiovascular: No palpitations or chest pain. Gastrointestinal: No nausea or vomiting. Neurological: No focal neurologiacal weakness. Extremities: No edema. Musculoskeletal: No new complaints. Genitourinary: No complaints. Hematological: Negative. Psychiatric/Behavioral: Negative. appearance. Cardiac silhouette small in size. There are a few mild scattered sclerotic foci in the thoracic spine, consistent with history of prostate cancer. Evidence for kyphoplasty at 2 mid thoracic    fracture levels. There are also old mild compression fractures T4 T11 which were seen on prior MRI thoracic spine, 2/7/2019.   2. Questionable tiny effusion left side. Small fibrocalcified nodule right midlung, unchanged, likely a granuloma. 3. Small 3.1 cm nodule left lower lung field, not definitely seen previously. This could conceivably represent loculated fluid in the major fissure but other possibilities not excluded. CT thorax recommended for further evaluation.                                           Assessment      Diagnosis Orders   1. Cough     2. Aspiration into airway, subsequent encounter      Dysphagia   3. Stage 3 severe COPD by GOLD classification ([address]     4. Pulmonary emphysema, unspecified emphysema type ([address]           Plan   Speech therapy referral  Continue aspiration precautions of thin liquids. Resume Stiolto. Change RAAD to PRN. Continue Mucinex and Acapella for pulmonary hygiene. He is up to date with both PNA vaccines.     Will see Lisa Flowers back in: 3 months    Electronically signed by JAILYN Butts CNP on 5/13/2019 at 4:41 PM'

## 2019-05-13 NOTE — TELEPHONE ENCOUNTER
Outpatient therapy called office requesting a new DX for the referral sent in today. She states the referral needs to be deleted and a new one be placed with the correct DX. Please advise. Thank you!

## 2019-05-23 ENCOUNTER — HOSPITAL ENCOUNTER (OUTPATIENT)
Dept: SPEECH THERAPY | Age: 84
Setting detail: THERAPIES SERIES
Discharge: HOME | End: 2019-05-23
Payer: MEDICARE

## 2019-05-23 PROCEDURE — 92610 EVALUATE SWALLOWING FUNCTION: CPT | Performed by: SPEECH-LANGUAGE PATHOLOGIST

## 2019-05-23 NOTE — PROGRESS NOTES
** PLEASE SIGN, DATE AND TIME CERTIFICATION BELOW AND RETURN TO [facility] OUTPATIENT REHABILITATION (FAX #: [phone]). ATTEST/CO-SIGN IF ACCESSING VIA [employer]. THANK YOU.**    I certify that I have examined the patient below and determined that Physical Medicine and Rehabilitation service is necessary and that I approve the established plan of care for up to 90 days or as specifically noted. Attestation, signature or co-signature of physician indicates approval of certification requirements.    ________________________ ____________ __________  Physician Signature   Date   Time    [facility]  SPEECH THERAPY  Bedside Swallowing Evaluation    Date: 2019  Patient Name: Benny Gutierrez    CSN: [id number]   : 1926  (80 y.o.)  Gender: male   Referring Physician:  Eb Hopkins. YOVANNY Vasquez CNP  Diagnosis: cough (R05); aspiration into airway, subsequent encounter (T17.908D); Dysphagia , unspecified type (R13.10)  Secondary Diagnosis:  dysphagia  Insurance/Certification Information: Medicare  Visit number / total approved visits: 1 - unlimited visits based on medical necessity  Visit count since last progress note:  1  Certification Date:       swallowing - 5    History of Present Illness/Injury: MBS completed as an OP on 19. The MBS was ordered \"to evaluate swallowing function d/t dysphagia and r/o potential aspiration, including silent d/t abnormal CT chest.\" Patient's daughter reporting, \"patient with hx of radiation d/t prostate cancer and intermittent dysphagia following radiation. \" Patient takes crushes large pills and takes in applesauce/yogurt. OP therapy recommended following the MBS.       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    CKD (chronic kidney disease) stage 2, GFR 60-89 ml/min 2018    COPD ([facility]) [facility]     Diverticulitis     DVT of lower extremity, bilateral ([facility] 02/2019    GERD (gastroesophageal reflux disease)     Hiatal hernia     Hyperlipemia 8/12/2013    Hyperlipidemia     Other disorders of kidney and ureter in diseases classified elsewhere     Pleural plaque 8/12/2013    Primary adenocarcinoma of prostate ([facility] 01/2019    high grade    Pulmonary emboli ([facility] 02/2019     Pain:  None, 0/10. Current Diet: Regular with thin liquids (NO STRAW)    Respiratory Status: independent    Behavioral Observation: Patient alert and cooperative. Patient's daughter, Wiliam, present throughout. ORAL MECHANISM EVALUATION:         Comments:  Facial / Labial [x]WFL [] Impaired []DNT    Lingual []WFL [x] Impaired []DNT Mildly decreased coordination    Dentition [x]WFL [] Impaired []DNT Full set of dentures   Velum [x]WFL [] Impaired []DNT To midline with phonation   Vocal Quality [x]WFL [] Impaired []DNT    Sensation [x]WFL [] Impaired []DNT    Cough [x]WFL [] Impaired []DNT Productive (asked to cough hard prior to any PO trials to clear up some wheezing noted)   Other: []WFL [] Impaired []DNT    Other: []WFL [] Impaired []DNT        PATIENT WAS EVALUATED USING: [x] Thin liquid [] Nectar thick liquid [] Honey thick liquid   [] Pudding thick liquid  [x] Solid [x] Soft   [x] Puree    ORAL PHASE: [] WFL  [x] Impaired   [] Loss of bolus from lips [] Impaired AP movement [] Pocketing Left   [] Pocketing Right  [] Lingual Pumping  [x] Impaired Mastication - mildly increased time required   [] Reduced Bolus Formation [] Impaired Oral Initiation    [] OTHER:    PHARYNGEAL PHASE: [] WFL: Pharyngeal phase appears WFLs, but cannot completely rule out pharyngeal phase deficits from a bedside swallow evaluation alone. [x] Impaired   [x] Delayed Swallow  [x] Decreased Hyolaryngeal Elevation   [] Audible Swallow   [x] Suspected Pharyngeal Residue due to spontaneous multiple swallow.    [] OTHER:    SIGNS AND SYMPTOMS OF LARYNGEAL PENETRATION / ASPIRATION:  No overt s/s of aspiration noted throughout this evaluation. IMPRESSIONS: Patient requires mildly increased time to masticate the bolus, however, functional.  Patient also presents with a pharyngeal phase with deficits including a mildly delayed swallow, mildly decreased hyolaryngeal elevation and suspected pharyngeal residue secondary to spontaneous multiple swallows and patient report of residue. Patient had a MBS completed on 5/9/19 with \"audible aspiration evident with thin liquids via straw,\" but no signs and symptoms of aspiration evident during this evaluation with trials of thin liquids via cup. RECOMMENDATIONS:     Modified Barium Swallow: [] Is indicated to further assess    [x] Is NOT indicated at this time; Will recommend as        appropriate. DIET RECOMMENDATIONS:   Regular with thin liquids - NO STRAW    STRATEGIES: [] Strategies pending MBS results. [x] Full upright position [x] Small bite/sip  [x] No Straw   []Multiple Swallow  [] Chin tuck   [] Head turn   []Pulmonary monitoring [] Oral care after all meals [] Supervision    [x] Medication in applesauce as he has been doing at home recently. []Direct 1:1 Supervision [] Spoon all liquids   [] Alternate solid / liquid [] Limit distractions  [] Monitor for fatigue  [] PMV in place for all po [x] OTHER: slow rate     EDUCATION:   Learner: [x]Patient [] Significant other [x] Daughter      [] Parent [] Other:   Education: [x] Reviewed results and recommendations of this evaluation     [x] Reviewed diet and strategies     [x] Reviewed signs, symptoms and risk of aspiration     [] Demonstrated how to thick liquid appropriately.      [x] Reviewed goals and Plan of Care     [x] OTHER: swallowing exercises - handout given and educated on all exercises and patient return demonstrated all exercises   Method: [x] Discussion [x] Demonstration [x] Hand-out     []

## 2019-05-24 ENCOUNTER — HOSPITAL ENCOUNTER (OUTPATIENT)
Age: 84
Discharge: HOME | End: 2019-05-24
Payer: MEDICARE

## 2019-05-24 DIAGNOSIS — Z51.81 THERAPEUTIC DRUG MONITORING: ICD-10-CM

## 2019-05-24 DIAGNOSIS — C61 PROSTATE CANCER (HCC): ICD-10-CM

## 2019-05-24 DIAGNOSIS — E83.51 HYPOCALCEMIA: ICD-10-CM

## 2019-05-24 LAB
CALCIUM IONIZED: 1.12 MMOL/L (ref 1.12–1.32)
MAGNESIUM: 2 MG/DL (ref 1.6–2.4)
PHOSPHORUS: 3.8 MG/DL (ref 2.4–4.7)
PROSTATE SPECIFIC ANTIGEN: 0.38 NG/ML (ref 0–1)
VITAMIN D 25-HYDROXY: 30 NG/ML (ref 30–100)

## 2019-05-24 PROCEDURE — 36415 COLL VENOUS BLD VENIPUNCTURE: CPT

## 2019-05-24 PROCEDURE — 84153 ASSAY OF PSA TOTAL: CPT

## 2019-05-24 PROCEDURE — 82306 VITAMIN D 25 HYDROXY: CPT

## 2019-05-24 PROCEDURE — 83735 ASSAY OF MAGNESIUM: CPT

## 2019-05-24 PROCEDURE — 84100 ASSAY OF PHOSPHORUS: CPT

## 2019-05-24 PROCEDURE — 82330 ASSAY OF CALCIUM: CPT

## 2019-05-24 RX ORDER — TIOTROPIUM BROMIDE AND OLODATEROL 3.124; 2.736 UG/1; UG/1
SPRAY, METERED RESPIRATORY (INHALATION)
Qty: 1 INHALER | Refills: 5 | Status: SHIPPED | OUTPATIENT
Start: 2019-05-24 | End: 2019-09-27 | Stop reason: SDUPTHER

## 2019-05-28 NOTE — TELEPHONE ENCOUNTER
LM with Daughter Sin Prater [facility]) informing her of the medication refill, instructed to call back with any questions she may have.

## 2019-05-29 ENCOUNTER — TELEPHONE (OUTPATIENT)
Dept: UROLOGY | Age: 84
End: 2019-05-29

## 2019-05-29 NOTE — TELEPHONE ENCOUNTER
Kun Prescott,  Patient's daughter, Rukhsana Draper, called. She stated at his last visit he was told if his calcium was still low we would hold off on his Xgeva injection. She wants to confirm it is an acceptable level before they drive here for the appointment tomorrow. Please advise, thank you!

## 2019-05-29 NOTE — TELEPHONE ENCOUNTER
Ical up to 1. 12. I will give him the [facility] as planned. Ok to come in for appt as scheduled.

## 2019-05-29 NOTE — TELEPHONE ENCOUNTER
The patient was advised of the message from Brenda Pruitt up to 1. 12. Tonia Heimlich will be given as planned. Ok to come in for appt as scheduled. She voiced understanding.

## 2019-05-30 ENCOUNTER — HOSPITAL ENCOUNTER (OUTPATIENT)
Dept: SPEECH THERAPY | Age: 84
Setting detail: THERAPIES SERIES
Discharge: HOME | End: 2019-05-30
Payer: MEDICARE

## 2019-05-30 ENCOUNTER — NURSE ONLY (OUTPATIENT)
Dept: UROLOGY | Age: 84
End: 2019-05-30
Payer: MEDICARE

## 2019-05-30 DIAGNOSIS — C79.51 SECONDARY MALIGNANT NEOPLASM OF BONE AND BONE MARROW (HCC): Primary | ICD-10-CM

## 2019-05-30 DIAGNOSIS — C61 MALIGNANT NEOPLASM OF PROSTATE (HCC): ICD-10-CM

## 2019-05-30 DIAGNOSIS — C79.52 SECONDARY MALIGNANT NEOPLASM OF BONE AND BONE MARROW (HCC): Primary | ICD-10-CM

## 2019-05-30 PROCEDURE — 92526 ORAL FUNCTION THERAPY: CPT | Performed by: SPEECH-LANGUAGE PATHOLOGIST

## 2019-05-30 PROCEDURE — 96401 CHEMO ANTI-NEOPL SQ/IM: CPT | Performed by: NURSE PRACTITIONER

## 2019-05-30 NOTE — PROGRESS NOTES
Patient has given me verbal consent to perform Xgeva Injection yes      Following Dr. Darion Stewart of [facility]. XGEVA 120MG/1.7ML MG GIVEN S.C Left ARM  Lot Number: [id number]  Expiration Date: 07/2021  [facility] #: [id number]    After Injection was given there were no reactions at injection site and patient was feeling well. Patient was notified that possible side effects from injections include: Redness, swelling and itching at the injection site. Possible side effects of androgen deprivation therapy, including hot flashes, flushing of the skin, increased weight, decreased sex drive, and difficulties with ED. Patient was instructed to inform their dental office that they are receiving Alex Malik and that major dental procedures should be avoided. Patient was advised to call our office with any further questions or concerns. Any active dental problems, infections, or pain? Patient has dentures    Date of last dental appointment: N/A    Any planned dental procedures? N/A    Last Bone Scan was performed on N/A    Last Calcium, ionized level drawn on 5/24/2019 and Calcium Value was 1.12. Calcium needs to be checked periodically. Ask provider when Calcium needs checked again. Patient supplied their own medications No    Pt [address] therapy first initiated on 02/26/2019.

## 2019-05-30 NOTE — PROGRESS NOTES
[address] THERAPY    [x] DAILY NOTE [] PROGRESS NOTE [] DISCHARGE NOTE    [x]Outpatient [address]   [][place] [facility]   [][facility]      Date: 2019  Patient Name: Melba Tao      CSN: [id number]   : 1926  (80 y.o.)  Gender: male     Referring Physician:  Sari Francois. Kavon Fuentes, JAILYN-CNP  Diagnosis: cough (R05); aspiration into airway, subsequent encounter (T17.908D); Dysphagia, unspecified type (R13.10)  Secondary Diagnosis:  dysphagia  Insurance/Certification Information: Medicare  Visit number / total approved visits: 2 - unlimited visits based on medical necessity  Visit count since last progress note:  2  Certification Date:   Last scheduled appointment: 19  Date of Last MBS:  19  Diet:  Regular with thin liquids (no straw)    Subjective: Patient's daughter present throughout. Patient reports he has been completing his HEP 2 times per day, 10 repetitions of each exercise. SHORT TERM GOAL #1:  Goal 1: Patient will tolerate a regular diet with thin liquids with use of swallowing strategies with min cues and without overt s/s of aspiration to safely maintain adequate nutrition and hydration. INTERVENTIONS: Patient denies any overt difficulty with foods and liquids, but reports he has been coughing up some mucus and phlegm. Thin liquid trials completed via cup this session without overt s/s of aspiration on 5/5 trials. Further education completed on s/s of aspiration to watch for at home. SHORT TERM GOAL #2:  Goal 2: Patient will complete tongue base retraction exercises x 25 for improved pharyngeal strength and decrease vallecular residue. INTERVENTIONS: rosemary x 15 with fair to good success  Lingual protrusion with alternating tongue tip back x 10 with fair to good success overall, mild fatigue evident.     SHORT TERM GOAL #3:  Goal 3: Patient will complete effortful swallows x 25 for improved pharyngeal strength and thus reduce residue. INTERVENTIONS: effortful swallows x 12 with good effort noted overall. SHORT TERM GOAL #4:  Goal 4: Patient will complete the mendelsohn maneuver x 25 for improved laryngeal elevation and thus improved airway protection. INTERVENTIONS: mendelsohn x 12 with good hold, but decreased laryngeal elevation; mildly decreased fatigue. **Education completed on rationale for this exercise. Assessment: [x]Progressing towards goals []Not Progressing towards goals  Plan for Next Session:  Diet monitor, pharyngeal strengthening and laryngeal elevation exercises  Patient Tolerance of Treatment:  [x]Tolerated well []Tolerated fair []Required rest breaks  []Fatigued    Education:  [x]Education was provided []Education not provided due to:  Learner:  [x]Patient []Significant Other [x]Other - daughter  Education provided regarding:  [x]Goals and POC [x]Diet and swallowing precautions    [x]Home Exercise Program []Progress and/or discharge information  Method of Education: [x]Discussion [x]Demonstration []Handout []Other:  Evaluation of Education:  [x]Verbalized understanding []Demonstrates without assistance  [x]Demonstrates with assistance [x]Needs further instruction    []No evidence of learning  []No family present      Plan: [x]Continue with current plan of care []Modify current plan of care   []Progress note - frequency and duration:   []Discharge notes - reason for discharge:     [x]Patient continues to require treatment by a licensed therapist to address functional deficits as outlined in the established plan of care. Certification required: [] Yes - See Physician Certification below.       [x] No       Time in: 0900  Time out: 0928  Untimed treatment: 28  Timed treatment: 0  Total time: 28 minutes      Mandi Fuller M.S. [address] [place] [address]

## 2019-06-03 ENCOUNTER — CARE COORDINATION (OUTPATIENT)
Dept: CASE MANAGEMENT | Age: 84
End: 2019-06-03

## 2019-06-03 ENCOUNTER — HOSPITAL ENCOUNTER (OUTPATIENT)
Dept: SPEECH THERAPY | Age: 84
Setting detail: THERAPIES SERIES
Discharge: HOME | End: 2019-06-03
Payer: MEDICARE

## 2019-06-03 PROCEDURE — 92526 ORAL FUNCTION THERAPY: CPT | Performed by: SPEECH-LANGUAGE PATHOLOGIST

## 2019-06-03 NOTE — PROGRESS NOTES
[address] THERAPY    [x] DAILY NOTE [] PROGRESS NOTE [] DISCHARGE NOTE    [x]Outpatient [address]   [][place] 500 [facility]   [][facility]      Date: 6/3/2019  Patient Name: Benny Gutierrez      CSN: [id number]   : 1926  (80 y.o.)  Gender: male     Referring Physician:  Eb Hopkins. CHAY Vasquez  Diagnosis: cough (R05); aspiration into airway, subsequent encounter (T17.908D); Dysphagia, unspecified type (R13.10)  Secondary Diagnosis:  dysphagia  Insurance/Certification Information: Medicare  Visit number / total approved visits: 3 - unlimited visits based on medical necessity  Visit count since last progress note:  3  Certification Date:   Last scheduled appointment: 19  Date of Last MBS:  19  Diet:  Regular with thin liquids (no straw)    Subjective:   SHORT TERM GOAL #1:  Goal 1: Patient will tolerate a regular diet with thin liquids with use of swallowing strategies with min cues and without overt s/s of aspiration to safely maintain adequate nutrition and hydration. INTERVENTIONS: Patient reports good tolerance of regular foods and thin liquids. Completed thin liquid trials via cup this session without overt s/s of aspiration on 5/5 trials. Ongoing education on s/s of aspiration. SHORT TERM GOAL #2:  Goal 2: Patient will complete tongue base retraction exercises x 25 for improved pharyngeal strength and decrease vallecular residue. INTERVENTIONS: rosemary completed x 10 with fair to good success  Lingual protrusion with alternating tongue tip back x 15 with good success overall, mild fatigue evident.    **Further education completed on rationale for tongue base retraction exercises  **Patient completed all exercises with a rest break in the middle    [address] #3:  Goal 3: Patient will complete effortful swallows x 25 for improved pharyngeal strength and thus reduce residue. INTERVENTIONS: effortful swallows x 15 with good effort noted overall; reviewed rationale for effortful swallows    SHORT TERM GOAL #4:  Goal 4: Patient will complete the mendelsohn maneuver x 25 for improved laryngeal elevation and thus improved airway protection. INTERVENTIONS: mendelsohn x 10 with good hold, but decreased laryngeal elevation  **Education completed on rationale for this exercise. Assessment: [x]Progressing towards goals []Not Progressing towards goals  Plan for Next Session:  Diet monitor, pharyngeal strengthening and laryngeal elevation exercises  Patient Tolerance of Treatment:  [x]Tolerated well []Tolerated fair []Required rest breaks  []Fatigued    Education:  [x]Education was provided []Education not provided due to:  Learner:  [x]Patient []Significant Other [x]Other - daughter  Education provided regarding:  [x]Goals and POC [x]Diet and swallowing precautions    [x]Home Exercise Program []Progress and/or discharge information  Method of Education: [x]Discussion [x]Demonstration []Handout []Other:  Evaluation of Education:  [x]Verbalized understanding []Demonstrates without assistance  [x]Demonstrates with assistance [x]Needs further instruction    []No evidence of learning  []No family present      Plan: [x]Continue with current plan of care []Modify current plan of care   []Progress note - frequency and duration:   []Discharge notes - reason for discharge:     [x]Patient continues to require treatment by a licensed therapist to address functional deficits as outlined in the established plan of care. Certification required: [] Yes - See Physician Certification below.       [x] No       Time in: 1108  Time out: 1131  Untimed treatment: 23  Timed treatment: 0  Total time: 23 minutes      Chuy Knight M.S. [address] [address]

## 2019-06-07 ENCOUNTER — HOSPITAL ENCOUNTER (OUTPATIENT)
Dept: SPEECH THERAPY | Age: 84
Setting detail: THERAPIES SERIES
Discharge: HOME | End: 2019-06-07
Payer: MEDICARE

## 2019-06-07 PROCEDURE — 92526 ORAL FUNCTION THERAPY: CPT | Performed by: SPEECH-LANGUAGE PATHOLOGIST

## 2019-06-07 NOTE — PROGRESS NOTES
SHORT TERM GOAL #3:  Goal 3: Patient will complete effortful swallows x 25 for improved pharyngeal strength and thus reduce residue. INTERVENTIONS: effortful swallows x 17 with good effort noted overall    SHORT TERM GOAL #4:  Goal 4: Patient will complete the mendelsohn maneuver x 25 for improved laryngeal elevation and thus improved airway protection. INTERVENTIONS: mendelsohn x 15 with good hold on 14/15 trials, but decreased laryngeal elevation  **Patient reports he continues to complete his HEP and he has increased the number of repetitions. Patient encouraged to continue to increase the repetitions up to 25 as tolerated. Assessment: [x]Progressing towards goals []Not Progressing towards goals  Plan for Next Session:  Diet monitor, pharyngeal strengthening and laryngeal elevation exercises  Patient Tolerance of Treatment:  [x]Tolerated well []Tolerated fair []Required rest breaks  []Fatigued    Education:  [x]Education was provided []Education not provided due to:  Learner:  [x]Patient []Significant Other [x]Other - daughter  Education provided regarding:  [x]Goals and POC [x]Diet and swallowing precautions    [x]Home Exercise Program []Progress and/or discharge information  Method of Education: [x]Discussion [x]Demonstration []Handout []Other:  Evaluation of Education:  [x]Verbalized understanding []Demonstrates without assistance  [x]Demonstrates with assistance [x]Needs further instruction    []No evidence of learning  []No family present      Plan: [x]Continue with current plan of care []Modify current plan of care   []Progress note - frequency and duration:   []Discharge notes - reason for discharge:     [x]Patient continues to require treatment by a licensed therapist to address functional deficits as outlined in the established plan of care. Certification required: [] Yes - See Physician Certification below.       [x] No       Time in: 1303  Time out: 1331  Untimed treatment: 28  Timed treatment: 0  Total time: 28 minutes      ANA ROSA Goodwin 1265

## 2019-06-10 ENCOUNTER — CARE COORDINATION (OUTPATIENT)
Dept: CASE MANAGEMENT | Age: 84
End: 2019-06-10

## 2019-06-10 ENCOUNTER — HOSPITAL ENCOUNTER (OUTPATIENT)
Dept: SPEECH THERAPY | Age: 84
Setting detail: THERAPIES SERIES
Discharge: HOME | End: 2019-06-10
Payer: MEDICARE

## 2019-06-10 DIAGNOSIS — C79.51 PROSTATE CANCER METASTATIC TO BONE (HCC): ICD-10-CM

## 2019-06-10 DIAGNOSIS — C61 PROSTATE CANCER METASTATIC TO BONE (HCC): ICD-10-CM

## 2019-06-10 PROCEDURE — 92526 ORAL FUNCTION THERAPY: CPT | Performed by: SPEECH-LANGUAGE PATHOLOGIST

## 2019-06-10 RX ORDER — TRAMADOL HYDROCHLORIDE 50 MG/1
TABLET ORAL
Qty: 90 TABLET | Refills: 0 | Status: SHIPPED | OUTPATIENT
Start: 2019-06-10 | End: 2019-07-15 | Stop reason: SDUPTHER

## 2019-06-10 NOTE — PROGRESS NOTES
[address] THERAPY    [x] DAILY NOTE [] PROGRESS NOTE [] DISCHARGE NOTE    [x]Outpatient [address]   [][place] 500 [facility]   [][facility]      Date: 6/10/2019  Patient Name: Melba Tao      CSN: [id number]   : 1926  (80 y.o.)  Gender: male     Referring Physician:  Sari Francois. Kavon Fuentes, JAILYN-CNP  Diagnosis: cough (R05); aspiration into airway, subsequent encounter (T17.908D); Dysphagia, unspecified type (R13.10)  Secondary Diagnosis:  dysphagia  Insurance/Certification Information: Medicare  Visit number / total approved visits: 5 - unlimited visits based on medical necessity  Visit count since last progress note:  5  Certification Date: 65  Last scheduled appointment: 19  Date of Last MBS:  19  Diet:  Regular with thin liquids (no straw)    Subjective: Patient's daughter present. Patient and daughter feel the therapy is helping the patient's swallowing and he reports having less mucus/phlegm. SHORT TERM GOAL #1:  Goal 1: Patient will tolerate a regular diet with thin liquids with use of swallowing strategies with min cues and without overt s/s of aspiration to safely maintain adequate nutrition and hydration. INTERVENTIONS: Patient reports no overt difficulty with food/liquids at home. Patient continues to crush the larger pills and takes it in yogurt and reports it is going well. Completed trials of thin liquids without overt difficulty on  trials. SHORT TERM GOAL #2:  Goal 2: Patient will complete tongue base retraction exercises x 25 for improved pharyngeal strength and decrease vallecular residue. INTERVENTIONS: rosemary completed x 15 with fair to good success  Lingual protrusion with alternating tongue tip back x 20 with good success overall, mild fatigue evident.    Lingual protrusion with resistance x 15 with good strength, min cues for increased protrusion    SHORT TERM GOAL #3:  Goal 3: Patient will complete effortful swallows x 25 for improved pharyngeal strength and thus reduce residue. INTERVENTIONS: effortful swallows x 20 with good effort throughout    SHORT TERM GOAL #4:  Goal 4: Patient will complete the mendelsohn maneuver x 25 for improved laryngeal elevation and thus improved airway protection. INTERVENTIONS: mendelsohn x 18 with good hold on 16/18 trials  **Patient reports he continues to complete his HEP and he has increased the number of repetitions. Patient encouraged to continue to increase the repetitions up to 25 as tolerated. Assessment: [x]Progressing towards goals []Not Progressing towards goals  Plan for Next Session:  Diet monitor, pharyngeal strengthening and laryngeal elevation exercises  Patient Tolerance of Treatment:  [x]Tolerated well []Tolerated fair []Required rest breaks  []Fatigued    Education:  [x]Education was provided []Education not provided due to:  Learner:  [x]Patient []Significant Other [x]Other - daughter  Education provided regarding:  [x]Goals and POC [x]Diet and swallowing precautions    [x]Home Exercise Program []Progress and/or discharge information  Method of Education: [x]Discussion [x]Demonstration []Handout []Other:  Evaluation of Education:  [x]Verbalized understanding []Demonstrates without assistance  [x]Demonstrates with assistance [x]Needs further instruction    []No evidence of learning  []No family present      Plan: [x]Continue with current plan of care []Modify current plan of care   []Progress note - frequency and duration:   []Discharge notes - reason for discharge:     [x]Patient continues to require treatment by a licensed therapist to address functional deficits as outlined in the established plan of care. Certification required: [] Yes - See Physician Certification below.       [x] No       Time in: 1315  Time out: 1345  Untimed treatment: 30  Timed treatment: 0  Total time: 30 minutes      Nneka Vincent M.S. CCC-SLP 9208

## 2019-06-10 NOTE — CARE COORDINATION
Name: Reyes Maki    ### Patient Details  YOB: 1926  MRN: [id number]    ### Encounter Details  Encounter ID: [id number]  Arrival Date: N/A  Discharge Date: N/A    ### Related interaction  COPD High Touch UA (Welcome Call) ([email or web address])    ### Questions     Question 1   Consent   If you are interested in starting this program today, please press 1.. If you have questions or would like to talk to our , please press 2 and we will call you right back. If youd like to opt out of the program, please press 3   Opt Out (Issue Panel: Opt Out)    ### Required Interventions and Feedback     [facility] Update         *Patient Status changed in [facility] to[de-identified]     Patient Declined (selected by NEREYDA on 06/10/2019 11:00 AM EDT)     Call Status         *Call Status:     Patient Reached (edited by NEREYDA on 06/10/2019 10:58 AM EDT)    Additional Call Status Details[de-identified]     Spoke with Janna Ayala, patient's daughter.   Patient not interested in the [facility] program.   (edited by Lam Emery on 06/10/2019 11:00 AM EDT)      Vashti Austin RN  Tele-Health Coordinator

## 2019-06-10 NOTE — TELEPHONE ENCOUNTER
The pharmacy is  requesting a refill of the below medication which has been pended for you:     Requested Prescriptions     Pending Prescriptions Disp Refills    traMADol (ULTRAM) 50 MG tablet [Pharmacy Med Name: TRAMADOL 50MG TAB] 90 tablet 0     Sig: TAKE 1 TABLET BY MOUTH EVERY 8 HOURS AS NEEDED FOR PAIN       Last Appointment Date: 3/13/2019  Next Appointment Date: 9/16/2019    No Known Allergies

## 2019-06-13 ENCOUNTER — HOSPITAL ENCOUNTER (OUTPATIENT)
Dept: SPEECH THERAPY | Age: 84
Setting detail: THERAPIES SERIES
Discharge: HOME | End: 2019-06-13
Payer: MEDICARE

## 2019-06-13 ENCOUNTER — TELEPHONE (OUTPATIENT)
Dept: UROLOGY | Age: 84
End: 2019-06-13

## 2019-06-13 DIAGNOSIS — Z51.81 THERAPEUTIC DRUG MONITORING: Primary | ICD-10-CM

## 2019-06-13 PROCEDURE — 92526 ORAL FUNCTION THERAPY: CPT | Performed by: SPEECH-LANGUAGE PATHOLOGIST

## 2019-06-13 NOTE — TELEPHONE ENCOUNTER
Patient is due for Xgeva shot in July and daughter stopped in to ask if he didn't need and labs? I did not see any ordered the last time he was here for lab visit. Please advise and I will let them know.

## 2019-06-13 NOTE — PROGRESS NOTES
[address] THERAPY    [x] DAILY NOTE [] PROGRESS NOTE [] DISCHARGE NOTE    [x]Outpatient [address]   [][place] 500 [facility]   [][facility]      Date: 2019  Patient Name: Benny Gutierrez      CSN: [id number]   : 1926  (80 y.o.)  Gender: male     Referring Physician:  Eb Hopkins. CHAY Vasquez  Diagnosis: cough (R05); aspiration into airway, subsequent encounter (T17.908D); Dysphagia, unspecified type (R13.10)  Secondary Diagnosis:  dysphagia  Insurance/Certification Information: Medicare  Visit number / total approved visits: 6 - unlimited visits based on medical necessity  Visit count since last progress note:  6  Certification Date: 19  Last scheduled appointment: 19  Date of Last MBS:  19  Diet:  Regular with thin liquids (no straw)    Subjective: Patient brought his HEP along this date to demonstrate how he completes his exercises at home. SHORT TERM GOAL #1:  Goal 1: Patient will tolerate a regular diet with thin liquids with use of swallowing strategies with min cues and without overt s/s of aspiration to safely maintain adequate nutrition and hydration. INTERVENTIONS: Patient reports good tolerance with foods and liquids at home. Completed trials of kitty crackers this session without overt difficulty on 6/6 trials and no complaints of pharyngeal residue. Also tolerated trials of thin liquids without overt difficulty on 6/6 trials. Patient required 1 cue to take small sips. SHORT TERM GOAL #2:  Goal 2: Patient will complete tongue base retraction exercises x 25 for improved pharyngeal strength and decrease vallecular residue.   INTERVENTIONS: rosemary completed x 10 with good success  Lingual protrusion with alternating tongue tip back x 20 with good success overall, x 1 break  Lingual protrusion with resistance x 20 with good strength, min cues for increased protrusion initially, x 1 break    SHORT TERM GOAL #3:  Goal 3: Patient will complete effortful swallows x 25 for improved pharyngeal strength and thus reduce residue. INTERVENTIONS: effortful swallows x 21 with and without PO with good effort throughout. SHORT TERM GOAL #4:  Goal 4: Patient will complete the mendelsohn maneuver x 25 for improved laryngeal elevation and thus improved airway protection. INTERVENTIONS: mendelsohn x 10 with good hold throughout  **Patient reports he continues to complete his HEP 2 x per day with up to 15 repetitions. Patient encouraged to continue to increase the repetitions up to 20 and then eventually 25. Assessment: [x]Progressing towards goals []Not Progressing towards goals  Plan for Next Session:  Diet monitor, pharyngeal strengthening and laryngeal elevation exercises  Patient Tolerance of Treatment:  [x]Tolerated well []Tolerated fair []Required rest breaks  []Fatigued    Education:  [x]Education was provided []Education not provided due to:  Learner:  [x]Patient []Significant Other [x]Other - daughter  Education provided regarding:  [x]Goals and POC [x]Diet and swallowing precautions    [x]Home Exercise Program []Progress and/or discharge information  Method of Education: [x]Discussion [x]Demonstration []Handout []Other:  Evaluation of Education:  [x]Verbalized understanding []Demonstrates without assistance  [x]Demonstrates with assistance [x]Needs further instruction    []No evidence of learning  []No family present      Plan: [x]Continue with current plan of care []Modify current plan of care   []Progress note - frequency and duration:   []Discharge notes - reason for discharge:     [x]Patient continues to require treatment by a licensed therapist to address functional deficits as outlined in the established plan of care. Certification required: [] Yes - See Physician Certification below.       [x] No       Time in: 1132  Time out: 1204  Untimed treatment: 32  Timed treatment: 0  Total time: 32 minutes      Toya Alonso M.S. [address] [address]

## 2019-06-17 ENCOUNTER — HOSPITAL ENCOUNTER (OUTPATIENT)
Dept: SPEECH THERAPY | Age: 84
Setting detail: THERAPIES SERIES
Discharge: HOME | End: 2019-06-17
Payer: MEDICARE

## 2019-06-17 PROCEDURE — 92526 ORAL FUNCTION THERAPY: CPT | Performed by: SPEECH-LANGUAGE PATHOLOGIST

## 2019-06-17 NOTE — PROGRESS NOTES
[address] THERAPY    [x] DAILY NOTE [] PROGRESS NOTE [] DISCHARGE NOTE     [x]Outpatient [address]   [][place] 500 [facility]   [][facility]      Date: 2019  Patient Name: Roge Livingston      CSN: [id number]   : 1926  (80 y.o.)  Gender: male     Referring Physician:  CHAY Schulz  Diagnosis: cough (R05); aspiration into airway, subsequent encounter (T17.908D); Dysphagia, unspecified type (R13.10)  Secondary Diagnosis:  dysphagia  Insurance/Certification Information: Medicare  Visit number / total approved visits: 7 - unlimited visits based on medical necessity  Visit count since last progress note:  7  Certification Date: 6/07/10  Last scheduled appointment: 19  Date of Last MBS:  19  Diet:  Regular with thin liquids (no straw)    Subjective:  Patient reports he was still able to get his HEP done over the weekend, but he has not been able to increase the number of repetitions. SHORT TERM GOAL #1:  Goal 1: Patient will tolerate a regular diet with thin liquids with use of swallowing strategies with min cues and without overt s/s of aspiration to safely maintain adequate nutrition and hydration. INTERVENTIONS: Patient reports he had some peanuts over the weekend and he tolerated it well. Patient denies any difficulty with other foods as well. Patient completed trials of thin liquids without overt difficulty on 6/8 trials, throat clearing following 1/8 trials and immediate coughing noted on 1/8 trials. Patient reports he may have taken a larger drink without awareness on the 2 trials that he had difficulty. Patient also reports attempting to complete effortful swallows with the 2 trials, which may have caused him to have difficulty. Patient educated not to complete effortful swallows anymore when he has something in the oral cavity.   Ongoing education on the importance to take small sips.    SHORT TERM GOAL #2:  Goal 2: Patient will complete tongue base retraction exercises x 25 for improved pharyngeal strength and decrease vallecular residue. INTERVENTIONS: rosemary completed x 15 with good success, but increased time to initiate the swallow. Lingual protrusion with alternating tongue tip back x 25 with good success overall, x 1 break  Lingual protrusion with resistance x 25 with good strength, min cues for increased protrusion at times, x 1 break    SHORT TERM GOAL #3:  Goal 3: Patient will complete effortful swallows x 25 for improved pharyngeal strength and thus reduce residue. INTERVENTIONS: effortful swallows x 25 with and without PO with good effort throughout. SHORT TERM GOAL #4:  Goal 4: Patient will complete the mendelsohn maneuver x 25 for improved laryngeal elevation and thus improved airway protection. INTERVENTIONS: mendelsohn x 15 with good hold throughout  **Patient reports he continues to complete his HEP 2 x per day with 15 repetitions. Patient encouraged to continue to increase the repetitions up to 20 and then eventually 25.       Assessment: [x]Progressing towards goals []Not Progressing towards goals  Plan for Next Session:  Diet monitor, pharyngeal strengthening and laryngeal elevation exercises  Patient Tolerance of Treatment:  [x]Tolerated well []Tolerated fair []Required rest breaks  []Fatigued    Education:  [x]Education was provided []Education not provided due to:  Learner:  [x]Patient []Significant Other [x]Other - daughter  Education provided regarding:  [x]Goals and POC [x]Diet and swallowing precautions    [x]Home Exercise Program []Progress and/or discharge information  Method of Education: [x]Discussion [x]Demonstration []Handout []Other:  Evaluation of Education:  [x]Verbalized understanding []Demonstrates without assistance  [x]Demonstrates with assistance [x]Needs further instruction    []No evidence of learning  []No family present      Plan: [x]Continue with current plan of care []Modify current plan of care   []Progress note - frequency and duration:   []Discharge notes - reason for discharge:     [x]Patient continues to require treatment by a licensed therapist to address functional deficits as outlined in the established plan of care. Certification required: [] Yes - See Physician Certification below.       [x] No       Time in: 1100  Time out: 1130  Untimed treatment: 30  Timed treatment: 0  Total time: 30 minutes      Manjit Meade M.SSerafin [id number] [place] [address]

## 2019-06-20 ENCOUNTER — APPOINTMENT (OUTPATIENT)
Dept: SPEECH THERAPY | Age: 84
End: 2019-06-20
Payer: MEDICARE

## 2019-06-24 ENCOUNTER — HOSPITAL ENCOUNTER (OUTPATIENT)
Dept: SPEECH THERAPY | Age: 84
Setting detail: THERAPIES SERIES
Discharge: HOME | End: 2019-06-24
Payer: MEDICARE

## 2019-06-24 PROCEDURE — 92526 ORAL FUNCTION THERAPY: CPT | Performed by: SPEECH-LANGUAGE PATHOLOGIST

## 2019-06-24 NOTE — DISCHARGE SUMMARY
protrusion with resistance x 25 with good strength, min cues for increased protrusion    SHORT TERM GOAL #3:  Goal 3: Patient will complete effortful swallows x 25 for improved pharyngeal strength and thus reduce residue. -GOAL MET. INTERVENTIONS: effortful swallows x 25 with and without PO with good effort throughout. SHORT TERM GOAL #4:  Goal 4: Patient will complete the mendelsohn maneuver x 25 for improved laryngeal elevation and thus improved airway protection. -GOAL MET. INTERVENTIONS: mendelsohn x 25 with good hold throughout  **Patient reports he continues to complete his HEP 2 x per day with 20-25 repetitions. Long-term Goals  Timeframe for Long-term Goals: 5 weeks  Goal 1: Patient will tolerate the least restrictive diet without overt s/s of aspiration and independent use of swallowing strategies to safely maintain adequate nutrition and hydration. - GOAL MET. Assessment: [x]Progressing towards goals []Not Progressing towards goals  Patient has met 4/4 short term goals and 1/1 long term goals over the last month of treatment. Patient is currently tolerating a regular diet with thin liquids without overt s/s of aspiration. Patient independently demonstrates use of swallowing strategies. Patient completes up to 25 repetitions of tongue base retraction, pharyngeal strengthening and laryngeal elevation exercises with good success overall. Patient completes his HEP as recommended and reports good improvement noted with his swallowing. Patient is being discharged from formal therapy at this time, however, it is recommended that he continue to complete his HEP as requested.     Patient Tolerance of Treatment:  [x]Tolerated well []Tolerated fair []Required rest breaks  []Fatigued    Education:  [x]Education was provided []Education not provided due to:  Learner:  [x]Patient []Significant Other [x]Other - daughter  Education provided regarding:  [x]Goals and POC [x]Diet and swallowing precautions    [x]Home Exercise Program [x]Progress and/or discharge information  Method of Education: [x]Discussion [x]Demonstration []Handout []Other:  Evaluation of Education:  [x]Verbalized understanding []Demonstrates without assistance  [x]Demonstrates with assistance [x]Needs further instruction    []No evidence of learning  []No family present      Plan: []Continue with current plan of care []Modify current plan of care   []Progress note - frequency and duration:   [x]Discharge notes - reason for discharge: goals met, independent with HEP    []Patient continues to require treatment by a licensed therapist to address functional deficits as outlined in the established plan of care. Certification required: [] Yes - See Physician Certification below.       [x] No       Time in: 1102  Time out: 1130  Untimed treatment: 28  Timed treatment: 0  Total time: 28 minutes      Olinda Light M.S. [id number] [id number]

## 2019-06-27 ENCOUNTER — OFFICE VISIT (OUTPATIENT)
Dept: FAMILY MEDICINE CLINIC | Age: 84
End: 2019-06-27

## 2019-06-27 ENCOUNTER — HOSPITAL ENCOUNTER (OUTPATIENT)
Age: 84
Discharge: HOME | End: 2019-06-27
Payer: MEDICARE

## 2019-06-27 VITALS
RESPIRATION RATE: 18 BRPM | SYSTOLIC BLOOD PRESSURE: 110 MMHG | BODY MASS INDEX: 19.33 KG/M2 | WEIGHT: 123.13 LBS | DIASTOLIC BLOOD PRESSURE: 60 MMHG | HEIGHT: 67 IN | HEART RATE: 84 BPM

## 2019-06-27 DIAGNOSIS — Z51.81 THERAPEUTIC DRUG MONITORING: ICD-10-CM

## 2019-06-27 DIAGNOSIS — I82.4Y3 ACUTE DEEP VEIN THROMBOSIS (DVT) OF PROXIMAL VEIN OF BOTH LOWER EXTREMITIES (HCC): ICD-10-CM

## 2019-06-27 DIAGNOSIS — L30.9 DERMATITIS: Primary | ICD-10-CM

## 2019-06-27 LAB — CALCIUM IONIZED: 1.15 MMOL/L (ref 1.12–1.32)

## 2019-06-27 PROCEDURE — 82330 ASSAY OF CALCIUM: CPT

## 2019-06-27 PROCEDURE — 1036F TOBACCO NON-USER: CPT | Performed by: FAMILY MEDICINE

## 2019-06-27 PROCEDURE — 36415 COLL VENOUS BLD VENIPUNCTURE: CPT

## 2019-06-27 PROCEDURE — 99213 OFFICE O/P EST LOW 20 MIN: CPT | Performed by: FAMILY MEDICINE

## 2019-06-27 PROCEDURE — 1123F ACP DISCUSS/DSCN MKR DOCD: CPT | Performed by: FAMILY MEDICINE

## 2019-06-27 PROCEDURE — G8420 CALC BMI NORM PARAMETERS: HCPCS | Performed by: FAMILY MEDICINE

## 2019-06-27 PROCEDURE — 4040F PNEUMOC VAC/ADMIN/RCVD: CPT | Performed by: FAMILY MEDICINE

## 2019-06-27 PROCEDURE — G8598 ASA/ANTIPLAT THER USED: HCPCS | Performed by: FAMILY MEDICINE

## 2019-06-27 PROCEDURE — G8427 DOCREV CUR MEDS BY ELIG CLIN: HCPCS | Performed by: FAMILY MEDICINE

## 2019-06-27 RX ORDER — HYDROXYZINE HYDROCHLORIDE 25 MG/1
25 TABLET, FILM COATED ORAL EVERY 8 HOURS PRN
Qty: 30 TABLET | Refills: 1 | Status: SHIPPED | OUTPATIENT
Start: 2019-06-27 | End: 2020-03-11 | Stop reason: SDUPTHER

## 2019-06-27 ASSESSMENT — ENCOUNTER SYMPTOMS
SINUS PRESSURE: 0
CONSTIPATION: 0
SHORTNESS OF BREATH: 0

## 2019-06-27 NOTE — PATIENT INSTRUCTIONS
See me following the doppler in August  Wash only 2 times a week  Use the hydrocortisone four time a day

## 2019-06-27 NOTE — PROGRESS NOTES
Subjective:      Patient ID: Beatriz Cedeno is a 80 y.o. male. HPI  1. There has been a rash on the back the past 6 weeks. He has used several OTC products. 2. He washes his back daily  3. He wears a brace on his back when up  Review of Systems   Constitutional: Negative for fatigue. HENT: Negative for sinus pressure. Eyes: Negative for visual disturbance. Respiratory: Negative for shortness of breath. Cardiovascular: Negative for chest pain. Gastrointestinal: Negative for constipation. Genitourinary: Negative. Musculoskeletal: Negative for arthralgias. Skin: Negative for rash. Neurological: Negative for headaches. The patient's medications, allergies, past medical problems, surgical, social, and family histories were reviewed and updated as needed. Objective:   Physical Exam   Constitutional: He is oriented to person, place, and time. He appears well-developed and well-nourished. No distress. HENT:   Head: Normocephalic and atraumatic. Eyes: Conjunctivae are normal. No scleral icterus. Neck: No tracheal deviation present. Cardiovascular: Normal rate, regular rhythm and normal heart sounds. Pulmonary/Chest: Effort normal and breath sounds normal.   Musculoskeletal: He exhibits no edema. Neurological: He is alert and oriented to person, place, and time. Skin: Skin is warm and dry. There is a lot of skin atrophic changes on the back but also some diffuse fine redness seen in the lower half. Psychiatric: He has a normal mood and affect. His behavior is normal.   Blood pressure 110/60, pulse 84, resp. rate 18, height 5' 7\" (1.702 m), weight 123 lb 2 oz (55.8 kg). Assessment:       Diagnosis Orders   1. Dermatitis  hydrOXYzine (ATARAX) 25 MG tablet   2.  Acute deep vein thrombosis (DVT) of proximal vein of both lower extremities (HCC)  VL DUP LOWER EXTREMITY VENOUS LEFT           Plan:      See me following the doppler in August  Wash only 2 times a week  Use the hydrocortisone four time a day        Uri Crowe MD

## 2019-06-28 ENCOUNTER — TELEPHONE (OUTPATIENT)
Dept: UROLOGY | Age: 84
End: 2019-06-28

## 2019-06-28 NOTE — TELEPHONE ENCOUNTER
The patient daughter Shirley on [employer] was advised of the message from Janice Gibson CNP. I tasha level was normal at 1.15. We will continue with Xgeva injection as planned. She voiced understanding.

## 2019-06-28 NOTE — TELEPHONE ENCOUNTER
Please let daughter know the I tasha level was normal at 1.15. We will continue with Xgeva injection as planned.

## 2019-07-02 ENCOUNTER — OFFICE VISIT (OUTPATIENT)
Dept: UROLOGY | Age: 84
End: 2019-07-02
Payer: MEDICARE

## 2019-07-02 VITALS
BODY MASS INDEX: 19.46 KG/M2 | HEIGHT: 67 IN | SYSTOLIC BLOOD PRESSURE: 116 MMHG | DIASTOLIC BLOOD PRESSURE: 72 MMHG | WEIGHT: 124 LBS

## 2019-07-02 DIAGNOSIS — C79.52 SECONDARY MALIGNANT NEOPLASM OF BONE AND BONE MARROW (HCC): Primary | ICD-10-CM

## 2019-07-02 DIAGNOSIS — C61 MALIGNANT NEOPLASM OF PROSTATE (HCC): ICD-10-CM

## 2019-07-02 DIAGNOSIS — C79.51 SECONDARY MALIGNANT NEOPLASM OF BONE AND BONE MARROW (HCC): Primary | ICD-10-CM

## 2019-07-02 LAB
BILIRUBIN URINE: NEGATIVE
BLOOD URINE, POC: NORMAL
CHARACTER, URINE: CLEAR
COLOR, URINE: YELLOW
GLUCOSE URINE: NEGATIVE MG/DL
KETONES, URINE: NEGATIVE
LEUKOCYTE CLUMPS, URINE: NEGATIVE
NITRITE, URINE: NEGATIVE
PH, URINE: 8.5 (ref 5–9)
PROTEIN, URINE: NEGATIVE MG/DL
SPECIFIC GRAVITY, URINE: 1.01 (ref 1–1.03)
UROBILINOGEN, URINE: 0.2 EU/DL (ref 0–1)

## 2019-07-02 PROCEDURE — 81003 URINALYSIS AUTO W/O SCOPE: CPT | Performed by: NURSE PRACTITIONER

## 2019-07-02 PROCEDURE — 99214 OFFICE O/P EST MOD 30 MIN: CPT | Performed by: NURSE PRACTITIONER

## 2019-07-02 PROCEDURE — 1123F ACP DISCUSS/DSCN MKR DOCD: CPT | Performed by: NURSE PRACTITIONER

## 2019-07-02 PROCEDURE — G8428 CUR MEDS NOT DOCUMENT: HCPCS | Performed by: NURSE PRACTITIONER

## 2019-07-02 PROCEDURE — G8420 CALC BMI NORM PARAMETERS: HCPCS | Performed by: NURSE PRACTITIONER

## 2019-07-02 PROCEDURE — 4040F PNEUMOC VAC/ADMIN/RCVD: CPT | Performed by: NURSE PRACTITIONER

## 2019-07-02 PROCEDURE — G8598 ASA/ANTIPLAT THER USED: HCPCS | Performed by: NURSE PRACTITIONER

## 2019-07-02 PROCEDURE — 1036F TOBACCO NON-USER: CPT | Performed by: NURSE PRACTITIONER

## 2019-07-02 PROCEDURE — 96401 CHEMO ANTI-NEOPL SQ/IM: CPT | Performed by: NURSE PRACTITIONER

## 2019-07-02 NOTE — PROGRESS NOTES
Scotty Harvey is a 80 y.o. male seen in follow up for prostate cancer. Mr. Elysia Durand has a hx of PSA 1/31/17 was 25 at which time family was notified of risk for prostate cancer but did not wish to pursue further testing. CT of the abdomen and pelvis performed for abdominal pain 12/17/18 noted multiple skeletal sclerotic metastases with pathologic compression fractures of L1-L3, enlarged hterogeneous prostate, and pelvic adenopathy. PSA performed 12/17/18 was 1,594. Pt was started on Casodex 12/20/18. Pt recovered on TCU 12/24/19-1/10/19. He underwent TRUS prostate biopsy 1/24/19 by Dr. Kirk Lake with findings of high grade prostatic adenocarcinoma. Pt was scheduled for review of biopsy results 2/11/19 however pt was admitted to the hospital 2/5/19-2/15/19 and treated for acute PE and DVT and pathologic T6 and T8 fractures. He underwent T6 and T8 vertebral bone biopsy, RFA for spine metastatic lesion at the levels of T6 and T8, and vertebral augmentation (balloon kyphoplasty) for T6 and T8 on 2/13/19 by Dr. Benoit Argueta. He has completed palliative radiation therapy. Pt started monthly Firmagon and Xgeva 2/26/19. Firmagon switched to Lupron 4/30/19. Pt had anorexia on Casodex and this was held. As pt's PSA has continued to trend down we decided at last appt to continue to hold it. Pt is on calcium carbonate and vitamin D supplementation. Pt underwent cystoscopy in the office 3/11/19 by Dr. Kirk Lake with findings of a significantly obstructing prostate and mild bladder wall trabeculations and debris. Pt accompanied to today's appt by eldest daughter. Denies pain currently. Pt has started gaining weight. He is using supplemental shakes three times a day.         Current Outpatient Medications   Medication Sig Dispense Refill    hydrOXYzine (ATARAX) 25 MG tablet Take 1 tablet by mouth every 8 hours as needed for Itching 30 tablet 1    traMADol (ULTRAM) 50 MG tablet TAKE 1 TABLET BY MOUTH

## 2019-07-15 ENCOUNTER — TELEPHONE (OUTPATIENT)
Dept: FAMILY MEDICINE CLINIC | Age: 84
End: 2019-07-15

## 2019-07-15 DIAGNOSIS — C79.51 PROSTATE CANCER METASTATIC TO BONE (HCC): ICD-10-CM

## 2019-07-15 DIAGNOSIS — C61 PROSTATE CANCER METASTATIC TO BONE (HCC): ICD-10-CM

## 2019-07-15 RX ORDER — TRAMADOL HYDROCHLORIDE 50 MG/1
50 TABLET ORAL EVERY 8 HOURS PRN
Qty: 90 TABLET | Refills: 0 | Status: SHIPPED | OUTPATIENT
Start: 2019-07-15 | End: 2019-08-16 | Stop reason: SDUPTHER

## 2019-07-15 NOTE — TELEPHONE ENCOUNTER
VL dup scheduled at [facility] on Aug 12 at 6. Pt to arrive to the [facility] vascular center at 1030. Called daughters cell to inform but pt answered the phone. I informed him. Pt daughter informed.

## 2019-07-15 NOTE — TELEPHONE ENCOUNTER
Ricardo Schneider called for a refill of:    traMADol (ULTRAM) 50 MG tablet TAKE 1 TABLET BY MOUTH EVERY 8 HOURS AS NEEDED FOR PAIN    [facility]

## 2019-07-25 DIAGNOSIS — C79.51 PROSTATE CANCER METASTATIC TO BONE (HCC): Primary | ICD-10-CM

## 2019-07-25 DIAGNOSIS — C61 PROSTATE CANCER METASTATIC TO BONE (HCC): Primary | ICD-10-CM

## 2019-07-26 RX ORDER — DOXAZOSIN MESYLATE 4 MG/1
4 TABLET ORAL NIGHTLY
Qty: 30 TABLET | Refills: 3 | Status: SHIPPED | OUTPATIENT
Start: 2019-07-26 | End: 2019-08-13 | Stop reason: SDUPTHER

## 2019-07-26 NOTE — TELEPHONE ENCOUNTER
Alok Bocanegra,    Prescription Refill Request    [facility] is requesting a refill of doxazosin, 4 mg, taken nightly  Last prescribed on 03/11/19    Last office visit 07/02/19 with provider Virgil [facility], APRN-CNP    [address] visit on 07/30/19, NO office visit is schedule at this time     Please send refill to [facility] on [address]    Thank you.

## 2019-07-30 LAB — CALCIUM IONIZED SERUM: 4.67 MG/DL (ref 4.7–5.9)

## 2019-07-31 ENCOUNTER — TELEPHONE (OUTPATIENT)
Dept: UROLOGY | Age: 84
End: 2019-07-31

## 2019-08-01 ENCOUNTER — TELEPHONE (OUTPATIENT)
Dept: UROLOGY | Age: 84
End: 2019-08-01

## 2019-08-01 ENCOUNTER — NURSE ONLY (OUTPATIENT)
Dept: UROLOGY | Age: 84
End: 2019-08-01
Payer: MEDICARE

## 2019-08-01 DIAGNOSIS — C61 PROSTATE CANCER METASTATIC TO BONE (HCC): Primary | ICD-10-CM

## 2019-08-01 DIAGNOSIS — C79.51 PROSTATE CANCER METASTATIC TO BONE (HCC): Primary | ICD-10-CM

## 2019-08-01 DIAGNOSIS — C79.51 PROSTATE CANCER METASTATIC TO BONE (HCC): ICD-10-CM

## 2019-08-01 DIAGNOSIS — C79.51 SECONDARY MALIGNANT NEOPLASM OF BONE AND BONE MARROW (HCC): Primary | ICD-10-CM

## 2019-08-01 DIAGNOSIS — C79.52 SECONDARY MALIGNANT NEOPLASM OF BONE AND BONE MARROW (HCC): Primary | ICD-10-CM

## 2019-08-01 DIAGNOSIS — C61 PROSTATE CANCER METASTATIC TO BONE (HCC): ICD-10-CM

## 2019-08-01 PROCEDURE — 96401 CHEMO ANTI-NEOPL SQ/IM: CPT | Performed by: NURSE PRACTITIONER

## 2019-08-01 NOTE — TELEPHONE ENCOUNTER
Patient was here this week for xgeva shot-daughter wanted to know for next month if he should have a psa and calcium drawn again? There is a standing order for the PSA. But should he have calcium too? If so would you please put order in his chart?   Thank yoou

## 2019-08-02 NOTE — TELEPHONE ENCOUNTER
Hannah Herrera notified patient is to have calcium drawn along with PSA this month and that [facility] placed the order. Hannah Herrera stated that patient is going to go to [facility] on the 2nd floor of the [facility] building. I told Hannah Herrera that they will be able to see the order.

## 2019-08-12 ENCOUNTER — HOSPITAL ENCOUNTER (OUTPATIENT)
Dept: INTERVENTIONAL RADIOLOGY/VASCULAR | Age: 84
Discharge: HOME | End: 2019-08-12
Payer: MEDICARE

## 2019-08-12 DIAGNOSIS — I82.4Y3 ACUTE DEEP VEIN THROMBOSIS (DVT) OF PROXIMAL VEIN OF BOTH LOWER EXTREMITIES (HCC): ICD-10-CM

## 2019-08-12 PROCEDURE — 93971 EXTREMITY STUDY: CPT

## 2019-08-13 RX ORDER — DOXAZOSIN MESYLATE 4 MG/1
4 TABLET ORAL NIGHTLY
Qty: 30 TABLET | Refills: 3 | Status: SHIPPED | OUTPATIENT
Start: 2019-08-13 | End: 2019-11-22 | Stop reason: SDUPTHER

## 2019-08-16 ENCOUNTER — OFFICE VISIT (OUTPATIENT)
Dept: FAMILY MEDICINE CLINIC | Age: 84
End: 2019-08-16

## 2019-08-16 VITALS
HEART RATE: 92 BPM | RESPIRATION RATE: 18 BRPM | HEIGHT: 67 IN | BODY MASS INDEX: 20.01 KG/M2 | SYSTOLIC BLOOD PRESSURE: 102 MMHG | DIASTOLIC BLOOD PRESSURE: 54 MMHG | WEIGHT: 127.5 LBS

## 2019-08-16 DIAGNOSIS — C61 PROSTATE CANCER METASTATIC TO BONE (HCC): ICD-10-CM

## 2019-08-16 DIAGNOSIS — L57.0 KERATOSIS: ICD-10-CM

## 2019-08-16 DIAGNOSIS — C79.51 PROSTATE CANCER METASTATIC TO BONE (HCC): ICD-10-CM

## 2019-08-16 DIAGNOSIS — I82.492 DEEP VEIN THROMBOSIS (DVT) OF OTHER VEIN OF LEFT LOWER EXTREMITY, UNSPECIFIED CHRONICITY (HCC): Primary | ICD-10-CM

## 2019-08-16 PROCEDURE — 1036F TOBACCO NON-USER: CPT | Performed by: FAMILY MEDICINE

## 2019-08-16 PROCEDURE — G8598 ASA/ANTIPLAT THER USED: HCPCS | Performed by: FAMILY MEDICINE

## 2019-08-16 PROCEDURE — G8420 CALC BMI NORM PARAMETERS: HCPCS | Performed by: FAMILY MEDICINE

## 2019-08-16 PROCEDURE — 4040F PNEUMOC VAC/ADMIN/RCVD: CPT | Performed by: FAMILY MEDICINE

## 2019-08-16 PROCEDURE — G8427 DOCREV CUR MEDS BY ELIG CLIN: HCPCS | Performed by: FAMILY MEDICINE

## 2019-08-16 PROCEDURE — 1123F ACP DISCUSS/DSCN MKR DOCD: CPT | Performed by: FAMILY MEDICINE

## 2019-08-16 PROCEDURE — 99213 OFFICE O/P EST LOW 20 MIN: CPT | Performed by: FAMILY MEDICINE

## 2019-08-16 RX ORDER — TRAMADOL HYDROCHLORIDE 50 MG/1
50 TABLET ORAL EVERY 8 HOURS PRN
Qty: 90 TABLET | Refills: 0 | Status: SHIPPED | OUTPATIENT
Start: 2019-08-16 | End: 2019-10-08 | Stop reason: SDUPTHER

## 2019-08-16 ASSESSMENT — ENCOUNTER SYMPTOMS
SHORTNESS OF BREATH: 0
CONSTIPATION: 0
SINUS PRESSURE: 0

## 2019-08-26 ENCOUNTER — NURSE ONLY (OUTPATIENT)
Dept: LAB | Age: 84
End: 2019-08-26

## 2019-08-26 DIAGNOSIS — C61 PROSTATE CANCER METASTATIC TO BONE (HCC): ICD-10-CM

## 2019-08-26 DIAGNOSIS — C79.51 PROSTATE CANCER METASTATIC TO BONE (HCC): ICD-10-CM

## 2019-08-26 LAB
CALCIUM IONIZED: 1.14 MMOL/L (ref 1.12–1.32)
PROSTATE SPECIFIC ANTIGEN: 0.18 NG/ML (ref 0–1)

## 2019-08-29 ENCOUNTER — TELEPHONE (OUTPATIENT)
Dept: FAMILY MEDICINE CLINIC | Age: 84
End: 2019-08-29

## 2019-08-29 ENCOUNTER — OFFICE VISIT (OUTPATIENT)
Dept: UROLOGY | Age: 84
End: 2019-08-29
Payer: MEDICARE

## 2019-08-29 VITALS
WEIGHT: 126 LBS | SYSTOLIC BLOOD PRESSURE: 120 MMHG | BODY MASS INDEX: 20.25 KG/M2 | DIASTOLIC BLOOD PRESSURE: 60 MMHG | HEIGHT: 66 IN

## 2019-08-29 DIAGNOSIS — Z51.81 THERAPEUTIC DRUG MONITORING: ICD-10-CM

## 2019-08-29 DIAGNOSIS — C79.52 SECONDARY MALIGNANT NEOPLASM OF BONE AND BONE MARROW (HCC): ICD-10-CM

## 2019-08-29 DIAGNOSIS — C79.51 SECONDARY MALIGNANT NEOPLASM OF BONE AND BONE MARROW (HCC): ICD-10-CM

## 2019-08-29 DIAGNOSIS — C61 PROSTATE CANCER METASTATIC TO BONE (HCC): Primary | ICD-10-CM

## 2019-08-29 DIAGNOSIS — C79.51 PROSTATE CANCER METASTATIC TO BONE (HCC): Primary | ICD-10-CM

## 2019-08-29 LAB
BILIRUBIN URINE: NEGATIVE
BLOOD URINE, POC: NORMAL
CHARACTER, URINE: CLEAR
COLOR, URINE: YELLOW
GLUCOSE URINE: NEGATIVE MG/DL
KETONES, URINE: NEGATIVE
LEUKOCYTE CLUMPS, URINE: NEGATIVE
NITRITE, URINE: NEGATIVE
PH, URINE: 7.5 (ref 5–9)
PROTEIN, URINE: NEGATIVE MG/DL
SPECIFIC GRAVITY, URINE: 1.01 (ref 1–1.03)
UROBILINOGEN, URINE: 0.2 EU/DL (ref 0–1)

## 2019-08-29 PROCEDURE — 81003 URINALYSIS AUTO W/O SCOPE: CPT | Performed by: NURSE PRACTITIONER

## 2019-08-29 PROCEDURE — 96401 CHEMO ANTI-NEOPL SQ/IM: CPT | Performed by: NURSE PRACTITIONER

## 2019-08-29 NOTE — PROGRESS NOTES
Patient has given me verbal consent to perform Xgeva Injection Yes      Following YOVANNY Maynard CNP plan of care. XGEVA 120MG/1.7ML MG GIVEN S.C Right ARM  Lot Number: [id number]  Expiration Date: 8/21  [facility] #: [id number]    After Injection was given there were no reactions at injection site and patient was feeling well. Patient was notified that possible side effects from injections include: Redness, swelling and itching at the injection site. Possible side effects of androgen deprivation therapy, including hot flashes, flushing of the skin, increased weight, decreased sex drive, and difficulties with ED. Patient was instructed to inform their dental office that they are receiving Hoy Tiburcio and that major dental procedures should be avoided. Patient was advised to call our office with any further questions or concerns. Any active dental problems, infections, or pain? No    Date of last dental appointment: Unknown    Any planned dental procedures? no    Last Bone Scan was performed on N/A    Last Calcium level drawn on 8/26/19 and Calcium Value was 1.14. Calcium needs to be checked periodically. Ask provider when Calcium needs checked again. Patient supplied their own medications No    Pt [address] therapy first initiated on 02/26/19.

## 2019-09-11 ENCOUNTER — CARE COORDINATION (OUTPATIENT)
Dept: CARE COORDINATION | Age: 84
End: 2019-09-11

## 2019-09-18 ENCOUNTER — CARE COORDINATION (OUTPATIENT)
Dept: CARE COORDINATION | Age: 84
End: 2019-09-18

## 2019-09-18 SDOH — ECONOMIC STABILITY: FOOD INSECURITY: WITHIN THE PAST 12 MONTHS, YOU WORRIED THAT YOUR FOOD WOULD RUN OUT BEFORE YOU GOT MONEY TO BUY MORE.: NEVER TRUE

## 2019-09-18 SDOH — SOCIAL STABILITY: SOCIAL NETWORK
DO YOU BELONG TO ANY CLUBS OR ORGANIZATIONS SUCH AS CHURCH GROUPS UNIONS, FRATERNAL OR ATHLETIC GROUPS, OR SCHOOL GROUPS?: NO

## 2019-09-18 SDOH — HEALTH STABILITY: PHYSICAL HEALTH: ON AVERAGE, HOW MANY DAYS PER WEEK DO YOU ENGAGE IN MODERATE TO STRENUOUS EXERCISE (LIKE A BRISK WALK)?: 0 DAYS

## 2019-09-18 SDOH — SOCIAL STABILITY: SOCIAL NETWORK
IN A TYPICAL WEEK, HOW MANY TIMES DO YOU TALK ON THE PHONE WITH FAMILY, FRIENDS, OR NEIGHBORS?: MORE THAN THREE TIMES A WEEK

## 2019-09-18 SDOH — SOCIAL STABILITY: SOCIAL NETWORK: HOW OFTEN DO YOU GET TOGETHER WITH FRIENDS OR RELATIVES?: MORE THAN THREE TIMES A WEEK

## 2019-09-18 SDOH — HEALTH STABILITY: MENTAL HEALTH: HOW OFTEN DO YOU HAVE A DRINK CONTAINING ALCOHOL?: MONTHLY OR LESS

## 2019-09-18 SDOH — ECONOMIC STABILITY: FOOD INSECURITY: WITHIN THE PAST 12 MONTHS, THE FOOD YOU BOUGHT JUST DIDN'T LAST AND YOU DIDN'T HAVE MONEY TO GET MORE.: NEVER TRUE

## 2019-09-18 SDOH — ECONOMIC STABILITY: TRANSPORTATION INSECURITY
IN THE PAST 12 MONTHS, HAS LACK OF TRANSPORTATION KEPT YOU FROM MEETINGS, WORK, OR FROM GETTING THINGS NEEDED FOR DAILY LIVING?: NO

## 2019-09-18 SDOH — SOCIAL STABILITY: SOCIAL NETWORK: ARE YOU MARRIED, WIDOWED, DIVORCED, SEPARATED, NEVER MARRIED, OR LIVING WITH A PARTNER?: WIDOWED

## 2019-09-18 SDOH — HEALTH STABILITY: PHYSICAL HEALTH: ON AVERAGE, HOW MANY MINUTES DO YOU ENGAGE IN EXERCISE AT THIS LEVEL?: 0 MIN

## 2019-09-18 SDOH — HEALTH STABILITY: MENTAL HEALTH
STRESS IS WHEN SOMEONE FEELS TENSE, NERVOUS, ANXIOUS, OR CAN'T SLEEP AT NIGHT BECAUSE THEIR MIND IS TROUBLED. HOW STRESSED ARE YOU?: NOT AT ALL

## 2019-09-18 SDOH — ECONOMIC STABILITY: TRANSPORTATION INSECURITY
IN THE PAST 12 MONTHS, HAS THE LACK OF TRANSPORTATION KEPT YOU FROM MEDICAL APPOINTMENTS OR FROM GETTING MEDICATIONS?: NO

## 2019-09-18 SDOH — SOCIAL STABILITY: SOCIAL NETWORK: HOW OFTEN DO YOU ATTENT MEETINGS OF THE CLUB OR ORGANIZATION YOU BELONG TO?: NEVER

## 2019-09-18 SDOH — ECONOMIC STABILITY: INCOME INSECURITY: HOW HARD IS IT FOR YOU TO PAY FOR THE VERY BASICS LIKE FOOD, HOUSING, MEDICAL CARE, AND HEATING?: NOT VERY HARD

## 2019-09-18 ASSESSMENT — ENCOUNTER SYMPTOMS: DYSPNEA ASSOCIATED WITH: EXERTION

## 2019-09-18 NOTE — CARE COORDINATION
Dr. Jessica Hodges, I have enrolled Johanna Duron into Care Coordination program to provide support and education to him and his family.   Please approve Plan of Care using smart phrase ( . Ccplanofcare)  Thank you
(COREG) 3.125 MG tablet Take 1 tablet by mouth 2 times daily (with meals) 2/15/19  Yes Shanda Mays MD   finasteride (PROSCAR) 5 MG tablet Take 1 tablet by mouth daily 2/16/19  Yes Shanda Mays MD   tiZANidine (ZANAFLEX) 2 MG tablet Take 1 tablet by mouth every 8 hours as needed (spasm) 2/15/19  Yes Shanda Mays MD   docusate sodium (COLACE, DULCOLAX) 100 MG CAPS Take 100 mg by mouth 2 times daily 2/15/19  Yes Shanda Mays MD   acetaminophen (TYLENOL) 500 MG tablet Take 500 mg by mouth every 8 hours as needed for Pain   Yes Historical Provider, MD   aspirin 325 MG EC tablet Take 1 tablet by mouth daily  Patient taking differently: Take 81 mg by mouth daily  1/23/19  Yes Santiago Wilcox MD       Future Appointments   Date Time Provider Madeline Valentinoi   9/26/2019 10:30 AM [facility] [place] [address] [address]   9/27/2019 10:00 AM JAILYN Samaniego - CNP [facility] [address] [facility]   11/22/2019  1:40 PM Santiago Wilcox MD [facility] [address]

## 2019-09-23 ENCOUNTER — NURSE ONLY (OUTPATIENT)
Dept: LAB | Age: 84
End: 2019-09-23

## 2019-09-23 DIAGNOSIS — Z51.81 THERAPEUTIC DRUG MONITORING: ICD-10-CM

## 2019-09-23 DIAGNOSIS — C61 PROSTATE CANCER METASTATIC TO BONE (HCC): ICD-10-CM

## 2019-09-23 DIAGNOSIS — C79.51 PROSTATE CANCER METASTATIC TO BONE (HCC): ICD-10-CM

## 2019-09-23 LAB
CALCIUM IONIZED: 1.16 MMOL/L (ref 1.12–1.32)
PROSTATE SPECIFIC ANTIGEN: 0.16 NG/ML (ref 0–1)

## 2019-09-26 ENCOUNTER — NURSE ONLY (OUTPATIENT)
Dept: UROLOGY | Age: 84
End: 2019-09-26
Payer: MEDICARE

## 2019-09-26 DIAGNOSIS — C79.51 SECONDARY MALIGNANT NEOPLASM OF BONE AND BONE MARROW (HCC): Primary | ICD-10-CM

## 2019-09-26 DIAGNOSIS — C79.52 SECONDARY MALIGNANT NEOPLASM OF BONE AND BONE MARROW (HCC): Primary | ICD-10-CM

## 2019-09-26 DIAGNOSIS — C61 MALIGNANT NEOPLASM OF PROSTATE (HCC): ICD-10-CM

## 2019-09-26 PROCEDURE — 96401 CHEMO ANTI-NEOPL SQ/IM: CPT | Performed by: NURSE PRACTITIONER

## 2019-09-26 NOTE — PROGRESS NOTES
HPI:   Patient presents with:  Hypertension: 11 month f/u       Joe Villegas is a 80year old male who presents for recheck of his hypertension. He has been taking medications as instructed, with no medication side effects.  His home BP monitoring in the Patient has given me verbal consent to perform Xgeva Injection YES      Following Cliff Welch  plan of care. XGEVA 120MG/1.7ML MG GIVEN S.C LEFT ARM  Lot Number: [id number]  Expiration Date: 11/21  [facility] Aguirre #: [id number]    After Injection was given there were no reactions at injection site and patient was feeling well. Patient was notified that possible side effects from injections include: Redness, swelling and itching at the injection site. Possible side effects of androgen deprivation therapy, including hot flashes, flushing of the skin, increased weight, decreased sex drive, and difficulties with ED. Patient was instructed to inform their dental office that they are receiving Coral Hedges and that major dental procedures should be avoided. Patient was advised to call our office with any further questions or concerns. Any active dental problems, infections, or pain? NO has dentures. Date of last dental appointment: Unknown    Any planned dental procedures? NO    Last Bone Scan was performed on NA    Last Calcium level drawn on 9/23/19 and Calcium Value was 1. 16. Calcium needs to be checked periodically. Ask provider when Calcium needs checked again. Patient supplied their own medications No    Pt [address] therapy first initiated on 2/26/19. ED (erectile dysfunction); Carotid artery disease ([facility] [address]  Occlusion and stenosis of precerebral artery without cerebral infarction; Primary osteoarthritis of left knee; Piriformis syndrome, left; Left hamstring muscle strain; Chronic left-sided low back adali [facility]    Pure hypercholesterolemia - Primary    Overview     Pravastatin 40         Current Assessment & Plan     Continue med, but consider atorvastatin in stead b.o cost.             Respiratory    Obstructive sleep apnea (Chronic)    Ove

## 2019-09-27 ENCOUNTER — OFFICE VISIT (OUTPATIENT)
Dept: PULMONOLOGY | Age: 84
End: 2019-09-27
Payer: MEDICARE

## 2019-09-27 VITALS
SYSTOLIC BLOOD PRESSURE: 122 MMHG | HEART RATE: 103 BPM | TEMPERATURE: 98.1 F | HEIGHT: 66 IN | BODY MASS INDEX: 20.99 KG/M2 | WEIGHT: 130.6 LBS | DIASTOLIC BLOOD PRESSURE: 68 MMHG | OXYGEN SATURATION: 97 %

## 2019-09-27 DIAGNOSIS — J44.9 STAGE 3 SEVERE COPD BY GOLD CLASSIFICATION (HCC): ICD-10-CM

## 2019-09-27 DIAGNOSIS — R05.9 COUGH: Primary | ICD-10-CM

## 2019-09-27 PROCEDURE — G8420 CALC BMI NORM PARAMETERS: HCPCS | Performed by: NURSE PRACTITIONER

## 2019-09-27 PROCEDURE — 99213 OFFICE O/P EST LOW 20 MIN: CPT | Performed by: NURSE PRACTITIONER

## 2019-09-27 PROCEDURE — 3023F SPIROM DOC REV: CPT | Performed by: NURSE PRACTITIONER

## 2019-09-27 PROCEDURE — 1036F TOBACCO NON-USER: CPT | Performed by: NURSE PRACTITIONER

## 2019-09-27 PROCEDURE — 1123F ACP DISCUSS/DSCN MKR DOCD: CPT | Performed by: NURSE PRACTITIONER

## 2019-09-27 PROCEDURE — G8598 ASA/ANTIPLAT THER USED: HCPCS | Performed by: NURSE PRACTITIONER

## 2019-09-27 PROCEDURE — G8427 DOCREV CUR MEDS BY ELIG CLIN: HCPCS | Performed by: NURSE PRACTITIONER

## 2019-09-27 PROCEDURE — G8926 SPIRO NO PERF OR DOC: HCPCS | Performed by: NURSE PRACTITIONER

## 2019-09-27 PROCEDURE — 4040F PNEUMOC VAC/ADMIN/RCVD: CPT | Performed by: NURSE PRACTITIONER

## 2019-09-27 RX ORDER — ALBUTEROL SULFATE 90 UG/1
2 AEROSOL, METERED RESPIRATORY (INHALATION) EVERY 6 HOURS PRN
COMMUNITY
End: 2021-01-29 | Stop reason: SDUPTHER

## 2019-09-27 NOTE — PROGRESS NOTES
effusion. He has no associated nasal congestion, PND nor allergy symptoms. He had not been on PPI therapy. Reports improved dysphagia, occasional trouble with large pills. Back to regular diet. Cough is worse in the morning, sporadic throughout the day. No increased SOB, except with coughing spells.     Progress History:   Since last visit any new medical issues? No  New ER or hospitlal visits? No  Any new or changes in medicines? Yes, off Eliquis. Using inhalers? Yes Stiolto  Are they helpful?  Yes   Past Medical hx   PMH:  Past Medical History:   Diagnosis Date    Blood circulation, collateral     CAD (coronary artery disease)     CAD (coronary artery disease) 9/2/2014    Cancer (HCC)     prostate to bone    CKD (chronic kidney disease) stage 2, GFR 60-89 ml/min 12/24/2018    COPD (chronic obstructive pulmonary disease) (HCC)     Diverticulitis     DVT of lower extremity, bilateral ([address] 02/2019    GERD (gastroesophageal reflux disease)     Hiatal hernia     Hyperlipemia 8/12/2013    Hyperlipidemia     Other disorders of kidney and ureter in diseases classified elsewhere     Pleural plaque 8/12/2013    Primary adenocarcinoma of prostate ([address] 01/2019    high grade    Pulmonary emboli ([address] 02/2019     SURGICAL HISTORY:  Past Surgical History:   Procedure Laterality Date    ABDOMEN SURGERY      APPENDECTOMY      CARDIAC CATHETERIZATION      CYSTOSCOPY  03/17/2015    Litholapaxy-Large    EYE SURGERY      cataracts    HERNIA REPAIR      KYPHOSIS SURGERY N/A 2/13/2019    ABLATION AND KYPHOPLASTY AT T8 AND ABLATION AND KYPHOPLASTY AT T6 performed by Usha Morel MD at [facility] N/A 12/17/2018    EXPLORATORY LAPAROTOMY, WITH ABDOMINAL WASHOUT performed by Jyoti Meadows MD at [address] [place] [address]:  Social History     Tobacco Use    Smoking status: Former Smoker     Packs/day: 1.00     Types: Cigarettes     Last attempt to quit: 7/1/1966     Years since quitting: kyphoplasty at 2 mid thoracic    fracture levels. There are also old mild compression fractures T4 T11 which were seen on prior MRI thoracic spine, 2/7/2019.   2. Questionable tiny effusion left side. Small fibrocalcified nodule right midlung, unchanged, likely a granuloma. 3. Small 3.1 cm nodule left lower lung field, not definitely seen previously. This could conceivably represent loculated fluid in the major fissure but other possibilities not excluded. CT thorax recommended for further evaluation.                      Assessment      Diagnosis Orders   1. Cough      Associated with dysphagia/aspiration   2. Stage 3 severe COPD by GOLD classification ([address]           Plan   Continue speech therapy exercises and aspiration precautions. Continue Stiolto. Can change Ventolin to PRN. Continue Mucinex and Acapella PRN for pulmonary hygiene measures. He is up to date with both PNA vaccines. Flu vaccine with PCP. Follows closely with PCP therefore can follow with pulmonary PRN. Will see Stormy Olea back in: PRN.     Electronically signed by JAILYN Purdy CNP on 9/27/2019 at 2:19 PM'

## 2019-09-30 ENCOUNTER — CARE COORDINATION (OUTPATIENT)
Dept: CARE COORDINATION | Age: 84
End: 2019-09-30

## 2019-09-30 ASSESSMENT — ENCOUNTER SYMPTOMS: DYSPNEA ASSOCIATED WITH: EXERTION

## 2019-10-08 DIAGNOSIS — C61 PROSTATE CANCER METASTATIC TO BONE (HCC): ICD-10-CM

## 2019-10-08 DIAGNOSIS — C79.51 PROSTATE CANCER METASTATIC TO BONE (HCC): ICD-10-CM

## 2019-10-08 RX ORDER — TRAMADOL HYDROCHLORIDE 50 MG/1
TABLET ORAL
Qty: 90 TABLET | Refills: 0 | Status: SHIPPED | OUTPATIENT
Start: 2019-10-08 | End: 2019-11-26 | Stop reason: SDUPTHER

## 2019-10-16 ENCOUNTER — CARE COORDINATION (OUTPATIENT)
Dept: CARE COORDINATION | Age: 84
End: 2019-10-16

## 2019-10-16 ASSESSMENT — ENCOUNTER SYMPTOMS: DYSPNEA ASSOCIATED WITH: EXERTION

## 2019-10-28 ENCOUNTER — NURSE ONLY (OUTPATIENT)
Dept: LAB | Age: 84
End: 2019-10-28

## 2019-10-28 DIAGNOSIS — C61 MALIGNANT NEOPLASM OF PROSTATE (HCC): ICD-10-CM

## 2019-10-28 LAB — PROSTATE SPECIFIC ANTIGEN: 0.12 NG/ML (ref 0–1)

## 2019-10-29 ENCOUNTER — TELEPHONE (OUTPATIENT)
Dept: UROLOGY | Age: 84
End: 2019-10-29

## 2019-10-29 DIAGNOSIS — Z51.81 THERAPEUTIC DRUG MONITORING: Primary | ICD-10-CM

## 2019-10-31 ENCOUNTER — NURSE ONLY (OUTPATIENT)
Dept: LAB | Age: 84
End: 2019-10-31

## 2019-10-31 DIAGNOSIS — Z51.81 THERAPEUTIC DRUG MONITORING: ICD-10-CM

## 2019-10-31 LAB — CALCIUM IONIZED: 1.11 MMOL/L (ref 1.12–1.32)

## 2019-11-01 ENCOUNTER — TELEPHONE (OUTPATIENT)
Dept: UROLOGY | Age: 84
End: 2019-11-01

## 2019-11-01 DIAGNOSIS — E83.51 HYPOCALCEMIA: Primary | ICD-10-CM

## 2019-11-06 ENCOUNTER — CARE COORDINATION (OUTPATIENT)
Dept: CARE COORDINATION | Age: 84
End: 2019-11-06

## 2019-11-18 ENCOUNTER — NURSE ONLY (OUTPATIENT)
Dept: LAB | Age: 84
End: 2019-11-18

## 2019-11-18 ENCOUNTER — TELEPHONE (OUTPATIENT)
Dept: UROLOGY | Age: 84
End: 2019-11-18

## 2019-11-18 DIAGNOSIS — E83.51 HYPOCALCEMIA: ICD-10-CM

## 2019-11-18 LAB
CALCIUM IONIZED: 1.17 MMOL/L (ref 1.12–1.32)
VITAMIN D 25-HYDROXY: 30 NG/ML (ref 30–100)

## 2019-11-22 ENCOUNTER — OFFICE VISIT (OUTPATIENT)
Dept: UROLOGY | Age: 84
End: 2019-11-22
Payer: MEDICARE

## 2019-11-22 VITALS
DIASTOLIC BLOOD PRESSURE: 68 MMHG | HEIGHT: 66 IN | WEIGHT: 130 LBS | SYSTOLIC BLOOD PRESSURE: 122 MMHG | BODY MASS INDEX: 20.89 KG/M2

## 2019-11-22 DIAGNOSIS — C79.52 SECONDARY MALIGNANT NEOPLASM OF BONE AND BONE MARROW (HCC): ICD-10-CM

## 2019-11-22 DIAGNOSIS — C61 PROSTATE CANCER METASTATIC TO BONE (HCC): Primary | ICD-10-CM

## 2019-11-22 DIAGNOSIS — C79.51 PROSTATE CANCER METASTATIC TO BONE (HCC): Primary | ICD-10-CM

## 2019-11-22 DIAGNOSIS — C79.51 SECONDARY MALIGNANT NEOPLASM OF BONE AND BONE MARROW (HCC): ICD-10-CM

## 2019-11-22 LAB
BILIRUBIN URINE: NEGATIVE
BLOOD URINE, POC: ABNORMAL
CHARACTER, URINE: CLEAR
COLOR, URINE: YELLOW
GLUCOSE URINE: NEGATIVE MG/DL
KETONES, URINE: NEGATIVE
LEUKOCYTE CLUMPS, URINE: ABNORMAL
NITRITE, URINE: NEGATIVE
PH, URINE: 8 (ref 5–9)
PROTEIN, URINE: NEGATIVE MG/DL
SPECIFIC GRAVITY, URINE: 1.01 (ref 1–1.03)
UROBILINOGEN, URINE: 0.2 EU/DL (ref 0–1)

## 2019-11-22 PROCEDURE — 1036F TOBACCO NON-USER: CPT | Performed by: NURSE PRACTITIONER

## 2019-11-22 PROCEDURE — 96401 CHEMO ANTI-NEOPL SQ/IM: CPT | Performed by: NURSE PRACTITIONER

## 2019-11-22 PROCEDURE — 1123F ACP DISCUSS/DSCN MKR DOCD: CPT | Performed by: NURSE PRACTITIONER

## 2019-11-22 PROCEDURE — 99213 OFFICE O/P EST LOW 20 MIN: CPT | Performed by: NURSE PRACTITIONER

## 2019-11-22 PROCEDURE — G8420 CALC BMI NORM PARAMETERS: HCPCS | Performed by: NURSE PRACTITIONER

## 2019-11-22 PROCEDURE — 4040F PNEUMOC VAC/ADMIN/RCVD: CPT | Performed by: NURSE PRACTITIONER

## 2019-11-22 PROCEDURE — G8598 ASA/ANTIPLAT THER USED: HCPCS | Performed by: NURSE PRACTITIONER

## 2019-11-22 PROCEDURE — G8484 FLU IMMUNIZE NO ADMIN: HCPCS | Performed by: NURSE PRACTITIONER

## 2019-11-22 PROCEDURE — 81003 URINALYSIS AUTO W/O SCOPE: CPT | Performed by: NURSE PRACTITIONER

## 2019-11-22 PROCEDURE — G8427 DOCREV CUR MEDS BY ELIG CLIN: HCPCS | Performed by: NURSE PRACTITIONER

## 2019-11-22 RX ORDER — DOXAZOSIN MESYLATE 4 MG/1
4 TABLET ORAL NIGHTLY
Qty: 90 TABLET | Refills: 3 | Status: SHIPPED | OUTPATIENT
Start: 2019-11-22 | End: 2020-11-13 | Stop reason: SDUPTHER

## 2019-11-22 RX ORDER — FINASTERIDE 5 MG/1
5 TABLET, FILM COATED ORAL DAILY
Qty: 90 TABLET | Refills: 3 | Status: SHIPPED | OUTPATIENT
Start: 2019-11-22 | End: 2020-03-03 | Stop reason: SDUPTHER

## 2019-11-26 DIAGNOSIS — C79.51 PROSTATE CANCER METASTATIC TO BONE (HCC): ICD-10-CM

## 2019-11-26 DIAGNOSIS — C61 PROSTATE CANCER METASTATIC TO BONE (HCC): ICD-10-CM

## 2019-11-26 RX ORDER — TRAMADOL HYDROCHLORIDE 50 MG/1
50 TABLET ORAL EVERY 8 HOURS PRN
Qty: 90 TABLET | Refills: 0 | Status: SHIPPED | OUTPATIENT
Start: 2019-11-26 | End: 2020-01-06 | Stop reason: SDUPTHER

## 2019-12-12 ENCOUNTER — OFFICE VISIT (OUTPATIENT)
Dept: FAMILY MEDICINE CLINIC | Age: 84
End: 2019-12-12

## 2019-12-12 VITALS
BODY MASS INDEX: 21.38 KG/M2 | HEART RATE: 66 BPM | SYSTOLIC BLOOD PRESSURE: 110 MMHG | HEIGHT: 66 IN | RESPIRATION RATE: 10 BRPM | DIASTOLIC BLOOD PRESSURE: 70 MMHG | WEIGHT: 133 LBS

## 2019-12-12 DIAGNOSIS — I25.10 CORONARY ARTERY DISEASE INVOLVING NATIVE HEART WITHOUT ANGINA PECTORIS, UNSPECIFIED VESSEL OR LESION TYPE: Primary | ICD-10-CM

## 2019-12-12 PROCEDURE — G0008 ADMIN INFLUENZA VIRUS VAC: HCPCS | Performed by: FAMILY MEDICINE

## 2019-12-12 PROCEDURE — 99213 OFFICE O/P EST LOW 20 MIN: CPT | Performed by: FAMILY MEDICINE

## 2019-12-12 PROCEDURE — 1123F ACP DISCUSS/DSCN MKR DOCD: CPT | Performed by: FAMILY MEDICINE

## 2019-12-12 PROCEDURE — G8420 CALC BMI NORM PARAMETERS: HCPCS | Performed by: FAMILY MEDICINE

## 2019-12-12 PROCEDURE — 1036F TOBACCO NON-USER: CPT | Performed by: FAMILY MEDICINE

## 2019-12-12 PROCEDURE — 4040F PNEUMOC VAC/ADMIN/RCVD: CPT | Performed by: FAMILY MEDICINE

## 2019-12-12 PROCEDURE — G8598 ASA/ANTIPLAT THER USED: HCPCS | Performed by: FAMILY MEDICINE

## 2019-12-12 PROCEDURE — G8427 DOCREV CUR MEDS BY ELIG CLIN: HCPCS | Performed by: FAMILY MEDICINE

## 2019-12-12 PROCEDURE — 90756 CCIIV4 VACC ABX FREE IM: CPT | Performed by: FAMILY MEDICINE

## 2019-12-12 RX ORDER — CARVEDILOL 3.12 MG/1
3.12 TABLET ORAL 2 TIMES DAILY WITH MEALS
Qty: 60 TABLET | Refills: 3 | Status: SHIPPED | DISCHARGE
Start: 2019-12-12 | End: 2019-12-12 | Stop reason: SDUPTHER

## 2019-12-12 RX ORDER — CARVEDILOL 3.12 MG/1
3.12 TABLET ORAL 2 TIMES DAILY WITH MEALS
Qty: 180 TABLET | Refills: 3 | Status: SHIPPED | OUTPATIENT
Start: 2019-12-12 | End: 2020-11-30

## 2019-12-12 ASSESSMENT — ENCOUNTER SYMPTOMS
SINUS PRESSURE: 0
CONSTIPATION: 0
BACK PAIN: 1
SHORTNESS OF BREATH: 1

## 2020-01-02 ENCOUNTER — NURSE ONLY (OUTPATIENT)
Dept: LAB | Age: 85
End: 2020-01-02

## 2020-01-02 LAB
CALCIUM IONIZED: 1.14 MMOL/L (ref 1.12–1.32)
PROSTATE SPECIFIC ANTIGEN: 0.29 NG/ML (ref 0–1)

## 2020-01-06 RX ORDER — TRAMADOL HYDROCHLORIDE 50 MG/1
50 TABLET ORAL EVERY 8 HOURS PRN
Qty: 90 TABLET | Refills: 0 | Status: SHIPPED | OUTPATIENT
Start: 2020-01-06 | End: 2020-01-07 | Stop reason: SDUPTHER

## 2020-01-06 NOTE — TELEPHONE ENCOUNTER
Date of last visit:  12/12/2019  Date of next visit:  6/17/2020    Requested Prescriptions     Pending Prescriptions Disp Refills    traMADol (ULTRAM) 50 MG tablet 90 tablet 0     Sig: Take 1 tablet by mouth every 8 hours as needed for Pain for up to 30 days.

## 2020-01-07 ENCOUNTER — TELEPHONE (OUTPATIENT)
Dept: FAMILY MEDICINE CLINIC | Age: 85
End: 2020-01-07

## 2020-01-07 ENCOUNTER — NURSE ONLY (OUTPATIENT)
Dept: UROLOGY | Age: 85
End: 2020-01-07
Payer: MEDICARE

## 2020-01-07 PROCEDURE — 96401 CHEMO ANTI-NEOPL SQ/IM: CPT | Performed by: NURSE PRACTITIONER

## 2020-01-07 PROCEDURE — 96402 CHEMO HORMON ANTINEOPL SQ/IM: CPT | Performed by: NURSE PRACTITIONER

## 2020-01-07 RX ORDER — TRAMADOL HYDROCHLORIDE 50 MG/1
50 TABLET ORAL EVERY 8 HOURS PRN
Qty: 90 TABLET | Refills: 0 | Status: SHIPPED | OUTPATIENT
Start: 2020-01-07 | End: 2020-02-06

## 2020-01-07 NOTE — PROGRESS NOTES
Unknown    Any planned dental procedures? Unknown     Last Bone Scan was performed on NA    Last Calcium level drawn on 01/02/20 and Calcium Value was 1.14. Calcium needs to be checked periodically. Ask provider when Calcium needs checked again. Patient supplied their own medications No    Pt [address] therapy first initiated on 02/26/19.

## 2020-01-29 ENCOUNTER — NURSE ONLY (OUTPATIENT)
Dept: LAB | Age: 85
End: 2020-01-29

## 2020-01-29 LAB
CALCIUM IONIZED: 1.16 MMOL/L (ref 1.12–1.32)
PROSTATE SPECIFIC ANTIGEN: 0.24 NG/ML (ref 0–1)

## 2020-02-04 ENCOUNTER — NURSE ONLY (OUTPATIENT)
Dept: UROLOGY | Age: 85
End: 2020-02-04
Payer: MEDICARE

## 2020-02-04 PROCEDURE — 96401 CHEMO ANTI-NEOPL SQ/IM: CPT | Performed by: NURSE PRACTITIONER

## 2020-02-04 NOTE — PROGRESS NOTES
Patient has given me verbal consent to perform Xgeva Injection YES      Following Claudean Pries CNP plan of care. Deacon Neth 120MG/1.7ML MG GIVEN S.C RIGHT ARM  Lot Number: [id number]  Expiration Date: 12/21  [facility] #: [id number]    After Injection was given there were no reactions at injection site and patient was feeling well. Patient was notified that possible side effects from injections include: Redness, swelling and itching at the injection site. Possible side effects of androgen deprivation therapy, including hot flashes, flushing of the skin, increased weight, decreased sex drive, and difficulties with ED. Patient was instructed to inform their dental office that they are receiving Deacon Neth and that major dental procedures should be avoided. Patient was advised to call our office with any further questions or concerns. Any active dental problems, infections, or pain? NO    Date of last dental appointment: Dentures     Any planned dental procedures? NO    Last Bone Scan was performed on NA    Last Calcium level drawn on 1/29/20 and Calcium Value was 1.016. Calcium needs to be checked periodically. Ask provider when Calcium needs checked again. Patient supplied their own medications No    Pt [address] therapy first initiated on 02/26/19.

## 2020-02-20 RX ORDER — TRAMADOL HYDROCHLORIDE 50 MG/1
50 TABLET ORAL EVERY 8 HOURS PRN
Qty: 90 TABLET | Refills: 0 | Status: SHIPPED | OUTPATIENT
Start: 2020-02-20 | End: 2020-03-30 | Stop reason: SDUPTHER

## 2020-02-27 ENCOUNTER — NURSE ONLY (OUTPATIENT)
Dept: LAB | Age: 85
End: 2020-02-27

## 2020-02-27 LAB
CALCIUM IONIZED: 1.21 MMOL/L (ref 1.12–1.32)
PROSTATE SPECIFIC ANTIGEN: 0.21 NG/ML (ref 0–1)

## 2020-03-03 ENCOUNTER — TELEPHONE (OUTPATIENT)
Dept: UROLOGY | Age: 85
End: 2020-03-03

## 2020-03-03 ENCOUNTER — NURSE ONLY (OUTPATIENT)
Dept: UROLOGY | Age: 85
End: 2020-03-03
Payer: MEDICARE

## 2020-03-03 PROCEDURE — 96401 CHEMO ANTI-NEOPL SQ/IM: CPT | Performed by: NURSE PRACTITIONER

## 2020-03-03 RX ORDER — FINASTERIDE 5 MG/1
5 TABLET, FILM COATED ORAL DAILY
Qty: 90 TABLET | Refills: 3 | Status: SHIPPED | OUTPATIENT
Start: 2020-03-03 | End: 2021-01-19 | Stop reason: SDUPTHER

## 2020-03-11 RX ORDER — HYDROXYZINE HYDROCHLORIDE 25 MG/1
25 TABLET, FILM COATED ORAL EVERY 8 HOURS PRN
Qty: 30 TABLET | Refills: 1 | Status: SHIPPED | OUTPATIENT
Start: 2020-03-11 | End: 2021-01-18 | Stop reason: SDUPTHER

## 2020-03-25 ENCOUNTER — TELEPHONE (OUTPATIENT)
Dept: UROLOGY | Age: 85
End: 2020-03-25

## 2020-03-25 PROBLEM — E43 SEVERE MALNUTRITION (HCC): Status: RESOLVED | Noted: 2018-12-26 | Resolved: 2020-03-24

## 2020-03-25 PROBLEM — E43 SEVERE MALNUTRITION (HCC): Status: RESOLVED | Noted: 2018-12-18 | Resolved: 2020-03-24

## 2020-03-25 NOTE — TELEPHONE ENCOUNTER
Because of concerns regarding COVID-19 exposure in this 81 yo gentleman I recommend holding off on labs and Xgeva injection at this time. He received his Lupron 45 mg on 1/7/2020 and PSA 2/27/20 down to 0.21. Next Lupron won't be due until July. We can hold off on his Edra Debar this month as long as he is continuing on the calcium and vitamin D. Please reschedule labs and visit for 1 month and we will readdress at that time if ok to come in or need to delay visit further.

## 2020-03-25 NOTE — TELEPHONE ENCOUNTER
Babar Castro was advised of the message. She voiced understanding stating the patient is taking the calcium and vitamin d. Appointment rescheduled for 04/30/2020.

## 2020-03-30 RX ORDER — TRAMADOL HYDROCHLORIDE 50 MG/1
50 TABLET ORAL EVERY 8 HOURS PRN
Qty: 90 TABLET | Refills: 0 | Status: SHIPPED | OUTPATIENT
Start: 2020-03-30 | End: 2020-05-11 | Stop reason: SDUPTHER

## 2020-03-30 NOTE — TELEPHONE ENCOUNTER
Marcelline Bloch called requesting a refill of their:    traMADol (ULTRAM) 50 MG tablet Take 1 tablet by mouth every 8 hours as needed for Pain     Send to [facility] [place] on [address]

## 2020-05-11 RX ORDER — TRAMADOL HYDROCHLORIDE 50 MG/1
50 TABLET ORAL EVERY 8 HOURS PRN
Qty: 90 TABLET | Refills: 0 | Status: SHIPPED | OUTPATIENT
Start: 2020-05-11 | End: 2020-06-10

## 2020-05-11 NOTE — TELEPHONE ENCOUNTER
Rizwan Solis called requesting a refill of the below medication which has been pended for you:     Requested Prescriptions     Pending Prescriptions Disp Refills    traMADol (ULTRAM) 50 MG tablet 90 tablet 0     Sig: Take 1 tablet by mouth every 8 hours as needed for Pain for up to 30 days.        Last Appointment Date: 12/12/2019  Next Appointment Date: 6/17/2020    No Known Allergies     Please send to :    [facility]

## 2020-05-19 ENCOUNTER — TELEPHONE (OUTPATIENT)
Dept: UROLOGY | Age: 85
End: 2020-05-19

## 2020-05-19 NOTE — TELEPHONE ENCOUNTER
I called and rescheduled the patients appointment for 07/09/20. I spoke with the patients daughter (on [employer],POA)and she stated that he is tired, and weak. He is having a hard time being able to get out of his chair. She is wondering is there anything she can do for her dad to help improve this.

## 2020-05-20 NOTE — TELEPHONE ENCOUNTER
Voicemail left for Yonathan Aviles stating the labs were ordered and to return the call for any questions.

## 2020-05-22 ENCOUNTER — NURSE ONLY (OUTPATIENT)
Dept: LAB | Age: 85
End: 2020-05-22

## 2020-05-22 LAB
ALBUMIN SERPL-MCNC: 4.4 G/DL (ref 3.5–5.1)
ALP BLD-CCNC: 45 U/L (ref 38–126)
ALT SERPL-CCNC: 13 U/L (ref 11–66)
ANION GAP SERPL CALCULATED.3IONS-SCNC: 9 MEQ/L (ref 8–16)
AST SERPL-CCNC: 29 U/L (ref 5–40)
BASOPHILS # BLD: 0.4 %
BASOPHILS ABSOLUTE: 0 THOU/MM3 (ref 0–0.1)
BILIRUB SERPL-MCNC: 0.5 MG/DL (ref 0.3–1.2)
BUN BLDV-MCNC: 21 MG/DL (ref 7–22)
CALCIUM SERPL-MCNC: 10.1 MG/DL (ref 8.5–10.5)
CHLORIDE BLD-SCNC: 96 MEQ/L (ref 98–111)
CO2: 32 MEQ/L (ref 23–33)
CREAT SERPL-MCNC: 1.2 MG/DL (ref 0.4–1.2)
EOSINOPHIL # BLD: 1.5 %
EOSINOPHILS ABSOLUTE: 0.1 THOU/MM3 (ref 0–0.4)
ERYTHROCYTE [DISTWIDTH] IN BLOOD BY AUTOMATED COUNT: 12.5 % (ref 11.5–14.5)
ERYTHROCYTE [DISTWIDTH] IN BLOOD BY AUTOMATED COUNT: 50.5 FL (ref 35–45)
GFR SERPL CREATININE-BSD FRML MDRD: 56 ML/MIN/1.73M2
GLUCOSE BLD-MCNC: 86 MG/DL (ref 70–108)
HCT VFR BLD CALC: 44.4 % (ref 42–52)
HEMOGLOBIN: 14.5 GM/DL (ref 14–18)
IMMATURE GRANS (ABS): 0.02 THOU/MM3 (ref 0–0.07)
IMMATURE GRANULOCYTES: 0.4 %
LYMPHOCYTES # BLD: 17.5 %
LYMPHOCYTES ABSOLUTE: 0.9 THOU/MM3 (ref 1–4.8)
MCH RBC QN AUTO: 35.5 PG (ref 26–33)
MCHC RBC AUTO-ENTMCNC: 32.7 GM/DL (ref 32.2–35.5)
MCV RBC AUTO: 108.8 FL (ref 80–94)
MONOCYTES # BLD: 10.4 %
MONOCYTES ABSOLUTE: 0.6 THOU/MM3 (ref 0.4–1.3)
NUCLEATED RED BLOOD CELLS: 0 /100 WBC
PLATELET # BLD: 192 THOU/MM3 (ref 130–400)
PMV BLD AUTO: 9.8 FL (ref 9.4–12.4)
POTASSIUM SERPL-SCNC: 4.3 MEQ/L (ref 3.5–5.2)
PROSTATE SPECIFIC ANTIGEN: 0.41 NG/ML (ref 0–1)
RBC # BLD: 4.08 MILL/MM3 (ref 4.7–6.1)
SEG NEUTROPHILS: 69.8 %
SEGMENTED NEUTROPHILS ABSOLUTE COUNT: 3.7 THOU/MM3 (ref 1.8–7.7)
SODIUM BLD-SCNC: 137 MEQ/L (ref 135–145)
TOTAL PROTEIN: 6.7 G/DL (ref 6.1–8)
TSH SERPL DL<=0.05 MIU/L-ACNC: 2.13 UIU/ML (ref 0.4–4.2)
VITAMIN D 25-HYDROXY: 28 NG/ML (ref 30–100)
WBC # BLD: 5.3 THOU/MM3 (ref 4.8–10.8)

## 2020-05-27 ENCOUNTER — TELEPHONE (OUTPATIENT)
Dept: UROLOGY | Age: 85
End: 2020-05-27

## 2020-05-27 NOTE — TELEPHONE ENCOUNTER
Please let pt's daughter know his labs overall are stable. PSA up just slightly to 0.41. No obvious cause for his fatigue noted in bloodwork. Recommend considering physical therapy and discussing fatigue further with PCP.

## 2020-05-27 NOTE — TELEPHONE ENCOUNTER
Attempted to call Kenneth Obrien on [address]. Voicemail left to return the call to the office.

## 2020-06-17 ENCOUNTER — HOSPITAL ENCOUNTER (OUTPATIENT)
Dept: GENERAL RADIOLOGY | Age: 85
Discharge: HOME | End: 2020-06-17
Payer: MEDICARE

## 2020-06-17 ENCOUNTER — HOSPITAL ENCOUNTER (OUTPATIENT)
Age: 85
Discharge: HOME | End: 2020-06-17
Payer: MEDICARE

## 2020-06-17 ENCOUNTER — OFFICE VISIT (OUTPATIENT)
Dept: FAMILY MEDICINE CLINIC | Age: 85
End: 2020-06-17

## 2020-06-17 VITALS
DIASTOLIC BLOOD PRESSURE: 70 MMHG | RESPIRATION RATE: 14 BRPM | SYSTOLIC BLOOD PRESSURE: 128 MMHG | HEART RATE: 64 BPM | BODY MASS INDEX: 22.51 KG/M2 | HEIGHT: 68 IN | WEIGHT: 148.5 LBS | TEMPERATURE: 97.5 F

## 2020-06-17 PROBLEM — I26.99 PULMONARY EMBOLISM (HCC): Status: ACTIVE | Noted: 2019-02-01

## 2020-06-17 PROBLEM — R53.1 ASTHENIA: Status: ACTIVE | Noted: 2018-12-24

## 2020-06-17 PROBLEM — E55.9 VITAMIN D DEFICIENCY: Status: ACTIVE | Noted: 2020-05-01

## 2020-06-17 PROBLEM — E41 NUTRITIONAL MARASMUS (HCC): Status: ACTIVE | Noted: 2018-12-18

## 2020-06-17 PROBLEM — I82.409 ACUTE DEEP VEIN THROMBOSIS OF LOWER LIMB (HCC): Status: ACTIVE | Noted: 2019-02-01

## 2020-06-17 PROCEDURE — 99214 OFFICE O/P EST MOD 30 MIN: CPT | Performed by: FAMILY MEDICINE

## 2020-06-17 PROCEDURE — G0439 PPPS, SUBSEQ VISIT: HCPCS | Performed by: FAMILY MEDICINE

## 2020-06-17 PROCEDURE — 71046 X-RAY EXAM CHEST 2 VIEWS: CPT

## 2020-06-17 PROCEDURE — 1036F TOBACCO NON-USER: CPT | Performed by: FAMILY MEDICINE

## 2020-06-17 PROCEDURE — 69210 REMOVE IMPACTED EAR WAX UNI: CPT | Performed by: FAMILY MEDICINE

## 2020-06-17 PROCEDURE — G8427 DOCREV CUR MEDS BY ELIG CLIN: HCPCS | Performed by: FAMILY MEDICINE

## 2020-06-17 PROCEDURE — 4040F PNEUMOC VAC/ADMIN/RCVD: CPT | Performed by: FAMILY MEDICINE

## 2020-06-17 PROCEDURE — G8420 CALC BMI NORM PARAMETERS: HCPCS | Performed by: FAMILY MEDICINE

## 2020-06-17 PROCEDURE — 1123F ACP DISCUSS/DSCN MKR DOCD: CPT | Performed by: FAMILY MEDICINE

## 2020-06-17 RX ORDER — TRAMADOL HYDROCHLORIDE 50 MG/1
50 TABLET ORAL 3 TIMES DAILY
Qty: 90 TABLET | Refills: 0 | Status: SHIPPED | OUTPATIENT
Start: 2020-06-17 | End: 2020-07-17

## 2020-06-17 RX ORDER — TRAMADOL HYDROCHLORIDE 50 MG/1
50 TABLET ORAL EVERY 6 HOURS PRN
COMMUNITY
End: 2020-06-17 | Stop reason: SDUPTHER

## 2020-06-17 ASSESSMENT — ENCOUNTER SYMPTOMS
SINUS PRESSURE: 0
SHORTNESS OF BREATH: 0
CONSTIPATION: 0

## 2020-06-17 ASSESSMENT — LIFESTYLE VARIABLES
HOW OFTEN DO YOU HAVE A DRINK CONTAINING ALCOHOL: 1
AUDIT TOTAL SCORE: 1
HOW OFTEN DURING THE LAST YEAR HAVE YOU FOUND THAT YOU WERE NOT ABLE TO STOP DRINKING ONCE YOU HAD STARTED: 0
HOW OFTEN DO YOU HAVE SIX OR MORE DRINKS ON ONE OCCASION: 0
HOW OFTEN DURING THE LAST YEAR HAVE YOU NEEDED AN ALCOHOLIC DRINK FIRST THING IN THE MORNING TO GET YOURSELF GOING AFTER A NIGHT OF HEAVY DRINKING: 0
HOW OFTEN DURING THE LAST YEAR HAVE YOU FAILED TO DO WHAT WAS NORMALLY EXPECTED FROM YOU BECAUSE OF DRINKING: 0
HAS A RELATIVE, FRIEND, DOCTOR, OR ANOTHER HEALTH PROFESSIONAL EXPRESSED CONCERN ABOUT YOUR DRINKING OR SUGGESTED YOU CUT DOWN: 0
AUDIT-C TOTAL SCORE: 1
HAVE YOU OR SOMEONE ELSE BEEN INJURED AS A RESULT OF YOUR DRINKING: 0
HOW OFTEN DURING THE LAST YEAR HAVE YOU BEEN UNABLE TO REMEMBER WHAT HAPPENED THE NIGHT BEFORE BECAUSE YOU HAD BEEN DRINKING: 0
HOW MANY STANDARD DRINKS CONTAINING ALCOHOL DO YOU HAVE ON A TYPICAL DAY: 0
HOW OFTEN DURING THE LAST YEAR HAVE YOU HAD A FEELING OF GUILT OR REMORSE AFTER DRINKING: 0

## 2020-06-17 ASSESSMENT — PATIENT HEALTH QUESTIONNAIRE - PHQ9
SUM OF ALL RESPONSES TO PHQ QUESTIONS 1-9: 0
SUM OF ALL RESPONSES TO PHQ QUESTIONS 1-9: 0

## 2020-06-17 NOTE — PROGRESS NOTES
Allergies    Prior to Visit Medications    Medication Sig Taking? Authorizing Provider   leuprolide (LUPRON) 7.5 MG injection Inject 7.5 mg into the muscle once Every 6 months Yes Historical Provider, MD   traMADol (ULTRAM) 50 MG tablet Take 50 mg by mouth every 6 hours as needed for Pain. Yes Historical Provider, MD   hydrOXYzine (ATARAX) 25 MG tablet Take 1 tablet by mouth every 8 hours as needed for Itching Yes Benjamín Madison MD   finasteride (PROSCAR) 5 MG tablet Take 1 tablet by mouth daily Yes JAILYN Peña CNP   carvedilol (COREG) 3.125 MG tablet Take 1 tablet by mouth 2 times daily (with meals) Yes Holger Calvo MD   doxazosin (CARDURA) 4 MG tablet Take 1 tablet by mouth nightly Yes JAILYN Peña CNP   albuterol sulfate HFA (VENTOLIN HFA) 108 (90 Base) MCG/ACT inhaler Inhale 2 puffs into the lungs every 6 hours as needed for Wheezing Yes Historical Provider, MD   Denosumab (Acacia Morris [place]) Inject into the skin Yes Historical Provider, MD   Tiotropium Bromide-Olodaterol (STIOLTO RESPIMAT) 2.5-2.5 MCG/ACT AERS INHALE 2 PUFFS BY MOUTH ONCE DAILY Yes JAILYN oRth CNP   Misc.  Devices (ACAPELLA) MISC 1 Device by Does not apply route 4 times daily Yes JAILYN Roth CNP   calcium carbonate-vitamin D (CALCIUM 600 + D) 600-400 MG-UNIT TABS per tab Take 1 tablet by mouth 2 times daily Yes JAILYN Peña CNP   Multiple Vitamins-Minerals (THERAPEUTIC MULTIVITAMIN-MINERALS) tablet Take 1 tablet by mouth daily Yes Sara Piedra MD   tiZANidine (ZANAFLEX) 2 MG tablet Take 1 tablet by mouth every 8 hours as needed (spasm) Yes Sara Piedra MD   docusate sodium (COLACE, DULCOLAX) 100 MG CAPS Take 100 mg by mouth 2 times daily Yes Sara Piedra MD   acetaminophen (TYLENOL) 500 MG tablet Take 500 mg by mouth every 8 hours as needed for Pain Yes Historical Provider, MD   aspirin 325 MG EC tablet Take 1 tablet by mouth daily Yes Robin Ibanez Ana Laguna MD       Past Medical History:   Diagnosis Date    Blood circulation, collateral     CAD (coronary artery disease)     CAD (coronary artery disease) 9/2/2014    Cancer ([facility]     prostate to bone    CKD (chronic kidney disease) stage 2, GFR 60-89 ml/min 12/24/2018    COPD (chronic obstructive pulmonary disease) ([facility]     Diverticulitis     DVT of lower extremity, bilateral ([address] 02/2019    GERD (gastroesophageal reflux disease)     Hiatal hernia     Hyperlipemia 8/12/2013    Hyperlipidemia     Other disorders of kidney and ureter in diseases classified elsewhere     Pleural plaque 8/12/2013    Primary adenocarcinoma of prostate ([facility] 01/2019    high grade    Pulmonary emboli ([facility] 02/2019       Past Surgical History:   Procedure Laterality Date    ABDOMEN SURGERY      APPENDECTOMY      CARDIAC CATHETERIZATION      CYSTOSCOPY  03/17/2015    Litholapaxy-Large    EYE SURGERY      cataracts    HERNIA REPAIR      KYPHOSIS SURGERY N/A 2/13/2019    ABLATION AND KYPHOPLASTY AT T8 AND ABLATION AND KYPHOPLASTY AT T6 performed by Napoleon Lucas MD at [facility] N/A 12/17/2018    EXPLORATORY LAPAROTOMY, WITH ABDOMINAL WASHOUT performed by Luisito Jernigan MD at [facility]       No family history on file. CareTeam (Including outside providers/suppliers regularly involved in providing care):   Patient Care Team:  Maria T Mcclain MD as PCP - General (Family Medicine)  Maria T Mcclain [facility] PCP [facility]    Wt Readings from Last 3 Encounters:   06/17/20 148 lb 8 oz (67.4 kg)   12/12/19 133 lb (60.3 kg)   11/22/19 130 lb (59 kg)     Vitals:    06/17/20 1024   BP: 128/70   Site: Right Upper Arm   Position: Sitting   Cuff Size: Medium Adult   Pulse: 64   Resp: 14   Temp: 97.5 °F (36.4 °C)   TempSrc: Oral   Weight: 148 lb 8 oz (67.4 kg)   Height: 5' 8\" (1.727 m)     Body mass index is 22.58 kg/m².     Based upon direct observation of the patient, evaluation of cognition reveals recent and remote memory intact. 1. He notices that the lower abd seems larger recently  2. He seems more fatigued too  3. We discussed the recent labs  4. He signed the med contract  General Appearance: alert and oriented to person, place and time, well-developed and well-nourished, in no acute distress  Skin: warm and dry, no rash or erythema  Head: normocephalic and atraumatic  Eyes: pupils equal, round, and reactive to light, extraocular eye movements intact, conjunctivae normal  ENT: bilateral cerumen impaction. Wax was removed from the affected ear(s) by myself, using the curette. Examination of the canal(s) by myself showed no sign of injury afterward. Neck: neck supple and non tender without mass, no thyromegaly or thyroid nodules, no cervical lymphadenopathy   Pulmonary/Chest: clear to auscultation bilaterally- no wheezes, rales or rhonchi, normal air movement, no respiratory distress  Cardiovascular: normal rate, normal S1 and S2 and no murmurs  Abdomen: soft, non-tender, non-distended and mass palpable- just to the left of the umbilicus  Extremities: no cyanosis, no clubbing and no edema  Musculoskeletal: normal range of motion, no joint swelling, deformity or tenderness  Neurologic: gait and coordination normal and speech normal    Patient's complete Health Risk Assessment and screening values have been reviewed and are found in Flowsheets. The following problems were reviewed today and where indicated follow up appointments were made and/or referrals ordered. Positive Risk Factor Screenings with Interventions:     Health Habits/Nutrition:  Health Habits/Nutrition  Do you exercise for at least 20 minutes 2-3 times per week?: (!) No  Have you lost any weight without trying in the past 3 months?: No  Do you eat fewer than 2 meals per day?: No  Have you seen a dentist within the past year?: Yes  Body mass index is 22.58 kg/m².   Health Habits/Nutrition Interventions:  · we discussed

## 2020-06-17 NOTE — PATIENT INSTRUCTIONS
Personalized Preventive Plan for Clark Claude - 6/17/2020  Medicare offers a range of preventive health benefits. Some of the tests and screenings are paid in full while other may be subject to a deductible, co-insurance, and/or copay. Some of these benefits include a comprehensive review of your medical history including lifestyle, illnesses that may run in your family, and various assessments and screenings as appropriate. After reviewing your medical record and screening and assessments performed today your provider may have ordered immunizations, labs, imaging, and/or referrals for you. A list of these orders (if applicable) as well as your Preventive Care list are included within your After Visit Summary for your review. Other Preventive Recommendations:    · A preventive eye exam performed by an eye specialist is recommended every 1-2 years to screen for glaucoma; cataracts, macular degeneration, and other eye disorders. · A preventive dental visit is recommended every 6 months. · Try to get at least 150 minutes of exercise per week or 10,000 steps per day on a pedometer . · Order or download the FREE \"Exercise & Physical Activity: Your Everyday Guide\" from [employer]. Call 8-599.250.6959 or search [employer] online. · You need 7092-3890 mg of calcium and 5059-4089 IU of vitamin D per day. It is possible to meet your calcium requirement with diet alone, but a vitamin D supplement is usually necessary to meet this goal.  · When exposed to the sun, use a sunscreen that protects against both UVA and UVB radiation with an SPF of 30 or greater. Reapply every 2 to 3 hours or after sweating, drying off with a towel, or swimming. · Always wear a seat belt when traveling in a car. Always wear a helmet when riding a bicycle or motorcycle.

## 2020-06-18 ENCOUNTER — TELEPHONE (OUTPATIENT)
Dept: FAMILY MEDICINE CLINIC | Age: 85
End: 2020-06-18

## 2020-06-18 NOTE — TELEPHONE ENCOUNTER
----- Message from Otoniel Velásquez MD sent at 6/18/2020  1:39 PM EDT -----  Let his daughter know that the CXR showed no new findings such as fluid build up or infection.

## 2020-07-06 ENCOUNTER — NURSE ONLY (OUTPATIENT)
Dept: LAB | Age: 85
End: 2020-07-06

## 2020-07-10 ENCOUNTER — OFFICE VISIT (OUTPATIENT)
Dept: UROLOGY | Age: 85
End: 2020-07-10
Payer: MEDICARE

## 2020-07-10 VITALS — WEIGHT: 136 LBS | HEIGHT: 66 IN | BODY MASS INDEX: 21.86 KG/M2 | TEMPERATURE: 98.9 F

## 2020-07-10 LAB
BILIRUBIN URINE: NEGATIVE
BLOOD URINE, POC: ABNORMAL
CHARACTER, URINE: CLEAR
COLOR, URINE: YELLOW
GLUCOSE URINE: NEGATIVE MG/DL
KETONES, URINE: NEGATIVE
LEUKOCYTE CLUMPS, URINE: ABNORMAL
NITRITE, URINE: NEGATIVE
PH, URINE: 6.5 (ref 5–9)
PROTEIN, URINE: NEGATIVE MG/DL
SPECIFIC GRAVITY, URINE: 1.01 (ref 1–1.03)
UROBILINOGEN, URINE: 0.2 EU/DL (ref 0–1)

## 2020-07-10 PROCEDURE — 4040F PNEUMOC VAC/ADMIN/RCVD: CPT | Performed by: NURSE PRACTITIONER

## 2020-07-10 PROCEDURE — 99213 OFFICE O/P EST LOW 20 MIN: CPT | Performed by: NURSE PRACTITIONER

## 2020-07-10 PROCEDURE — G8420 CALC BMI NORM PARAMETERS: HCPCS | Performed by: NURSE PRACTITIONER

## 2020-07-10 PROCEDURE — 96401 CHEMO ANTI-NEOPL SQ/IM: CPT | Performed by: NURSE PRACTITIONER

## 2020-07-10 PROCEDURE — G8427 DOCREV CUR MEDS BY ELIG CLIN: HCPCS | Performed by: NURSE PRACTITIONER

## 2020-07-10 PROCEDURE — 81003 URINALYSIS AUTO W/O SCOPE: CPT | Performed by: NURSE PRACTITIONER

## 2020-07-10 PROCEDURE — 1036F TOBACCO NON-USER: CPT | Performed by: NURSE PRACTITIONER

## 2020-07-10 PROCEDURE — 1123F ACP DISCUSS/DSCN MKR DOCD: CPT | Performed by: NURSE PRACTITIONER

## 2020-07-10 NOTE — PROGRESS NOTES
[address] [facility]  [address]  [facility] [address]  Dept: [phone]  Loc: [phone]    Visit Date: 7/10/2020        HPI:     Eduardo Mobley is a 80 y.o. 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 Discuss Lupron. has not had xgeva since march. HPI   Mr Tim Marie was seen today in follow up for prostate cancer. Mr. Tim Marie has a hx of PSA 1/31/17 that was 25 at which time family was notified of risk for prostate cancer but did not wish to pursue further testing. CT of the abdomen and pelvis performed for abdominal pain 12/17/18 noted multiple skeletal sclerotic metastases with pathologic compression fractures of L1-L3, enlarged hterogeneous prostate, and pelvic adenopathy. PSA performed 12/17/18 was 1,594. Pt was started on Casodex 12/20/18. Pt recovered on TCU 12/24/19-1/10/19. He underwent TRUS prostate biopsy 1/24/19 by Dr. Rosy Velarde with findings of high grade prostatic adenocarcinoma. Pt was scheduled for review of biopsy results 2/11/19 however pt was admitted to the hospital 2/5/19-2/15/19 and treated for acute PE and DVT and pathologic T6 and T8 fractures. He underwent T6 and T8 vertebral bone biopsy, RFA for spine metastatic lesion at the levels of T6 and T8, and vertebral augmentation (balloon kyphoplasty) for T6 and T8 on 2/13/19 by Dr. Delfino Jordan. He has completed palliative radiation therapy. Pt started monthly Firmagon and Xgeva 2/26/19. Firmagon switched to Lupron 4/30/19. Pt had anorexia on Casodex and this was held. As pt's PSA has continued to trend down pt and daughter decided to hold casodex. Pt is on calcium carbonate and vitamin D supplementation.   Last Lupron injection 1/7/2020.     Pt underwent cystoscopy in the office 3/11/19 by Dr. Rosy Velarde with findings of a significantly obstructing prostate and mild bladder wall trabeculations and debris. Pt is accompanied to today's appt by his daughter. He denies any fever, chills  Notes increased fatigue. Current Outpatient Medications   Medication Sig Dispense Refill    leuprolide (LUPRON) 7.5 MG injection Inject 7.5 mg into the muscle once Every 6 months      traMADol (ULTRAM) 50 MG tablet Take 1 tablet by mouth 3 times daily for 30 days. 90 tablet 0    Cholecalciferol (VITAMIN D3) 125 MCG (5000 UT) CAPS Take 1 capsule by mouth every morning (before breakfast)      hydrOXYzine (ATARAX) 25 MG tablet Take 1 tablet by mouth every 8 hours as needed for Itching 30 tablet 1    finasteride (PROSCAR) 5 MG tablet Take 1 tablet by mouth daily 90 tablet 3    carvedilol (COREG) 3.125 MG tablet Take 1 tablet by mouth 2 times daily (with meals) 180 tablet 3    doxazosin (CARDURA) 4 MG tablet Take 1 tablet by mouth nightly 90 tablet 3    albuterol sulfate HFA (VENTOLIN HFA) 108 (90 Base) MCG/ACT inhaler Inhale 2 puffs into the lungs every 6 hours as needed for Wheezing      Tiotropium Bromide-Olodaterol (STIOLTO RESPIMAT) 2.5-2.5 MCG/ACT AERS INHALE 2 PUFFS BY MOUTH ONCE DAILY 1 Inhaler 11    Misc. Devices (ACAPELLA) MISC 1 Device by Does not apply route 4 times daily 1 each 0    calcium carbonate-vitamin D (CALCIUM 600 + D) 600-400 MG-UNIT TABS per tab Take 1 tablet by mouth 2 times daily 60 tablet 5    Multiple Vitamins-Minerals (THERAPEUTIC MULTIVITAMIN-MINERALS) tablet Take 1 tablet by mouth daily      tiZANidine (ZANAFLEX) 2 MG tablet Take 1 tablet by mouth every 8 hours as needed (spasm)      docusate sodium (COLACE, DULCOLAX) 100 MG CAPS Take 100 mg by mouth 2 times daily      acetaminophen (TYLENOL) 500 MG tablet Take 500 mg by mouth every 8 hours as needed for Pain      aspirin 325 MG EC tablet Take 1 tablet by mouth daily       No current facility-administered medications for this visit.         Past Medical History  Royal Crawley  has a past medical history of Blood

## 2020-07-10 NOTE — PROGRESS NOTES
Patient has given me verbal consent to perform Xgeva Injection yes      Following Gabe Love plan of care. XGEVA 120MG/1.7ML MG GIVEN S.C Left ARM  Lot Number: [id number]  Expiration Date: 05-22  [facility] #: [id number]    After Injection was given there were no reactions at injection site and patient was feeling well. Patient was notified that possible side effects from injections include: Redness, swelling and itching at the injection site. Possible side effects of androgen deprivation therapy, including hot flashes, flushing of the skin, increased weight, decreased sex drive, and difficulties with ED. Patient was instructed to inform their dental office that they are receiving Altamease Duster and that major dental procedures should be avoided. Patient was advised to call our office with any further questions or concerns. Any active dental problems, infections, or pain? Dentures    Date of last dental appointment: none    Any planned dental procedures? none    Last Bone Scan was performed on none    Last Calcium level drawn on 5-22-20 and Calcium Value was 10.2. Calcium needs to be checked periodically. Ask provider when Calcium needs checked again. Patient supplied their own medications No    Pt [address] therapy first initiated on 2-26-19.

## 2020-08-07 ENCOUNTER — NURSE ONLY (OUTPATIENT)
Dept: LAB | Age: 85
End: 2020-08-07

## 2020-08-07 LAB
PROSTATE SPECIFIC ANTIGEN: 0.81 NG/ML (ref 0–1)
VITAMIN D 25-HYDROXY: 41 NG/ML (ref 30–100)

## 2020-08-09 LAB
CALCIUM IONIZED: NORMAL
TESTOSTERONE TOTAL: < 3 NG/DL (ref 300–720)

## 2020-08-10 ENCOUNTER — TELEPHONE (OUTPATIENT)
Dept: UROLOGY | Age: 85
End: 2020-08-10

## 2020-08-10 RX ORDER — TRAMADOL HYDROCHLORIDE 50 MG/1
50 TABLET ORAL EVERY 6 HOURS PRN
Qty: 90 TABLET | Refills: 0 | Status: SHIPPED | OUTPATIENT
Start: 2020-08-10 | End: 2020-09-29 | Stop reason: SDUPTHER

## 2020-08-10 NOTE — TELEPHONE ENCOUNTER
Please let pt's daughter know his calcium is normal at 1.23, vitamin D is normal at 41, testosterone is still castrate at <3, and psa is trending up to 0.81.

## 2020-08-11 NOTE — TELEPHONE ENCOUNTER
Attempted to call the Pola Ramsey on [address]. Voicemail left to return the call to the office.

## 2020-08-11 NOTE — TELEPHONE ENCOUNTER
Shirley called back to office and I relayed message.   She voiced understanding and said they would be here Friday

## 2020-08-14 ENCOUNTER — OFFICE VISIT (OUTPATIENT)
Dept: UROLOGY | Age: 85
End: 2020-08-14
Payer: MEDICARE

## 2020-08-14 VITALS — TEMPERATURE: 99.6 F | BODY MASS INDEX: 20.4 KG/M2 | HEIGHT: 67 IN | WEIGHT: 130 LBS

## 2020-08-14 LAB
BILIRUBIN, POC: NEGATIVE
BLOOD URINE, POC: NORMAL
CLARITY, POC: CLEAR
COLOR, POC: YELLOW
GLUCOSE URINE, POC: NEGATIVE
KETONES, POC: NEGATIVE
LEUKOCYTE EST, POC: NORMAL
NITRITE, POC: NEGATIVE
PH, POC: 7
PROTEIN, POC: NEGATIVE
SPECIFIC GRAVITY, POC: 1.02
UROBILINOGEN, POC: 0.2

## 2020-08-14 PROCEDURE — 4040F PNEUMOC VAC/ADMIN/RCVD: CPT | Performed by: NURSE PRACTITIONER

## 2020-08-14 PROCEDURE — G8420 CALC BMI NORM PARAMETERS: HCPCS | Performed by: NURSE PRACTITIONER

## 2020-08-14 PROCEDURE — 1036F TOBACCO NON-USER: CPT | Performed by: NURSE PRACTITIONER

## 2020-08-14 PROCEDURE — G8427 DOCREV CUR MEDS BY ELIG CLIN: HCPCS | Performed by: NURSE PRACTITIONER

## 2020-08-14 PROCEDURE — 96401 CHEMO ANTI-NEOPL SQ/IM: CPT | Performed by: NURSE PRACTITIONER

## 2020-08-14 PROCEDURE — 81003 URINALYSIS AUTO W/O SCOPE: CPT | Performed by: NURSE PRACTITIONER

## 2020-08-14 PROCEDURE — 1123F ACP DISCUSS/DSCN MKR DOCD: CPT | Performed by: NURSE PRACTITIONER

## 2020-08-14 PROCEDURE — 99213 OFFICE O/P EST LOW 20 MIN: CPT | Performed by: NURSE PRACTITIONER

## 2020-08-14 NOTE — PROGRESS NOTES
Patient has given me verbal consent to perform Xgeva Injection yes      Following Dr. Jessy Islas of [facility]. XGEVA 120MG/1.7ML MG GIVEN S.C right ARM  Lot Number: [id number]  Expiration Date: 05-22  [facility] #: [id number]    After Injection was given there were no reactions at injection site and patient was feeling well. Patient was notified that possible side effects from injections include: Redness, swelling and itching at the injection site. Possible side effects of androgen deprivation therapy, including hot flashes, flushing of the skin, increased weight, decreased sex drive, and difficulties with ED. Patient was instructed to inform their dental office that they are receiving Vel Bright and that major dental procedures should be avoided. Patient was advised to call our office with any further questions or concerns. Any active dental problems, infections, or pain? Dentures    Date of last dental appointment: None    Any planned dental procedures? none    Last Bone Scan was performed on none    Last Calcium level drawn on 8/7/20 and Calcium Value was 1.20 Calcium needs to be checked periodically. Ask provider when Calcium needs checked again. Patient supplied their own medications No    Pt [address] therapy first initiated on 2-26-19.

## 2020-08-14 NOTE — PROGRESS NOTES
[address] [facility],[address]  [address] [place], [place] [address]  Dept: [phone]  Loc: [phone]    Visit Date: 8/14/2020        HPI:     Miley Galvan is a 80 y.o. male who presents today for:  Chief Complaint   Patient presents with    Prostate Cancer    1 Month Follow-Up     Xgeva Injection due today        HPI   Mr Daniel Lozano was seen today in follow up for prostate cancer. Mr. Daniel Lozano has a hx of PSA 1/31/17 that was 25 at which time family was notified of risk for prostate cancer but did not wish to pursue further testing. CT of the abdomen and pelvis performed for abdominal pain 12/17/18 noted multiple skeletal sclerotic metastases with pathologic compression fractures of L1-L3, enlarged hterogeneous prostate, and pelvic adenopathy. PSA performed 12/17/18 was 1,594. Pt was started on Casodex 12/20/18. Pt recovered on TCU 12/24/19-1/10/19. He underwent TRUS prostate biopsy 1/24/19 by Dr. Andree Champion with findings of high grade prostatic adenocarcinoma. Pt was scheduled for review of biopsy results 2/11/19 however pt was admitted to the hospital 2/5/19-2/15/19 and treated for acute PE and DVT and pathologic T6 and T8 fractures. He underwent T6 and T8 vertebral bone biopsy, RFA for spine metastatic lesion at the levels of T6 and T8, and vertebral augmentation (balloon kyphoplasty) for T6 and T8 on 2/13/19 by Dr. Noemi Linton. He has completed palliative radiation therapy. Pt started monthly Firmagon and Xgeva 2/26/19. Firmagon switched to Lupron 4/30/19. Pt had anorexia on Casodex and this was held. As pt's PSA has continued to trend down pt and daughter decided to hold casodex. Pt is on calcium carbonate and vitamin D supplementation. Last Lupron injection 1/7/2020.   At last appt pt and daughter wanted to hold off on injection secondary to significant fatigue.       Pt underwent cystoscopy in the office 3/11/19 by Dr. Andree Champion with findings of a significantly obstructing prostate and mild bladder wall trabeculations and debris. Pt is accompanied to today's appt by his daughter. He denies any fever, chills. Working on increasing strength. Notes fatigue possibly slightly improved. Current Outpatient Medications   Medication Sig Dispense Refill    traMADol (ULTRAM) 50 MG tablet Take 1 tablet by mouth every 6 hours as needed for Pain for up to 30 days. 90 tablet 0    leuprolide (LUPRON) 7.5 MG injection Inject 7.5 mg into the muscle once Every 6 months      Cholecalciferol (VITAMIN D3) 125 MCG (5000 UT) CAPS Take 1 capsule by mouth every morning (before breakfast)      finasteride (PROSCAR) 5 MG tablet Take 1 tablet by mouth daily 90 tablet 3    carvedilol (COREG) 3.125 MG tablet Take 1 tablet by mouth 2 times daily (with meals) 180 tablet 3    doxazosin (CARDURA) 4 MG tablet Take 1 tablet by mouth nightly 90 tablet 3    Tiotropium Bromide-Olodaterol (STIOLTO RESPIMAT) 2.5-2.5 MCG/ACT AERS INHALE 2 PUFFS BY MOUTH ONCE DAILY 1 Inhaler 11    Misc.  Devices (ACAPELLA) MISC 1 Device by Does not apply route 4 times daily 1 each 0    calcium carbonate-vitamin D (CALCIUM 600 + D) 600-400 MG-UNIT TABS per tab Take 1 tablet by mouth 2 times daily 60 tablet 5    Multiple Vitamins-Minerals (THERAPEUTIC MULTIVITAMIN-MINERALS) tablet Take 1 tablet by mouth daily      docusate sodium (COLACE, DULCOLAX) 100 MG CAPS Take 100 mg by mouth 2 times daily      acetaminophen (TYLENOL) 500 MG tablet Take 500 mg by mouth every 8 hours as needed for Pain      aspirin 325 MG EC tablet Take 1 tablet by mouth daily      hydrOXYzine (ATARAX) 25 MG tablet Take 1 tablet by mouth every 8 hours as needed for Itching (Patient not taking: Reported on 8/14/2020) 30 tablet 1    albuterol sulfate HFA (VENTOLIN HFA) 108 (90 Base) MCG/ACT inhaler Inhale 2 puffs into the lungs every 6 hours as needed for Wheezing      tiZANidine (ZANAFLEX) 2 MG tablet Take 1 tablet by mouth every 8 hours as needed (spasm) (Patient not taking: Reported on 8/14/2020)       No current facility-administered medications for this visit. Past Medical History  Jimena Evans  has a past medical history of Blood circulation, collateral, CAD (coronary artery disease), CAD (coronary artery disease), Cancer ([address] CKD (chronic kidney disease) stage 2, GFR 60-89 ml/min, COPD (chronic obstructive pulmonary disease) ([address] Diverticulitis, DVT of lower extremity, bilateral ([address] GERD (gastroesophageal reflux disease), Hiatal hernia, Hyperlipemia, Hyperlipidemia, Other disorders of kidney and ureter in diseases classified elsewhere, Pleural plaque, Primary adenocarcinoma of prostate ([address] Pulmonary emboli ([address] and Vitamin D deficiency. Past Surgical History  The patient  has a past surgical history that includes hernia repair; Appendectomy; Cardiac catheterization; eye surgery; Cystocopy (03/17/2015); LAPAROTOMY EXPLORATORY (N/A, 12/17/2018); Abdomen surgery; and Kyphosis surgery (N/A, 2/13/2019). Family History  This patient's family history is not on file. Social History  Jimena Evans  reports that he quit smoking about 54 years ago. His smoking use included cigarettes. He smoked 1.00 pack per day. He has never used smokeless tobacco. He reports current alcohol use. He reports that he does not use drugs. Subjective:      Review of Systems   Constitutional: Positive for fatigue. Negative for activity change, appetite change, chills, diaphoresis, fever and unexpected weight change. Genitourinary: Positive for frequency and urgency. Negative for decreased urine volume, dysuria, flank pain and hematuria. Objective:   Temp 99.6 °F (37.6 °C)   Ht 5' 6.5\" (1.689 m)   Wt 130 lb (59 kg)   BMI 20.67 kg/m²     Physical Exam  Constitutional:       General: He is not in acute distress. Appearance: He is not ill-appearing, toxic-appearing or diaphoretic.    HENT:      Head: Normocephalic and atraumatic. Eyes:      General: No scleral icterus. Right eye: No discharge. Left eye: No discharge. Cardiovascular:      Rate and Rhythm: Normal rate and regular rhythm. Pulmonary:      Effort: Pulmonary effort is normal. No respiratory distress. Abdominal:      General: There is no distension. Tenderness: There is no abdominal tenderness. Skin:     General: Skin is warm and dry. Capillary Refill: Capillary refill takes less than 2 seconds. Neurological:      Mental Status: He is alert. Mental status is at baseline. Psychiatric:         Mood and Affect: Mood normal.         POC  Results for POC orders placed in visit on 08/14/20   POCT Urinalysis No Micro (Auto)   Result Value Ref Range    Color, UA Yellow     Clarity, UA Clear     Glucose, UA POC Negative     Bilirubin, UA Negative     Ketones, UA Negative     Spec Grav, UA 1.025     Blood, UA POC Trace     pH, UA 7.0     Protein, UA POC Negative     Urobilinogen, UA 0.2     Leukocytes, UA Trace     Nitrite, UA Negative          Patients recent PSA values are as follows  Lab Results   Component Value Date    PSA 0.81 08/07/2020    PSA 0.41 05/22/2020    PSA 0.21 02/27/2020        Recent BUN/Creatinine:  Lab Results   Component Value Date    BUN 21 05/22/2020    CREATININE 1.2 05/22/2020         Assessment:   Prostate cancer  Plan:     Pt's PSA  0.81 8/7/2020. Discussed Lupron injection today but pt and daughter would like to hold off and see what PSA is next month. PSA and testosterone in one month. Pt to receive Xgeva today. Continue calcium and vitamin d supplementation. Continue Cardura and finasteride. OV in 1 month for Xgeva injection. Ca, testosterone, psa prior to appt.

## 2020-09-04 ENCOUNTER — NURSE ONLY (OUTPATIENT)
Dept: LAB | Age: 85
End: 2020-09-04

## 2020-09-04 LAB — PROSTATE SPECIFIC ANTIGEN: 0.84 NG/ML (ref 0–1)

## 2020-09-06 LAB
CALCIUM IONIZED: NORMAL
TESTOSTERONE TOTAL: < 3 NG/DL (ref 300–720)

## 2020-09-10 ENCOUNTER — TELEPHONE (OUTPATIENT)
Dept: FAMILY MEDICINE CLINIC | Age: 85
End: 2020-09-10

## 2020-09-11 ENCOUNTER — OFFICE VISIT (OUTPATIENT)
Dept: UROLOGY | Age: 85
End: 2020-09-11
Payer: MEDICARE

## 2020-09-11 VITALS — TEMPERATURE: 97.8 F | WEIGHT: 133 LBS | BODY MASS INDEX: 20.16 KG/M2 | HEIGHT: 68 IN

## 2020-09-11 LAB
BILIRUBIN URINE: NEGATIVE
BLOOD URINE, POC: ABNORMAL
CHARACTER, URINE: CLEAR
COLOR, URINE: YELLOW
GLUCOSE URINE: NEGATIVE MG/DL
KETONES, URINE: NEGATIVE
LEUKOCYTE CLUMPS, URINE: NEGATIVE
NITRITE, URINE: NEGATIVE
PH, URINE: 7 (ref 5–9)
PROTEIN, URINE: NEGATIVE MG/DL
SPECIFIC GRAVITY, URINE: 1.02 (ref 1–1.03)
UROBILINOGEN, URINE: 0.2 EU/DL (ref 0–1)

## 2020-09-11 PROCEDURE — 81003 URINALYSIS AUTO W/O SCOPE: CPT | Performed by: NURSE PRACTITIONER

## 2020-09-11 PROCEDURE — G8427 DOCREV CUR MEDS BY ELIG CLIN: HCPCS | Performed by: NURSE PRACTITIONER

## 2020-09-11 PROCEDURE — 4040F PNEUMOC VAC/ADMIN/RCVD: CPT | Performed by: NURSE PRACTITIONER

## 2020-09-11 PROCEDURE — 99213 OFFICE O/P EST LOW 20 MIN: CPT | Performed by: NURSE PRACTITIONER

## 2020-09-11 PROCEDURE — G8420 CALC BMI NORM PARAMETERS: HCPCS | Performed by: NURSE PRACTITIONER

## 2020-09-11 PROCEDURE — 1123F ACP DISCUSS/DSCN MKR DOCD: CPT | Performed by: NURSE PRACTITIONER

## 2020-09-11 PROCEDURE — 1036F TOBACCO NON-USER: CPT | Performed by: NURSE PRACTITIONER

## 2020-09-11 PROCEDURE — 96401 CHEMO ANTI-NEOPL SQ/IM: CPT | Performed by: NURSE PRACTITIONER

## 2020-09-11 NOTE — PROGRESS NOTES
Patient has given me verbal consent to perform Xgeva Injection yes      Following Claudie Krabbe CNP plan of care. Feliciano Peed 120MG/1.7ML MG GIVEN S.C left ARM  Lot Number: [id number]  Expiration Date: 01/22  Chris Aguirre #: [id number]    After Injection was given there were no reactions at injection site and patient was feeling well. Patient was notified that possible side effects from injections include: Redness, swelling and itching at the injection site. Possible side effects of androgen deprivation therapy, including hot flashes, flushing of the skin, increased weight, decreased sex drive, and difficulties with ED. Patient was instructed to inform their dental office that they are receiving Feliciano Peed and that major dental procedures should be avoided. Patient was advised to call our office with any further questions or concerns. Any active dental problems, infections, or pain? No, patient has dentures    Date of last dental appointment: unknown    Any planned dental procedures? no    Last Calcium level drawn on 09/04/20 and Calcium Value was 7.4. Calcium needs to be checked periodically. Ask provider when Calcium needs checked again. Patient supplied their own medications No    Pt [address] therapy first initiated on 02/26/19.

## 2020-09-11 NOTE — PROGRESS NOTES
[address] [facility]  [address]  [facility] [address]  Dept: [phone]  Loc: [phone]    Visit Date: 9/11/2020        HPI:     Mary Mathews is a 80 y.o. male who presents today for:  Chief Complaint   Patient presents with    Prostate Cancer       HPI   Mr Neeta Ratliff was seen today in follow up for prostate cancer. Mr. Neeta Ratliff has a hx of PSA 1/31/17 that was 25 at which time family was notified of risk for prostate cancer but did not wish to pursue further testing. CT of the abdomen and pelvis performed for abdominal pain 12/17/18 noted multiple skeletal sclerotic metastases with pathologic compression fractures of L1-L3, enlarged hterogeneous prostate, and pelvic adenopathy. PSA performed 12/17/18 was 1,594. Pt was started on Casodex 12/20/18. Pt recovered on TCU 12/24/19-1/10/19. He underwent TRUS prostate biopsy 1/24/19 by Dr. Osei Fuller with findings of high grade prostatic adenocarcinoma. Pt was scheduled for review of biopsy results 2/11/19 however pt was admitted to the hospital 2/5/19-2/15/19 and treated for acute PE and DVT and pathologic T6 and T8 fractures. He underwent T6 and T8 vertebral bone biopsy, RFA for spine metastatic lesion at the levels of T6 and T8, and vertebral augmentation (balloon kyphoplasty) for T6 and T8 on 2/13/19 by Dr. Bety Mason. He has completed palliative radiation therapy. Pt started monthly Firmagon and Xgeva 2/26/19. Firmagon switched to Lupron 4/30/19. Pt had anorexia on Casodex and this was held. As pt's PSA has continued to trend down pt and daughter decided to hold casodex. Pt is on calcium carbonate and vitamin D supplementation. Last Lupron injection 1/7/2020.  Pt and daughter have held off on further injections pending increase in PSA secondary to significant fatigue.       Pt underwent cystoscopy in the office 3/11/19 by Dr. Osei Fuller with findings of a significantly obstructing prostate and mild bladder wall trabeculations and debris. Pt is accompanied to today's appt by his daughter. He denies any fever, chills. Working on increasing strength. Notes fatigue possibly slightly improved. Decreased appetite but denies weight loss. Current Outpatient Medications   Medication Sig Dispense Refill    leuprolide (LUPRON) 7.5 MG injection Inject 7.5 mg into the muscle once Every 6 months      Cholecalciferol (VITAMIN D3) 125 MCG (5000 UT) CAPS Take 1 capsule by mouth every morning (before breakfast)      hydrOXYzine (ATARAX) 25 MG tablet Take 1 tablet by mouth every 8 hours as needed for Itching 30 tablet 1    finasteride (PROSCAR) 5 MG tablet Take 1 tablet by mouth daily 90 tablet 3    carvedilol (COREG) 3.125 MG tablet Take 1 tablet by mouth 2 times daily (with meals) 180 tablet 3    doxazosin (CARDURA) 4 MG tablet Take 1 tablet by mouth nightly 90 tablet 3    albuterol sulfate HFA (VENTOLIN HFA) 108 (90 Base) MCG/ACT inhaler Inhale 2 puffs into the lungs every 6 hours as needed for Wheezing      Tiotropium Bromide-Olodaterol (STIOLTO RESPIMAT) 2.5-2.5 MCG/ACT AERS INHALE 2 PUFFS BY MOUTH ONCE DAILY 1 Inhaler 11    Misc. Devices (ACAPELLA) MISC 1 Device by Does not apply route 4 times daily 1 each 0    calcium carbonate-vitamin D (CALCIUM 600 + D) 600-400 MG-UNIT TABS per tab Take 1 tablet by mouth 2 times daily 60 tablet 5    Multiple Vitamins-Minerals (THERAPEUTIC MULTIVITAMIN-MINERALS) tablet Take 1 tablet by mouth daily      tiZANidine (ZANAFLEX) 2 MG tablet Take 1 tablet by mouth every 8 hours as needed (spasm)      docusate sodium (COLACE, DULCOLAX) 100 MG CAPS Take 100 mg by mouth 2 times daily      acetaminophen (TYLENOL) 500 MG tablet Take 500 mg by mouth every 8 hours as needed for Pain      aspirin 325 MG EC tablet Take 1 tablet by mouth daily       No current facility-administered medications for this visit.         Past Medical History  Kristiignacia Nathan  has a past medical history of Blood circulation, collateral, CAD (coronary artery disease), CAD (coronary artery disease), Cancer ([address] CKD (chronic kidney disease) stage 2, GFR 60-89 ml/min, COPD (chronic obstructive pulmonary disease) ([address] Diverticulitis, DVT of lower extremity, bilateral ([address] GERD (gastroesophageal reflux disease), Hiatal hernia, Hyperlipemia, Hyperlipidemia, Other disorders of kidney and ureter in diseases classified elsewhere, Pleural plaque, Primary adenocarcinoma of prostate ([address] Pulmonary emboli ([address] and Vitamin D deficiency. Past Surgical History  The patient  has a past surgical history that includes hernia repair; Appendectomy; Cardiac catheterization; eye surgery; Cystocopy (03/17/2015); LAPAROTOMY EXPLORATORY (N/A, 12/17/2018); Abdomen surgery; and Kyphosis surgery (N/A, 2/13/2019). Family History  This patient's family history is not on file. Social History  Ceci Baird  reports that he quit smoking about 54 years ago. His smoking use included cigarettes. He smoked 1.00 pack per day. He has never used smokeless tobacco. He reports current alcohol use. He reports that he does not use drugs. Subjective:      Review of Systems   Constitutional: Positive for fatigue. Negative for activity change, appetite change, chills, diaphoresis, fever and unexpected weight change. Genitourinary: Positive for frequency and urgency. Negative for decreased urine volume, dysuria, flank pain and hematuria. Objective:   Temp 97.8 °F (36.6 °C)   Ht 5' 8\" (1.727 m)   Wt 133 lb (60.3 kg)   BMI 20.22 kg/m²     Physical Exam  Constitutional:       General: He is not in acute distress. Appearance: He is not ill-appearing, toxic-appearing or diaphoretic. HENT:      Head: Normocephalic and atraumatic. Eyes:      General: No scleral icterus. Right eye: No discharge. Left eye: No discharge. Cardiovascular:      Rate and Rhythm: Normal rate and regular rhythm.    Pulmonary: Effort: Pulmonary effort is normal. No respiratory distress. Abdominal:      General: There is no distension. Tenderness: There is no abdominal tenderness. Skin:     General: Skin is warm and dry. Capillary Refill: Capillary refill takes less than 2 seconds. Neurological:      Mental Status: He is alert. Mental status is at baseline. Psychiatric:         Mood and Affect: Mood normal.         POC  No results found for this visit on 09/11/20. Patients recent PSA values are as follows  Lab Results   Component Value Date    PSA 0.84 09/04/2020    PSA 0.81 08/07/2020    PSA 0.41 05/22/2020        Recent BUN/Creatinine:  Lab Results   Component Value Date    BUN 21 05/22/2020    CREATININE 1.2 05/22/2020         Assessment:   Prostate cancer  Plan:     Pt's PSA  0.84 9/4/2020. Discussed Lupron injection today but pt and daughter would like to continue to hold off and see what PSA is next month. PSA in one month. Pt to receive Xgeva today. Continue calcium and vitamin d supplementation. Continue Cardura and finasteride. OV in 1 month for Xgeva injection. CMP, vit d, psa prior to appt.

## 2020-09-29 RX ORDER — TRAMADOL HYDROCHLORIDE 50 MG/1
50 TABLET ORAL EVERY 6 HOURS PRN
Qty: 90 TABLET | Refills: 0 | Status: SHIPPED | OUTPATIENT
Start: 2020-09-29 | End: 2020-11-11 | Stop reason: SDUPTHER

## 2020-09-29 NOTE — TELEPHONE ENCOUNTER
Date of last visit:  6/17/2020  Date of next visit:  11/18/2020    Requested Prescriptions     Pending Prescriptions Disp Refills    traMADol (ULTRAM) 50 MG tablet 90 tablet 0     Sig: Take 1 tablet by mouth every 6 hours as needed for Pain for up to 30 days.

## 2020-10-05 ENCOUNTER — NURSE ONLY (OUTPATIENT)
Dept: LAB | Age: 85
End: 2020-10-05

## 2020-10-05 LAB
ALBUMIN SERPL-MCNC: 4.4 G/DL (ref 3.5–5.1)
ALP BLD-CCNC: 42 U/L (ref 38–126)
ALT SERPL-CCNC: 11 U/L (ref 11–66)
ANION GAP SERPL CALCULATED.3IONS-SCNC: 13 MEQ/L (ref 8–16)
AST SERPL-CCNC: 25 U/L (ref 5–40)
BILIRUB SERPL-MCNC: 0.5 MG/DL (ref 0.3–1.2)
BUN BLDV-MCNC: 32 MG/DL (ref 7–22)
CALCIUM SERPL-MCNC: 9.7 MG/DL (ref 8.5–10.5)
CHLORIDE BLD-SCNC: 98 MEQ/L (ref 98–111)
CO2: 30 MEQ/L (ref 23–33)
CREAT SERPL-MCNC: 1.3 MG/DL (ref 0.4–1.2)
GFR SERPL CREATININE-BSD FRML MDRD: 51 ML/MIN/1.73M2
GLUCOSE BLD-MCNC: 87 MG/DL (ref 70–108)
POTASSIUM SERPL-SCNC: 4.5 MEQ/L (ref 3.5–5.2)
PROSTATE SPECIFIC ANTIGEN: 1.5 NG/ML (ref 0–1)
SODIUM BLD-SCNC: 141 MEQ/L (ref 135–145)
TOTAL PROTEIN: 6.6 G/DL (ref 6.1–8)
VITAMIN D 25-HYDROXY: 45 NG/ML (ref 30–100)

## 2020-10-07 LAB — CALCIUM IONIZED: NORMAL

## 2020-10-13 ENCOUNTER — OFFICE VISIT (OUTPATIENT)
Dept: UROLOGY | Age: 85
End: 2020-10-13
Payer: MEDICARE

## 2020-10-13 VITALS — WEIGHT: 133 LBS | HEIGHT: 68 IN | BODY MASS INDEX: 20.16 KG/M2 | TEMPERATURE: 97.5 F

## 2020-10-13 LAB
BILIRUBIN URINE: NEGATIVE
BLOOD URINE, POC: ABNORMAL
CHARACTER, URINE: CLEAR
COLOR, URINE: YELLOW
GLUCOSE URINE: NEGATIVE MG/DL
KETONES, URINE: ABNORMAL
LEUKOCYTE CLUMPS, URINE: NEGATIVE
NITRITE, URINE: NEGATIVE
PH, URINE: 7 (ref 5–9)
PROTEIN, URINE: NEGATIVE MG/DL
SPECIFIC GRAVITY, URINE: 1.02 (ref 1–1.03)
UROBILINOGEN, URINE: 0.2 EU/DL (ref 0–1)

## 2020-10-13 PROCEDURE — G8484 FLU IMMUNIZE NO ADMIN: HCPCS | Performed by: NURSE PRACTITIONER

## 2020-10-13 PROCEDURE — G8427 DOCREV CUR MEDS BY ELIG CLIN: HCPCS | Performed by: NURSE PRACTITIONER

## 2020-10-13 PROCEDURE — 96402 CHEMO HORMON ANTINEOPL SQ/IM: CPT | Performed by: NURSE PRACTITIONER

## 2020-10-13 PROCEDURE — 1036F TOBACCO NON-USER: CPT | Performed by: NURSE PRACTITIONER

## 2020-10-13 PROCEDURE — 99213 OFFICE O/P EST LOW 20 MIN: CPT | Performed by: NURSE PRACTITIONER

## 2020-10-13 PROCEDURE — 81003 URINALYSIS AUTO W/O SCOPE: CPT | Performed by: NURSE PRACTITIONER

## 2020-10-13 PROCEDURE — 96401 CHEMO ANTI-NEOPL SQ/IM: CPT | Performed by: NURSE PRACTITIONER

## 2020-10-13 PROCEDURE — 1123F ACP DISCUSS/DSCN MKR DOCD: CPT | Performed by: NURSE PRACTITIONER

## 2020-10-13 PROCEDURE — 4040F PNEUMOC VAC/ADMIN/RCVD: CPT | Performed by: NURSE PRACTITIONER

## 2020-10-13 PROCEDURE — G8420 CALC BMI NORM PARAMETERS: HCPCS | Performed by: NURSE PRACTITIONER

## 2020-10-13 NOTE — PROGRESS NOTES
[address] [facility]  [address]  [facility] [address]  Dept: [phone]  Loc: [phone]    Visit Date: 10/13/2020        HPI:     Dunia Cintron is a 80 y.o. male who presents today for:  Chief Complaint   Patient presents with    Follow-up     malignant neoplasm of prostate, labs prior. xgeva today        HPI   Mr Bret Maldonado was seen today in follow up for prostate cancer. Mr. Bret Maldonado has a hx of PSA 1/31/17 that was 25 at which time family was notified of risk for prostate cancer but did not wish to pursue further testing. CT of the abdomen and pelvis performed for abdominal pain 12/17/18 noted multiple skeletal sclerotic metastases with pathologic compression fractures of L1-L3, enlarged hterogeneous prostate, and pelvic adenopathy. PSA performed 12/17/18 was 1,594. Pt was started on Casodex 12/20/18. Pt recovered on TCU 12/24/19-1/10/19. He underwent TRUS prostate biopsy 1/24/19 by Dr. Hanna Multani with findings of high grade prostatic adenocarcinoma. Pt was scheduled for review of biopsy results 2/11/19 however pt was admitted to the hospital 2/5/19-2/15/19 and treated for acute PE and DVT and pathologic T6 and T8 fractures. He underwent T6 and T8 vertebral bone biopsy, RFA for spine metastatic lesion at the levels of T6 and T8, and vertebral augmentation (balloon kyphoplasty) for T6 and T8 on 2/13/19 by Dr. Gulshan Frederick. He has completed palliative radiation therapy. Pt started monthly Firmagon and Xgeva 2/26/19. Firmagon switched to Lupron 4/30/19. Pt had anorexia on Casodex and this was held. Pt and daughter decided to hold casodex. Pt is on calcium carbonate and vitamin D supplementation. Last Lupron injection 1/7/2020.  Pt and daughter have held off on further injections pending increase in PSA secondary to significant fatigue on ADT therapy.       Pt underwent cystoscopy in the office 3/11/19 by Dr. Hanna Multani with findings of a current facility-administered medications for this visit. Past Medical History  Margret Sanchez  has a past medical history of Blood circulation, collateral, CAD (coronary artery disease), CAD (coronary artery disease), Cancer ([address] CKD (chronic kidney disease) stage 2, GFR 60-89 ml/min, COPD (chronic obstructive pulmonary disease) ([address] Diverticulitis, DVT of lower extremity, bilateral ([address] GERD (gastroesophageal reflux disease), Hiatal hernia, Hyperlipemia, Hyperlipidemia, Other disorders of kidney and ureter in diseases classified elsewhere, Pleural plaque, Primary adenocarcinoma of prostate ([address] Pulmonary emboli ([address] and Vitamin D deficiency. Past Surgical History  The patient  has a past surgical history that includes hernia repair; Appendectomy; Cardiac catheterization; eye surgery; Cystocopy (03/17/2015); LAPAROTOMY EXPLORATORY (N/A, 12/17/2018); Abdomen surgery; and Kyphosis surgery (N/A, 2/13/2019). Family History  This patient's family history is not on file. Social History  Margret Sanchez  reports that he quit smoking about 54 years ago. His smoking use included cigarettes. He smoked 1.00 pack per day. He has never used smokeless tobacco. He reports current alcohol use. He reports that he does not use drugs. Subjective:      Review of Systems   Constitutional: Positive for fatigue. Negative for activity change, appetite change, chills, diaphoresis, fever and unexpected weight change. Genitourinary: Positive for frequency and urgency. Negative for decreased urine volume, dysuria, flank pain and hematuria. Objective:   Temp 97.5 °F (36.4 °C)   Ht 5' 8\" (1.727 m)   Wt 133 lb (60.3 kg)   BMI 20.22 kg/m²     Physical Exam  Constitutional:       General: He is not in acute distress. Appearance: He is not ill-appearing, toxic-appearing or diaphoretic. HENT:      Head: Normocephalic and atraumatic. Eyes:      General: No scleral icterus. Right eye: No discharge. Left eye: No discharge. Cardiovascular:      Rate and Rhythm: Normal rate and regular rhythm. Pulmonary:      Effort: Pulmonary effort is normal. No respiratory distress. Abdominal:      General: There is no distension. Tenderness: There is no abdominal tenderness. Skin:     General: Skin is warm and dry. Capillary Refill: Capillary refill takes less than 2 seconds. Neurological:      Mental Status: He is alert. Mental status is at baseline. Psychiatric:         Mood and Affect: Mood normal.         POC  No results found for this visit on 10/13/20. Patients recent PSA values are as follows  Lab Results   Component Value Date    PSA 1.50 (H) 10/05/2020    PSA 0.84 09/04/2020    PSA 0.81 08/07/2020        Recent BUN/Creatinine:  Lab Results   Component Value Date    BUN 32 10/05/2020    CREATININE 1.3 10/05/2020         Assessment:   Prostate cancer  Plan:     Pt's PSA trending up to 1.5. Pt is agreeable to proceeding with Eligard 45 mg injection today. Pt to receive Xgeva today. Continue calcium and vitamin d supplementation. Continue Cardura and finasteride. Lab/ma in 1 month for Xgeva injection. Ca, vit d, psa prior to appt.

## 2020-10-13 NOTE — PROGRESS NOTES
Patient has given me verbal consent to perform Xgeva Injection yes      Following Cleve Paget CNP plan of care. Kaylyn Livings 120MG/1.7ML MG GIVEN S.C right ARM  Lot Number: [id number]  Expiration Date: 10/2022  [address] #: [id number]    After Injection was given there were no reactions at injection site and patient was feeling well. Patient was notified that possible side effects from injections include: Redness, swelling and itching at the injection site. Possible side effects of androgen deprivation therapy, including hot flashes, flushing of the skin, increased weight, decreased sex drive, and difficulties with ED. Patient was instructed to inform their dental office that they are receiving Kaylyn Livings and that major dental procedures should be avoided. Patient was advised to call our office with any further questions or concerns. Any active dental problems, infections, or pain? No, patient has dentures    Date of last dental appointment: unknown    Any planned dental procedures? no    Last Bone Scan was performed on 09/04/20    Last Calcium level drawn on 10.05.20 and Calcium Value was 7.4. Calcium needs to be checked periodically. Ask provider when Calcium needs checked again. Patient supplied their own medications No    Pt [address] therapy first initiated on 02/26/19. Patient has given me verbal consent to perform Eligard Injection yes      Following Cleve Paget CNP plan of care  Eligard 45mg given SQ RLQ abd  Lot Number [id number]  Expiration date 03/2022  NDC# 48148-504-50     After Injection was given there were no reactions at injection site and patient was feeling well. Patient was notified that possible side effects from injections include: Redness, swelling and itching at the injection site. Possible side effects of androgen deprivation therapy, including hot flashes, flushing of the skin, increased weight, decreased sex drive, and difficulties with ED.  Patient was instructed to call the office with any further questions or concerns. Date of last Calcium/Vit D level: 10/05/20  Is patient on Calcium/Vit D replacement?  yes  Date of last Dexa Scan: n/a per Provider  Testosterone Level of <3 done on 09/04/20  Psa level of 1.50 done on 10/05/20     Patient supplied their own medications No        Pt [address] therapy first initiated on 02/26/19

## 2020-10-26 RX ORDER — TIOTROPIUM BROMIDE AND OLODATEROL 3.124; 2.736 UG/1; UG/1
SPRAY, METERED RESPIRATORY (INHALATION)
Qty: 4 G | Refills: 0 | Status: SHIPPED | OUTPATIENT
Start: 2020-10-26 | End: 2020-11-23 | Stop reason: SDUPTHER

## 2020-11-03 PROBLEM — J44.9 CHRONIC OBSTRUCTIVE LUNG DISEASE (HCC): Status: RESOLVED | Noted: 2020-11-03 | Resolved: 2020-11-03

## 2020-11-06 ENCOUNTER — NURSE ONLY (OUTPATIENT)
Dept: LAB | Age: 85
End: 2020-11-06

## 2020-11-06 ENCOUNTER — TELEPHONE (OUTPATIENT)
Dept: UROLOGY | Age: 85
End: 2020-11-06

## 2020-11-06 LAB
PROSTATE SPECIFIC ANTIGEN: 2.08 NG/ML (ref 0–1)
VITAMIN D 25-HYDROXY: 48 NG/ML (ref 30–100)

## 2020-11-08 LAB — CALCIUM IONIZED: NORMAL

## 2020-11-09 NOTE — TELEPHONE ENCOUNTER
Patient and daughter Micheline Pineda advised PSA was 2.08. They voiced understanding and will have the testosterone level drawn.

## 2020-11-10 ENCOUNTER — NURSE ONLY (OUTPATIENT)
Dept: LAB | Age: 85
End: 2020-11-10

## 2020-11-11 ENCOUNTER — OFFICE VISIT (OUTPATIENT)
Dept: FAMILY MEDICINE CLINIC | Age: 85
End: 2020-11-11

## 2020-11-11 VITALS
HEIGHT: 68 IN | SYSTOLIC BLOOD PRESSURE: 122 MMHG | BODY MASS INDEX: 20.78 KG/M2 | RESPIRATION RATE: 22 BRPM | HEART RATE: 68 BPM | WEIGHT: 137.13 LBS | TEMPERATURE: 97.3 F | DIASTOLIC BLOOD PRESSURE: 80 MMHG

## 2020-11-11 LAB — TESTOSTERONE TOTAL: < 3 NG/DL (ref 300–720)

## 2020-11-11 PROCEDURE — G8420 CALC BMI NORM PARAMETERS: HCPCS | Performed by: FAMILY MEDICINE

## 2020-11-11 PROCEDURE — G8427 DOCREV CUR MEDS BY ELIG CLIN: HCPCS | Performed by: FAMILY MEDICINE

## 2020-11-11 PROCEDURE — 1036F TOBACCO NON-USER: CPT | Performed by: FAMILY MEDICINE

## 2020-11-11 PROCEDURE — 99213 OFFICE O/P EST LOW 20 MIN: CPT | Performed by: FAMILY MEDICINE

## 2020-11-11 PROCEDURE — 4040F PNEUMOC VAC/ADMIN/RCVD: CPT | Performed by: FAMILY MEDICINE

## 2020-11-11 PROCEDURE — 1123F ACP DISCUSS/DSCN MKR DOCD: CPT | Performed by: FAMILY MEDICINE

## 2020-11-11 RX ORDER — TRAMADOL HYDROCHLORIDE 50 MG/1
50 TABLET ORAL EVERY 6 HOURS PRN
Qty: 90 TABLET | Refills: 0 | Status: SHIPPED | OUTPATIENT
Start: 2020-11-11 | End: 2020-12-11

## 2020-11-11 ASSESSMENT — ENCOUNTER SYMPTOMS
SHORTNESS OF BREATH: 0
SINUS PRESSURE: 0
COUGH: 1
CONSTIPATION: 0

## 2020-11-11 NOTE — PROGRESS NOTES
Subjective:      Patient ID: Dunia Cintron is a 80 y.o. male. HPI  1. He had trouble swallowing a pill and choked  2. He coughs some and does raise some mucus  3. The tramadol helps at a half pill twice daily and a full one at night  Review of Systems   Constitutional: Positive for fatigue. HENT: Negative for sinus pressure. Eyes: Negative for visual disturbance. Respiratory: Positive for cough. Negative for shortness of breath. Cardiovascular: Negative for chest pain. Gastrointestinal: Negative for constipation. Genitourinary: Negative. Musculoskeletal: Positive for arthralgias. Skin: Negative for rash. Neurological: Positive for weakness. Negative for headaches. The patient's medications, allergies, past medical problems, surgical, social, and family histories were reviewed and updated as needed. Objective:   Physical Exam  Constitutional:       General: He is not in acute distress. Appearance: He is well-developed. HENT:      Head: Normocephalic and atraumatic. Eyes:      General: No scleral icterus. Conjunctiva/sclera: Conjunctivae normal.   Neck:      Trachea: No tracheal deviation. Cardiovascular:      Rate and Rhythm: Normal rate and regular rhythm. Heart sounds: Normal heart sounds. Pulmonary:      Effort: Pulmonary effort is normal.      Breath sounds: Normal breath sounds. Skin:     General: Skin is warm and dry. Neurological:      Mental Status: He is alert and oriented to person, place, and time. Psychiatric:         Behavior: Behavior normal.     Blood pressure 122/80, pulse 68, temperature 97.3 °F (36.3 °C), temperature source Infrared, resp. rate 22, height 5' 8\" (1.727 m), weight 137 lb 2 oz (62.2 kg). Assessment:       Diagnosis Orders   1.  Prostate cancer metastatic to bone (HCC)  traMADol (ULTRAM) 50 MG tablet           Plan:      See me in 6 months        Ludmila Concepcion MD

## 2020-11-12 ENCOUNTER — NURSE ONLY (OUTPATIENT)
Dept: UROLOGY | Age: 85
End: 2020-11-12
Payer: MEDICARE

## 2020-11-12 VITALS — TEMPERATURE: 97.3 F

## 2020-11-12 LAB — CALCIUM IONIZED: NORMAL

## 2020-11-12 PROCEDURE — 96401 CHEMO ANTI-NEOPL SQ/IM: CPT | Performed by: NURSE PRACTITIONER

## 2020-11-12 NOTE — PROGRESS NOTES
I have personally verified, reviewed, released, signed, authenticated, authorized, confirmed,finalized, and approved the actions of the [facility]. Pt's PSA trending up despite Eligard last month. Repeat PSA in one month. Haley Snow today. PSA, ical, testosterone in one month with office visit.

## 2020-11-13 RX ORDER — DOXAZOSIN MESYLATE 4 MG/1
4 TABLET ORAL NIGHTLY
Qty: 90 TABLET | Refills: 3 | Status: SHIPPED | OUTPATIENT
Start: 2020-11-13 | End: 2021-03-30 | Stop reason: DRUGHIGH

## 2020-11-23 RX ORDER — TIOTROPIUM BROMIDE AND OLODATEROL 3.124; 2.736 UG/1; UG/1
SPRAY, METERED RESPIRATORY (INHALATION)
Qty: 4 G | Refills: 11 | Status: SHIPPED | OUTPATIENT
Start: 2020-11-23 | End: 2021-12-14

## 2020-11-23 RX ORDER — TIOTROPIUM BROMIDE AND OLODATEROL 3.124; 2.736 UG/1; UG/1
SPRAY, METERED RESPIRATORY (INHALATION)
Qty: 4 G | Refills: 0 | OUTPATIENT
Start: 2020-11-23

## 2020-11-23 NOTE — TELEPHONE ENCOUNTER
Date of last visit:  11/11/2020  Date of next visit:  5/12/2021    Requested Prescriptions     Pending Prescriptions Disp Refills    Tiotropium Bromide-Olodaterol (STIOLTO RESPIMAT) 2.5-2.5 MCG/ACT AERS 4 g 0     Sig: INHALE 2 PUFFS BY MOUTH ONCE DAILY

## 2020-11-23 NOTE — TELEPHONE ENCOUNTER
Pt's daughter called requesting a Rx for Stiolto. She said the pulmonary doctor said they did not need to go back there and that they should call the family doctor for refills.     [facility]

## 2020-12-08 ENCOUNTER — NURSE ONLY (OUTPATIENT)
Dept: LAB | Age: 85
End: 2020-12-08

## 2020-12-08 LAB — PROSTATE SPECIFIC ANTIGEN: 3.17 NG/ML (ref 0–1)

## 2020-12-09 LAB
CALCIUM IONIZED: NORMAL
TESTOSTERONE FREE: NORMAL

## 2020-12-15 ENCOUNTER — OFFICE VISIT (OUTPATIENT)
Dept: UROLOGY | Age: 85
End: 2020-12-15
Payer: MEDICARE

## 2020-12-15 VITALS — TEMPERATURE: 97 F | HEIGHT: 68 IN | WEIGHT: 136 LBS | BODY MASS INDEX: 20.61 KG/M2

## 2020-12-15 LAB
BILIRUBIN URINE: NEGATIVE
BLOOD URINE, POC: ABNORMAL
CHARACTER, URINE: CLEAR
COLOR, URINE: YELLOW
GLUCOSE URINE: NEGATIVE MG/DL
KETONES, URINE: NEGATIVE
LEUKOCYTE CLUMPS, URINE: ABNORMAL
NITRITE, URINE: NEGATIVE
PH, URINE: 7 (ref 5–9)
PROTEIN, URINE: ABNORMAL MG/DL
SPECIFIC GRAVITY, URINE: 1.02 (ref 1–1.03)
UROBILINOGEN, URINE: 0.2 EU/DL (ref 0–1)

## 2020-12-15 PROCEDURE — G8427 DOCREV CUR MEDS BY ELIG CLIN: HCPCS | Performed by: NURSE PRACTITIONER

## 2020-12-15 PROCEDURE — 1036F TOBACCO NON-USER: CPT | Performed by: NURSE PRACTITIONER

## 2020-12-15 PROCEDURE — 96401 CHEMO ANTI-NEOPL SQ/IM: CPT | Performed by: NURSE PRACTITIONER

## 2020-12-15 PROCEDURE — 1123F ACP DISCUSS/DSCN MKR DOCD: CPT | Performed by: NURSE PRACTITIONER

## 2020-12-15 PROCEDURE — 4040F PNEUMOC VAC/ADMIN/RCVD: CPT | Performed by: NURSE PRACTITIONER

## 2020-12-15 PROCEDURE — 99214 OFFICE O/P EST MOD 30 MIN: CPT | Performed by: NURSE PRACTITIONER

## 2020-12-15 PROCEDURE — G8484 FLU IMMUNIZE NO ADMIN: HCPCS | Performed by: NURSE PRACTITIONER

## 2020-12-15 PROCEDURE — 81003 URINALYSIS AUTO W/O SCOPE: CPT | Performed by: NURSE PRACTITIONER

## 2020-12-15 PROCEDURE — G8420 CALC BMI NORM PARAMETERS: HCPCS | Performed by: NURSE PRACTITIONER

## 2020-12-15 RX ORDER — TRAMADOL HYDROCHLORIDE 50 MG/1
50 TABLET ORAL EVERY 6 HOURS PRN
COMMUNITY
End: 2020-12-28 | Stop reason: SDUPTHER

## 2020-12-15 RX ORDER — BICALUTAMIDE 50 MG/1
50 TABLET, FILM COATED ORAL DAILY
Qty: 30 TABLET | Refills: 1 | Status: SHIPPED | OUTPATIENT
Start: 2020-12-15 | End: 2021-02-01 | Stop reason: SDUPTHER

## 2020-12-15 ASSESSMENT — ENCOUNTER SYMPTOMS
ABDOMINAL PAIN: 0
BACK PAIN: 0

## 2020-12-15 NOTE — PROGRESS NOTES
He denies any fever, chills. Reports he is doing well. Denies any pain, increased fatigue, weight loss. No change in urinary symptoms. Current Outpatient Medications   Medication Sig Dispense Refill    traMADol (ULTRAM) 50 MG tablet Take 50 mg by mouth every 6 hours as needed for Pain.  carvedilol (COREG) 3.125 MG tablet TAKE 1 TABLET BY MOUTH TWICE DAILY WITH MEALS 180 tablet 3    Tiotropium Bromide-Olodaterol (STIOLTO RESPIMAT) 2.5-2.5 MCG/ACT AERS INHALE 2 PUFFS BY MOUTH ONCE DAILY 4 g 11    doxazosin (CARDURA) 4 MG tablet Take 1 tablet by mouth nightly 90 tablet 3    leuprolide (LUPRON) 7.5 MG injection Inject 7.5 mg into the muscle once Every 6 months      Cholecalciferol (VITAMIN D3) 125 MCG (5000 UT) CAPS Take 1 capsule by mouth every morning (before breakfast)      hydrOXYzine (ATARAX) 25 MG tablet Take 1 tablet by mouth every 8 hours as needed for Itching 30 tablet 1    finasteride (PROSCAR) 5 MG tablet Take 1 tablet by mouth daily 90 tablet 3    albuterol sulfate HFA (VENTOLIN HFA) 108 (90 Base) MCG/ACT inhaler Inhale 2 puffs into the lungs every 6 hours as needed for Wheezing      Misc. Devices (ACAPELLA) MISC 1 Device by Does not apply route 4 times daily 1 each 0    calcium carbonate-vitamin D (CALCIUM 600 + D) 600-400 MG-UNIT TABS per tab Take 1 tablet by mouth 2 times daily 60 tablet 5    Multiple Vitamins-Minerals (THERAPEUTIC MULTIVITAMIN-MINERALS) tablet Take 1 tablet by mouth daily      tiZANidine (ZANAFLEX) 2 MG tablet Take 1 tablet by mouth every 8 hours as needed (spasm)      docusate sodium (COLACE, DULCOLAX) 100 MG CAPS Take 100 mg by mouth 2 times daily      acetaminophen (TYLENOL) 500 MG tablet Take 500 mg by mouth every 8 hours as needed for Pain      aspirin 325 MG EC tablet Take 1 tablet by mouth daily       No current facility-administered medications for this visit.         Past Medical History  Jh Yanez  has a past medical history of Blood circulation, collateral, CAD (coronary artery disease), CAD (coronary artery disease), Cancer ([address] CKD (chronic kidney disease) stage 2, GFR 60-89 ml/min, COPD (chronic obstructive pulmonary disease) ([address] Diverticulitis, DVT of lower extremity, bilateral ([address] GERD (gastroesophageal reflux disease), Hiatal hernia, Hyperlipemia, Hyperlipidemia, Other disorders of kidney and ureter in diseases classified elsewhere, Pleural plaque, Primary adenocarcinoma of prostate ([address] Pulmonary emboli ([address] and Vitamin D deficiency. Past Surgical History  The patient  has a past surgical history that includes hernia repair; Appendectomy; Cardiac catheterization; eye surgery; Cystocopy (03/17/2015); LAPAROTOMY EXPLORATORY (N/A, 12/17/2018); Abdomen surgery; and Kyphosis surgery (N/A, 2/13/2019). Family History  This patient's family history is not on file. Social History  Gissell Ojeda  reports that he quit smoking about 54 years ago. His smoking use included cigarettes. He smoked 1.00 pack per day. He has never used smokeless tobacco. He reports current alcohol use. He reports that he does not use drugs. Subjective:      Review of Systems   Constitutional: Negative for activity change, appetite change, chills, diaphoresis, fatigue, fever and unexpected weight change. Gastrointestinal: Negative for abdominal pain. Genitourinary: Negative for decreased urine volume, dysuria, flank pain, frequency, hematuria and urgency. Musculoskeletal: Negative for back pain. Objective:   Temp 97 °F (36.1 °C) (Temporal)   Ht 5' 8\" (1.727 m)   Wt 136 lb (61.7 kg)   BMI 20.68 kg/m²     Physical Exam  Constitutional:       General: He is not in acute distress. Appearance: He is not ill-appearing, toxic-appearing or diaphoretic. HENT:      Head: Normocephalic and atraumatic. Nose: Nose normal.      Mouth/Throat:      Mouth: Mucous membranes are moist.   Eyes:      General: No scleral icterus. Right eye: No discharge. Left eye: No discharge. Pulmonary:      Effort: Pulmonary effort is normal. No respiratory distress. Skin:     General: Skin is warm and dry. Capillary Refill: Capillary refill takes less than 2 seconds. Neurological:      Mental Status: He is alert and oriented to person, place, and time. Mental status is at baseline. Psychiatric:         Mood and Affect: Mood normal.         Behavior: Behavior normal.         Thought Content: Thought content normal.         POC  Results for POC orders placed in visit on 12/15/20   POCT Urinalysis No Micro (Auto)   Result Value Ref Range    Glucose, Ur Negative NEGATIVE mg/dl    Bilirubin Urine Negative     Ketones, Urine Negative NEGATIVE    Specific Gravity, Urine 1.020 1.002 - 1.030    Blood, UA POC Moderate (A) NEGATIVE    pH, Urine 7.00 5.0 - 9.0    Protein, Urine Trace (A) NEGATIVE mg/dl    Urobilinogen, Urine 0.20 0.0 - 1.0 eu/dl    Nitrite, Urine Negative NEGATIVE    Leukocyte Clumps, Urine Large (A) NEGATIVE    Color, Urine Yellow [id number]    Character, Urine Clear [id number]         Patients recent PSA values are as follows  Lab Results   Component Value Date    PSA 3.17 (H) 12/08/2020    PSA 2.08 (H) 11/06/2020    PSA 1.50 (H) 10/05/2020        Recent BUN/Creatinine:  Lab Results   Component Value Date    BUN 32 10/05/2020    CREATININE 1.3 10/05/2020         Assessment:   Prostate cancer  Plan:     Pt's PSA continues to trend upward to recent high of 3.17 despite receiving Eligard in October. Testosterone castrate. Discussed considering addition of Jodean Hoot or Casodex at this time. Discussed side effects. Pt and daughter would like to proceed with Xtandi--office to facilitate sending to specialty pharmacy and obtaining coverage/financial support. Start Casodex 50 mg daily until receives Jodean Hoot. Pt to receive Xgeva today. Continue calcium and vitamin d supplementation. Continue Cardura and finasteride.       Lab/ma in 1 month for

## 2020-12-16 ENCOUNTER — TELEPHONE (OUTPATIENT)
Dept: UROLOGY | Age: 85
End: 2020-12-16

## 2020-12-16 NOTE — TELEPHONE ENCOUNTER
[place] [employer] forms completed and faxed to [employer] at [phone]. Confirmation received, see attached. I called and spoke with Gris Mcdermott with [facility] specialty pharmacy at [phone], to notify her to help with assistance for patient to receive xtandi. I called and spoke with Babar Brannon at [employer] and notified her to use [facility].

## 2020-12-28 ENCOUNTER — TELEPHONE (OUTPATIENT)
Dept: UROLOGY | Age: 85
End: 2020-12-28

## 2020-12-28 RX ORDER — TRAMADOL HYDROCHLORIDE 50 MG/1
50 TABLET ORAL EVERY 6 HOURS PRN
Qty: 90 TABLET | Refills: 0 | Status: SHIPPED | OUTPATIENT
Start: 2020-12-28 | End: 2021-01-27

## 2021-01-06 ENCOUNTER — TELEPHONE (OUTPATIENT)
Dept: UROLOGY | Age: 86
End: 2021-01-06

## 2021-01-07 ENCOUNTER — NURSE ONLY (OUTPATIENT)
Dept: LAB | Age: 86
End: 2021-01-07

## 2021-01-07 DIAGNOSIS — C61 PROSTATE CANCER (HCC): ICD-10-CM

## 2021-01-07 LAB — PROSTATE SPECIFIC ANTIGEN: 1.36 NG/ML (ref 0–1)

## 2021-01-08 LAB — TESTOSTERONE TOTAL: < 3 NG/DL (ref 300–720)

## 2021-01-08 NOTE — TELEPHONE ENCOUNTER
Patient's daughter called back and gave patient's plan d information. [employer] ID: [id number]. I called [employer] and spoke to AJ and gave her the [facility] ID she will call Toaugustin Montes at [phone] if they have any other questions.

## 2021-01-09 LAB — CALCIUM IONIZED: NORMAL

## 2021-01-13 ENCOUNTER — TELEPHONE (OUTPATIENT)
Dept: UROLOGY | Age: 86
End: 2021-01-13

## 2021-01-13 NOTE — TELEPHONE ENCOUNTER
I called and spoke with Cleotha Denver at [phone]([id number]) I notified her that [employer]e [employer] support is wanting to know all of his [facility] information including, Member #, group #, address. She stated that she does not currently have the information with her and will call back tomorrow. Cleotha Denver wanted to know if the psa results have dropped enough to keep him on Casodex and not switch his medication to Amee Ny. Please advise so that I can tell her tomorrow when she calls back.

## 2021-01-14 NOTE — TELEPHONE ENCOUNTER
Aimee Nuñez called and I notified her that per [facility] the family wants to keep him on the casodex instead of the Xtandi we can do that.  As we previously discussed the Voncile Lennert is likely a better drug but the casodex could be used.   She would like to discuss with Eli Foreman at [address] I advised her that I am going to proceed with getting Voncile Lennert approved because it is a lengthy process. Paper work placed on [employer] to be filled out.     [employer] Part D coverage  -[id number]  Group# [address]  [address] [facility] [address]

## 2021-01-17 ASSESSMENT — ENCOUNTER SYMPTOMS
SHORTNESS OF BREATH: 0
SINUS PRESSURE: 0
CONSTIPATION: 0

## 2021-01-17 NOTE — PROGRESS NOTES
Rate and Rhythm: Normal rate and regular rhythm. Heart sounds: Normal heart sounds. Pulmonary:      Effort: Pulmonary effort is normal.      Breath sounds: Normal breath sounds. Skin:     General: Skin is warm and dry. Neurological:      Mental Status: He is alert and oriented to person, place, and time. Psychiatric:         Behavior: Behavior normal.                 An electronic signature was used to authenticate this note.     --Leanna Clark MD

## 2021-01-18 ENCOUNTER — OFFICE VISIT (OUTPATIENT)
Dept: FAMILY MEDICINE CLINIC | Age: 86
End: 2021-01-18

## 2021-01-18 VITALS
TEMPERATURE: 97.2 F | WEIGHT: 133.38 LBS | SYSTOLIC BLOOD PRESSURE: 108 MMHG | HEIGHT: 68 IN | BODY MASS INDEX: 20.21 KG/M2 | HEART RATE: 64 BPM | DIASTOLIC BLOOD PRESSURE: 60 MMHG | RESPIRATION RATE: 18 BRPM

## 2021-01-18 DIAGNOSIS — Z23 NEED FOR IMMUNIZATION AGAINST INFLUENZA: ICD-10-CM

## 2021-01-18 DIAGNOSIS — C79.51 PROSTATE CANCER METASTATIC TO BONE (HCC): ICD-10-CM

## 2021-01-18 DIAGNOSIS — C61 PROSTATE CANCER METASTATIC TO BONE (HCC): ICD-10-CM

## 2021-01-18 DIAGNOSIS — R09.89 PULMONARY CONGESTION: Primary | ICD-10-CM

## 2021-01-18 DIAGNOSIS — R53.0 NEOPLASTIC MALIGNANT RELATED FATIGUE: ICD-10-CM

## 2021-01-18 PROCEDURE — 4040F PNEUMOC VAC/ADMIN/RCVD: CPT | Performed by: FAMILY MEDICINE

## 2021-01-18 PROCEDURE — 1123F ACP DISCUSS/DSCN MKR DOCD: CPT | Performed by: FAMILY MEDICINE

## 2021-01-18 PROCEDURE — G8420 CALC BMI NORM PARAMETERS: HCPCS | Performed by: FAMILY MEDICINE

## 2021-01-18 PROCEDURE — G8427 DOCREV CUR MEDS BY ELIG CLIN: HCPCS | Performed by: FAMILY MEDICINE

## 2021-01-18 PROCEDURE — 90688 IIV4 VACCINE SPLT 0.5 ML IM: CPT | Performed by: FAMILY MEDICINE

## 2021-01-18 PROCEDURE — 99214 OFFICE O/P EST MOD 30 MIN: CPT | Performed by: FAMILY MEDICINE

## 2021-01-18 PROCEDURE — G0008 ADMIN INFLUENZA VIRUS VAC: HCPCS | Performed by: FAMILY MEDICINE

## 2021-01-18 PROCEDURE — 1036F TOBACCO NON-USER: CPT | Performed by: FAMILY MEDICINE

## 2021-01-18 RX ORDER — HYDROXYZINE HYDROCHLORIDE 10 MG/1
10-20 TABLET, FILM COATED ORAL EVERY 8 HOURS PRN
Qty: 30 TABLET | Refills: 2 | Status: SHIPPED | OUTPATIENT
Start: 2021-01-18 | End: 2021-05-04

## 2021-01-18 ASSESSMENT — PATIENT HEALTH QUESTIONNAIRE - PHQ9
2. FEELING DOWN, DEPRESSED OR HOPELESS: 0
SUM OF ALL RESPONSES TO PHQ QUESTIONS 1-9: 0
SUM OF ALL RESPONSES TO PHQ QUESTIONS 1-9: 0
SUM OF ALL RESPONSES TO PHQ9 QUESTIONS 1 & 2: 0

## 2021-01-18 NOTE — PATIENT INSTRUCTIONS
See me in 6 months  Double the use of the spirometer and acapella  Walk for the 5 minutes several times a day and check the pulse ox if you get tired

## 2021-01-19 ENCOUNTER — OFFICE VISIT (OUTPATIENT)
Dept: UROLOGY | Age: 86
End: 2021-01-19
Payer: MEDICARE

## 2021-01-19 VITALS — WEIGHT: 137 LBS | HEIGHT: 68 IN | BODY MASS INDEX: 20.76 KG/M2 | TEMPERATURE: 97 F

## 2021-01-19 DIAGNOSIS — M81.0 OSTEOPOROSIS, UNSPECIFIED OSTEOPOROSIS TYPE, UNSPECIFIED PATHOLOGICAL FRACTURE PRESENCE: ICD-10-CM

## 2021-01-19 DIAGNOSIS — C61 PROSTATE CANCER (HCC): Primary | ICD-10-CM

## 2021-01-19 LAB
BILIRUBIN URINE: NEGATIVE
BLOOD URINE, POC: ABNORMAL
CHARACTER, URINE: CLEAR
COLOR, URINE: YELLOW
GLUCOSE URINE: NEGATIVE MG/DL
KETONES, URINE: NEGATIVE
LEUKOCYTE CLUMPS, URINE: ABNORMAL
NITRITE, URINE: NEGATIVE
PH, URINE: 7 (ref 5–9)
PROTEIN, URINE: NEGATIVE MG/DL
SPECIFIC GRAVITY, URINE: 1.02 (ref 1–1.03)
UROBILINOGEN, URINE: 0.2 EU/DL (ref 0–1)

## 2021-01-19 PROCEDURE — G8482 FLU IMMUNIZE ORDER/ADMIN: HCPCS | Performed by: NURSE PRACTITIONER

## 2021-01-19 PROCEDURE — 1036F TOBACCO NON-USER: CPT | Performed by: NURSE PRACTITIONER

## 2021-01-19 PROCEDURE — 1123F ACP DISCUSS/DSCN MKR DOCD: CPT | Performed by: NURSE PRACTITIONER

## 2021-01-19 PROCEDURE — 99213 OFFICE O/P EST LOW 20 MIN: CPT | Performed by: NURSE PRACTITIONER

## 2021-01-19 PROCEDURE — G8420 CALC BMI NORM PARAMETERS: HCPCS | Performed by: NURSE PRACTITIONER

## 2021-01-19 PROCEDURE — 81003 URINALYSIS AUTO W/O SCOPE: CPT | Performed by: NURSE PRACTITIONER

## 2021-01-19 PROCEDURE — 4040F PNEUMOC VAC/ADMIN/RCVD: CPT | Performed by: NURSE PRACTITIONER

## 2021-01-19 PROCEDURE — G8427 DOCREV CUR MEDS BY ELIG CLIN: HCPCS | Performed by: NURSE PRACTITIONER

## 2021-01-19 PROCEDURE — 96401 CHEMO ANTI-NEOPL SQ/IM: CPT | Performed by: NURSE PRACTITIONER

## 2021-01-19 RX ORDER — FINASTERIDE 5 MG/1
5 TABLET, FILM COATED ORAL DAILY
Qty: 90 TABLET | Refills: 3 | Status: SHIPPED | OUTPATIENT
Start: 2021-01-19 | End: 2021-03-02 | Stop reason: SDUPTHER

## 2021-01-19 ASSESSMENT — ENCOUNTER SYMPTOMS
BACK PAIN: 0
ABDOMINAL PAIN: 0

## 2021-01-19 NOTE — PROGRESS NOTES
Patient has given me verbal consent to perform Xgeva Injection yes      Following [employer] plan of care. XGEVA 120MG/1.7ML MG GIVEN S.C Right ARM  Lot Number: [id number]  Expiration Date:   [facility] #: [id number]    After Injection was given there were no reactions at injection site and patient was feeling well. Patient was notified that possible side effects from injections include: Redness, swelling and itching at the injection site. Possible side effects of androgen deprivation therapy, including hot flashes, flushing of the skin, increased weight, decreased sex drive, and difficulties with ED. Patient was instructed to inform their dental office that they are receiving Aleena Mani and that major dental procedures should be avoided. Patient was advised to call our office with any further questions or concerns. Any active dental problems, infections, or pain? No    Date of last dental appointment: NA    Any planned dental procedures? Dentures    Last Bone Scan was performed on 9-4-20    Last Calcium level drawn on 1-7-21 and Calcium Value was 1.24. Calcium needs to be checked periodically. Ask provider when Calcium needs checked again. Patient supplied their own medications No    Pt [address] therapy first initiated on 2-.

## 2021-01-19 NOTE — PROGRESS NOTES
[address] [facility].  [address]  [facility] [address]  Dept: [phone]  Loc: [phone]    Visit Date: 1/19/2021        HPI:     Leslie Lundberg is a 80 y.o. male who presents today for:  Chief Complaint   Patient presents with    Follow-up     Review labs, xgeva injection, Discuss xtandi. HPI   Pt seen today in follow up for prostate cancer. Mr. Milligan has a hx of PSA 1/31/17 that was 25 at which time family was notified of risk for prostate cancer but did not wish to pursue further testing.  CT of the abdomen and pelvis performed for abdominal pain 12/17/18 noted multiple skeletal sclerotic metastases with pathologic compression fractures of L1-L3, enlarged hterogeneous prostate, and pelvic adenopathy.  PSA performed 12/17/18 was 1,594.  Pt was started on Casodex 12/20/18.  Pt recovered on TCU 12/24/19-1/10/19. Evans Mckeon underwent TRUS prostate biopsy 1/24/19 by Dr. Fco Chou with findings of high grade prostatic adenocarcinoma. Pt was scheduled for review of biopsy results 2/11/19 however pt was admitted to the hospital 2/5/19-2/15/19 and treated for acute PE and DVT and pathologic T6 and T8 fractures.  He underwent T6 and T8 vertebral bone biopsy, RFA for spine metastatic lesion at the levels of T6 and T8, and vertebral augmentation (balloon kyphoplasty) for T6 and T8 on 2/13/19 by Dr. Madisyn Castaneda has completed palliative radiation therapy. Pt started monthly Firmagon and Clive Yaneth 2/26/19.  Firmagon switched to Lupron 4/30/19.  Pt had anorexia on Casodex and this was held.  Pt and daughter decided to hold casodex.  Pt is on calcium carbonate and vitamin D supplementation.  Last ADT injection with Eligard 45 mg 10/13/2020.       Pt underwent cystoscopy in the office 3/11/19 by Dr. Fco Chou with findings of a significantly obstructing prostate and mild bladder wall trabeculations and debris.      In December 2020 Yasir's PSA continued to trend upward to recent high of 3.17 with a testosterone <3. At last office visit we discussed addition of [place] or Casodex and possible side effects. Pt was started on Casodex 12/15/2020 while awaiting coverage of Xtandi. Most recent PSA has trended down to 1.36 on Casodex. Pt is here today for Xgeva injection. Pt reports he is doing well. He is accompanied by daughter to appt. Current Outpatient Medications   Medication Sig Dispense Refill    finasteride (PROSCAR) 5 MG tablet Take 1 tablet by mouth daily 90 tablet 3    hydrOXYzine (ATARAX) 10 MG tablet Take 1-2 tablets by mouth every 8 hours as needed for Itching 30 tablet 2    traMADol (ULTRAM) 50 MG tablet Take 1 tablet by mouth every 6 hours as needed for Pain for up to 30 days. 90 tablet 0    bicalutamide (CASODEX) 50 MG chemo tablet Take 1 tablet by mouth daily 30 tablet 1    carvedilol (COREG) 3.125 MG tablet TAKE 1 TABLET BY MOUTH TWICE DAILY WITH MEALS 180 tablet 3    Tiotropium Bromide-Olodaterol (STIOLTO RESPIMAT) 2.5-2.5 MCG/ACT AERS INHALE 2 PUFFS BY MOUTH ONCE DAILY 4 g 11    doxazosin (CARDURA) 4 MG tablet Take 1 tablet by mouth nightly 90 tablet 3    leuprolide (LUPRON) 7.5 MG injection Inject 7.5 mg into the muscle once Every 6 months      Cholecalciferol (VITAMIN D3) 125 MCG (5000 UT) CAPS Take 1 capsule by mouth every morning (before breakfast)      Misc.  Devices (ACAPELLA) MISC 1 Device by Does not apply route 4 times daily 1 each 0    calcium carbonate-vitamin D (CALCIUM 600 + D) 600-400 MG-UNIT TABS per tab Take 1 tablet by mouth 2 times daily 60 tablet 5    Multiple Vitamins-Minerals (THERAPEUTIC MULTIVITAMIN-MINERALS) tablet Take 1 tablet by mouth daily      tiZANidine (ZANAFLEX) 2 MG tablet Take 1 tablet by mouth every 8 hours as needed (spasm)      docusate sodium (COLACE, DULCOLAX) 100 MG CAPS Take 100 mg by mouth 2 times daily      aspirin 325 MG EC tablet Take 1 tablet by mouth daily      enzalutamide (XTANDI) 40 MG capsule Take 4 capsules by mouth daily (Patient not taking: Reported on 1/19/2021) 120 capsule 11    albuterol sulfate HFA (VENTOLIN HFA) 108 (90 Base) MCG/ACT inhaler Inhale 2 puffs into the lungs every 6 hours as needed for Wheezing      acetaminophen (TYLENOL) 500 MG tablet Take 500 mg by mouth every 8 hours as needed for Pain       Current Facility-Administered Medications   Medication Dose Route Frequency Provider Last Rate Last Admin    denosumab (XGEVA) SC injection 120 mg  120 mg Subcutaneous Once [facility] - [facility], APRN - CNP           Past Medical History  Albina Martins  has a past medical history of Blood circulation, collateral, CAD (coronary artery disease), CAD (coronary artery disease), Cancer ([address] CKD (chronic kidney disease) stage 2, GFR 60-89 ml/min, COPD (chronic obstructive pulmonary disease) ([address] Diverticulitis, DVT of lower extremity, bilateral ([address] GERD (gastroesophageal reflux disease), Hiatal hernia, Hyperlipemia, Hyperlipidemia, Other disorders of kidney and ureter in diseases classified elsewhere, Pleural plaque, Primary adenocarcinoma of prostate ([address] Pulmonary emboli ([address] and Vitamin D deficiency. Past Surgical History  The patient  has a past surgical history that includes hernia repair; Appendectomy; Cardiac catheterization; eye surgery; Cystocopy (03/17/2015); LAPAROTOMY EXPLORATORY (N/A, 12/17/2018); Abdomen surgery; and Kyphosis surgery (N/A, 2/13/2019). Family History  This patient's family history is not on file. Social History  Albina Martins  reports that he quit smoking about 54 years ago. His smoking use included cigarettes. He smoked 1.00 pack per day. He has never used smokeless tobacco. He reports current alcohol use. He reports that he does not use drugs. Subjective:      Review of Systems   Constitutional: Negative for activity change, appetite change, chills, diaphoresis, fatigue, fever and unexpected weight change. Gastrointestinal: Negative for abdominal pain. Genitourinary: Negative for decreased urine volume, difficulty urinating, dysuria, frequency, hematuria and urgency. Musculoskeletal: Negative for back pain. Objective:   Temp 97 °F (36.1 °C)   Ht 5' 8\" (1.727 m)   Wt 137 lb (62.1 kg)   BMI 20.83 kg/m²     Physical Exam  Constitutional:       General: He is not in acute distress. Appearance: Normal appearance. He is not ill-appearing or diaphoretic. HENT:      Head: Normocephalic and atraumatic. Right Ear: External ear normal.      Left Ear: External ear normal.      Nose: Nose normal.   Eyes:      General: No scleral icterus. Right eye: No discharge. Left eye: No discharge. Pulmonary:      Effort: Pulmonary effort is normal. No respiratory distress. Neurological:      Mental Status: He is alert and oriented to person, place, and time. Mental status is at baseline. Psychiatric:         Mood and Affect: Mood normal.         Behavior: Behavior normal.         Thought Content:  Thought content normal.         POC  Results for POC orders placed in visit on 01/19/21   POCT Urinalysis No Micro (Auto)   Result Value Ref Range    Glucose, Ur Negative NEGATIVE mg/dl    Bilirubin Urine Negative     Ketones, Urine Negative NEGATIVE    Specific Gravity, Urine 1.020 1.002 - 1.030    Blood, UA POC Moderate (A) NEGATIVE    pH, Urine 7.00 5.0 - 9.0    Protein, Urine Negative NEGATIVE mg/dl    Urobilinogen, Urine 0.20 0.0 - 1.0 eu/dl    Nitrite, Urine Negative NEGATIVE    Leukocyte Clumps, Urine Moderate (A) NEGATIVE    Color, Urine Yellow YELLOW-STRAW    Character, Urine Clear [id number].[id number]         Patients recent PSA values are as follows  Lab Results   Component Value Date    PSA 1.36 (H) 01/07/2021    PSA 3.17 (H) 12/08/2020    PSA 2.08 (H) 11/06/2020        Recent BUN/Creatinine:  Lab Results   Component Value Date    BUN 32 10/05/2020    CREATININE 1.3 10/05/2020         Assessment: Metastatic castration resistant prostate cancer  Obstructive prostate  Plan:     Pt to receive [place] Actis today. Continue calcium and vitamin D supplementation. Continue Cardura and finasteride. Continue Casodex. Last ADT injection with Eligard 45 mg 10/13/2020. Staff working on coverage for [facility]. Lab/ma visit in 1 month for next Xgeva injection.   PSA, testosterone, ca2+ prior to appt

## 2021-01-22 ENCOUNTER — TELEPHONE (OUTPATIENT)
Dept: UROLOGY | Age: 86
End: 2021-01-22

## 2021-01-22 NOTE — TELEPHONE ENCOUNTER
Received prior authorization form from Romelia Max for cover my meds. I logged onto Cover my meds and completed the questions needed to complete p/a form. Received confirmation on line that all information was sent to [facility] for approval.  Unable to upload labs and progress notes onto cover my meds so I faxed psa labs and progress notes to [facility] at [phone]. See attached cover my meds form and confirmation of cover sheet of labs and progress notes faxed. I called and spoke with the patient and notified him that we are still waiting to hear from [facility] about his p/a for xtandi. All forms have been completed. I asked him to make sure that he will let Satish Carranza know.

## 2021-01-28 NOTE — TELEPHONE ENCOUNTER
I called and spoke with Celine at [facility] support solutions at [phone]. She stated that the patient has been approved for xtandi in the free drug program.  She stated that xtandi has been shipped to patient. I requested that she fax an approval letter with an expiration date so that we may have it for the chart.

## 2021-01-29 ENCOUNTER — HOSPITAL ENCOUNTER (OUTPATIENT)
Age: 86
Discharge: HOME | End: 2021-01-29
Payer: MEDICARE

## 2021-01-29 ENCOUNTER — OFFICE VISIT (OUTPATIENT)
Dept: PULMONOLOGY | Age: 86
End: 2021-01-29
Payer: MEDICARE

## 2021-01-29 ENCOUNTER — HOSPITAL ENCOUNTER (OUTPATIENT)
Dept: GENERAL RADIOLOGY | Age: 86
Discharge: HOME | End: 2021-01-29
Payer: MEDICARE

## 2021-01-29 VITALS
TEMPERATURE: 97.1 F | HEART RATE: 91 BPM | DIASTOLIC BLOOD PRESSURE: 72 MMHG | HEIGHT: 67 IN | SYSTOLIC BLOOD PRESSURE: 106 MMHG | BODY MASS INDEX: 20.78 KG/M2 | OXYGEN SATURATION: 95 % | WEIGHT: 132.4 LBS

## 2021-01-29 DIAGNOSIS — R06.02 SHORTNESS OF BREATH: Primary | ICD-10-CM

## 2021-01-29 DIAGNOSIS — C79.51 PROSTATE CANCER METASTATIC TO BONE (HCC): ICD-10-CM

## 2021-01-29 DIAGNOSIS — R06.02 SHORTNESS OF BREATH: ICD-10-CM

## 2021-01-29 DIAGNOSIS — J44.9 STAGE 3 SEVERE COPD BY GOLD CLASSIFICATION (HCC): ICD-10-CM

## 2021-01-29 DIAGNOSIS — J43.2 CENTRILOBULAR EMPHYSEMA (HCC): ICD-10-CM

## 2021-01-29 DIAGNOSIS — C61 PROSTATE CANCER METASTATIC TO BONE (HCC): ICD-10-CM

## 2021-01-29 PROCEDURE — G8420 CALC BMI NORM PARAMETERS: HCPCS | Performed by: NURSE PRACTITIONER

## 2021-01-29 PROCEDURE — 99214 OFFICE O/P EST MOD 30 MIN: CPT | Performed by: NURSE PRACTITIONER

## 2021-01-29 PROCEDURE — G8482 FLU IMMUNIZE ORDER/ADMIN: HCPCS | Performed by: NURSE PRACTITIONER

## 2021-01-29 PROCEDURE — 71046 X-RAY EXAM CHEST 2 VIEWS: CPT

## 2021-01-29 PROCEDURE — 4040F PNEUMOC VAC/ADMIN/RCVD: CPT | Performed by: NURSE PRACTITIONER

## 2021-01-29 PROCEDURE — G8926 SPIRO NO PERF OR DOC: HCPCS | Performed by: NURSE PRACTITIONER

## 2021-01-29 PROCEDURE — 94618 PULMONARY STRESS TESTING: CPT | Performed by: NURSE PRACTITIONER

## 2021-01-29 PROCEDURE — 1036F TOBACCO NON-USER: CPT | Performed by: NURSE PRACTITIONER

## 2021-01-29 PROCEDURE — 3023F SPIROM DOC REV: CPT | Performed by: NURSE PRACTITIONER

## 2021-01-29 PROCEDURE — G8427 DOCREV CUR MEDS BY ELIG CLIN: HCPCS | Performed by: NURSE PRACTITIONER

## 2021-01-29 PROCEDURE — 1123F ACP DISCUSS/DSCN MKR DOCD: CPT | Performed by: NURSE PRACTITIONER

## 2021-01-29 RX ORDER — ALBUTEROL SULFATE 90 UG/1
2 AEROSOL, METERED RESPIRATORY (INHALATION) EVERY 6 HOURS PRN
Qty: 1 INHALER | Refills: 3 | Status: SHIPPED | OUTPATIENT
Start: 2021-01-29 | End: 2022-05-23 | Stop reason: SDUPTHER

## 2021-01-29 ASSESSMENT — ENCOUNTER SYMPTOMS
NAUSEA: 0
STRIDOR: 0
VOMITING: 0
CHEST TIGHTNESS: 0
DIARRHEA: 0
SHORTNESS OF BREATH: 1
EYES NEGATIVE: 1
ALLERGIC/IMMUNOLOGIC NEGATIVE: 1
WHEEZING: 0
GASTROINTESTINAL NEGATIVE: 1

## 2021-01-29 NOTE — PROGRESS NOTES
[place] [facility]    Patient: John Hobson, 80 y.o.   : 1926      Subjective     Chief Complaint   Patient presents with    Follow-up     16 month pulmonay follow up . Patient is having SOB         HPI  Jessenia Parekh is here for shortness of breath. Patients daughter called asking for patient to be evaluated. Patient has history of Severe COPD with no home oxygen use currently   No radiology exams to review today   Last PFT  Severe COPD with FEV1 49%- previously treated by PCP  Patient seen previously by Ms. Negar Vidal in our office Trelegy trial and failed- last note patient was on Stiolto and Albuterol PRN- she was seeing PRN  MBS done in  with no overt signs of aspiration. History of Prostate cancer with METS- had Palliative radiation therapy- in 2020 PSA continued to trend up to recent high of 3.17 with a testosterone of <3. PCP noted reviewed from 21 where he was seen for pulmonary congestion- no treatment ordered suggested using IS/Acapella multiple times during the day  Patient choked on pill in November and squad called but patient was not taken to hospital   SOB worse with activity  Productive cough at times ? Any aspiration      Progress History:   Since last visit any new medical issues? No  New ER or hospital visits? No  Any new or changes in medicines? No  Using inhalers? Yes Stiolto and Albuterol PRN  Are they helpful?  Yes   Flu vaccine UTD  Pneumonia vaccine UTD  Past Medical hx   PMH:  Past Medical History:   Diagnosis Date    Blood circulation, collateral     CAD (coronary artery disease)     CAD (coronary artery disease) 2014    Cancer ([address]     prostate to bone    CKD (chronic kidney disease) stage 2, GFR 60-89 ml/min 2018    COPD (chronic obstructive pulmonary disease) ([address]     Diverticulitis     DVT of lower extremity, bilateral ([address] 2019    GERD (gastroesophageal reflux disease)     Hiatal hernia     Hyperlipemia 2013    Hyperlipidemia     Other disorders of kidney and ureter in diseases classified elsewhere     Pleural plaque 2013    Primary adenocarcinoma of prostate ([facility] 2019    high grade    Pulmonary emboli ([facility] 2019    Vitamin D deficiency 2020     SURGICAL HISTORY:  Past Surgical History:   Procedure Laterality Date    ABDOMEN SURGERY      APPENDECTOMY      CARDIAC CATHETERIZATION      CYSTOSCOPY  2015    Litholapaxy-Large    EYE SURGERY      cataracts    HERNIA REPAIR      KYPHOSIS SURGERY N/A 2019    ABLATION AND KYPHOPLASTY AT T8 AND ABLATION AND KYPHOPLASTY AT T6 performed by Bryant Ayala MD at [facility] N/A 2018    EXPLORATORY LAPAROTOMY, WITH ABDOMINAL WASHOUT performed by Melanie Montanez MD at [address] [place] [address] Road:  Social History     Tobacco Use    Smoking status: Former Smoker     Packs/day: 1.00     Types: Cigarettes     Quit date: 1966     Years since quittin.5    Smokeless tobacco: Never Used   Substance Use Topics    Alcohol use: Yes     Frequency: Monthly or less     Binge frequency: Never     Comment: occasionally    Drug use: No     ALLERGIES:No Known Allergies  FAMILY HISTORY:No family history on file.   CURRENT MEDICATIONS:  Current Outpatient Medications   Medication Sig Dispense Refill    albuterol sulfate HFA (VENTOLIN HFA) 108 (90 Base) MCG/ACT inhaler Inhale 2 puffs into the lungs every 6 hours as needed for Wheezing or Shortness of Breath 1 Inhaler 3    finasteride (PROSCAR) 5 MG tablet Take 1 tablet by mouth daily 90 tablet 3    hydrOXYzine (ATARAX) 10 MG tablet Take 1-2 tablets by mouth every 8 hours as needed for Itching 30 tablet 2    bicalutamide (CASODEX) 50 MG chemo tablet Take 1 tablet by mouth daily 30 tablet 1    enzalutamide (XTANDI) 40 MG capsule Take 4 capsules by mouth daily 120 capsule 11    carvedilol (COREG) 3.125 MG tablet TAKE 1 TABLET BY MOUTH Wt 132 lb 6.4 oz (60.1 kg)   SpO2 95% Comment: on RA  BMI 20.74 kg/m²    Wt Readings from Last 3 Encounters:   01/29/21 132 lb 6.4 oz (60.1 kg)   01/19/21 137 lb (62.1 kg)   01/18/21 133 lb 6 oz (60.5 kg)       Physical Exam  Vitals signs and nursing note reviewed. Constitutional:       General: He is not in acute distress. Appearance: He is well-developed. HENT:      Head: Normocephalic and atraumatic. Neck:      Trachea: No tracheal deviation. Cardiovascular:      Rate and Rhythm: Normal rate and regular rhythm. Heart sounds: Normal heart sounds. No murmur. Pulmonary:      Effort: Pulmonary effort is normal. No respiratory distress. Breath sounds: Normal breath sounds. No stridor. No wheezing or rales. Chest:      Chest wall: No tenderness. Abdominal:      General: Bowel sounds are normal. There is no distension. Palpations: Abdomen is soft. Skin:     General: Skin is warm and dry. Capillary Refill: Capillary refill takes less than 2 seconds. Neurological:      Mental Status: He is alert and oriented to person, place, and time. Psychiatric:         Behavior: Behavior normal.         Thought Content: Thought content normal.         Judgment: Judgment normal.          results   Lung Nodule Screening     [] Qualifies    [x] Does not qualify   [] Declined    [] Completed  The USPSTF recommends annual screening for lung cancer with low-dose computed tomography (LDCT) in adults aged 54 to [de-identified] years who have a 30 pack-year smoking history and currently smoke or have quit within the past 15 years. Screening should be discontinued once a person has not smoked for 15 years or develops a health problem that substantially limits life expectancy or the ability or willingness to have curative lung surgery.      2017  PFT- consistent with Severe COPD FEV1 49% with air trapping and severely reduced DLCO- no real (+) BD repsonse                Six Minute Walk Test  Tl Coleman [facility] 1/27/1926    Six minute walk test done in my office today by my medical assistant. Yasir's oxygen saturation at rest on room air was 97%. His oxygen saturation dropped to 90% on room air with exertion after walking 540 feet and within 6 minutes. At the end of the test Mike Caldwell's oxygen saturation remained at 90% on room air with exertion. Patient ambulated a total of 540 feet with no oxygen. Resting Dyspnea/Sunil score was 0/0  and 0/0  upon completion of the walk. Resting heart rate was 79 bpm and 105 bpm upon completing the walk. Nasal Oxygen order:  0 lpm to be used with:  Rest: No.  Walking: No.   Sleep: No.   POC flow: No.  Continuous flow: No.     02/11/2019   ECHOCARDIOGRAM COMPLETE 2D W DOPPLER W COLOR. Conclusions      Summary   Technically difficult examination. Overall left ventricular function is normal.   Ejection fraction is visually estimated at 55%. The aortic valve leaflets were not well visualized. Aortic valve appears tricuspid. Aortic valve leaflets are somewhat thickened. Aortic valve leaflets are Mildly calcified. Trivial aortic regurgitation is noted. Signature      ----------------------------------------------------------------   Electronically signed by Sameer Cross MD (Interpreting   physician) on 02/11/2019 at 04:54 PM  Assessment      Diagnosis Orders   1. Shortness of breath  XR CHEST (2 VW)    6 Minute Walk Test    albuterol sulfate HFA (VENTOLIN HFA) 108 (90 Base) MCG/ACT inhaler   2. Stage 3 severe COPD by GOLD classification ([facility])  6 Minute Walk Test    albuterol sulfate HFA (VENTOLIN HFA) 108 (90 Base) MCG/ACT inhaler   3. Centrilobular emphysema ([address]     4.  Prostate cancer metastatic to bone [facility])           Plan   -CXR 2- view; will notify patient if acute findings  -Discussed use of Mucinex PRN  -6MWT today - patient needing NO O2 with rest or activity and patient declined any nocturnal testings  -RX sent for new Albuterol inhaler to pharmacy   -At the patients current age and known cancer dx with mets to bone would NOT check PFT  -Continue current inhaler regimen   -Advised to maintain pneumonia vaccine with PCP and to take flu vaccine this coming season.  -Advised patient to call office with any changes, questions, or concerns regarding respiratory status  -Office note forwarded to PCP  -Discussed speech therapy again for swallowing issues patient declines  -encouraged use of IS/Acapella    Will see Angely Freeman back in: NELLY Romero CNP  1/29/2021

## 2021-02-01 ENCOUNTER — TELEPHONE (OUTPATIENT)
Dept: PULMONOLOGY | Age: 86
End: 2021-02-01

## 2021-02-01 ENCOUNTER — TELEPHONE (OUTPATIENT)
Dept: UROLOGY | Age: 86
End: 2021-02-01

## 2021-02-01 DIAGNOSIS — R06.02 SHORTNESS OF BREATH: Primary | ICD-10-CM

## 2021-02-01 RX ORDER — BICALUTAMIDE 50 MG/1
50 TABLET, FILM COATED ORAL DAILY
Qty: 90 TABLET | Refills: 3 | Status: SHIPPED | OUTPATIENT
Start: 2021-02-01 | End: 2022-10-26 | Stop reason: ALTCHOICE

## 2021-02-01 NOTE — TELEPHONE ENCOUNTER
Discussed findings of CXR. Missy Wagner is going to discuss with patient and other family members unsure if they want to pursue Ct Chest. They will call office if they want to proceed.

## 2021-02-01 NOTE — TELEPHONE ENCOUNTER
Trying to discuss CXR findings. Recommend Ct Chest and follow up with me in 1 week. Left message will await return phone call.

## 2021-02-01 NOTE — TELEPHONE ENCOUNTER
They would prefer 90 day supply if it is going to be long termed. Please advise. Thank you. Rashard Gomez please call the company to put the xtandi on hold.

## 2021-02-02 NOTE — TELEPHONE ENCOUNTER
Attempted to call Peggy(daughter) at [phone] multiple times and line was busy. Spoke with Karl Golden at 2:45 and notified her that the [employer] was placed on hold thru [employer] support solutions. Karl Golden stated that her father received 2 shipments of Xtandi of #120 tablets. One from [facility] [employer]Xtandi support [employer] and one from [employer]. I called and spoke with Lisa Patton the pharmacist at [facility] specialty pharmacy at [phone], and notified her to place Xtandi on hold. Patient is unable to swallow tablets. Pharmacist stated that she would have to cancel rx.

## 2021-02-02 NOTE — TELEPHONE ENCOUNTER
I called and spoke with Beverley Lentz at  [facility] at [phone]. I notified her that the patient is having not able to swallow the Xtandi pill and will need to be placed on hold until further notice. She stated that she will place it on hold and will have to make a product complaint because of the adverse event. No other course of action needed unless patient would like to restart Xtandi. We would then call Xt[employer] support solutions and restart medication.

## 2021-02-03 DIAGNOSIS — J44.9 STAGE 3 SEVERE COPD BY GOLD CLASSIFICATION (HCC): Primary | ICD-10-CM

## 2021-02-03 NOTE — TELEPHONE ENCOUNTER
Patricia Trammell called in stating they do not want to do Chest CT but would like to do the overnight pulse ox. Please advise.

## 2021-02-04 ENCOUNTER — TELEPHONE (OUTPATIENT)
Dept: UROLOGY | Age: 86
End: 2021-02-04

## 2021-02-04 DIAGNOSIS — R53.83 FATIGUE, UNSPECIFIED TYPE: Primary | ICD-10-CM

## 2021-02-04 DIAGNOSIS — Z51.81 THERAPEUTIC DRUG MONITORING: ICD-10-CM

## 2021-02-04 NOTE — TELEPHONE ENCOUNTER
Patient is taking casodex 50 mg. He has been taking it for 7 weeks. Can they go down to a half a tab? It is causing a lot of fatigue and poor appetite. Please advise. Thank you. I can leave a voicemail.

## 2021-02-05 ENCOUNTER — TELEPHONE (OUTPATIENT)
Dept: FAMILY MEDICINE CLINIC | Age: 86
End: 2021-02-05

## 2021-02-05 ENCOUNTER — HOSPITAL ENCOUNTER (INPATIENT)
Age: 86
LOS: 3 days | Discharge: HOME | DRG: 177 | End: 2021-02-08
Attending: FAMILY MEDICINE | Admitting: INTERNAL MEDICINE
Payer: MEDICARE

## 2021-02-05 ENCOUNTER — APPOINTMENT (OUTPATIENT)
Dept: GENERAL RADIOLOGY | Age: 86
DRG: 177 | End: 2021-02-05
Payer: MEDICARE

## 2021-02-05 ENCOUNTER — APPOINTMENT (OUTPATIENT)
Dept: CT IMAGING | Age: 86
DRG: 177 | End: 2021-02-05
Payer: MEDICARE

## 2021-02-05 DIAGNOSIS — J18.9 PNEUMONIA DUE TO ORGANISM: Primary | ICD-10-CM

## 2021-02-05 LAB
ANION GAP SERPL CALCULATED.3IONS-SCNC: 11 MEQ/L (ref 8–16)
BASOPHILS # BLD: 0.3 %
BASOPHILS ABSOLUTE: 0 THOU/MM3 (ref 0–0.1)
BUN BLDV-MCNC: 26 MG/DL (ref 7–22)
CALCIUM SERPL-MCNC: 9.7 MG/DL (ref 8.5–10.5)
CHLORIDE BLD-SCNC: 92 MEQ/L (ref 98–111)
CO2: 29 MEQ/L (ref 23–33)
CREAT SERPL-MCNC: 1.2 MG/DL (ref 0.4–1.2)
EKG ATRIAL RATE: 77 BPM
EKG P AXIS: 77 DEGREES
EKG P-R INTERVAL: 162 MS
EKG Q-T INTERVAL: 388 MS
EKG QRS DURATION: 84 MS
EKG QTC CALCULATION (BAZETT): 439 MS
EKG R AXIS: 70 DEGREES
EKG T AXIS: 78 DEGREES
EKG VENTRICULAR RATE: 77 BPM
EOSINOPHIL # BLD: 0.1 %
EOSINOPHILS ABSOLUTE: 0 THOU/MM3 (ref 0–0.4)
ERYTHROCYTE [DISTWIDTH] IN BLOOD BY AUTOMATED COUNT: 12.2 % (ref 11.5–14.5)
ERYTHROCYTE [DISTWIDTH] IN BLOOD BY AUTOMATED COUNT: 47 FL (ref 35–45)
GFR SERPL CREATININE-BSD FRML MDRD: 56 ML/MIN/1.73M2
GLUCOSE BLD-MCNC: 120 MG/DL (ref 70–108)
HCT VFR BLD CALC: 42.8 % (ref 42–52)
HEMOGLOBIN: 14.5 GM/DL (ref 14–18)
IMMATURE GRANS (ABS): 0.03 THOU/MM3 (ref 0–0.07)
IMMATURE GRANULOCYTES: 0.3 %
LACTIC ACID, SEPSIS: 1.5 MMOL/L (ref 0.5–1.9)
LYMPHOCYTES # BLD: 3.8 %
LYMPHOCYTES ABSOLUTE: 0.4 THOU/MM3 (ref 1–4.8)
MCH RBC QN AUTO: 35.3 PG (ref 26–33)
MCHC RBC AUTO-ENTMCNC: 33.9 GM/DL (ref 32.2–35.5)
MCV RBC AUTO: 104.1 FL (ref 80–94)
MONOCYTES # BLD: 7.7 %
MONOCYTES ABSOLUTE: 0.9 THOU/MM3 (ref 0.4–1.3)
NUCLEATED RED BLOOD CELLS: 0 /100 WBC
OSMOLALITY CALCULATION: 270.5 MOSMOL/KG (ref 275–300)
PLATELET # BLD: 212 THOU/MM3 (ref 130–400)
PMV BLD AUTO: 9.4 FL (ref 9.4–12.4)
POTASSIUM REFLEX MAGNESIUM: 4.4 MEQ/L (ref 3.5–5.2)
PRO-BNP: 762 PG/ML (ref 0–1800)
PROCALCITONIN: 0.1 NG/ML (ref 0.01–0.09)
RBC # BLD: 4.11 MILL/MM3 (ref 4.7–6.1)
SARS-COV-2, NAAT: NOT DETECTED
SEG NEUTROPHILS: 87.8 %
SEGMENTED NEUTROPHILS ABSOLUTE COUNT: 10.1 THOU/MM3 (ref 1.8–7.7)
SODIUM BLD-SCNC: 132 MEQ/L (ref 135–145)
TROPONIN T: 0.01 NG/ML
WBC # BLD: 11.5 THOU/MM3 (ref 4.8–10.8)

## 2021-02-05 PROCEDURE — 6360000002 HC RX W HCPCS: Performed by: INTERNAL MEDICINE

## 2021-02-05 PROCEDURE — 99285 EMERGENCY DEPT VISIT HI MDM: CPT

## 2021-02-05 PROCEDURE — 2700000000 HC OXYGEN THERAPY PER DAY

## 2021-02-05 PROCEDURE — 83605 ASSAY OF LACTIC ACID: CPT

## 2021-02-05 PROCEDURE — 80048 BASIC METABOLIC PNL TOTAL CA: CPT

## 2021-02-05 PROCEDURE — 1200000003 HC TELEMETRY R&B

## 2021-02-05 PROCEDURE — 84145 PROCALCITONIN (PCT): CPT

## 2021-02-05 PROCEDURE — 2580000003 HC RX 258: Performed by: STUDENT IN AN ORGANIZED HEALTH CARE EDUCATION/TRAINING PROGRAM

## 2021-02-05 PROCEDURE — 71045 X-RAY EXAM CHEST 1 VIEW: CPT

## 2021-02-05 PROCEDURE — 6360000002 HC RX W HCPCS: Performed by: STUDENT IN AN ORGANIZED HEALTH CARE EDUCATION/TRAINING PROGRAM

## 2021-02-05 PROCEDURE — U0002 COVID-19 LAB TEST NON-CDC: HCPCS

## 2021-02-05 PROCEDURE — 87040 BLOOD CULTURE FOR BACTERIA: CPT

## 2021-02-05 PROCEDURE — 2580000003 HC RX 258: Performed by: INTERNAL MEDICINE

## 2021-02-05 PROCEDURE — 94761 N-INVAS EAR/PLS OXIMETRY MLT: CPT

## 2021-02-05 PROCEDURE — 93005 ELECTROCARDIOGRAM TRACING: CPT | Performed by: STUDENT IN AN ORGANIZED HEALTH CARE EDUCATION/TRAINING PROGRAM

## 2021-02-05 PROCEDURE — 83880 ASSAY OF NATRIURETIC PEPTIDE: CPT

## 2021-02-05 PROCEDURE — 84484 ASSAY OF TROPONIN QUANT: CPT

## 2021-02-05 PROCEDURE — 6370000000 HC RX 637 (ALT 250 FOR IP): Performed by: INTERNAL MEDICINE

## 2021-02-05 PROCEDURE — 96374 THER/PROPH/DIAG INJ IV PUSH: CPT

## 2021-02-05 PROCEDURE — 99223 1ST HOSP IP/OBS HIGH 75: CPT | Performed by: INTERNAL MEDICINE

## 2021-02-05 PROCEDURE — 36415 COLL VENOUS BLD VENIPUNCTURE: CPT

## 2021-02-05 PROCEDURE — 6360000004 HC RX CONTRAST MEDICATION: Performed by: STUDENT IN AN ORGANIZED HEALTH CARE EDUCATION/TRAINING PROGRAM

## 2021-02-05 PROCEDURE — 85025 COMPLETE CBC W/AUTO DIFF WBC: CPT

## 2021-02-05 PROCEDURE — 6370000000 HC RX 637 (ALT 250 FOR IP): Performed by: STUDENT IN AN ORGANIZED HEALTH CARE EDUCATION/TRAINING PROGRAM

## 2021-02-05 PROCEDURE — 71275 CT ANGIOGRAPHY CHEST: CPT

## 2021-02-05 PROCEDURE — 94640 AIRWAY INHALATION TREATMENT: CPT

## 2021-02-05 RX ORDER — CARVEDILOL 3.12 MG/1
3.12 TABLET ORAL 2 TIMES DAILY WITH MEALS
Status: DISCONTINUED | OUTPATIENT
Start: 2021-02-05 | End: 2021-02-08 | Stop reason: HOSPADM

## 2021-02-05 RX ORDER — SODIUM CHLORIDE 9 MG/ML
INJECTION, SOLUTION INTRAVENOUS CONTINUOUS
Status: ACTIVE | OUTPATIENT
Start: 2021-02-05 | End: 2021-02-06

## 2021-02-05 RX ORDER — OYSTER SHELL CALCIUM WITH VITAMIN D 500; 200 MG/1; [IU]/1
1 TABLET, FILM COATED ORAL 2 TIMES DAILY
Status: DISCONTINUED | OUTPATIENT
Start: 2021-02-06 | End: 2021-02-08 | Stop reason: HOSPADM

## 2021-02-05 RX ORDER — TIZANIDINE 4 MG/1
2 TABLET ORAL EVERY 8 HOURS PRN
Status: DISCONTINUED | OUTPATIENT
Start: 2021-02-05 | End: 2021-02-08 | Stop reason: HOSPADM

## 2021-02-05 RX ORDER — DOCUSATE SODIUM 100 MG/1
100 CAPSULE, LIQUID FILLED ORAL 2 TIMES DAILY
Status: DISCONTINUED | OUTPATIENT
Start: 2021-02-05 | End: 2021-02-08 | Stop reason: HOSPADM

## 2021-02-05 RX ORDER — DOXAZOSIN MESYLATE 4 MG/1
4 TABLET ORAL NIGHTLY
Status: DISCONTINUED | OUTPATIENT
Start: 2021-02-05 | End: 2021-02-08 | Stop reason: HOSPADM

## 2021-02-05 RX ORDER — FINASTERIDE 5 MG/1
5 TABLET, FILM COATED ORAL DAILY
Status: DISCONTINUED | OUTPATIENT
Start: 2021-02-06 | End: 2021-02-08 | Stop reason: HOSPADM

## 2021-02-05 RX ORDER — POLYETHYLENE GLYCOL 3350 17 G/17G
17 POWDER, FOR SOLUTION ORAL DAILY PRN
Status: DISCONTINUED | OUTPATIENT
Start: 2021-02-05 | End: 2021-02-08 | Stop reason: HOSPADM

## 2021-02-05 RX ORDER — SODIUM CHLORIDE 0.9 % (FLUSH) 0.9 %
10 SYRINGE (ML) INJECTION PRN
Status: DISCONTINUED | OUTPATIENT
Start: 2021-02-05 | End: 2021-02-08 | Stop reason: HOSPADM

## 2021-02-05 RX ORDER — SODIUM CHLORIDE 0.9 % (FLUSH) 0.9 %
10 SYRINGE (ML) INJECTION EVERY 12 HOURS SCHEDULED
Status: DISCONTINUED | OUTPATIENT
Start: 2021-02-05 | End: 2021-02-08 | Stop reason: HOSPADM

## 2021-02-05 RX ORDER — SODIUM CHLORIDE 9 MG/ML
1000 INJECTION, SOLUTION INTRAVENOUS CONTINUOUS
Status: DISCONTINUED | OUTPATIENT
Start: 2021-02-05 | End: 2021-02-05

## 2021-02-05 RX ORDER — POLYETHYLENE GLYCOL 3350 17 G/17G
17 POWDER, FOR SOLUTION ORAL ONCE
COMMUNITY

## 2021-02-05 RX ORDER — ALBUTEROL SULFATE 2.5 MG/3ML
2.5 SOLUTION RESPIRATORY (INHALATION)
Status: DISCONTINUED | OUTPATIENT
Start: 2021-02-06 | End: 2021-02-08 | Stop reason: HOSPADM

## 2021-02-05 RX ORDER — TRAMADOL HYDROCHLORIDE 50 MG/1
50 TABLET ORAL EVERY 6 HOURS PRN
Status: DISCONTINUED | OUTPATIENT
Start: 2021-02-05 | End: 2021-02-08 | Stop reason: HOSPADM

## 2021-02-05 RX ORDER — PROMETHAZINE HYDROCHLORIDE 25 MG/1
12.5 TABLET ORAL EVERY 6 HOURS PRN
Status: DISCONTINUED | OUTPATIENT
Start: 2021-02-05 | End: 2021-02-08 | Stop reason: HOSPADM

## 2021-02-05 RX ORDER — BICALUTAMIDE 50 MG/1
50 TABLET, FILM COATED ORAL DAILY
Status: DISCONTINUED | OUTPATIENT
Start: 2021-02-05 | End: 2021-02-08 | Stop reason: HOSPADM

## 2021-02-05 RX ORDER — ACETAMINOPHEN 650 MG/1
650 SUPPOSITORY RECTAL EVERY 6 HOURS PRN
Status: DISCONTINUED | OUTPATIENT
Start: 2021-02-05 | End: 2021-02-08 | Stop reason: HOSPADM

## 2021-02-05 RX ORDER — ONDANSETRON 2 MG/ML
4 INJECTION INTRAMUSCULAR; INTRAVENOUS EVERY 6 HOURS PRN
Status: DISCONTINUED | OUTPATIENT
Start: 2021-02-05 | End: 2021-02-08 | Stop reason: HOSPADM

## 2021-02-05 RX ORDER — IPRATROPIUM BROMIDE AND ALBUTEROL SULFATE 2.5; .5 MG/3ML; MG/3ML
1 SOLUTION RESPIRATORY (INHALATION)
Status: DISCONTINUED | OUTPATIENT
Start: 2021-02-05 | End: 2021-02-05

## 2021-02-05 RX ORDER — IPRATROPIUM BROMIDE AND ALBUTEROL SULFATE 2.5; .5 MG/3ML; MG/3ML
1 SOLUTION RESPIRATORY (INHALATION) ONCE
Status: COMPLETED | OUTPATIENT
Start: 2021-02-05 | End: 2021-02-05

## 2021-02-05 RX ORDER — TRAMADOL HYDROCHLORIDE 50 MG/1
50 TABLET ORAL EVERY 6 HOURS PRN
COMMUNITY
End: 2021-02-22 | Stop reason: SDUPTHER

## 2021-02-05 RX ORDER — ACETAMINOPHEN 325 MG/1
650 TABLET ORAL EVERY 6 HOURS PRN
Status: DISCONTINUED | OUTPATIENT
Start: 2021-02-05 | End: 2021-02-08 | Stop reason: HOSPADM

## 2021-02-05 RX ADMIN — ENOXAPARIN SODIUM 40 MG: 40 INJECTION SUBCUTANEOUS at 20:37

## 2021-02-05 RX ADMIN — PIPERACILLIN AND TAZOBACTAM 3375 MG: 3; .375 INJECTION, POWDER, LYOPHILIZED, FOR SOLUTION INTRAVENOUS at 19:45

## 2021-02-05 RX ADMIN — DOCUSATE SODIUM 100 MG: 100 CAPSULE, LIQUID FILLED ORAL at 20:36

## 2021-02-05 RX ADMIN — CARVEDILOL 3.12 MG: 3.12 TABLET, FILM COATED ORAL at 20:36

## 2021-02-05 RX ADMIN — IPRATROPIUM BROMIDE AND ALBUTEROL SULFATE 1 AMPULE: .5; 3 SOLUTION RESPIRATORY (INHALATION) at 14:53

## 2021-02-05 RX ADMIN — GLYCOPYRROLATE AND FORMOTEROL FUMARATE 2 PUFF: 9; 4.8 AEROSOL, METERED RESPIRATORY (INHALATION) at 21:46

## 2021-02-05 RX ADMIN — CEFTRIAXONE SODIUM 1000 MG: 1 INJECTION, POWDER, FOR SOLUTION INTRAMUSCULAR; INTRAVENOUS at 15:49

## 2021-02-05 RX ADMIN — IPRATROPIUM BROMIDE AND ALBUTEROL SULFATE 1 AMPULE: .5; 3 SOLUTION RESPIRATORY (INHALATION) at 19:57

## 2021-02-05 RX ADMIN — SODIUM CHLORIDE 1000 ML: 9 INJECTION, SOLUTION INTRAVENOUS at 17:19

## 2021-02-05 RX ADMIN — TRAMADOL HYDROCHLORIDE 50 MG: 50 TABLET, FILM COATED ORAL at 23:02

## 2021-02-05 RX ADMIN — AZITHROMYCIN DIHYDRATE 500 MG: 500 INJECTION, POWDER, LYOPHILIZED, FOR SOLUTION INTRAVENOUS at 16:22

## 2021-02-05 RX ADMIN — DOXAZOSIN 4 MG: 4 TABLET ORAL at 20:36

## 2021-02-05 RX ADMIN — IOPAMIDOL 80 ML: 755 INJECTION, SOLUTION INTRAVENOUS at 17:29

## 2021-02-05 ASSESSMENT — ENCOUNTER SYMPTOMS
NAUSEA: 0
SHORTNESS OF BREATH: 1
COUGH: 1
DIARRHEA: 0
ABDOMINAL PAIN: 0
BACK PAIN: 1
CONSTIPATION: 0
SINUS PAIN: 0
VOMITING: 0
EYE PAIN: 0

## 2021-02-05 ASSESSMENT — PAIN DESCRIPTION - PAIN TYPE: TYPE: CHRONIC PAIN

## 2021-02-05 ASSESSMENT — PAIN SCALES - GENERAL
PAINLEVEL_OUTOF10: 7
PAINLEVEL_OUTOF10: 0

## 2021-02-05 ASSESSMENT — PAIN DESCRIPTION - LOCATION: LOCATION: BACK

## 2021-02-05 ASSESSMENT — PAIN - FUNCTIONAL ASSESSMENT: PAIN_FUNCTIONAL_ASSESSMENT: ACTIVITIES ARE NOT PREVENTED

## 2021-02-05 ASSESSMENT — PAIN DESCRIPTION - FREQUENCY: FREQUENCY: INTERMITTENT

## 2021-02-05 NOTE — H&P
History & Physical        Patient:  Alison Vasques  YOB: 1926    MRN: [id number]     Acct: [de-identified]    PCP: Ari Grayson MD    Date of Admission: 2/5/2021    Date of Service: Pt seen/examined on 2/5/2021   and Admitted to Inpatient with expected LOS greater than two midnights due to medical therapy. Chief Complaint:  Fatigue, weakness, poor PO intake, cough, sob       History Of Present Illness:      80 y.o. male who presented to [address] [place] [address] with 10 day history of increased fatigue, weakness, productive cough, sob, and poor PO intake. 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 Pt follows up with Urology for his malignancy, is currently on Casodex chemotherapy pills that were started 12/16/2020. Daughter at bedside as well. Pt lives alone at home, has a lot of help with his children who come over everyday. Pt does have difficulty swallowing pills and food at times, choking however clearing it up. Pt has been having poor PO intake over the past 4 days as well. No recent history of travel or being around anyone with COVID-19. Pt denies any fevers, chills, diarrhea, chest pain, abdominal pain, burning with urination, or focal neurological deficits. In the ED, Pulse Ox 82% on RA, had to be placed on 4L NC. Na 132, Cl 92, BUN 26 and Cr 1.2. WBC 11.5. Procal 0.10. Troponin 0.014. Negative COVID-19 test     CXR new right lower lobe infiltrates and possible developing infiltrates left upper lobe.       Past Medical History:          Diagnosis Date    Blood circulation, collateral     CAD (coronary artery disease)     CAD (coronary artery disease) 9/2/2014    Cancer ([facility] [address]     prostate to bone    CKD (chronic kidney disease) stage 2, GFR 60-89 ml/min 12/24/2018    COPD (chronic obstructive pulmonary disease) ([facility] [address] [facility]     Diverticulitis     DVT of lower extremity, bilateral ([facility] [address] 02/2019  GERD (gastroesophageal reflux disease)     Hiatal hernia     Hyperlipemia 8/12/2013    Hyperlipidemia     Other disorders of kidney and ureter in diseases classified elsewhere     Pleural plaque 8/12/2013    Primary adenocarcinoma of prostate ([facility] [address] 01/2019    high grade    Pulmonary emboli ([facility] [address] 02/2019    Vitamin D deficiency 05/2020       Past Surgical History:          Procedure Laterality Date    ABDOMEN SURGERY      APPENDECTOMY      CARDIAC CATHETERIZATION      CYSTOSCOPY  03/17/2015    Litholapaxy-Large    EYE SURGERY      cataracts    HERNIA REPAIR      KYPHOSIS SURGERY N/A 2/13/2019    ABLATION AND KYPHOPLASTY AT T8 AND ABLATION AND KYPHOPLASTY AT T6 performed by Gudelia Middleton MD at [facility] N/A 12/17/2018    EXPLORATORY LAPAROTOMY, WITH [facility]BDOMINAL WASHOUT [facility]       Medications Prior to Admission:      Prior to Admission medications    Medication Sig Start Date End Date Taking? Authorizing Provider   traMADol (ULTRAM) 50 MG tablet Take 50 mg by mouth every 6 hours as needed for Pain.    Yes Historical Provider, MD   bicalutamide (CASODEX) 50 MG chemo tablet Take 1 tablet by mouth daily 2/1/21 2/1/22  JAILYN Reza CNP   albuterol sulfate HFA (VENTOLIN HFA) 108 (90 Base) MCG/ACT inhaler Inhale 2 puffs into the lungs every 6 hours as needed for Wheezing or Shortness of Breath 1/29/21   JAILYN Linn CNP   finasteride (PROSCAR) 5 MG tablet Take 1 tablet by mouth daily 1/19/21   JAILYN Reza CNP   hydrOXYzine (ATARAX) 10 MG tablet Take 1-2 tablets by mouth every 8 hours as needed for Itching 1/18/21   Bud Kamara MD   carvedilol (COREG) 3.125 MG tablet TAKE 1 TABLET BY MOUTH TWICE DAILY WITH MEALS 11/30/20   Bud Kamara MD   Tiotropium Bromide-Olodaterol (STIOLTO RESPIMAT) 2.5-2.5 MCG/ACT AERS INHALE 2 PUFFS BY MOUTH ONCE DAILY 11/23/20   Bud Kamara MD  Alcohol use: Yes     Frequency: Monthly or less     Binge frequency: Never     Comment: occasionally    Drug use: No    Sexual activity: Not Currently   Lifestyle    Physical activity     Days per week: 0 days     Minutes per session: 0 min    Stress: Not at all   Relationships    Social connections     Talks on phone: More than three times a week     Gets together: More than three times a week     Attends [employer] service: Not on file     Active member of club or organization: No     Attends meetings of clubs or organizations: Never     Relationship status:     Intimate partner violence     Fear of current or ex partner: Not on file     Emotionally abused: Not on file     Physically abused: Not on file     Forced sexual activity: Not on file   Other Topics Concern    Not on file   Social History Narrative    - Former    Lives alone but has lots of family support that goes in and helps him    Son will spend the night most nights    Family assists with transportation    No social barriers noted    No barriers with medication affordability       Family History:       Reviewed in detail and negative for DM, CAD, Cancer, CVA. Positive as follows:    No family history on file. Diet:  No diet orders on file    REVIEW OF SYSTEMS:   Pertinent positives as noted in the HPI. All other systems reviewed and negative. PHYSICAL EXAM:    /72   Pulse 79   Temp 98.4 °F (36.9 °C) (Oral)   Resp 16   Wt 134 lb (60.8 kg)   SpO2 96%   BMI 20.99 kg/m²     General appearance:  Chronically ill appearing, pleasant elderly male   HEENT:  Normal cephalic, atraumatic without obvious deformity. Pupils equal, round, and reactive to light. Extra ocular muscles intact. Conjunctivae/corneas clear. Neck: Supple, with full range of motion. No jugular venous distention. Trachea midline. Respiratory:  Normal respiratory effort. Clear to auscultation, bilaterally without Rales/Wheezes/Rhonchi. Cardiovascular:  Regular rate and rhythm with normal S1/S2 without murmurs, rubs or gallops. Abdomen: Soft, non-tender, non-distended with normal bowel sounds. Musculoskeletal:  No clubbing, cyanosis or edema bilaterally. Full range of motion without deformity. Skin: Skin color, texture, turgor normal.  No rashes or lesions. Neurologic:  Neurovascularly intact without any focal sensory/motor deficits. Cranial nerves: II-XII intact, grossly non-focal.  Psychiatric:  Alert and oriented, thought content appropriate, normal insight  Capillary Refill: Brisk,< 3 seconds   Peripheral Pulses: +2 palpable, equal bilaterally       Labs:     Recent Labs     02/05/21  1429   WBC 11.5*   HGB 14.5   HCT 42.8        Recent Labs     02/05/21  1429   *   K 4.4   CL 92*   CO2 29   BUN 26*   CREATININE 1.2   CALCIUM 9.7     No results for input(s): AST, ALT, BILIDIR, BILITOT, ALKPHOS in the last 72 hours. No results for input(s): INR in the last 72 hours. No results for input(s): Towana Gildardo in the last 72 hours. Urinalysis:      Lab Results   Component Value Date    NITRU Negative 01/19/2021    WBCUA 0-2 02/05/2019    BACTERIA MODERATE 02/05/2019    RBCUA 3-5 02/05/2019    BLOODU Moderate 01/19/2021    BLOODU SMALL 02/05/2019    SPECGRAV 1.025 08/14/2020    SPECGRAV <1.005 01/05/2019    GLUCOSEU Negative 01/19/2021       Radiology:     CXR: I have reviewed the CXR with the following interpretation: see below     XR CHEST PORTABLE   Final Result   New right lower lobe infiltrates and possible developing infiltrates left upper lobe            **This report has been created using voice recognition software. It may contain minor errors which are inherent in voice recognition technology. **      Final report electronically signed by Dr. Afsaneh Drummond on 2/5/2021 3:07 PM      CTA CHEST W WO CONTRAST    (Results Pending)         ASSESSMENT:    Active Hospital Problems    Diagnosis Date Noted  Acute respiratory failure with hypoxia (HCC) [J96.01]     Prostate cancer metastatic to bone (HCC) [C61, C79.51]     Stage 3 severe COPD by GOLD classification ([facility] [address] [J44.9]        Assessment/Plan:    1. Acute Hypoxic Resp. Failure: has known severe COPD however no wheezing on exam. Pulse Ox 82% on RA, had to be placed on 4L NC (baseline is RA). WBC 11.5. Procal 0.10. Negative COVID-19 test. CXR new right lower lobe infiltrates and possible developing infiltrates left upper lobe. Pt has been coughing with pill intake. Aspiration Bacterial PNA? History of DVT (not on [facility] [address]) and extensive diffuse malignancy with mets, PE? Pending CTA Chest. IV Zosyn. Pending Flu. Consult Pulmonary Medicine (follows up with Dr. Pio Lindo). 2. 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 Poor overall prognosis. 3. Severe COPD: resume home inhalers    4. Hx DVT: not on [address], only on  daily. 5. Essential HTN: resume home BP medications     6. Code Status: pt is Limited x 4. Thank you Jaylan Garcia MD for the opportunity to be involved in this patient's care.     Electronically signed by Roberto Tian MD on 2/5/2021 at 5:15 PM

## 2021-02-05 NOTE — TELEPHONE ENCOUNTER
Please have him check CMP now to assure no changes in liver, kidney function, lytes. It is not advisable to take a 1/2 dose. If symptoms are severe hold the Casodex.

## 2021-02-05 NOTE — ED PROVIDER NOTES
[place] ENCOUNTER          Pt Name: Tessa Figueroa  MRN: [id number]  [place] 1/27/1926  Date of evaluation: 2/5/2021  Treating Resident Physician: Best Vizcarra MD  Supervising Physician: Dr. Julio Richards MD    [address] [place] [address]       Chief Complaint   Patient presents with    Shortness of Breath    Fatigue     History obtained from the patient and daughter at bedside. HISTORY OF PRESENT ILLNESS    HPI  Tessa Figueroa is a 80 y.o. male who presents to the emergency department for evaluation of this of breath and productive cough. Patient states for approximately 10 days patient has had worsening shortness of breath at rest and exertion in addition to a productive cough. Patient has a history of COPD but his symptoms are worse than his usual baseline. Denies any chest pain. Patient denies any headache fever chills. Patient denies any abdominal pain nausea vomiting diarrhea constipation leg swelling. Patient denies any radiation or alleviating or exacerbating factors. Daughter at bedside states that he has had aspiration issues in the past and he has difficulty swallowing some of his pills due to the large size. Patient has a history of prostate cancer with metastasis, COPD, and previous DVT. Patient is on aspirin 325 and is compliant. Patient does not use supplemental oxygen at home. Patient denies any new aches fever chills chest pain abdominal pain nausea vomiting diarrhea constipation leg swelling. The patient has no other acute complaints at this time. REVIEW OF SYSTEMS   Review of Systems   Constitutional: Positive for appetite change. Negative for chills, fatigue and fever. HENT: Negative for ear pain and sinus pain. Eyes: Negative for pain. Respiratory: Positive for cough and shortness of breath. Cardiovascular: Negative for chest pain. Gastrointestinal: Negative for abdominal pain, constipation, diarrhea, nausea and vomiting. Genitourinary: Positive for difficulty urinating. Negative for dysuria. Musculoskeletal: Positive for back pain. Negative for neck pain. Skin: Negative for wound. Neurological: Negative for headaches. Psychiatric/Behavioral: Negative for confusion.          PAST MEDICAL AND SURGICAL HISTORY     Past Medical History:   Diagnosis Date    Blood circulation, collateral     CAD (coronary artery disease)     CAD (coronary artery disease) 9/2/2014    Cancer ([facility] [address]     prostate to bone    CKD (chronic kidney disease) stage 2, GFR 60-89 ml/min 12/24/2018    COPD (chronic obstructive pulmonary disease) (HCC)     Diverticulitis     DVT of lower extremity, bilateral ([address] 02/2019    GERD (gastroesophageal reflux disease)     Hiatal hernia     Hyperlipemia 8/12/2013    Hyperlipidemia     Other disorders of kidney and ureter in diseases classified elsewhere     Pleural plaque 8/12/2013    Primary adenocarcinoma of prostate ([facility] [address] 01/2019    high grade    Pulmonary emboli ([facility] [address] 02/2019    Vitamin D deficiency 05/2020     Past Surgical History:   Procedure Laterality Date    ABDOMEN SURGERY      APPENDECTOMY      CARDIAC CATHETERIZATION      CYSTOSCOPY  03/17/2015    Litholapaxy-Large    EYE SURGERY      cataracts    HERNIA REPAIR      KYPHOSIS SURGERY N/A 2/13/2019    ABLATION AND KYPHOPLASTY AT T8 AND ABLATION AND KYPHOPLASTY AT T6 performed by Jacinta Domínguez MD at [facility] N/A 12/17/2018    EXPLORATORY LAPAROTOMY, WITH ABDOMINAL WASHOUT [facility]     Current Facility-Administered Medications:     [START ON 2/6/2021] aspirin EC tablet 325 mg, 325 mg, Oral, Daily, Renata Nam MD    bicalutamide (CASODEX) chemo tablet 50 mg, 50 mg, Oral, Daily, Renata Nam MD Family assists with transportation    No social barriers noted    No barriers with medication affordability     Social History     Tobacco Use    Smoking status: Former Smoker     Packs/day: 1.00     Types: Cigarettes     Quit date: 1966     Years since quittin.5    Smokeless tobacco: Never Used   Substance Use Topics    Alcohol use: Yes     Frequency: Monthly or less     Binge frequency: Never     Comment: occasionally    Drug use: No         ALLERGIES   No Known Allergies      FAMILY HISTORY   No family history on file. PREVIOUS RECORDS   Previous records reviewed: Patient was seen last on 2021 for pulmonology office visit. PHYSICAL EXAM     ED Triage Vitals   BP Temp Temp src Pulse Resp SpO2 Height Weight   -- -- -- -- -- -- -- --     Initial vital signs and nursing assessment reviewed and vitals are/show: normal. Pulsoximetry is abnormal per my interpretation. Additional Vital Signs:  Vitals:    21 1828   BP: 123/75   Pulse: 77   Resp: 16   Temp:    SpO2: 95%       Physical Exam  Constitutional:       General: He is not in acute distress. Appearance: Normal appearance. He is normal weight. He is not ill-appearing, toxic-appearing or diaphoretic. HENT:      Head: Normocephalic and atraumatic. Right Ear: External ear normal.      Left Ear: External ear normal.   Eyes:      General: No scleral icterus. Right eye: No discharge. Left eye: No discharge. Neck:      Musculoskeletal: Normal range of motion and neck supple. Cardiovascular:      Rate and Rhythm: Normal rate and regular rhythm. Pulses: Normal pulses. Heart sounds: Normal heart sounds. No murmur. No friction rub. No gallop. Pulmonary:      Effort: Pulmonary effort is normal. No respiratory distress. Breath sounds: Examination of the right-upper field reveals rhonchi. Examination of the left-upper field reveals rhonchi. Examination of the right-middle field reveals rhonchi. Examination of the left-middle field reveals rhonchi. Examination of the right-lower field reveals rhonchi. Examination of the left-lower field reveals rhonchi. Rhonchi present. No wheezing or rales. Chest:      Chest wall: No tenderness. Abdominal:      General: Abdomen is flat. There is no distension. Palpations: Abdomen is soft. Tenderness: There is no abdominal tenderness. There is no guarding or rebound. Musculoskeletal: Normal range of motion. Right lower leg: No edema. Left lower leg: No edema. Skin:     General: Skin is warm and dry. Findings: No rash. Neurological:      Mental Status: He is alert and oriented to person, place, and time. Mental status is at baseline. Psychiatric:         Mood and Affect: Mood normal.         Behavior: Behavior normal.         Thought Content: Thought content normal.         Judgment: Judgment normal.             MEDICAL DECISION MAKING   Initial Assessment:   COPD versus pneumonia versus CHF versus bronchitis versus viral illness versus Covid      Plan:     Labs. Imaging: CXR  EKG  DuoNeb treatment  COVID-19 swab      Imaging shows patient has probable pneumonia. Antibiotics ordered. IV maintenance fluids ordered. Patient tested negative for COVID-19. Patient has an elevation of troponin at 0.014, patient denies any chest pain, likely due to dehydration or demand ischemia, patient takes daily 325 aspirin. Patient admitted for pneumonia.           ED RESULTS   Laboratory results:  Labs Reviewed   CBC WITH AUTO DIFFERENTIAL - Abnormal; Notable for the following components:       Result Value    WBC 11.5 (*)     RBC 4.11 (*)     .1 (*)     MCH 35.3 (*)     RDW-SD 47.0 (*)     Segs Absolute 10.1 (*)     Lymphocytes Absolute 0.4 (*) All other components within normal limits   BASIC METABOLIC PANEL W/ REFLEX TO MG FOR LOW K - Abnormal; Notable for the following components:    Sodium 132 (*)     Chloride 92 (*)     Glucose 120 (*)     BUN 26 (*)     All other components within normal limits   TROPONIN - Abnormal; Notable for the following components:    Troponin T 0.014 (*)     All other components within normal limits   PROCALCITONIN - Abnormal; Notable for the following components:    Procalcitonin 0.10 (*)     All other components within normal limits   GLOMERULAR FILTRATION RATE, ESTIMATED - Abnormal; Notable for the following components:    Est, Glom Filt Rate 56 (*)     All other components within normal limits   OSMOLALITY - Abnormal; Notable for the following components:    Osmolality Calc 270.5 (*)     All other components within normal limits   CULTURE, BLOOD 1   CULTURE, BLOOD 2   RAPID INFLUENZA A/B ANTIGENS   BRAIN NATRIURETIC PEPTIDE   COVID-19   LACTATE, SEPSIS   ANION GAP   BASIC METABOLIC PANEL   CBC WITH AUTO DIFFERENTIAL   MAGNESIUM       Radiologic studies results:  CTA CHEST W WO CONTRAST   Final Result   Bilateral lower lobe consolidation. Atypical or viral type pneumonia cannot be excluded. Correlate for for infiltrate. Extensive emphysema. Extensive known bony metastatic disease secondary to prostate cancer. Small amount of debris, secretions or mucous plugging within the lower lobe bronchi. These may be contributing to patient's symptoms. **This report has been created using voice recognition software. It may contain minor errors which are inherent in voice recognition technology. **      Final report electronically signed by Dr. Merlin Aures on 2/5/2021 5:58 PM      XR CHEST PORTABLE   Final Result   New right lower lobe infiltrates and possible developing infiltrates left upper lobe **This report has been created using voice recognition software. It may contain minor errors which are inherent in voice recognition technology. **      Final report electronically signed by Dr. Ronnie New on 2/5/2021 3:07 PM          ED Medications administered this visit:   Medications   aspirin EC tablet 325 mg (has no administration in time range)   bicalutamide (CASODEX) chemo tablet 50 mg (has no administration in time range)   calcium-vitamin D (OSCAL-500) 500-200 MG-UNIT per tablet 1 tablet (has no administration in time range)   carvedilol (COREG) tablet 3.125 mg (has no administration in time range)   docusate sodium (COLACE) capsule 100 mg (has no administration in time range)   doxazosin (CARDURA) tablet 4 mg (has no administration in time range)   finasteride (PROSCAR) tablet 5 mg (has no administration in time range)   tiZANidine (ZANAFLEX) tablet 2 mg (has no administration in time range)   traMADol (ULTRAM) tablet 50 mg (has no administration in time range)   piperacillin-tazobactam (ZOSYN) 3,375 mg in dextrose 5 % 50 mL IVPB extended infusion (mini-bag) (has no administration in time range)   sodium chloride flush 0.9 % injection 10 mL (has no administration in time range)   sodium chloride flush 0.9 % injection 10 mL (has no administration in time range)   enoxaparin (LOVENOX) injection 40 mg (has no administration in time range)   promethazine (PHENERGAN) tablet 12.5 mg (has no administration in time range)     Or   ondansetron (ZOFRAN) injection 4 mg (has no administration in time range)   polyethylene glycol (GLYCOLAX) packet 17 g (has no administration in time range)   acetaminophen (TYLENOL) tablet 650 mg (has no administration in time range)     Or   acetaminophen (TYLENOL) suppository 650 mg (has no administration in time range)   0.9 % sodium chloride infusion (has no administration in time range) ipratropium-albuterol (DUONEB) nebulizer solution 1 ampule (has no administration in time range)   ipratropium-albuterol (DUONEB) nebulizer solution 1 ampule (1 ampule Inhalation Given 2/5/21 1453)   cefTRIAXone (ROCEPHIN) 1000 mg IVPB in 50 mL D5W minibag (0 mg Intravenous Stopped 2/5/21 1622)   azithromycin (ZITHROMAX) 500 mg in D5W 250ml addavial (0 mg Intravenous Stopped 2/5/21 1834)   iopamidol (ISOVUE-370) 76 % injection 80 mL (80 mLs Intravenous Given 2/5/21 1729)     EKG Interpretation:   No STEMI  Grossly normal  Ventricular Rate: 77 bpm  AR Interval: 162 ms   QRS Duration: 84 ms   QT Interval: 388 ms   QTc: 439 ms    ED COURSE              MEDICATION CHANGES     New Prescriptions    No medications on file         FINAL DISPOSITION     Final diagnoses:   Pneumonia due to organism     Condition: Stable  Dispo: Admit to med/surg floor      This transcription was electronically signed. Parts of this transcriptions may have been dictated by use of voice recognition software and electronically transcribed, and parts may have been transcribed with the assistance of an ED scribe. The transcription may contain errors not detected in proofreading. Please refer to my supervising physician's documentation if my documentation differs.     Electronically Signed: Jacques Fernandez, 02/05/21, 7:41 PM       Jacques Fernandez MD  Resident  02/05/21 7611

## 2021-02-05 NOTE — TELEPHONE ENCOUNTER
Pt's daughter called in asking for your opinion - pt is not running a fever, slight headache this morning, when sitting is okay. Said that he has zero energy. She is wondering if the no energy is from him not getting enough oxygen? Urine is dark. Pulse Ox is running mid 80's to 93. She said that is just sitting. He does not want to go to the ER if at all possible. I told her that he needs to go to ER. She agreed.

## 2021-02-05 NOTE — ED NOTES
In for hourly rounding. Pt resting on cot in position of comfort. Pt remains A&Ox4, resps easy and unlabored. Pt continues on 4LPM via nc. IV continues to infuse and shows no s/s of infection or infiltration. Pt continues to deny pain at this time. Monitor remains in place. Updated pt on POC. Will monitor.      Harley Arias RN  02/05/21 1998

## 2021-02-06 LAB
ANION GAP SERPL CALCULATED.3IONS-SCNC: 10 MEQ/L (ref 8–16)
BASOPHILS # BLD: 0.1 %
BASOPHILS ABSOLUTE: 0 THOU/MM3 (ref 0–0.1)
BUN BLDV-MCNC: 18 MG/DL (ref 7–22)
CALCIUM SERPL-MCNC: 8.5 MG/DL (ref 8.5–10.5)
CHLORIDE BLD-SCNC: 98 MEQ/L (ref 98–111)
CO2: 29 MEQ/L (ref 23–33)
CREAT SERPL-MCNC: 1.1 MG/DL (ref 0.4–1.2)
EOSINOPHIL # BLD: 1.1 %
EOSINOPHILS ABSOLUTE: 0.1 THOU/MM3 (ref 0–0.4)
ERYTHROCYTE [DISTWIDTH] IN BLOOD BY AUTOMATED COUNT: 12.3 % (ref 11.5–14.5)
ERYTHROCYTE [DISTWIDTH] IN BLOOD BY AUTOMATED COUNT: 47.8 FL (ref 35–45)
GFR SERPL CREATININE-BSD FRML MDRD: 62 ML/MIN/1.73M2
GLUCOSE BLD-MCNC: 96 MG/DL (ref 70–108)
HCT VFR BLD CALC: 34.7 % (ref 42–52)
HEMOGLOBIN: 11.5 GM/DL (ref 14–18)
IMMATURE GRANS (ABS): 0.05 THOU/MM3 (ref 0–0.07)
IMMATURE GRANULOCYTES: 0.7 %
LYMPHOCYTES # BLD: 9.7 %
LYMPHOCYTES ABSOLUTE: 0.7 THOU/MM3 (ref 1–4.8)
MAGNESIUM: 1.9 MG/DL (ref 1.6–2.4)
MCH RBC QN AUTO: 34.7 PG (ref 26–33)
MCHC RBC AUTO-ENTMCNC: 33.1 GM/DL (ref 32.2–35.5)
MCV RBC AUTO: 104.8 FL (ref 80–94)
MONOCYTES # BLD: 11.3 %
MONOCYTES ABSOLUTE: 0.8 THOU/MM3 (ref 0.4–1.3)
NUCLEATED RED BLOOD CELLS: 0 /100 WBC
OSMOLALITY CALCULATION: 275.6 MOSMOL/KG (ref 275–300)
PLATELET # BLD: 189 THOU/MM3 (ref 130–400)
PMV BLD AUTO: 9.2 FL (ref 9.4–12.4)
POTASSIUM SERPL-SCNC: 3.8 MEQ/L (ref 3.5–5.2)
RBC # BLD: 3.31 MILL/MM3 (ref 4.7–6.1)
SEG NEUTROPHILS: 77.1 %
SEGMENTED NEUTROPHILS ABSOLUTE COUNT: 5.5 THOU/MM3 (ref 1.8–7.7)
SODIUM BLD-SCNC: 137 MEQ/L (ref 135–145)
WBC # BLD: 7.1 THOU/MM3 (ref 4.8–10.8)

## 2021-02-06 PROCEDURE — 94640 AIRWAY INHALATION TREATMENT: CPT

## 2021-02-06 PROCEDURE — 99232 SBSQ HOSP IP/OBS MODERATE 35: CPT | Performed by: FAMILY MEDICINE

## 2021-02-06 PROCEDURE — 1200000003 HC TELEMETRY R&B

## 2021-02-06 PROCEDURE — 94761 N-INVAS EAR/PLS OXIMETRY MLT: CPT

## 2021-02-06 PROCEDURE — 99222 1ST HOSP IP/OBS MODERATE 55: CPT | Performed by: INTERNAL MEDICINE

## 2021-02-06 PROCEDURE — 92610 EVALUATE SWALLOWING FUNCTION: CPT

## 2021-02-06 PROCEDURE — 36415 COLL VENOUS BLD VENIPUNCTURE: CPT

## 2021-02-06 PROCEDURE — 6370000000 HC RX 637 (ALT 250 FOR IP): Performed by: INTERNAL MEDICINE

## 2021-02-06 PROCEDURE — 2580000003 HC RX 258: Performed by: INTERNAL MEDICINE

## 2021-02-06 PROCEDURE — 6360000002 HC RX W HCPCS: Performed by: INTERNAL MEDICINE

## 2021-02-06 PROCEDURE — 2700000000 HC OXYGEN THERAPY PER DAY

## 2021-02-06 PROCEDURE — 83735 ASSAY OF MAGNESIUM: CPT

## 2021-02-06 PROCEDURE — 80048 BASIC METABOLIC PNL TOTAL CA: CPT

## 2021-02-06 PROCEDURE — 93010 ELECTROCARDIOGRAM REPORT: CPT | Performed by: NUCLEAR MEDICINE

## 2021-02-06 PROCEDURE — 85025 COMPLETE CBC W/AUTO DIFF WBC: CPT

## 2021-02-06 RX ADMIN — TRAMADOL HYDROCHLORIDE 50 MG: 50 TABLET, FILM COATED ORAL at 21:11

## 2021-02-06 RX ADMIN — SODIUM CHLORIDE: 9 INJECTION, SOLUTION INTRAVENOUS at 03:09

## 2021-02-06 RX ADMIN — GLYCOPYRROLATE AND FORMOTEROL FUMARATE 2 PUFF: 9; 4.8 AEROSOL, METERED RESPIRATORY (INHALATION) at 08:54

## 2021-02-06 RX ADMIN — DOXAZOSIN 4 MG: 4 TABLET ORAL at 21:11

## 2021-02-06 RX ADMIN — Medication 1 TABLET: at 21:11

## 2021-02-06 RX ADMIN — CARVEDILOL 3.12 MG: 3.12 TABLET, FILM COATED ORAL at 09:51

## 2021-02-06 RX ADMIN — DOCUSATE SODIUM 100 MG: 100 CAPSULE, LIQUID FILLED ORAL at 09:51

## 2021-02-06 RX ADMIN — ALBUTEROL SULFATE 2.5 MG: 2.5 SOLUTION RESPIRATORY (INHALATION) at 12:40

## 2021-02-06 RX ADMIN — ASPIRIN 325 MG: 325 TABLET, COATED ORAL at 09:51

## 2021-02-06 RX ADMIN — SODIUM CHLORIDE, PRESERVATIVE FREE 10 ML: 5 INJECTION INTRAVENOUS at 21:11

## 2021-02-06 RX ADMIN — PIPERACILLIN AND TAZOBACTAM 3375 MG: 3; .375 INJECTION, POWDER, LYOPHILIZED, FOR SOLUTION INTRAVENOUS at 03:46

## 2021-02-06 RX ADMIN — PIPERACILLIN AND TAZOBACTAM 3375 MG: 3; .375 INJECTION, POWDER, LYOPHILIZED, FOR SOLUTION INTRAVENOUS at 21:10

## 2021-02-06 RX ADMIN — ALBUTEROL SULFATE 2.5 MG: 2.5 SOLUTION RESPIRATORY (INHALATION) at 08:53

## 2021-02-06 RX ADMIN — FINASTERIDE 5 MG: 5 TABLET, FILM COATED ORAL at 09:51

## 2021-02-06 RX ADMIN — ALBUTEROL SULFATE 2.5 MG: 2.5 SOLUTION RESPIRATORY (INHALATION) at 20:31

## 2021-02-06 RX ADMIN — Medication 1 TABLET: at 09:51

## 2021-02-06 RX ADMIN — PIPERACILLIN AND TAZOBACTAM 3375 MG: 3; .375 INJECTION, POWDER, LYOPHILIZED, FOR SOLUTION INTRAVENOUS at 11:59

## 2021-02-06 RX ADMIN — ENOXAPARIN SODIUM 40 MG: 40 INJECTION SUBCUTANEOUS at 21:11

## 2021-02-06 RX ADMIN — ALBUTEROL SULFATE 2.5 MG: 2.5 SOLUTION RESPIRATORY (INHALATION) at 16:27

## 2021-02-06 RX ADMIN — CARVEDILOL 3.12 MG: 3.12 TABLET, FILM COATED ORAL at 17:23

## 2021-02-06 RX ADMIN — DOCUSATE SODIUM 100 MG: 100 CAPSULE, LIQUID FILLED ORAL at 21:11

## 2021-02-06 RX ADMIN — GLYCOPYRROLATE AND FORMOTEROL FUMARATE 2 PUFF: 9; 4.8 AEROSOL, METERED RESPIRATORY (INHALATION) at 20:45

## 2021-02-06 ASSESSMENT — PAIN - FUNCTIONAL ASSESSMENT: PAIN_FUNCTIONAL_ASSESSMENT: PREVENTS OR INTERFERES SOME ACTIVE ACTIVITIES AND ADLS

## 2021-02-06 ASSESSMENT — PAIN DESCRIPTION - DESCRIPTORS: DESCRIPTORS: ACHING

## 2021-02-06 ASSESSMENT — PAIN DESCRIPTION - LOCATION: LOCATION: BACK

## 2021-02-06 ASSESSMENT — PAIN DESCRIPTION - ORIENTATION: ORIENTATION: MID;LOWER;UPPER

## 2021-02-06 ASSESSMENT — PAIN SCALES - GENERAL: PAINLEVEL_OUTOF10: 0

## 2021-02-06 ASSESSMENT — PAIN DESCRIPTION - FREQUENCY: FREQUENCY: INTERMITTENT

## 2021-02-06 NOTE — PROGRESS NOTES
Hospitalist Progress Note    Patient:  Vamsi Martinez      Unit/Bed:6K-17/017-A    YOB: 1926    MRN: [id number]       Acct: [de-identified]     PCP: Coy Torres MD    Date of Admission: 2/5/2021    Assessment/Plan:    Anticipated Discharge in :     JUDI/[facility]on[facility] [address] Problems    Diagnosis Date Noted    Acute respiratory failure with hypoxia ([facility] [J96.01]     Prostate cancer metastatic to bone ([facility] [C61, C79.51]     Stage 3 severe COPD by GOLD classification ([facility] [J44.9]        Bibasilar pneumonia, CAP vs aspiration  Pt has been coughing with pill intake. A  History of DVT (not on [address]) and extensive diffuse malignancy with mets  CTA chest showed Bilateral lower lobe consolidation. Extensive emphysema. Extensive known bony metastatic disease secondary to prostate cancer. Small amount of debris, secretions or mucous plugging within the lower lobe bronchi. Procalcitonin low, however on chemo  Patient started on IV Zosyn on 2/5  Consulted Pulmonary Medicine (follows up with Dr. Rocky Ortega). Acute Hypoxic Respiratory Failure  Pneumonia vs severe COPD  Pulse Ox 82% on RA, had to be placed on 4L NC (baseline is RA). WBC 11.5. Procal 0.10. Negative COVID-19 test.   CXR new right lower lobe infiltrates and possible developing infiltrates left upper lobe. Treatment as above  Wean O2 as tolerated    Severe COPD  Albuterol neb q4 prn for SOB or wheezing  resume home inhalers    Dysphagia, aspiration risk  Patient has difficulty swallowing, chokes on pills, seems to be oropharyngeal  Had radiation treatment in 2018  SLP consult     Stage 4 Prostate Cancer with Bone Mets  follows up with Urology, currently on Casodex chemotherapy pills that were started 12/16/2020. Poor overall prognosis. Hx DVT  not on [address], only on  daily. Continue Lovenox     Essential HTN  resume home BP medications      Code Status  pt is Limited x 4.        Chief Complaint:   SOB, fatigue Hospital Course:   80 y.o. male who presented to [address] [facility] [address] with 10 day history of increased fatigue, weakness, productive cough, sob, and poor PO intake. Pt has a pmh of Stage 4 Prostate Cancer with Bone Mets, Severe COPD, HTN, DVT (not on [address]), and CAD. Pt follows up with Urology for his malignancy, is currently on Casodex chemotherapy pills that were started 12/16/2020. Daughter at bedside as well. Pt lives alone at home, has a lot of help with his children who come over everyday. Pt does have difficulty swallowing pills and food at times, choking however clearing it up. Pt has been having poor PO intake over the past 4 days as well. No recent history of travel or being around anyone with COVID-19. Pt denies any fevers, chills, diarrhea, chest pain, abdominal pain, burning with urination, or focal neurological deficits.     In the ED, Pulse Ox 82% on RA, had to be placed on 4L NC. Na 132, Cl 92, BUN 26 and Cr 1.2. WBC 11.5. Procal 0.10. Troponin 0.014. Negative COVID-19 test      CXR new right lower lobe infiltrates and possible developing infiltrates left upper lobe. Subjective:   Patient seen and examined, appears comfortable in bed, without any signs of cardiorespiratory distress. VS stable, afebrile, saturating well on 3 lpm O2 per nasal cannula. Patient denies chest pain, palpitations, or shortness of breath. He denies any nausea, vomiting, abdominal pain, diarrhea or constipation.        Medications:  Reviewed    Infusion Medications    sodium chloride 75 mL/hr at 02/06/21 0309     Scheduled Medications    aspirin  325 mg Oral Daily    bicalutamide  50 mg Oral Daily    calcium-vitamin D  1 tablet Oral BID    carvedilol  3.125 mg Oral BID     docusate sodium  100 mg Oral BID    doxazosin  4 mg Oral Nightly    finasteride  5 mg Oral Daily    glycopyrrolate-formoterol  2 puff Inhalation BID    piperacillin-tazobactam  3,375 mg Intravenous Q8H  sodium chloride flush  10 mL Intravenous 2 times per day    enoxaparin  40 mg Subcutaneous Q24H    albuterol  2.5 mg Nebulization Q4H While awake     PRN Meds: tiZANidine, traMADol, sodium chloride flush, promethazine **OR** ondansetron, polyethylene glycol, acetaminophen **OR** acetaminophen      Intake/Output Summary (Last 24 hours) at 2/6/2021 4048  Last data filed at 2/5/2021 2344  Gross per 24 hour   Intake    Output 100 ml   Net -100 ml       Diet:  DIET LOW SODIUM 2 GM;  Dietary Nutrition Supplements: Standard High Calorie Oral Supplement    Exam:  /68   Pulse 73   Temp 97.8 °F (36.6 °C) (Oral)   Resp 16   Wt 130 lb 1.6 oz (59 kg)   SpO2 92%   BMI 20.38 kg/m²     General appearance: No apparent distress, appears stated age and cooperative. HEENT: Pupils equal, round, and reactive to light. Conjunctivae/corneas clear. Neck: Supple, with full range of motion. No jugular venous distention. Trachea midline. Respiratory:  Normal respiratory effort. Clear to auscultation, bilaterally without Rales/Wheezes/Rhonchi. Cardiovascular: Regular rate and rhythm with normal S1/S2 without murmurs, rubs or gallops. Abdomen: Soft, non-tender, non-distended with normal bowel sounds. Musculoskeletal: passive and active ROM x 4 extremities. Skin: Skin color, texture, turgor normal.  No rashes or lesions. Neurologic:  Neurovascularly intact without any focal sensory/motor deficits.  Cranial nerves: II-XII intact, grossly non-focal.  Psychiatric: Alert and oriented, thought content appropriate, normal insight  Capillary Refill: Brisk,< 3 seconds   Peripheral Pulses: +2 palpable, equal bilaterally       Labs:   Recent Labs     02/05/21  1429 02/06/21 0317   WBC 11.5* 7.1   HGB 14.5 11.5*   HCT 42.8 34.7*    189     Recent Labs     02/05/21  1429 02/06/21 0317   * 137   K 4.4 3.8   CL 92* 98   CO2 29 29   BUN 26* 18   CREATININE 1.2 1.1   CALCIUM 9.7 8.5 No results for input(s): AST, ALT, BILIDIR, BILITOT, ALKPHOS in the last 72 hours. No results for input(s): INR in the last 72 hours. No results for input(s): Emiliana Freddy in the last 72 hours. Urinalysis:      Lab Results   Component Value Date    NITRU Negative 01/19/2021    WBCUA 0-2 02/05/2019    BACTERIA MODERATE 02/05/2019    RBCUA 3-5 02/05/2019    BLOODU Moderate 01/19/2021    BLOODU SMALL 02/05/2019    SPECGRAV 1.025 08/14/2020    SPECGRAV <1.005 01/05/2019    GLUCOSEU Negative 01/19/2021       Radiology:  CTA CHEST W WO CONTRAST   Final Result   Bilateral lower lobe consolidation. Atypical or viral type pneumonia cannot be excluded. Correlate for for infiltrate. Extensive emphysema. Extensive known bony metastatic disease secondary to prostate cancer. Small amount of debris, secretions or mucous plugging within the lower lobe bronchi. These may be contributing to patient's symptoms. **This report has been created using voice recognition software. It may contain minor errors which are inherent in voice recognition technology. **      Final report electronically signed by Dr. Renetta Patrick on 2/5/2021 5:58 PM      XR CHEST PORTABLE   Final Result   New right lower lobe infiltrates and possible developing infiltrates left upper lobe            **This report has been created using voice recognition software. It may contain minor errors which are inherent in voice recognition technology. **      Final report electronically signed by Dr. Maria R Garza on 2/5/2021 3:07 PM          Diet: DIET LOW SODIUM 2 GM;  Dietary Nutrition Supplements: Standard High Calorie Oral Supplement    DVT prophylaxis: [] Lovenox                                 [] SCDs                                 [] SQ Heparin                                 [] Encourage ambulation           [] Already on Anticoagulation     Disposition:    [] Home       [] TCU       [] Rehab       [] Psych       [] SNF [] [facility]       [] Other-    Code Status: Limited    PT/OT Eval Status:       Electronically signed by Rossy West MD on 2/6/2021 at 9:09 AM

## 2021-02-06 NOTE — ED NOTES
Med Rec complete. Patient and family updated on plan of care. Requesting something to eat. Denies other needs at this time.      Vandana Pan RN  02/05/21 1910

## 2021-02-06 NOTE — ED NOTES
Patient given some snacks. Family is at bedside. Family assisting patient with snacks. Patient in no distress, denies any needs. Family updated on plan of care.      Lydia Mcdaniels RN  02/05/21 1600

## 2021-02-06 NOTE — PROGRESS NOTES
Diagnosis Date    Blood circulation, collateral     CAD (coronary artery disease)     CAD (coronary artery disease) 9/2/2014    Cancer (HCC)     prostate to bone    CKD (chronic kidney disease) stage 2, GFR 60-89 ml/min 12/24/2018    COPD (chronic obstructive pulmonary disease) (HCC)     Diverticulitis     DVT of lower extremity, bilateral ([facility] 02/2019    GERD (gastroesophageal reflux disease)     Hiatal hernia     Hyperlipemia 8/12/2013    Hyperlipidemia     Other disorders of kidney and ureter in diseases classified elsewhere     Pleural plaque 8/12/2013    Primary adenocarcinoma of prostate ([facility] 01/2019    high grade    Pulmonary emboli ([facility] 02/2019    Vitamin D deficiency 05/2020       SUBJECTIVE:  RN approved session this date. Upon arrival to room, pt sitting upright in recliner with son at bedside. Pt was agreeable to participate in clinical swallow evaluation this date. Pt and son provided this ST with extensive history of swallowing problems. Pt reports having radiation tx to his neck Manuel Man couple years ago\" and has experienced swallowing difficulty ever since. Specifically, pt reports difficulty swallowing pills and occasionally experiences food/drink \"going down the wrong pipe\". Pt's son reports swallowing issues have gotten worse over the past 2 months. Per chart review, most recent MBS was completed 5/9/2019 which recommended regular texture diet and thin liquids. Pt completed outpatient speech therapy services May 2019 through June 2019 to address swallow dysfunction. OBJECTIVE:    Pain:  No pain reported.     Current Diet: Regular diet     Respiratory Status:  Nasal Canula    Behavioral Observation:  Alert and Oriented    Oral Mechanism Evaluation:      Facial / Labial WFL    Lingual WFL    Dentition WFL Pt edentulous with dentures in place for clinical swallow evaluation   Velum [facility]    Vocal Quality [facility]    Sensation Not Tested    Cough Not Tested Patient Evaluated Using:  Ice chips x2, thins via cup x5, puree x3, and hard/coarse texture (1/2 kitty cracker)     Oral Phase:  Impaired:  Reduced Bolus Formation    Pharyngeal Phase: Impaired:  Delayed Swallow    Signs and Symptoms of Laryngeal Penetration/Aspiration: No signs/symptoms of aspiration evident in this evaluation, but cannot rule out silent aspiration. Impresssions: Pt demonstrated evidence of a mild oral dysphagia characterized by extended mastication and decreased bolus formation resulting in min oral residue. Pt demonstrated evidence of a suspected mild pharyngeal dysphagia characterized by delayed swallow initiation. Pt observed independently utilizing small bites/sips, slow rate, upright position, and alternation of bites/sips with po trials this date. Pt reported having radiation tx to his neck Aurther Yunier couple years ago\" and has experienced swallowing difficulty ever since. Specifically, pt reported difficulty swallowing pills and occasionally experiences food/drink \"going down the wrong pipe\". Pt's son reported swallowing issues have gotten worse over the past 2 months. Per chart review, most recent MBS was completed 5/9/2019 which recommended regular texture diet and thin liquids. MBS completed on 5/9/2019 revealed a \"mild-moderate pharyngeal dysphagia as evidence by the findings outlined above. Patient with consistent premature spillage to the level of the vallecula. Patient with good hyolaryngeal elevation with limited hyolaryngeal protrusion and  fair thryo-hoyid approximation overall resulting in decreased epiglottic inversion and poor airway protection with thin and nectar thick liquids via straw. NO penetration or aspiration noted with multiple po trials of consecutive cup sips of thin liquids. Patient also with decreased pharyngeal constriction and limited UES opening resulting in mild pharyngeal residue within the vallecular space and along the posterior pharyngeal wall; patient able to clear material to trace amounts provided verbal cues to initiate \"dry swallow\" and thin liquid wash. Due to specific c/o 'trouble with pills' patient did successfully complete PO trial of 1/2 barium pill with thin liquids via cup; no difficulty or aspiration noted. \" Based on results from clinical swallow evaluation, it is recommended pt initiate and soft and bite-size diet with thin liquids (NO STRAWS) at this time. D/t history of dysphagia and recent reports of swallow decline, it is recommended pt complete repeat MBS on 2/8/2021 to further assess pharyngeal function. RECOMMENDATIONS/ASSESSMENT:   Modified Barium Swallow:  MBS is indicated to further assess pharyngeal function. ST with plans to complete MBS 2/8/2021  Diet Recommendations:  Soft and Bite-Size with Thin liquids  Strategies:  Full Upright Position, Small Bite/Sip, No Straw, Multiple Swallow, Pulmonary Monitoring, Oral Care after all Meals, Medication in Applesauce, Alternate Solids and Liquids, Limit Distractions and Monitor for Fatigue   Rehabilitation Potential: good    EDUCATION:  Learner: Patient and Family  Education:  Reviewed results and recommendations of this evaluation, Reviewed diet and strategies, Reviewed signs, symptoms and risks of aspiration, Reviewed ST goals and Plan of Care, Reviewed recommendations for follow-up and Education Related to Potential Risks and Complications Due to Impairment/Illness/Injury  Evaluation of Education: Verbalizes understanding, Demonstrates without assistance and Needs further instruction    PLAN:  Skilled SLP intervention on acute care 3-5 x per week or until goals met and/or pt plateaus in function. Specific interventions for next session may include: complete of MBS. PATIENT GOAL:    Did not state. Will further assess during treatment.     SHORT TERM GOALS:  Short-term Goals  Timeframe for Short-term Goals: 2 weeks  Goal 1: Pt will consume a soft and bite-size diet with thin liquids with utilization of recommended swallow strategies without overt s/s of aspiration, stable pulmonary status, and adequate endurance 5/5 trials for safe meal consumption Goal 2: Pt will participate in advanced po trials with utilization of recommended swallow strategies without overt s/s of aspiration, stable pulmonary status, and adequate endurance for safe meal consumption of LRD. Goal 3: Pt will participate in formal instrumental assessment (MBS) to further assess pharyngeal function and determine safest level of po intake and update POC accordingly.   Goal 4: Pt will recall and complete oral hygiene routine provided min verbal cues across 5/5 sessions in order to reduce colonization of oral bacteria and development of aspiration pneumonia    LONG TERM GOALS:  No long term goals recommended d/t short [address] [facility] [address]

## 2021-02-06 NOTE — CONSULTS
[facility]    Patient - Jack Harris   MRN -  [id number]   [facility]   - 1926      Date of Admission -  2021  2:15 PM  Date of evaluation -  2021  Room - Abdirahman Fierro MD Primary Care Physician - Carlene Elizondo MD   Chief Complaint   SOB  Active Hospital Problem List      Active Hospital Problems    Diagnosis Date Noted    Acute respiratory failure with hypoxia ([facility] [J96.01]     Prostate cancer metastatic to bone ([facility] [C61, C79.51]     Stage 3 severe COPD by GOLD classification ([facility] [J44.9]      HPI   89 y. o. male with past medical history of severe COPD not on home oxygen, CAD, HTN, DVT and metastatic prostate cancer who presented to [address] [place] [address] with 10 day history of increased shortness of breath, cough and generalized weakness. In the ED, COVID testing was negative, Pulse Ox 82% on RA, had to be placed on 4L NC.CXR showed infiltrates at the lung bases. WBC 11.5. Procal 0.10.  Troponin 0.014. cre 1.2.     He stopped smoking at age 36    PFT showed FEV1 12. L, DLCO 39%  Past Medical History         Diagnosis Date    Blood circulation, collateral     CAD (coronary artery disease)     CAD (coronary artery disease) 2014    [facility]     prostate to bone    CKD (chronic kidney disease) stage 2, GFR 60-89 ml/min 2018    COPD [facility] obstructive pulmonary disease) [facility]     Diverticulitis     DVT of lower extremity, bilateral ([facility] 2019    GERD (gastroesophageal reflux disease)     Hiatal hernia     Hyperlipemia 2013    Hyperlipidemia     Other disorders of kidney and ureter in diseases classified elsewhere     Pleural plaque 2013    Primary adenocarcinoma of prostate ([facility] 2019    high grade    Pulmonary emboli ([facility] 2019    Vitamin D deficiency 2020      Past Surgical History           Procedure Laterality Date  ABDOMEN SURGERY      APPENDECTOMY      CARDIAC CATHETERIZATION      CYSTOSCOPY  2015    Litholapaxy-Large    EYE SURGERY      cataracts    HERNIA REPAIR      KYPHOSIS SURGERY N/A 2019    ABLATION AND KYPHOPLASTY AT T8 AND ABLATION AND KYPHOPLASTY AT T6 performed by Ana Holland MD at [facility] N/A 2018    EXPLORATORY LAPAROTOMY, WITH ABDOMINAL WASHOUT performed by Gabriella Dolan MD at [address] [place] [address] Nutrition Supplements: Standard High Calorie Oral Supplement  DIET DYSPHAGIA SOFT AND BITE-SIZED; Low Sodium (2 GM)  Allergies    Patient has no known allergies. Social History     Social History     Socioeconomic History    Marital status:      Spouse name: Not on file    Number of children: 11    Years of education: Not on file    Highest education level: Not on file   Occupational History    Occupation: Retired   Social Needs    Financial resource strain: Not very hard    Food insecurity     Worry: Never true     Inability: Never true    Transportation needs     Medical: No     Non-medical: No   Tobacco Use    Smoking status: Former Smoker     Packs/day: 1.00     Types: Cigarettes     Quit date: 1966     Years since quittin.5    Smokeless tobacco: Never Used   Substance and Sexual Activity    Alcohol use: Yes     Frequency: Monthly or less     Binge frequency: Never     Comment: occasionally    Drug use: No    Sexual activity: Not Currently   Lifestyle    Physical activity     Days per week: 0 days     Minutes per session: 0 min    Stress: Not at all   Relationships    Social connections     Talks on phone: More than three times a week     Gets together: More than three times a week     Attends Worship service: Not on file     Active member of club or organization: No     Attends meetings of clubs or organizations: Never     Relationship status:      Intimate partner violence Fear of current or ex partner: Not on file     Emotionally abused: Not on file     Physically abused: Not on file     Forced sexual activity: Not on file   Other Topics Concern    Not on file   Social History Narrative    - Former    Lives alone but has lots of family support that goes in and helps him    Son will spend the night most nights    Family assists with transportation    No social barriers noted    No barriers with medication affordability     Family History    No family history on file. Sleep History    n/a  ROS    General/Constitutional: No recent loss of weight or appetite changes. No fever or chills. HENT: Negative. Eyes: Negative. Upper respiratory tract: No nasal stuffiness or post nasal drip. Lower respiratory tract/ lungs: SOB, Cough  Cardiovascular: No palpitations, chest pain or edema. Gastrointestinal: No nausea or vomiting. Neurological: No focal neurological weakness. Extremities: No tenderness. Musculoskeletal: Back pain   Genitourinary: No complaints. Hematological: Negative. Denies easy buising  Skin: No itching.   Meds    Current Medications    aspirin  325 mg Oral Daily    bicalutamide  50 mg Oral Daily    calcium-vitamin D  1 tablet Oral BID    carvedilol  3.125 mg Oral BID WC    docusate sodium  100 mg Oral BID    doxazosin  4 mg Oral Nightly    finasteride  5 mg Oral Daily    glycopyrrolate-formoterol  2 puff Inhalation BID    piperacillin-tazobactam  3,375 mg Intravenous Q8H    sodium chloride flush  10 mL Intravenous 2 times per day    enoxaparin  40 mg Subcutaneous Q24H    albuterol  2.5 mg Nebulization Q4H While awake     tiZANidine, traMADol, sodium chloride flush, promethazine **OR** ondansetron, polyethylene glycol, acetaminophen **OR** acetaminophen  IV Drips/Infusions    Vitals    Vitals weight is 130 lb 1.6 oz (59 kg). His oral temperature is 98.4 °F (36.9 °C). His blood pressure is 122/69 and his pulse is 76. His respiration is 18 and oxygen saturation is 95%. O2 Flow Rate (L/min): 3 L/min  I/O    Intake/Output Summary (Last 24 hours) at 2/6/2021 1847  Last data filed at 2/6/2021 1725  Gross per 24 hour   Intake 2466.71 ml   Output 350 ml   Net 2116.71 ml     Patient Vitals for the past 96 hrs (Last 3 readings):   Weight   02/05/21 2134 130 lb 1.6 oz (59 kg)   02/05/21 1423 134 lb (60.8 kg)     Exam   Constitutional: Patient appears moderately built and moderately nourished. Head: Normocephalic and atraumatic. Mouth/Throat: Oropharynx is clear and moist.  No oral thrush. Eyes: Conjunctivae are normal. Pupils are equal, round, and reactive to light. No scleral icterus. Neck: Neck supple. No JVD or tracheal deviation present. Cardiovascular: Regular rate, regular rhythm, S1 and S2 with no murmur. No peripheral edema  Pulmonary/Chest: Diminished breath sounds bilateral, wheezing   Abdominal: Soft. Bowel sounds audible. No distension or tenderness to palp  Musculoskeletal: Moves all extremities  Lymphadenopathy:  No cervical adenopathy. Neurological: Patient is alert and oriented to person, place, and time. Skin: Skin is warm and dry. Labs   ABG  Lab Results   Component Value Date    PH 8.0 12/09/2016     No results found for: DIONE Boyer  CBC  Recent Labs     02/05/21  1429 02/06/21 0317   WBC 11.5* 7.1   RBC 4.11* 3.31*   HGB 14.5 11.5*   HCT 42.8 34.7*   .1* 104.8*   MCH 35.3* 34.7*   MCHC 33.9 33.1    189   MPV 9.4 9.2*      BMP  Recent Labs     02/05/21  1429 02/06/21 0317   * 137   K 4.4 3.8   CL 92* 98   CO2 29 29   BUN 26* 18   CREATININE 1.2 1.1   GLUCOSE 120* 96   MG  --  1.9   CALCIUM 9.7 8.5     LFT  No results for input(s): AST, ALT, ALB, BILITOT, ALKPHOS, LIPASE in the last 72 hours. Invalid input(s):   AMYLASE  TROP Lab Results   Component Value Date    TROPONINT 0.014 02/05/2021    TROPONINT < 0.010 02/05/2019     BNP  Lab Results   Component Value Date    PROBNP 762.0 02/05/2021    PROBNP 1070.0 02/05/2019     D-Dimer  No results found for: DDIMER  Lactic Acid  No results for input(s): LACTA in the last 72 hours. INR  No results for input(s): INR, PROTIME in the last 72 hours. PTT  No results for input(s): APTT in the last 72 hours. Glucose  No results for input(s): POCGLU in the last 72 hours. UA No results for input(s): SPECGRAV, PHUR, COLORU, CLARITYU, MUCUS, PROTEINU, BLOODU, RBCUA, WBCUA, BACTERIA, NITRU, GLUCOSEU, BILIRUBINUR, UROBILINOGEN, KETUA, LABCAST, LABCASTTY, AMORPHOS in the last 72 hours. Invalid input(s): CRYSTALS. PFTs          Echo     Summary   Technically difficult examination. Overall left ventricular function is normal.   Ejection fraction is visually estimated at 55%. The aortic valve leaflets were not well visualized. Aortic valve appears tricuspid. Aortic valve leaflets are somewhat thickened. Aortic valve leaflets are Mildly calcified. Trivial aortic regurgitation is noted. Cultures    Procalcitonin  Lab Results   Component Value Date    PROCAL 0.10 02/05/2021     Radiology    CXR    CT Scans    (See actual reports for details)    Assessment   COPD exacerbation  Aspiration pneumonia  Severe COPD- FEV1 1.22 l, DLCO 39%, would most likely require home oxygen  Prostate Ca with metastasis and pathologic Lumbar compression fracture  CAD with stent: Stable    Recommendations   Monitor SpO2 initiate supplemental O2 PRN to maintain SpO2 >90%  Home 02 evaluation prior to discharge  Sputum culture  Antibiotics- Unasyn  Swallow study   Steroid taper     Thank you for the consult and allowing us to participate in the care of your patient. Case discussed with nurse and patient/family. Questions and concerns addressed. Meds and Orders reviewed.     Electronically signed by Delvin Riley MD on 2/6/2021 at 6:47 PM

## 2021-02-06 NOTE — PROGRESS NOTES
Pharmacy Medication History Note      List of current medications patient is taking is complete. Source of information: patient medication list, patient daughter    Changes made to medication list:  Medications removed (include reason, ex. therapy complete or physician discontinued): None    Medications added/doses adjusted:  Polyethylene glycol (miralax)    Other notes (ex. Recent course of antibiotics, Coumadin dosing):  Denies use of other OTC or herbal medications.       Allergies reviewed      Electronically signed by Elysia Amaro on 2/5/2021 at 8:55 PM

## 2021-02-07 LAB
ANION GAP SERPL CALCULATED.3IONS-SCNC: 8 MEQ/L (ref 8–16)
BUN BLDV-MCNC: 15 MG/DL (ref 7–22)
CALCIUM SERPL-MCNC: 8.7 MG/DL (ref 8.5–10.5)
CHLORIDE BLD-SCNC: 101 MEQ/L (ref 98–111)
CO2: 28 MEQ/L (ref 23–33)
CREAT SERPL-MCNC: 0.9 MG/DL (ref 0.4–1.2)
ERYTHROCYTE [DISTWIDTH] IN BLOOD BY AUTOMATED COUNT: 12.2 % (ref 11.5–14.5)
ERYTHROCYTE [DISTWIDTH] IN BLOOD BY AUTOMATED COUNT: 47.7 FL (ref 35–45)
GFR SERPL CREATININE-BSD FRML MDRD: 78 ML/MIN/1.73M2
GLUCOSE BLD-MCNC: 86 MG/DL (ref 70–108)
HCT VFR BLD CALC: 35.6 % (ref 42–52)
HEMOGLOBIN: 11.5 GM/DL (ref 14–18)
MCH RBC QN AUTO: 34.8 PG (ref 26–33)
MCHC RBC AUTO-ENTMCNC: 32.3 GM/DL (ref 32.2–35.5)
MCV RBC AUTO: 107.9 FL (ref 80–94)
PLATELET # BLD: 189 THOU/MM3 (ref 130–400)
PMV BLD AUTO: 9.1 FL (ref 9.4–12.4)
POTASSIUM SERPL-SCNC: 3.7 MEQ/L (ref 3.5–5.2)
PROSTATE SPECIFIC ANTIGEN: 1.02 NG/ML (ref 0–1)
RBC # BLD: 3.3 MILL/MM3 (ref 4.7–6.1)
SODIUM BLD-SCNC: 137 MEQ/L (ref 135–145)
WBC # BLD: 5.2 THOU/MM3 (ref 4.8–10.8)

## 2021-02-07 PROCEDURE — 80048 BASIC METABOLIC PNL TOTAL CA: CPT

## 2021-02-07 PROCEDURE — 99232 SBSQ HOSP IP/OBS MODERATE 35: CPT | Performed by: FAMILY MEDICINE

## 2021-02-07 PROCEDURE — 94760 N-INVAS EAR/PLS OXIMETRY 1: CPT

## 2021-02-07 PROCEDURE — 1200000003 HC TELEMETRY R&B

## 2021-02-07 PROCEDURE — 99232 SBSQ HOSP IP/OBS MODERATE 35: CPT | Performed by: INTERNAL MEDICINE

## 2021-02-07 PROCEDURE — 6360000002 HC RX W HCPCS: Performed by: INTERNAL MEDICINE

## 2021-02-07 PROCEDURE — 85027 COMPLETE CBC AUTOMATED: CPT

## 2021-02-07 PROCEDURE — 94640 AIRWAY INHALATION TREATMENT: CPT

## 2021-02-07 PROCEDURE — 84153 ASSAY OF PSA TOTAL: CPT

## 2021-02-07 PROCEDURE — 2580000003 HC RX 258: Performed by: INTERNAL MEDICINE

## 2021-02-07 PROCEDURE — 2700000000 HC OXYGEN THERAPY PER DAY

## 2021-02-07 PROCEDURE — 6370000000 HC RX 637 (ALT 250 FOR IP): Performed by: INTERNAL MEDICINE

## 2021-02-07 PROCEDURE — 6370000000 HC RX 637 (ALT 250 FOR IP): Performed by: STUDENT IN AN ORGANIZED HEALTH CARE EDUCATION/TRAINING PROGRAM

## 2021-02-07 PROCEDURE — 36415 COLL VENOUS BLD VENIPUNCTURE: CPT

## 2021-02-07 RX ORDER — PREDNISONE 20 MG/1
40 TABLET ORAL DAILY
Status: DISCONTINUED | OUTPATIENT
Start: 2021-02-07 | End: 2021-02-08 | Stop reason: HOSPADM

## 2021-02-07 RX ADMIN — PIPERACILLIN AND TAZOBACTAM 3375 MG: 3; .375 INJECTION, POWDER, LYOPHILIZED, FOR SOLUTION INTRAVENOUS at 18:43

## 2021-02-07 RX ADMIN — ENOXAPARIN SODIUM 40 MG: 40 INJECTION SUBCUTANEOUS at 20:48

## 2021-02-07 RX ADMIN — DOXAZOSIN 4 MG: 4 TABLET ORAL at 20:47

## 2021-02-07 RX ADMIN — ALBUTEROL SULFATE 2.5 MG: 2.5 SOLUTION RESPIRATORY (INHALATION) at 08:13

## 2021-02-07 RX ADMIN — PREDNISONE 40 MG: 20 TABLET ORAL at 12:47

## 2021-02-07 RX ADMIN — SODIUM CHLORIDE, PRESERVATIVE FREE 10 ML: 5 INJECTION INTRAVENOUS at 20:48

## 2021-02-07 RX ADMIN — GLYCOPYRROLATE AND FORMOTEROL FUMARATE 2 PUFF: 9; 4.8 AEROSOL, METERED RESPIRATORY (INHALATION) at 20:54

## 2021-02-07 RX ADMIN — CARVEDILOL 3.12 MG: 3.12 TABLET, FILM COATED ORAL at 09:05

## 2021-02-07 RX ADMIN — TRAMADOL HYDROCHLORIDE 50 MG: 50 TABLET, FILM COATED ORAL at 20:48

## 2021-02-07 RX ADMIN — ALBUTEROL SULFATE 2.5 MG: 2.5 SOLUTION RESPIRATORY (INHALATION) at 11:48

## 2021-02-07 RX ADMIN — SODIUM CHLORIDE, PRESERVATIVE FREE 10 ML: 5 INJECTION INTRAVENOUS at 09:06

## 2021-02-07 RX ADMIN — PIPERACILLIN AND TAZOBACTAM 3375 MG: 3; .375 INJECTION, POWDER, LYOPHILIZED, FOR SOLUTION INTRAVENOUS at 12:46

## 2021-02-07 RX ADMIN — Medication 1 TABLET: at 20:47

## 2021-02-07 RX ADMIN — DOCUSATE SODIUM 100 MG: 100 CAPSULE, LIQUID FILLED ORAL at 09:05

## 2021-02-07 RX ADMIN — ALBUTEROL SULFATE 2.5 MG: 2.5 SOLUTION RESPIRATORY (INHALATION) at 15:59

## 2021-02-07 RX ADMIN — PIPERACILLIN AND TAZOBACTAM 3375 MG: 3; .375 INJECTION, POWDER, LYOPHILIZED, FOR SOLUTION INTRAVENOUS at 03:50

## 2021-02-07 RX ADMIN — ASPIRIN 325 MG: 325 TABLET, COATED ORAL at 09:05

## 2021-02-07 RX ADMIN — ALBUTEROL SULFATE 2.5 MG: 2.5 SOLUTION RESPIRATORY (INHALATION) at 20:54

## 2021-02-07 RX ADMIN — Medication 1 TABLET: at 09:05

## 2021-02-07 RX ADMIN — FINASTERIDE 5 MG: 5 TABLET, FILM COATED ORAL at 09:05

## 2021-02-07 RX ADMIN — CARVEDILOL 3.12 MG: 3.12 TABLET, FILM COATED ORAL at 16:25

## 2021-02-07 RX ADMIN — GLYCOPYRROLATE AND FORMOTEROL FUMARATE 2 PUFF: 9; 4.8 AEROSOL, METERED RESPIRATORY (INHALATION) at 08:14

## 2021-02-07 ASSESSMENT — PAIN DESCRIPTION - FREQUENCY: FREQUENCY: INTERMITTENT

## 2021-02-07 ASSESSMENT — PAIN SCALES - GENERAL: PAINLEVEL_OUTOF10: 0

## 2021-02-07 ASSESSMENT — PAIN DESCRIPTION - ONSET: ONSET: ON-GOING

## 2021-02-07 ASSESSMENT — PAIN DESCRIPTION - DESCRIPTORS: DESCRIPTORS: SHARP

## 2021-02-07 ASSESSMENT — PAIN DESCRIPTION - LOCATION: LOCATION: BACK

## 2021-02-07 NOTE — PROGRESS NOTES
[facility]    Patient - Leslie Lundberg,  Age - 80 y.o.    - 1926      Room Number - 4S-44/513-   Selene Harper MD Primary Care Physician - Jaylan Garcia MD   MRN -  [id number]   [facility] - [de-identified]  Date of Admission -  2021  2:15 PM  Hospital Day - 2    Chief Complaint   COPD exacerbation  HPI   80 y. o. male with past medical history of severe COPD not on home oxygen, CAD, HTN, DVT and metastatic prostate cancer who presented to [facility] with 10 day history of increased shortness of breath, cough and generalized weakness. In the ED, COVID testing was negative, Pulse Ox 82% on RA, had to be placed on 4L NC.CXR showed infiltrates at the lung bases. WBC 11.5. Procal 0.10. Troponin 0.014. cre 1.2.     He stopped smoking at age 36     PFT showed FEV1 12. L, DLCO 39%  Past 24 hours, ROS   Feeling better, less short of breath, oxygen 2 L/min, awaiting swallow study. All other systems reviewed  Objective    Vitals    weight is 131 lb 9.6 oz (59.7 kg). His oral temperature is 97.7 °F (36.5 °C). His blood pressure is 119/58 (abnormal) and his pulse is 71. His respiration is 18 and oxygen saturation is 94%. O2 Flow Rate (L/min): 2 L/min  I/O    Intake/Output Summary (Last 24 hours) at 2021 1555  Last data filed at 2021 1126  Gross per 24 hour   Intake 1240.4 ml   Output 500 ml   Net 740.4 ml     Patient Vitals for the past 96 hrs (Last 3 readings):   Weight   21 0345 131 lb 9.6 oz (59.7 kg)   21 2134 130 lb 1.6 oz (59 kg)   21 1423 134 lb (60.8 kg)     Exam    Physical Exam  Constitutional: Patient appears moderately built and moderately nourished. Head: Normocephalic and atraumatic. Mouth/Throat: Oropharynx is clear and moist.  No oral thrush. Eyes: Conjunctivae are normal. Pupils are equal, round, and reactive to light. No scleral icterus. Neck: Neck supple. No JVD or tracheal deviation present. Cardiovascular: Regular rate, regular rhythm, S1 and S2 with no murmur. No peripheral edema  Pulmonary/Chest: Diminished breath sounds bilateral, wheezing   Abdominal: Soft. Bowel sounds audible. No distension or tenderness to palp  Musculoskeletal: Moves all extremities  Lymphadenopathy:  No cervical adenopathy. Neurological: Patient is alert and oriented to person, place, and time. Skin: Skin is warm and dry. Meds       predniSONE  40 mg Oral Daily    aspirin  325 mg Oral Daily    bicalutamide  50 mg Oral Daily    calcium-vitamin D  1 tablet Oral BID    carvedilol  3.125 mg Oral BID WC    docusate sodium  100 mg Oral BID    doxazosin  4 mg Oral Nightly    finasteride  5 mg Oral Daily    glycopyrrolate-formoterol  2 puff Inhalation BID    piperacillin-tazobactam  3,375 mg Intravenous Q8H    sodium chloride flush  10 mL Intravenous 2 times per day    enoxaparin  40 mg Subcutaneous Q24H    albuterol  2.5 mg Nebulization Q4H While awake       tiZANidine, traMADol, sodium chloride flush, promethazine **OR** ondansetron, polyethylene glycol, acetaminophen **OR** acetaminophen  Labs   ABG  Lab Results   Component Value Date    PH 8.0 12/09/2016     No results found for: [employer], SETPEEP  CBC  Recent Labs     02/05/21  1429 02/06/21 0317 02/07/21 0319   WBC 11.5* 7.1 5.2   RBC 4.11* 3.31* 3.30*   HGB 14.5 11.5* 11.5*   HCT 42.8 34.7* 35.6*   .1* 104.8* 107.9*   MCH 35.3* 34.7* 34.8*   MCHC 33.9 33.1 32.3    189 189   MPV 9.4 9.2* 9.1*      BMP  Recent Labs     02/05/21  1429 02/06/21 0317 02/07/21 0319   * 137 137   K 4.4 3.8 3.7   CL 92* 98 101   CO2 29 29 28   BUN 26* 18 15   CREATININE 1.2 1.1 0.9   GLUCOSE 120* 96 86   MG  --  1.9  --    CALCIUM 9.7 8.5 8.7     LFT  No results for input(s): AST, ALT, ALB, BILITOT, ALKPHOS, LIPASE in the last 72 hours. Invalid input(s):   AMYLASE  TROP  Lab Results Component Value Date    TROPONINT 0.014 02/05/2021    TROPONINT < 0.010 02/05/2019     BNP  Lab Results   Component Value Date    PROBNP 762.0 02/05/2021    PROBNP 1070.0 02/05/2019     PFTs          Echo     Summary   Technically difficult examination.  [place] left ventricular function is normal.   Ejection fraction is visually estimated at 55%.   The aortic valve leaflets were not well visualized.   Aortic valve appears tricuspid.   Aortic valve leaflets are somewhat thickened.   Aortic valve leaflets are Mildly calcified.   Trivial aortic regurgitation is noted.   Cultures    Procalcitonin        Lab Results   Component Value Date     PROCAL 0.10 02/05/2021      Radiology    CXR    CT Scans    (See actual reports for details)           Active Hospital Problem List      Active Hospital Problems    Diagnosis Date Noted    Acute respiratory failure with hypoxia ([address] [J96.01]     Prostate cancer metastatic to bone ([address] [C61, C79.51]     Stage 3 severe COPD by GOLD classification ([address] [J44.9]      Assessment and Plan     COPD exacerbation  Aspiration pneumonia  Severe COPD- FEV1 1.22 l, DLCO 39%, would most likely require home oxygen  Prostate Ca with metastasis and pathologic Lumbar compression fracture  CAD with stent: Stable    Monitor SpO2 initiate supplemental O2 PRN to maintain SpO2 >90%  Home 02 evaluation prior to discharge  Antibiotics- change to oral Augmentin tomorrow  Swallow study in am  Steroid taper   follow up as outpatient    Electronically signed by     Eddie Ivy MD on 2/7/2021 at 3:55 PM

## 2021-02-07 NOTE — PROGRESS NOTES
Hospitalist Progress Note    Patient:  Julia Whitehead      Unit/Bed:6K-17/017-A    YOB: 1926    MRN: [id number]       Acct: [de-identified]     PCP: Matt Kitchen MD    Date of Admission: 2/5/2021    Assessment/Plan:    Anticipated Discharge in : 1-2 days    Active Hospital Problems    Diagnosis Date Noted    Acute respiratory failure with hypoxia ([facility] [J96.01]     Prostate cancer metastatic to bone ([facility] [C61, C79.51]     Stage 3 severe COPD by GOLD classification ([facility] [J44.9]        Bibasilar pneumonia, CAP vs aspiration  Pt has been coughing with pill intake. Has been on soft diet for 2 years due to long standing dysphagia 2/2 radiation and aging  CTA chest showed Bilateral lower lobe consolidation. Extensive emphysema. Extensive known bony metastatic disease secondary to prostate cancer. Small amount of debris, secretions or mucous plugging within the lower lobe bronchi. Procalcitonin low, however on chemo  Patient started on IV Zosyn on 2/5, plan for Augmentin at discharge  Consulted Pulmonary Medicine (follows up with Dr. Beny Vargas). Has been afebrile since admission, no drug reactions    Acute Hypoxic Respiratory Failure  Pneumonia vs severe COPD  Pulse Ox 82% on RA, had to be placed on 4L NC (baseline is RA). WBC 11.5. Procal 0.10.  Negative COVID-19 test.   Treatment as above  Wean O2 as tolerated, to have home O2 eval before discharge  To have overnight pulse oximetry check for possible ONOFRE    Severe COPD w/ acute excacerbation  Albuterol neb q4 prn for SOB or wheezing  resume home inhalers, bevespi  Add prednisone 5 days, 2/11 is last dose    Dysphagia, aspiration risk  Patient has long history of difficulty swallowing, chokes on pills, seems to be oropharyngeal and has been on soft diet for years  Had radiation treatment in 2018  SLP consult for MBS before discharge      Stage 4 Prostate Cancer with Bone Mets Diagnosed about 2 years ago. Follows up with Urology, currently on Casodex chemotherapy pills that were started 12/16/2020. Per family, this medicine greatly decreases QOL  PSA normal this visit at 1.0    Hx DVT  not on [address], only on  daily. Continue Lovenox     Essential HTN  resume home BP medications      Code Status  pt is Limited x 4. Chief Complaint:   SOB, fatigue    Hospital Course:   80 y.o. male who presented to [address] [place] [address] with 10 day history of increased fatigue, weakness, productive cough, sob, and poor PO intake. Pt has a pmh of Stage 4 Prostate Cancer with Bone Mets, Severe COPD, HTN, DVT (not on [address]), and CAD. Pt follows up with Urology for his malignancy, is currently on Casodex chemotherapy pills that were started 12/16/2020. Daughter at bedside as well. Pt lives alone at home, has a lot of help with his children who come over everyday. Pt does have difficulty swallowing pills and food at times, choking however clearing it up. Pt has been having poor PO intake over the past 4 days as well. No recent history of travel or being around anyone with COVID-19. Pt denies any fevers, chills, diarrhea, chest pain, abdominal pain, burning with urination, or focal neurological deficits.     In the ED, Pulse Ox 82% on RA, had to be placed on 4L NC. Na 132, Cl 92, BUN 26 and Cr 1.2. WBC 11.5. Procal 0.10. Troponin 0.014. Negative COVID-19 test      CXR new right lower lobe infiltrates and possible developing infiltrates left upper lobe. Treated as COPD exacerbation and aspiration pneumonia. Pulmonology consulted who agreed with plan.   SLP consulted, MBS ordered, and recommended     Subjective: Patient seen and examined, appears comfortable in bed, without any signs of cardiorespiratory distress. VS stable, afebrile, saturating well on 2 lpm O2 per nasal cannula. Patient and family says he has been improved since yesterday. Patient denies chest pain, palpitations, or shortness of breath. He denies any nausea, vomiting, abdominal pain, diarrhea or constipation. Medications:  Reviewed    Infusion Medications     Scheduled Medications    aspirin  325 mg Oral Daily    bicalutamide  50 mg Oral Daily    calcium-vitamin D  1 tablet Oral BID    carvedilol  3.125 mg Oral BID WC    docusate sodium  100 mg Oral BID    doxazosin  4 mg Oral Nightly    finasteride  5 mg Oral Daily    glycopyrrolate-formoterol  2 puff Inhalation BID    piperacillin-tazobactam  3,375 mg Intravenous Q8H    sodium chloride flush  10 mL Intravenous 2 times per day    enoxaparin  40 mg Subcutaneous Q24H    albuterol  2.5 mg Nebulization Q4H While awake     PRN Meds: tiZANidine, traMADol, sodium chloride flush, promethazine **OR** ondansetron, polyethylene glycol, acetaminophen **OR** acetaminophen      Intake/Output Summary (Last 24 hours) at 2/7/2021 1035  Last data filed at 2/7/2021 0350  Gross per 24 hour   Intake 3072.92 ml   Output 500 ml   Net 2572.92 ml       Diet:  Dietary Nutrition Supplements: Standard High Calorie Oral Supplement  DIET DYSPHAGIA SOFT AND BITE-SIZED; Low Sodium (2 GM)    Exam:  BP (!) 112/59   Pulse 75   Temp 97.5 °F (36.4 °C) (Oral)   Resp 20   Wt 131 lb 9.6 oz (59.7 kg)   SpO2 93%   BMI 20.61 kg/m²     General appearance: No apparent distress, appears stated age and cooperative. HEENT: Pupils equal, round, and reactive to light. Conjunctivae/corneas clear. Neck: Supple, with full range of motion. No jugular venous distention. Trachea midline. Respiratory:  Mildly increased respiratory effort.  Diminished breath sounds bilateral, wheezing, worst in lower lobe Cardiovascular: Regular rate and rhythm with normal S1/S2 without murmurs, rubs or gallops. Abdomen: Soft, non-tender, non-distended with normal bowel sounds. Musculoskeletal: passive and active ROM x 4 extremities. Skin: Skin color, texture, turgor normal.  No rashes or lesions. Neurologic:  Neurovascularly intact without any focal sensory/motor deficits. Cranial nerves: II-XII intact, grossly non-focal.  Psychiatric: Alert and oriented, thought content appropriate, normal insight  Capillary Refill: Brisk,< 3 seconds   Peripheral Pulses: +2 palpable, equal bilaterally       Labs:   Recent Labs     02/05/21  1429 02/06/21 0317 02/07/21 0319   WBC 11.5* 7.1 5.2   HGB 14.5 11.5* 11.5*   HCT 42.8 34.7* 35.6*    189 189     Recent Labs     02/05/21  1429 02/06/21 0317 02/07/21 0319   * 137 137   K 4.4 3.8 3.7   CL 92* 98 101   CO2 29 29 28   BUN 26* 18 15   CREATININE 1.2 1.1 0.9   CALCIUM 9.7 8.5 8.7     No results for input(s): AST, ALT, BILIDIR, BILITOT, ALKPHOS in the last 72 hours. No results for input(s): INR in the last 72 hours. No results for input(s): Jenny Gaster in the last 72 hours. Urinalysis:      Lab Results   Component Value Date    NITRU Negative 01/19/2021    WBCUA 0-2 02/05/2019    BACTERIA MODERATE 02/05/2019    RBCUA 3-5 02/05/2019    BLOODU Moderate 01/19/2021    BLOODU SMALL 02/05/2019    SPECGRAV 1.025 08/14/2020    SPECGRAV <1.005 01/05/2019    GLUCOSEU Negative 01/19/2021       Radiology:  CTA CHEST W WO CONTRAST   Final Result   Bilateral lower lobe consolidation. Atypical or viral type pneumonia cannot be excluded. Correlate for for infiltrate. Extensive emphysema. Extensive known bony metastatic disease secondary to prostate cancer. Small amount of debris, secretions or mucous plugging within the lower lobe bronchi. These may be contributing to patient's symptoms. **This report has been created using voice recognition software. It may contain minor errors which are inherent in voice recognition technology. **      Final report electronically signed by Dr. Nara Gonzalez on 2/5/2021 5:58 PM      XR CHEST PORTABLE   Final Result   New right lower lobe infiltrates and possible developing infiltrates left upper lobe            **This report has been created using voice recognition software. It may contain minor errors which are inherent in voice recognition technology. **      Final report electronically signed by Dr. Андрей Rodriguez on 2/5/2021 3:07 PM          Diet: Dietary Nutrition Supplements: Standard High Calorie Oral Supplement  DIET DYSPHAGIA SOFT AND BITE-SIZED;  Low Sodium (2 GM)    DVT prophylaxis: [x] Lovenox                                 [] SCDs                                 [] SQ Heparin                                 [] Encourage ambulation           [] Already on Anticoagulation     Disposition:    [] Home       [] TCU       [] Rehab       [] Psych       [] SNF       [] [place]       [] Other-    Code Status: Limited    PT/OT Eval Status:       Electronically signed by Phoebe Young MD on 2/7/2021 at 10:35 AM

## 2021-02-08 ENCOUNTER — APPOINTMENT (OUTPATIENT)
Dept: GENERAL RADIOLOGY | Age: 86
DRG: 177 | End: 2021-02-08
Payer: MEDICARE

## 2021-02-08 ENCOUNTER — APPOINTMENT (OUTPATIENT)
Dept: RESPIRATORY THERAPY | Age: 86
DRG: 177 | End: 2021-02-08
Payer: MEDICARE

## 2021-02-08 VITALS
BODY MASS INDEX: 21.06 KG/M2 | DIASTOLIC BLOOD PRESSURE: 70 MMHG | HEIGHT: 67 IN | SYSTOLIC BLOOD PRESSURE: 131 MMHG | TEMPERATURE: 98 F | HEART RATE: 80 BPM | RESPIRATION RATE: 23 BRPM | OXYGEN SATURATION: 91 % | WEIGHT: 134.2 LBS

## 2021-02-08 PROBLEM — E44.0 MODERATE MALNUTRITION (HCC): Chronic | Status: ACTIVE | Noted: 2021-02-08

## 2021-02-08 PROBLEM — J96.01 ACUTE RESPIRATORY FAILURE WITH HYPOXIA (HCC): Status: ACTIVE | Noted: 2021-02-08

## 2021-02-08 PROBLEM — K65.0 ACUTE PERITONITIS (HCC): Status: RESOLVED | Noted: 2018-12-17 | Resolved: 2021-02-08

## 2021-02-08 PROCEDURE — 94640 AIRWAY INHALATION TREATMENT: CPT

## 2021-02-08 PROCEDURE — 94761 N-INVAS EAR/PLS OXIMETRY MLT: CPT

## 2021-02-08 PROCEDURE — 2500000003 HC RX 250 WO HCPCS: Performed by: STUDENT IN AN ORGANIZED HEALTH CARE EDUCATION/TRAINING PROGRAM

## 2021-02-08 PROCEDURE — 92611 MOTION FLUOROSCOPY/SWALLOW: CPT

## 2021-02-08 PROCEDURE — 74230 X-RAY XM SWLNG FUNCJ C+: CPT

## 2021-02-08 PROCEDURE — 6370000000 HC RX 637 (ALT 250 FOR IP): Performed by: STUDENT IN AN ORGANIZED HEALTH CARE EDUCATION/TRAINING PROGRAM

## 2021-02-08 PROCEDURE — 6370000000 HC RX 637 (ALT 250 FOR IP): Performed by: INTERNAL MEDICINE

## 2021-02-08 PROCEDURE — 6360000002 HC RX W HCPCS: Performed by: INTERNAL MEDICINE

## 2021-02-08 PROCEDURE — 99239 HOSP IP/OBS DSCHRG MGMT >30: CPT | Performed by: FAMILY MEDICINE

## 2021-02-08 PROCEDURE — 2580000003 HC RX 258: Performed by: INTERNAL MEDICINE

## 2021-02-08 PROCEDURE — 92526 ORAL FUNCTION THERAPY: CPT

## 2021-02-08 RX ORDER — PREDNISONE 20 MG/1
40 TABLET ORAL DAILY
Qty: 8 TABLET | Refills: 0 | Status: SHIPPED | OUTPATIENT
Start: 2021-02-09 | End: 2021-02-13

## 2021-02-08 RX ORDER — AMOXICILLIN AND CLAVULANATE POTASSIUM 875; 125 MG/1; MG/1
1 TABLET, FILM COATED ORAL 2 TIMES DAILY
Qty: 10 TABLET | Refills: 0 | Status: SHIPPED | OUTPATIENT
Start: 2021-02-08 | End: 2021-02-13

## 2021-02-08 RX ADMIN — ASPIRIN 325 MG: 325 TABLET, COATED ORAL at 09:05

## 2021-02-08 RX ADMIN — FINASTERIDE 5 MG: 5 TABLET, FILM COATED ORAL at 09:05

## 2021-02-08 RX ADMIN — SODIUM CHLORIDE, PRESERVATIVE FREE 10 ML: 5 INJECTION INTRAVENOUS at 09:06

## 2021-02-08 RX ADMIN — GLYCOPYRROLATE AND FORMOTEROL FUMARATE 2 PUFF: 9; 4.8 AEROSOL, METERED RESPIRATORY (INHALATION) at 08:52

## 2021-02-08 RX ADMIN — Medication 1 TABLET: at 09:05

## 2021-02-08 RX ADMIN — ALBUTEROL SULFATE 2.5 MG: 2.5 SOLUTION RESPIRATORY (INHALATION) at 12:08

## 2021-02-08 RX ADMIN — PIPERACILLIN AND TAZOBACTAM 3375 MG: 3; .375 INJECTION, POWDER, LYOPHILIZED, FOR SOLUTION INTRAVENOUS at 05:42

## 2021-02-08 RX ADMIN — CARVEDILOL 3.12 MG: 3.12 TABLET, FILM COATED ORAL at 09:05

## 2021-02-08 RX ADMIN — ALBUTEROL SULFATE 2.5 MG: 2.5 SOLUTION RESPIRATORY (INHALATION) at 08:44

## 2021-02-08 RX ADMIN — BARIUM SULFATE 50 ML: 0.81 POWDER, FOR SUSPENSION ORAL at 10:40

## 2021-02-08 RX ADMIN — PREDNISONE 40 MG: 20 TABLET ORAL at 09:05

## 2021-02-08 RX ADMIN — BARIUM SULFATE 10 ML: 400 PASTE ORAL at 10:39

## 2021-02-08 NOTE — DISCHARGE SUMMARY
Discharge Summary    Date: 2/8/2021  Patient Name: Liliana Allen YOB: 1926 Age: 80 y.o. Admit Date: 2/5/2021  Discharge Date: 2/8/2021  Discharge Condition: [address]    Admission Diagnosis  Acute respiratory failure with hypoxia (HCC) (J96.01)     Discharge Diagnosis  Principal Problem: Acute on chronic respiratory failure with hypoxia (HCC)Active Problems: Stage 3 severe COPD by GOLD classification ([address] Prostate cancer metastatic to bone (HCC)  Benign prostatic hyperplasia with urinary obstruction  Stage 2 chronic kidney disease  Pneumonia of both lower lobes due to infectious organism  Dysphagia  Moderate malnutrition (HCC)  Acute respiratory failure with hypoxia (HCC)Resolved Problems:  * No resolved hospital problems. [facility] Stay  Narrative of Hospital Course:  80 y. o. male w/ PMH of metastatic prostate cancer, severe COPD, and dysphagia who presented to [address] [place] [address] on 2/05 with 10 day history of increased fatigue, weakness, productive cough, sob, and poor PO intake. Was diagnosed with aspiration pneumonia. Has been on soft diet for years now, but says he has been cheating a little more and has been slacking on muscle strengthening exercises.   In the ED, Pulse Ox 82% on RA, had to be placed on 4L NC. Na 132, Cl 92, BUN 26 and Cr 1.2. WBC 11.5. Procal 0.10. Troponin 0.014. Negative COVID-19 test      CXR new right lower lobe infiltrates and possible developing infiltrates left upper lobe.     Treated as COPD exacerbation and aspiration pneumonia with zosyn, steroids, breathing treatments. Patient continued to be weaned off oxygen and was nonhypoxic on RA for some time, but did have desaturations at times with minimal exertion. Pulmonology consulted who agreed with plan. SLP consulted, MBS ordered, and recommended continued soft diet. Also given supplemental O2, 2L at rest and 4lpm with exertion. Likely to improve after full course of steroids and antibiotics. Discharged with augmentin and close PCP followup. Consultants:  IP CONSULT TO PULMONOLOGYIP CONSULT TO DIETITIAN    Surgeries/procedures Performed:       Treatments:    Antibiotics, IV Hydration and Respiratory Therapy    Zosyn, O2 and Albuterol/Atropine Nebulizer    Discharge Plan/Disposition:  Home    Hospital/Incidental Findings Requiring Follow Up:    Patient Instructions:    Diet: Low Fat, Low Cholesterol Diet    Activity:Activity as Tolerated  For number of days (if applicable): Other Instructions:    Provider Follow-Up:   No follow-ups on file.      Significant Diagnostic Studies:    Recent Labs:  Admission on 02/05/2021, Discharged on 02/08/2021Ventricular Rate                              Date: 02/05/2021Value: 77          Ref range: BPM                Status: FinalAtrial Rate                                   Date: 02/05/2021Value: 77          Ref range: BPM                Status: FinalP-R Interval                                  Date: 02/05/2021Value: 162         Ref range: ms                 Status: FinalQRS Duration                                  Date: 02/05/2021Value: 84          Ref range: ms                 Status: FinalQ-T Interval                                  Date: 02/05/2021Value: 388         Ref range: ms                 Status: FinalQTc Calculation (Bazett)                      Date: 02/05/2021Value: 439         Ref range: ms                 Status: FinalP Axis                                        Date: 02/05/2021Value: 77          Ref range: degrees            Status: FinalR Axis                                        Date: 02/05/2021Value: 70          Ref range: degrees            Status: FinalT Axis                                        Date: 02/05/2021Value: 78          Ref range: degrees            Status: [address] [facility]                                           Date: 02/05/2021Value: 11.5*       Ref range: 4.8 - 10.8 thou/*  Status: FinalRB Date: 02/05/2021Value: 4.11*       Ref range: 4.70 - 6.10 mill*  Status: FinalHemoglobin                                    Date: 02/05/2021Value: 14.5        Ref range: 14.0 - 18.0 gm/dl  Status: FinalHematocrit                                    Date: 02/05/2021Value: 42.8        Ref range: 42.0 - 52.0 %      Status: FinalMCV                                           Date: 02/05/2021Value: 104.1*      Ref range: 80.0 - 94.0 fL     Status: [address] [place]                                           Date: 02/05/2021Value: 35.3*       Ref range: 26.0 - 33.0 pg     Status: [address]                                          Date: 02/05/2021Value: 33.9        Ref range: 32.2 - 35.5 gm/dl  Status: FinalRDW-CV                                        Date: 02/05/2021Value: 12.2        Ref range: 11.5 - 14.5 %      Status: FinalRDW-SD                                        Date: 02/05/2021Value: 47.0*       Ref range: 35.0 - 45.0 fL     Status: FinalPlatelets                                     Date: 02/05/2021Value: 212         Ref range: 130 - 400 thou/m*  Status: FinalMPV                                           Date: 02/05/2021Value: 9.4         Ref range: 9.4 - 12.4 fL      Status: FinalSeg Neutrophils                               Date: 02/05/2021Value: 87.8        Ref range: %                  Status: FinalLymphocytes                                   Date: 02/05/2021Value: 3.8         Ref range: %                  Status: FinalMonocytes                                     Date: 02/05/2021Value: 7.7         Ref range: %                  Status: FinalEosinophils                                   Date: 02/05/2021Value: 0.1         Ref range: %                  Status: FinalBasophils                                     Date: 02/05/2021Value: 0.3         Ref range: %                  Status: FinalImmature Granulocytes                         Date: 02/05/2021Value: 0.3         Ref range: %                  Status: FinalSegs Comment: Performed at [address] [place] [address] [id number]                                    Date: 02/05/2021Value: 0.014*      Ref range: ng/ml              Status: Final              Comment: <0.010 ng/ml        Normal> or = 0.010 ng/ml  Elevated   (99%)                    Consistent with myocardial damageCardiac troponin values can be elevated by many disease states in additionto acute ischemia. These include, but are not limited to: chronic renalfailure, CHF, CVA, pulmonary embolus, COPD, myocardial trauma/surgery,myocarditis, pericarditis, tachycardia, aortic dissection, amyloidosis,sepsis and strenuous exercise. Serial measurement of troponin is stronglyrecommended as a first step in determining whether a low level elevationrepresents an acute or chronic condition. Performed at [address] [place] [address], [address]                                       Date: 02/05/2021Value: 762.0       Ref range: 0.0 - 1800.0 pg/*  Status: Final              Comment:   Values < 300 pg/ml \"rule out\", or make the probability of heart failure highly unlikely. Values which \"rule in\" heart failue are as follows: AGE                  RANGE       <50 years            >450 pg/ml       50-75 years          >900 pg/ml       >75 years            >1800 pg/mlPerformed at [address] [place] [id number]                                 Date: 02/05/2021Value: 0.10*       Ref range: 0.01 - 0.09 ng/mL  Status: Final              Comment: Suspected Sepsis:<0.50 ng/mL Low likelihood of sepsis. 0.50-2.00 ng/mL Increased likelihood of sepsis. Antibiotics encouraged. >2.00 ng/mL High risk of sepsis/shock. Antibiotics strongly encouraged. Suspected Lower Resp Tract Infections:<0.24 ng/mL Low likelihood of bacterial infection. >0.24 ng/mL Increased likelihood of bacterial infection. Antibiotics encouraged. With successful antibiotic therapy, PCT levels should decrease rapidly. (Half-life of 24 to 36 hours. )Procalcitonin values from samples collected within the first 6hours of systemic infection may still be low. Retesting may be indicated. Values from day 1 and day 4 can be entered into the Change inProcalcitonin Calculator (www.[facility]-pct-calculator. com)to determine the patient's Mortality Risk Prognosis. In healthy neonates, plasma Procalcitonin (PCT) concentrations increasegradually after birth, reaching peak values at about 24 hours of age thendecrease to normal value                       s below 0.5 ng/mL by 48-72 hours of age. Performed at [address] [place] [address], NAAT                              Date: 02/05/2021Value: NOT DETECTED                   Ref range: NOT DETECTED       Status: Final              Comment: Rapid NAAT:   Negative results should be treated as presumptive and,if inconsistent with clinical signs and symptoms or necessary forpatient management, should be tested with an alternative molecularassay. Negative results do not preclude SARS-CoV-2 infection andshould not be used as the sole basis for patient management decisions. This test has been authorized by the FDA under an Emergency UseAuthorization (EUA) for use by authorized laboratories. Fact sheet for [employer] sheet for Patients: Kirit.dk: Isothermal Nucleic Acid AmplificationPerformed at [address] [place] [address] [id number] Acid, Sepsis                           Date: 02/05/2021Value: 1.5         Ref range: 0.5 - 1.9 mmol/L   Status: Final              Comment: Performed at [address] [place] [address] [id number] Culture, Routine                        Date: 02/05/2021Value: No growth-preliminary                      Status: PreliminaryBlood Culture, Routine                        Date: 02/05/2021Value: No growth-preliminary                      Status: PreliminaryAnion Gap                                     Date: 02/05/2021Value: 11.0        Ref range: 8.0 - 16.0 meq/L   Status: Final              Comment: ANION GAP = Sodium -(Chloride + CO2)Performed at [address] [place] [address], [address] [facility] Filt Rate                           Date: 02/05/2021Value: 64*         Ref range: ml/min/1.73m2      Status: Final              Comment: Stage Description                    GFR, ml/min/1.73 m2 -   At increased risk               > or = 60 (with chronic                                     kidney disease risk factors) 1   Normal or increased GFR         > or = 90 2   Mildly or decreased GFR         60 - 89 3   Moderately decreased GFR        30 - 59 4   Severely decreased GFR          15 - 29 5   Kidney failure                  <15 (or dialysis)Estimated GFR calculated using abbreviated MDRD formula asrecommended by [employer]. Calculation basedupon serum creatinine and adjusted for age, gender & race. Jessie. Internal Med., Vol. 139 (2) pg 137-147. Performed at [address] [place] [address]                               Date: 02/05/2021Value: 270.5*      Ref range: 275.0 - 300.0 mO*  Status: Final              Comment: Performed at [address] [place] [id number]                                        Date: 02/06/2021Value: 137         Ref range: 135 - 145 meq/L    Status: FinalPotassium                                     Date: 02/06/2021Value: 3.8         Ref range: 3.5 - 5.2 meq/L    Status: FinalChloride                                      Date: 02/06/2021Value: 98          Ref range: 98 - 111 meq/L     Status: FinalCO2                                           Date: 02/06/2021Value: 29          Ref range: 23 - 33 meq/L      Status: FinalGlucose                                       Date: 02/06/2021Value: 96          Ref range: 70 - 108 mg/dL     Status: FinalBUN                                           Date: 02/06/2021Value: 18          Ref range: 7 - 22 mg/dL       Status: FinalCREATININE                                    Date: 02/06/2021Value: 1.1         Ref range: 0.4 - 1.2 mg/dL    Status: FinalCalcium                                       Date: 02/06/2021Value: 8.5         Ref range: 8.5 - 10.5 mg/dL   Status: Final              Comment: Performed at [address] [place] [address], [address]                                           Date: 02/06/2021Value: 7.1         Ref range: 4.8 - 10.8 thou/*  Status: FinalRBC                                           Date: 02/06/2021Value: 3.31*       Ref range: 4.70 - 6.10 mill*  Status: FinalHemoglobin                                    Date: 02/06/2021Value: 11.5*       Ref range: 14.0 - 18.0 gm/dl  Status: FinalHematocrit                                    Date: 02/06/2021Value: 34.7*       Ref range: 42.0 - 52.0 %      Status: FinalMCV                                           Date: 02/06/2021Value: 104.8*      Ref range: 80.0 - 94.0 fL     Status: [address]                                           Date: 02/06/2021Value: 34.7*       Ref range: 26.0 - 33.0 pg     Status: [address]                                          Date: 02/06/2021Value: 33.1        Ref range: 32.2 - 35.5 gm/dl  Status: FinalRDW-CV                                        Date: 02/06/2021Value: 12.3        Ref range: 11.5 - 14.5 %      Status: FinalRDW-SD                                        Date: 02/06/2021Value: 47.8*       Ref range: 35.0 - 45.0 fL     Status: FinalPlatelets                                     Date: 02/06/2021Value: 189         Ref range: 130 - 400 thou/m*  Status: FinalMPV                                           Date: 02/06/2021Value: 9.2*        Ref range: 9.4 - 12.4 fL      Status: FinalSeg Neutrophils                               Date: 02/06/2021Value: 77.1        Ref range: %                  Status: FinalLymphocytes                                   Date: 02/06/2021Value: 9.7         Ref range: %                  Status: FinalMonocytes                                     Date: 02/06/2021Value: 11.3        Ref range: %                  Status: FinalEosinophils                                   Date: 02/06/2021Value: 1.1         Ref range: %                  Status: FinalBasophils                                     Date: 02/06/2021Value: 0.1         Ref range: %                  Status: FinalImmature Granulocytes                         Date: 02/06/2021Value: 0.7         Ref range: %                  Status: FinalSegs Absolute                                 Date: 02/06/2021Value: 5.5         Ref range: 1.8 - 7.7 thou/m*  Status: FinalLymphocytes Absolute                          Date: 02/06/2021Value: 0.7*        Ref range: 1.0 - 4.8 thou/m*  Status: FinalMonocytes Absolute                            Date: 02/06/2021Value: 0.8         Ref range: 0.4 - 1.3 thou/m*  Status: FinalEosinophils Absolute                          Date: 02/06/2021Value: 0.1         Ref range: 0.0 - 0.4 thou/m*  Status: FinalBasophils Absolute                            Date: 02/06/2021Value: 0.0         Ref range: 0.0 - 0.1 thou/m*  Status: FinalImmature Grans (Abs)                          Date: 02/06/2021Value: 0.05        Ref range: 0.00 - 0.07 thou*  Status: FinalnRBC                                          Date: 02/06/2021Value: 0           Ref range: /100 wbc           Status: Final              Comment: Performed at [address] [place] [id number]                                     Date: 02/06/2021Value: 1.9         Ref range: 1.6 - 2.4 mg/dL    Status: Final              Comment: Performed at [address] [place] [address] [address] [place]                                     Date: 02/06/2021Value: 10.0        Ref range: 8.0 - 16.0 meq/L   Status: Final              Comment: ANION GAP = Sodium -(Chloride + CO2)Performed at [facility], [address], [facility] Fil Rate                           Date: 02/06/2021Value: 58*         Ref range: ml/min/1.73m2      Status: Final              Comment: Stage Description                    GFR, ml/min/1.73 m2 -   At increased risk               > or = 60 (with chronic                                     kidney disease risk factors) 1   Normal or increased GFR         > or = 90 2   Mildly or decreased GFR         60 - 89 3   Moderately decreased GFR        30 - 59 4   Severely decreased GFR          15 - 29 5   Kidney failure                  <15 (or dialysis)Estimated GFR calculated using abbreviated MDRD formula asrecommended by [employer]. Calculation basedupon serum creatinine and adjusted for age, gender & race. Jessie. Internal Med., Vol. 139 (2) pg 137-147. Performed at [facility], [address] [place] [address]                               Date: 02/06/2021Value: 275.6       Ref range: 275.0 - 300.0 mO*  Status: Final              Comment: Performed at [facility], [facility]                                           Date: 02/07/2021Value: 1.02*       Ref range: 0.00 - 1.00 ng/mL  Status: Final              Comment: NCCN Prostate Cancer Early Detection GuidelinesAge 45-75  PSA <1 ng/ml, SERGIO Normal(if done)-->repeat testing at  2-4 year intervals. PSA 1-3 ng/ml, SERGIO Normal(if done)-->repeat testing at  1-2 year intervals. PSA >3 ng/ml or very suspicious SERGIO-->Consider referral for  biopsy. Age >75, in select patients  PSA <3 ng/ml, SERGIO normal(if done, and no other indications  for biopsy) -->repeat testing in select patients at  1-4 year intervals. PSA >3 ng/ml or very suspicious SERGIO-->Consider referral for  biopsy. Performed at [address] [place] [id number]                                           Date: 02/07/2021Value: 5.2         Ref range: 4.8 - 10.8 thou/*  Status: FinalRBC                                           Date: 02/07/2021Value: 3.30*       Ref range: 4.70 - 6.10 mill*  Status: FinalHemoglobin                                    Date: 02/07/2021Value: 11.5*       Ref range: 14.0 - 18.0 gm/dl  Status: FinalHematocrit                                    Date: 02/07/2021Value: 35.6*       Ref range: 42.0 - 52.0 %      Status: FinalMCV                                           Date: 02/07/2021Value: 107.9*      Ref range: 80.0 - 94.0 fL     Status: [address]                                           Date: 02/07/2021Value: 34.8*       Ref range: 26.0 - 33.0 pg     Status: [address]                                          Date: 02/07/2021Value: 32.3        Ref range: 32.2 - 35.5 gm/dl  Status: FinalRDW-CV                                        Date: 02/07/2021Value: 12.2        Ref range: 11.5 - 14.5 %      Status: FinalRDW-SD                                        Date: 02/07/2021Value: 47.7*       Ref range: 35.0 - 45.0 fL     Status: FinalPlatelets                                     Date: 02/07/2021Value: 189         Ref range: 130 - 400 thou/m*  Status: FinalMPV                                           Date: 02/07/2021Value: 9.1*        Ref range: 9.4 - 12.4 fL      Status: Final              Comment: Performed at [address] [place] [id number]                                        Date: 02/07/2021Value: 137         Ref range: 135 - 145 meq/L    Status: FinalPotassium                                     Date: 02/07/2021Value: 3.7         Ref range: 3.5 - 5.2 meq/L    Status: FinalChloride                                      Date: 02/07/2021Value: 101         Ref range: 98 - 111 meq/L     Status: FinalCO2                                           Date: 02/07/2021Value: 28          Ref range: 23 - 33 meq/L      Status: FinalGlucose                                       Date: 02/07/2021Value: 80          Ref range: 70 - 108 mg/dL     Status: FinalBUN                                           Date: 02/07/2021Value: 15          Ref range: 7 - 22 mg/dL       Status: FinalCREATININE                                    Date: 02/07/2021Value: 0.9         Ref range: 0.4 - 1.2 mg/dL    Status: FinalCalcium                                       Date: 02/07/2021Value: 8.7         Ref range: 8.5 - 10.5 mg/dL   Status: Final              Comment: Performed at [address] [place] [address], [address] [place]                                     Date: 02/07/2021Value: 8.0         Ref range: 8.0 - 16.0 meq/L   Status: Final              Comment: ANION GAP = Sodium -(Chloride + CO2)Performed at [address] [place] [address], [facility] Fil Rate                           Date: 02/07/2021Value: 66*         Ref range: ml/min/1.73m2      Status: Final              Comment: Stage Description                    GFR, ml/min/1.73 m2 -   At increased risk               > or = 60 (with chronic                                     kidney disease risk factors) 1   Normal or increased GFR         > or = 90 2   Mildly or decreased GFR         60 - 89 3   Moderately decreased GFR        30 - 59 4   Severely decreased GFR          15 - 29 5   Kidney failure                  <15 (or dialysis)Estimated GFR calculated using abbreviated MDRD formula asrecommended by [employer]. Calculation basedupon serum creatinine and adjusted for age, gender & race. Jessie. Internal Med., Vol. 139 (2) pg 137-147. Performed at [facility] [place], [address]------------    Radiology last 7 days:  Cta Chest W Wo ContrastResult Date: 2/5/2021Bilateral lower lobe consolidation. Atypical or viral type pneumonia cannot be excluded. Correlate for for infiltrate. Extensive emphysema.  Extensive known bony metastatic onceHistorical Medbicalutamide (CASODEX) 50 MG chemo tabletTake 1 tablet by mouth daily, Disp-90 tablet, R-3Normalalbuterol sulfate HFA (VENTOLIN HFA) 108 (90 Base) MCG/ACT inhalerInhale 2 puffs into the lungs every 6 hours as needed for Wheezing or Shortness of Breath, Disp-1 Inhaler, R-3Normalfinasteride (PROSCAR) 5 MG tabletTake 1 tablet by mouth daily, Disp-90 tablet, R-3NormalhydrOXYzine (ATARAX) 10 MG tabletTake 1-2 tablets by mouth every 8 hours as needed for Itching, Disp-30 tablet, R-2Normalcarvedilol (COREG) 3.125 MG tabletTAKE 1 TABLET BY MOUTH TWICE DAILY WITH MEALS, Disp-180 tablet, R-3NormalTiotropium Bromide-Olodaterol (STIOLTO RESPIMAT) 2.5-2.5 MCG/ACT AERSINHALE 2 PUFFS BY MOUTH ONCE DAILY, Disp-4 g, R-11Normaldoxazosin (CARDURA) 4 MG tabletTake 1 tablet by mouth nightly, Disp-90 tablet,R-3Please consider 90 day supplies to promote better adherenceNormalCholecalciferol (VITAMIN D3) 125 MCG (5000 UT) CAPSTake 1 capsule by mouth every morning (before breakfast)OTCMisc.  Devices (ACAPELLA) MISC4 TIMES DAILY Starting Tue 4/30/2019, Disp-1 each, R-0, NO PRINTcalcium carbonate-vitamin D (CALCIUM 600 + D) 600-400 MG-UNIT TABS per tabTake 1 tablet by mouth 2 times daily, Disp-60 tablet, R-5NormalMultiple Vitamins-Minerals (THERAPEUTIC MULTIVITAMIN-MINERALS) tabletTake 1 tablet by mouth dailyDC to SNFtiZANidine (ZANAFLEX) 2 MG tabletTake 1 tablet by mouth every 8 hours as needed (spasm)DC to SNFdocusate sodium (COLACE, DULCOLAX) 100 MG CAPSTake 100 mg by mouth 2 times dailyDC to SNFacetaminophen (TYLENOL) 500 MG tabletTake 500 mg by mouth every 8 hours as needed for PainHistorical Medaspirin 325 MG EC tabletTake 1 tablet by mouth dailyOTCleuprolide (LUPRON) 7.5 MG injectionInject 7.5 mg into the muscle once Every 6 monthsHistorical Med    Discharge Medication List as of 2/8/2021  2:48 PM    Time Spent on Discharge:3E] minutes were spent in patient examination, evaluation, counseling as well as medication reconciliation, prescriptions for required medications, discharge plan, and follow up.     Electronically signed by Hilario Perez MD on 2/8/21 at 5:38 PM EST

## 2021-02-08 NOTE — PROGRESS NOTES
Patient was evaluated today for the diagnosis of Severe COPD, acute hypoxic respiratory failure, community acquired pneumonia. I entered a DME order for home oxygen because the diagnosis and testing requires the patient to have supplemental oxygen. Condition will improve or be benefited by oxygen use. The patient is able to perform good mobility in a home setting and therefore does require the use of a portable oxygen system. The need for this equipment was discussed with the patient and he understands and is in agreement. Patient needs 2lpm at rest and 2 lpm with exertion.

## 2021-02-08 NOTE — TELEPHONE ENCOUNTER
Amrik Lopez called again wanted to verify that patient did stop taking the xtandi. She was advised that he did because he was not able to swallow the pills.

## 2021-02-08 NOTE — PROGRESS NOTES
Discharge teaching and instructions for diagnosis/procedure of pneumonia completed with patient using teachback method. AVS reviewed. Printed prescriptions given to patient. Patient voiced understanding regarding prescriptions, follow up appointments, and care of self at home. Discharged in a wheelchair to  home with support per family.

## 2021-02-08 NOTE — PROGRESS NOTES
Comprehensive Nutrition Assessment    Type and Reason for Visit:  Initial, Consult(poor appetite/intake prior to admit)    Nutrition Recommendations/Plan:   Further diet texture modifications per SLP. ONS initiated: Ensure Compact BID. Consider MVI. Nutrition Assessment:    Pt. moderately malnourished AEB criteria as listed below. At risk for further nutrition compromise r/t acute respiratory failure with hypoxia, pneumonia, COPD, dysphagia, advanced age, catabolic illness-stage 4 prostate Ca and underlying medical condition (hx: CAD, HLD, HH, CAD, severe COPD, GERD, CKD, prostate cancer with mets to bone, vitamin D, PE, smoking). Nutrition recommendations/interventions as per above. Malnutrition Assessment:  Malnutrition Status: Moderate malnutrition    Context:  Chronic Illness     Findings of the 6 clinical characteristics of malnutrition:  Energy Intake:  7 - 75% or less estimated energy requirements for 1 month or longer  Weight Loss:  No significant weight loss(12.4% loss in the last 8 months)     Body Fat Loss:  1 - Mild body fat loss Orbital   Muscle Mass Loss:  1 - Mild muscle mass loss Clavicles (pectoralis & deltoids), Temples (temporalis)  Fluid Accumulation:  No significant fluid accumulation     Strength:  Not Performed    Estimated Daily Nutrient Needs:  Energy (kcal):  5094-3419 kcals (30-35 kcals/kg/day); Weight Used for Energy Requirements:  Current(61 kg 2/8/21)     Protein (g):  73 grams or more (1.2 grams/kg/day);  Weight Used for Protein Requirements:  Current(61 kg) Nutrition Related Findings:  Thin. Pt and daughter seen. Appetite/intake fluctuates over the last 2 years when dx with prostate Ca. Recently had to be restarted on oral chemotherapy ~ 7 weeks ago, causes weakness, appetite changes. Pt had radiation therapy to his spine 2 years ago which caused damage to his esophagus and dysphagia. S/p MBS today, results noted. He uses ONS at home daily, agreed to while here as well. 2/7/21: Sodium 137, Potassium 3.7, BUN 15, Creatinine 0.9, Glucose 86. Rx: prednisone, zosyn, oscal-D. BM 2/6/21. Oral intake since admit variable: 1-25%, 26-50%, 51-75%. Wounds:  None       Current Nutrition Therapies:    Dietary Nutrition Supplements: Standard High Calorie Oral Supplement  DIET DYSPHAGIA SOFT AND BITE-SIZED; Low Sodium (2 GM)  Dietary Nutrition Supplements: Low Volume Supplement    Anthropometric Measures:  · Height: 5' 7\" (170.2 cm)  · Current Body Weight: 134 lb 3.2 oz (60.9 kg)(2/8/21 no edema)   · Admission Body Weight: 130 lb 1.6 oz (59 kg)(2/5/21 no edema)    · Usual Body Weight: (130-140#'s per pt. Per EMR: 6/17/20: 148#8oz)     · Ideal Body Weight: 148 lbs; % Ideal Body Weight 90.7 %   · BMI: 21  · Adjusted Body Weight:  ; No Adjustment   · BMI Categories: Underweight (BMI less than 22) age over 72       Nutrition Diagnosis:   · Moderate malnutrition, In context of chronic illness related to inadequate protein-energy intake, catabolic illness as evidenced by poor intake prior to admission, mild loss of subcutaneous fat, mild muscle loss      Nutrition Interventions:   Food and/or Nutrient Delivery:  Continue Current Diet, Start Oral Nutrition Supplement  Nutrition Education/Counseling:  Education initiated(Encouraged oral intake and ONS use)   Coordination of Nutrition Care:  Continue to monitor while inpatient    Goals:  Patient will safely consume 75% or more of meals during LOS.        Nutrition Monitoring and Evaluation: Behavioral-Environmental Outcomes:  None Identified   Food/Nutrient Intake Outcomes:  Diet Advancement/Tolerance, Food and Nutrient Intake, Supplement Intake, Vitamin/Mineral Intake  Physical Signs/Symptoms Outcomes:  Biochemical Data, Chewing or Swallowing, GI Status, Nutrition Focused Physical Findings, Skin, Weight     Discharge Planning:     Too soon to determine     Electronically signed by Lourdes Newman RD, LD on 2/8/21 at 1:14 PM EST    Contact: [phone]

## 2021-02-08 NOTE — PLAN OF CARE
Problem: Nutrition  Goal: Optimal nutrition therapy  Outcome: Ongoing   Nutrition Problem #1: Moderate malnutrition, In context of chronic illness  Intervention: Food and/or Nutrient Delivery: Continue Current Diet, Start Oral Nutrition Supplement  Nutritional Goals: Patient will safely consume 75% or more of meals during LOS.

## 2021-02-08 NOTE — TELEPHONE ENCOUNTER
Spoke with Jessy Schlatter, [place]. Patient is currently in the hospital admitted for what they think may be aspiration pneumonia. He will have barium swallow today. They have been holding the casadex as of now since Wednesday. They will call us back once patient is released with an update. NICHOLAS

## 2021-02-08 NOTE — PROGRESS NOTES
[facility]  SPEECH THERAPY  STRZ RENAL TELEMETRY 6K  Modified Barium Swallow + Dysphagia therapy     SLP Individual Minutes  Time In: 7437  Time Out: 7460  Minutes: 21  Timed Code Treatment Minutes: 0 Minutes   MBS: 13 minutes   Dysphagia therapy: 8 minutes        Date: 2021  Patient Name: Neil Cullen      CSN: [id number]   : 1926  (89 y.o.)  Gender: male   Referring Physician: Shahbaz Franklin MD  Diagnosis: Acute respiratory failure with hypoxia   Secondary Diagnosis: Dysphagia   Precautions: Fall risk History of Present Illness/Injury: Per chart review, \"Yasir Goel is a 80 y. o. male who presented to [address] [place] [address] with 10 day history of increased fatigue, weakness, productive cough, sob, and poor PO intake. Pt has a pmh of Stage 4 Prostate Cancer with Bone Mets, Severe COPD, HTN, DVT (not on [address]), and CAD. Pt follows up with Urology for his malignancy, is currently on Casodex chemotherapy pills that were started 12/16/2020. Daughter at bedside as well. Pt lives alone at home, has a lot of help with his children who come over everyday. Pt does have difficulty swallowing pills and food at times, choking however clearing it up. Pt has been having poor PO intake over the past 4 days as well. No recent history of travel or being around anyone with COVID-19. Pt denies any fevers, chills, diarrhea, chest pain, abdominal pain, burning with urination, or focal neurological deficits. In the ED, Pulse Ox 82% on RA, had to be placed on 4L NC. Na 132, Cl 92, BUN 26 and Cr 1.2. WBC 11.5. Procal 0.10. Troponin 0.014. Negative COVID-19 test. CXR new right lower lobe infiltrates and possible developing infiltrates left upper lobe. ST completion of clinical swallowing evaluation with reports from pt/family that pt with hx of HNC with radiation to the neck \"a couple of years ago\". Pt with worsening reported dysphagia. Recommendations for MBS to further assess and r/o pharyngeal dysfunction. DIAGNOSTIC IMPRESSIONS:  Pt presents with mild-moderate oropharyngeal dysphagia evidenced by the above skilled level findings. Pt demonstrations of slow mastication and AP transit with decreased bolus cohesion resulting in mild oral stasis to follow. Partial clearance with volitional double swallows. Pt with decreased bolus control resulting in premature pharyngeal entry to the level of the valleculae. Pt with decreased laryngeal elevation and anterior excursion with poor epiglottic inversion resulting in decreased airway protection with laryngeal penetration of thin barium by cup/straw DURING swallow swallow. Penetration mostly transient with only trace stasis remaining midway to the vocal folds. No observed tracheal aspiration. In addition, pt with decreased pharyngeal constriction. Mild vallecular stasis and residuals remaining at the posterior pharyngeal wall. Only partial clearance achieved with utilization of liquid wash. Recommendations for continuation of present diet. ST to follow up with establishment of HEP. DYSPHAGIA THERAPY: Immediately following MBS, the pt was provided with skilled level education and review of swallow study via visual feedback, review of anatomy & physiology in relation to current dysphagia as well as verbal explanation of deficits presenting. Further education re: enhanced pt risks for aspiration given hx of HNC and previous radiation, skilled swallowing strategies recommended to reduce aspiration risks as well as need for ongoing skilled dysphagia interventions/HEP provided.       Diet Recommendations:  Soft/bite sized and thin liquids   Strategies:  Full Upright Position, Small Bite/Sip, No Straw, Multiple Swallow, Pulmonary Monitoring, Medication in Applesauce, Alternate Solids and Liquids and Limit Distractions   Rehabilitation Potential: excellent    EDUCATION:  Learner: Patient and daughter Education:  Reviewed results and recommendations of this evaluation, Reviewed diet and strategies and Reviewed recommendations for follow-up  Evaluation of Education: Verbalizes understanding and Needs further instruction    PLAN:  No further speech therapy services indicated. PATIENT GOAL:    Did not state. Will further assess during treatment.         Kenyon Goodman M.S. Kirit Nicholson 2/8/2021 Orthopedic

## 2021-02-08 NOTE — PROGRESS NOTES
A home oxygen evaluation has been completed. [x]Patient is an inpatient. It is expected that the patient will be discharged within the next 48 hours. Qualified provider to write order for home prescription if patient qualifies. Social service/care managers will arrange for home oxygen. If patient is active, arrange for Home Medical supplier to assess for Oxygen Conserving Device per pulse oximetry. []Patient is an outpatient. Results will be faxed to the ordering provider. Qualified provider to write order for home prescription if patient qualifies and arranges for home oxygen. Patient was placed on room air for 20  minutes. SpO2 was 83 % on room air at rest. Patients SpO2 was below 89% and qualified for home oxygen. Oxygen was applied at 2 lpm via nasal cannula to maintain a SpO2 between 90-92% while at rest. Actual SpO2 was 93 %. Patient can ambulate for exercise flow rate. Patients was ambulated, SpO2 was 90% on 2 lpm to maintain SpO2 between 90-92% while exercising. If oxygen need is greater than 4 lpm the SpO2 on 4 lpm was . Note: For any SpO2 at 09% see policy and procedure for possible qualifications.

## 2021-02-08 NOTE — PROGRESS NOTES
CLINICAL PHARMACY: DISCHARGE MED RECONCILIATION/REVIEW    [facility]) Select Patient?: No  Total # of Interventions Recommended: 0   -   Total # Interventions Accepted: 0  Intervention Severity:   - Level 1 Intervention Present?: No   - Level 2 #: 0   - Level 3 #: 0   Time Spent (min): 15    Additional Documentation  AVS reviewed for discharge.

## 2021-02-08 NOTE — CARE COORDINATION
2/8/21, 11:38 AM EST  DISCHARGE PLANNING EVALUATION:    Juve Bower       Admitted: 2/5/2021/ Liliya 10 day: 3   Location: [facility]17/017-A Reason for admit: Acute respiratory failure with hypoxia ([facility] [address] [J96.01]   PMH:  has a past medical history of Blood circulation, collateral, CAD (coronary artery disease), CAD (coronary artery disease), Cancer ([address] CKD (chronic kidney disease) stage 2, GFR 60-89 ml/min, COPD (chronic obstructive pulmonary disease) ([address] Diverticulitis, DVT of lower extremity, bilateral ([address] GERD (gastroesophageal reflux disease), Hiatal hernia, Hyperlipemia, Hyperlipidemia, Other disorders of kidney and ureter in diseases classified elsewhere, Pleural plaque, Pneumonia of both lower lobes due to infectious organism, Primary adenocarcinoma of prostate ([address] Pulmonary emboli ([address] and Vitamin D deficiency. Barriers to Discharge:  91% on 1L, RN attempting to wean. IV zosyn, nebs [place], po prednisone. Pulm plans to change to po atb today. Awaiting MBS, ST following. PT/OT ordered. PCP: Shyla Ruiz MD  Readmission Risk Score: 18%    Patient Goals/Plan/Treatment Preferences: Spoke with Jayda Vasques and his daughter. Jayda Vasques is from home alone but his children come over every day. He has used HH in the past but neither he or his daughter feel services will be needed. Discussed home oxygen eval and available DME providers, they do not have a preference if oxygen is needed. Will follow evaluation. Transportation/Food Security/Housekeeping Addressed:  No issues identified. 1:52 PM EST Qualified for home oxygen. Jono Younger RN states Jayda Vasques has decided to use [facility]. Michael Diggs notified.

## 2021-02-08 NOTE — PLAN OF CARE
Problem: Pain:  Description: Pain management should include both nonpharmacologic and pharmacologic interventions. Goal: Pain level will decrease  Description: Pain level will decrease  Outcome: Met This Shift  Note: Patient denies any pain. Problem: Pain:  Description: Pain management should include both nonpharmacologic and pharmacologic interventions. Goal: Control of acute pain  Description: Control of acute pain  Outcome: Met This Shift     Problem: Pain:  Description: Pain management should include both nonpharmacologic and pharmacologic interventions. Goal: Control of chronic pain  Description: Control of chronic pain  Outcome: Met This Shift     Problem: Nutrition  Goal: Optimal nutrition therapy  2/8/2021 1418 by Xiomara Carr RN  Outcome: Ongoing  Note: Dietician seeing patient. Ensure provided during meals. Problem: Falls - Risk of:  Goal: Will remain free from falls  Description: Will remain free from falls  Outcome: Ongoing  Note: Bed alarm on, Falling star program in place. Call light within reach. Problem: Falls - Risk of:  Goal: Absence of physical injury  Description: Absence of physical injury  Outcome: Met This Shift     Problem: Discharge Planning:  Goal: Discharged to appropriate level of care  Description: Discharged to appropriate level of care  Outcome: Ongoing  Note: Patient plans to return home at discharge. Lives with son. Daughter helps as well. Problem: Discharge Planning:  Goal: Participates in care planning  Description: Participates in care planning  Outcome: Met This Shift     Problem: Airway Clearance - Ineffective:  Goal: Clear lung sounds  Description: Clear lung sounds  Outcome: Ongoing  Note: Lung sounds diminished.       Problem: Airway Clearance - Ineffective:  Goal: Ability to maintain a clear airway will improve  Description: Ability to maintain a clear airway will improve  Outcome: Ongoing Note: Patient coughing at times. Incentive spirometer at bedside. Problem: Gas Exchange - Impaired:  Goal: Levels of oxygenation will improve  Description: Levels of oxygenation will improve  Outcome: Ongoing  Note: Weaned oxygen to 1 liter. Home O2 eval done. Patient qualified for home oxygen. Problem: Hyperthermia:  Goal: Ability to maintain a body temperature in the normal range will improve  Description: Ability to maintain a body temperature in the normal range will improve  Outcome: Ongoing  Note: No fevers today. Problem: Tobacco Use:  Goal: Will participate in inpatient tobacco-use cessation counseling  Description: Will participate in inpatient tobacco-use cessation counseling  Outcome: Ongoing  Note: Patient does not smoke. Care plan reviewed with patient and daughter. Patient and daughter verbalize understanding of the plan of care and contribute to goal setting.

## 2021-02-09 NOTE — PROGRESS NOTES
CLINICAL PHARMACY NOTE: MEDS TO [address] Select Patient?: No  Total # of Prescriptions Filled: 2   The following medications were delivered to the patient:  Augmentin 875mg  Prednisone 20mg  Total # of Interventions Completed: 2  Time Spent (min): 30    Additional Documentation:

## 2021-02-10 ENCOUNTER — CARE COORDINATION (OUTPATIENT)
Dept: FAMILY MEDICINE CLINIC | Age: 86
End: 2021-02-10

## 2021-02-10 NOTE — CARE COORDINATION
[facility] Transitions Initial Follow Up Call    Outreach made within 2 business days of discharge: Yes    Patient: Leatha Kelley Patient : 1926   MRN: [id number]  Reason for Admission: COPD exacerbation  Discharge Date: 21       Spoke with:valerie Watson    Discharge department/facility: [facility] Interactive Patient Contact:  Was patient able to fill all prescriptions: Yes  Was patient instructed to bring all medications to the follow-up visit: Yes  Is patient taking all medications as directed in the discharge summary? Yes  Does patient understand their discharge instructions: Yes  Does patient have questions or concerns that need addressed prior to 7-14 day follow up office visit: no    Scheduled appointment with PCP within 7-14 days    Follow Up  Future Appointments   Date Time Provider Madeline Betancur   2021  2:50 PM MD MILLI Head [employer] [facility]   2021 10:00 AM Shavon Carrillo APRN - [address]   3/15/2021  9:30 AM [facility]   2021 10:50 AM MD MILLI Head [facility]   Shirley said today he is feeling more like himself. He is less SOB. He is worried he will feel worse when the steroids stop but she reassured him that it will be OK. Meds were reconciled and he will be at his next appointment.      Raul Leroy RN

## 2021-02-11 LAB
BLOOD CULTURE, ROUTINE: NORMAL
BLOOD CULTURE, ROUTINE: NORMAL

## 2021-02-17 ENCOUNTER — CARE COORDINATION (OUTPATIENT)
Dept: FAMILY MEDICINE CLINIC | Age: 86
End: 2021-02-17

## 2021-02-17 NOTE — CARE COORDINATION
He is still on 02 but just at 1 lpm. He is feeling better most of the time. Occasionally he will have a less than stellar day. He has an occasional cough but Jessy Schlatter thinks it is just post nasal. His appetite is Ok and they have no additional needs at home. He will keep his appointment on 2/22/2021.

## 2021-02-22 ENCOUNTER — OFFICE VISIT (OUTPATIENT)
Dept: FAMILY MEDICINE CLINIC | Age: 86
End: 2021-02-22

## 2021-02-22 VITALS
SYSTOLIC BLOOD PRESSURE: 90 MMHG | BODY MASS INDEX: 20.31 KG/M2 | RESPIRATION RATE: 16 BRPM | WEIGHT: 129.38 LBS | DIASTOLIC BLOOD PRESSURE: 60 MMHG | TEMPERATURE: 96.6 F | HEART RATE: 68 BPM | HEIGHT: 67 IN

## 2021-02-22 DIAGNOSIS — C79.51 PROSTATE CANCER METASTATIC TO BONE (HCC): ICD-10-CM

## 2021-02-22 DIAGNOSIS — C61 PROSTATE CANCER METASTATIC TO BONE (HCC): ICD-10-CM

## 2021-02-22 DIAGNOSIS — J18.9 PNEUMONIA OF BOTH LOWER LOBES DUE TO INFECTIOUS ORGANISM: Primary | ICD-10-CM

## 2021-02-22 DIAGNOSIS — I25.10 CORONARY ARTERY DISEASE INVOLVING NATIVE HEART WITHOUT ANGINA PECTORIS, UNSPECIFIED VESSEL OR LESION TYPE: ICD-10-CM

## 2021-02-22 PROCEDURE — 1111F DSCHRG MED/CURRENT MED MERGE: CPT | Performed by: FAMILY MEDICINE

## 2021-02-22 PROCEDURE — 99495 TRANSJ CARE MGMT MOD F2F 14D: CPT | Performed by: FAMILY MEDICINE

## 2021-02-22 RX ORDER — TRAMADOL HYDROCHLORIDE 50 MG/1
50 TABLET ORAL EVERY 6 HOURS PRN
Qty: 90 TABLET | Refills: 0 | Status: SHIPPED | OUTPATIENT
Start: 2021-02-22 | End: 2021-12-14 | Stop reason: SDUPTHER

## 2021-02-22 RX ORDER — CARVEDILOL 3.12 MG/1
TABLET ORAL
Qty: 180 TABLET | Refills: 3 | Status: SHIPPED | OUTPATIENT
Start: 2021-02-22 | End: 2022-03-16

## 2021-02-22 NOTE — PROGRESS NOTES
Post-Discharge Transitional Care Management Services or Hospital Follow Up      Neil Cullen   YOB: 1926    Date of Office Visit:  2/22/2021  Date of Hospital Admission: 2/5/21  Date of Hospital Discharge: 2/8/21  Risk of hospital readmission (high >=14%.  Medium >=10%) :Readmission Risk Score: 19      Care management risk score Rising risk (score 2-5) and Complex Care (Scores >=6): 5     Non face to face  following discharge, date last encounter closed (first attempt may have been earlier): 2/10/2021  2:22 PM    Call initiated 2 business days of discharge: Yes    Patient Active Problem List   Diagnosis    Hyperlipidemia    Pleural plaque    Coronary arteriosclerosis    Urinary bladder stone    Stage 3 severe COPD by GOLD classification ([address]    S/P exploratory laparotomy    Prostate cancer metastatic to bone ([address]    Raised prostate specific antigen    Benign prostatic hyperplasia with urinary obstruction    Trauma of urethra    Acute midline low back pain    Acute postoperative pulmonary insufficiency (HCC)    Acute on chronic respiratory failure with hypoxia ([address]    Centrilobular emphysema ([address]    Asthenia    Stage 2 chronic kidney disease    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    Pathological fracture of sacral vertebra due to neoplastic disease    Mild bibasilar atelectasis    Pleural effusion, right    Microscopic hematuria    Nutritional marasmus ([address]    Pathological fracture of vertebra due to neoplastic disease    Pulmonary embolism (HCC)    Acute deep vein thrombosis of lower limb (HCC)    Adenocarcinoma of prostate ([address]    DVT of lower extremity, bilateral ([address]    Pulmonary emboli (HCC)    Vitamin D deficiency    Pneumonia of both lower lobes due to infectious organism    Dysphagia    Moderate malnutrition ([address]    Acute respiratory failure with hypoxia (HCC)       No Known Allergies Medications listed as ordered at the time of discharge from hospital   [place]   Home Medication Instructions ANDI:    Printed on:02/22/21 6787   Medication Information                      acetaminophen (TYLENOL) 500 MG tablet  Take 500 mg by mouth every 8 hours as needed for Pain             albuterol sulfate HFA (VENTOLIN HFA) 108 (90 Base) MCG/ACT inhaler  Inhale 2 puffs into the lungs every 6 hours as needed for Wheezing or Shortness of Breath             aspirin 325 MG EC tablet  Take 1 tablet by mouth daily             bicalutamide (CASODEX) 50 MG chemo tablet  Take 1 tablet by mouth daily             calcium carbonate-vitamin D (CALCIUM 600 + D) 600-400 MG-UNIT TABS per tab  Take 1 tablet by mouth 2 times daily             carvedilol (COREG) 3.125 MG tablet  TAKE 1 TABLET BY MOUTH TWICE DAILY WITH MEALS             Cholecalciferol (VITAMIN D3) 125 MCG (5000 UT) CAPS  Take 1 capsule by mouth every morning (before breakfast)             docusate sodium (COLACE, DULCOLAX) 100 MG CAPS  Take 100 mg by mouth 2 times daily             doxazosin (CARDURA) 4 MG tablet  Take 1 tablet by mouth nightly             finasteride (PROSCAR) 5 MG tablet  Take 1 tablet by mouth daily             hydrOXYzine (ATARAX) 10 MG tablet  Take 1-2 tablets by mouth every 8 hours as needed for Itching             leuprolide (LUPRON) 7.5 MG injection  Inject 7.5 mg into the muscle once Every 6 months             Misc.  Devices (ACAPELLA) MISC  1 Device by Does not apply route 4 times daily             Multiple Vitamins-Minerals (THERAPEUTIC MULTIVITAMIN-MINERALS) tablet  Take 1 tablet by mouth daily             polyethylene glycol (MIRALAX) 17 g packet  Take 17 g by mouth once             Tiotropium Bromide-Olodaterol (STIOLTO RESPIMAT) 2.5-2.5 MCG/ACT AERS  INHALE 2 PUFFS BY MOUTH ONCE DAILY             tiZANidine (ZANAFLEX) 2 MG tablet  Take 1 tablet by mouth every 8 hours as needed (spasm) traMADol (ULTRAM) 50 MG tablet  Take 50 mg by mouth every 6 hours as needed for Pain. Medications marked \"taking\" at this time  Outpatient Medications Marked as Taking for the 2/22/21 encounter (Office Visit) with Carlene Elizondo MD   Medication Sig Dispense Refill    traMADol (ULTRAM) 50 MG tablet Take 50 mg by mouth every 6 hours as needed for Pain.  polyethylene glycol (MIRALAX) 17 g packet Take 17 g by mouth once      bicalutamide (CASODEX) 50 MG chemo tablet Take 1 tablet by mouth daily 90 tablet 3    albuterol sulfate HFA (VENTOLIN HFA) 108 (90 Base) MCG/ACT inhaler Inhale 2 puffs into the lungs every 6 hours as needed for Wheezing or Shortness of Breath 1 Inhaler 3    finasteride (PROSCAR) 5 MG tablet Take 1 tablet by mouth daily 90 tablet 3    hydrOXYzine (ATARAX) 10 MG tablet Take 1-2 tablets by mouth every 8 hours as needed for Itching 30 tablet 2    carvedilol (COREG) 3.125 MG tablet TAKE 1 TABLET BY MOUTH TWICE DAILY WITH MEALS 180 tablet 3    Tiotropium Bromide-Olodaterol (STIOLTO RESPIMAT) 2.5-2.5 MCG/ACT AERS INHALE 2 PUFFS BY MOUTH ONCE DAILY 4 g 11    doxazosin (CARDURA) 4 MG tablet Take 1 tablet by mouth nightly 90 tablet 3    leuprolide (LUPRON) 7.5 MG injection Inject 7.5 mg into the muscle once Every 6 months      Cholecalciferol (VITAMIN D3) 125 MCG (5000 UT) CAPS Take 1 capsule by mouth every morning (before breakfast)      Misc.  Devices (ACAPELLA) MISC 1 Device by Does not apply route 4 times daily 1 each 0    calcium carbonate-vitamin D (CALCIUM 600 + D) 600-400 MG-UNIT TABS per tab Take 1 tablet by mouth 2 times daily 60 tablet 5    Multiple Vitamins-Minerals (THERAPEUTIC MULTIVITAMIN-MINERALS) tablet Take 1 tablet by mouth daily      docusate sodium (COLACE, DULCOLAX) 100 MG CAPS Take 100 mg by mouth 2 times daily      acetaminophen (TYLENOL) 500 MG tablet Take 500 mg by mouth every 8 hours as needed for Pain 2. Coronary artery disease involving native heart without angina pectoris, unspecified vessel or lesion type  carvedilol (COREG) 3.125 MG tablet    NC DISCHARGE MEDS RECONCILED W/ CURRENT OUTPATIENT MED LIST   3.  Prostate cancer metastatic to bone (HCC)  traMADol (ULTRAM) 50 MG tablet    NC DISCHARGE MEDS RECONCILED W/ CURRENT OUTPATIENT MED LIST           Medical Decision Making: high complexity

## 2021-02-23 ENCOUNTER — NURSE ONLY (OUTPATIENT)
Dept: LAB | Age: 86
End: 2021-02-23

## 2021-02-23 DIAGNOSIS — M81.0 OSTEOPOROSIS, UNSPECIFIED OSTEOPOROSIS TYPE, UNSPECIFIED PATHOLOGICAL FRACTURE PRESENCE: ICD-10-CM

## 2021-02-23 DIAGNOSIS — C61 PROSTATE CANCER (HCC): ICD-10-CM

## 2021-02-23 LAB — PROSTATE SPECIFIC ANTIGEN: 1.57 NG/ML (ref 0–1)

## 2021-02-24 ENCOUNTER — CARE COORDINATION (OUTPATIENT)
Dept: FAMILY MEDICINE CLINIC | Age: 86
End: 2021-02-24

## 2021-02-24 NOTE — CARE COORDINATION
Daryn Acevedo is doing better. He is using his acapella every hour at home. Ramo Dowd is encouraging him to use 02 at 1 lpm at night but needs it less and less during the day. He is gradually getting stronger every day. He gets up at home and walks. They have no additional needs at home at this time.

## 2021-02-25 LAB — TESTOSTERONE TOTAL: < 3 NG/DL (ref 300–720)

## 2021-02-26 ENCOUNTER — OFFICE VISIT (OUTPATIENT)
Dept: UROLOGY | Age: 86
End: 2021-02-26
Payer: MEDICARE

## 2021-02-26 VITALS — TEMPERATURE: 96.8 F | HEIGHT: 67 IN | WEIGHT: 128 LBS | BODY MASS INDEX: 20.09 KG/M2

## 2021-02-26 DIAGNOSIS — C61 PROSTATE CANCER (HCC): ICD-10-CM

## 2021-02-26 DIAGNOSIS — C79.51 SECONDARY MALIGNANT NEOPLASM OF BONE AND BONE MARROW (HCC): Primary | ICD-10-CM

## 2021-02-26 DIAGNOSIS — C79.52 SECONDARY MALIGNANT NEOPLASM OF BONE AND BONE MARROW (HCC): Primary | ICD-10-CM

## 2021-02-26 DIAGNOSIS — Z51.81 THERAPEUTIC DRUG MONITORING: ICD-10-CM

## 2021-02-26 LAB — CALCIUM IONIZED: NORMAL

## 2021-02-26 PROCEDURE — 1036F TOBACCO NON-USER: CPT | Performed by: NURSE PRACTITIONER

## 2021-02-26 PROCEDURE — 1123F ACP DISCUSS/DSCN MKR DOCD: CPT | Performed by: NURSE PRACTITIONER

## 2021-02-26 PROCEDURE — G8427 DOCREV CUR MEDS BY ELIG CLIN: HCPCS | Performed by: NURSE PRACTITIONER

## 2021-02-26 PROCEDURE — 1111F DSCHRG MED/CURRENT MED MERGE: CPT | Performed by: NURSE PRACTITIONER

## 2021-02-26 PROCEDURE — G8482 FLU IMMUNIZE ORDER/ADMIN: HCPCS | Performed by: NURSE PRACTITIONER

## 2021-02-26 PROCEDURE — 96401 CHEMO ANTI-NEOPL SQ/IM: CPT | Performed by: NURSE PRACTITIONER

## 2021-02-26 PROCEDURE — G8420 CALC BMI NORM PARAMETERS: HCPCS | Performed by: NURSE PRACTITIONER

## 2021-02-26 PROCEDURE — 99213 OFFICE O/P EST LOW 20 MIN: CPT | Performed by: NURSE PRACTITIONER

## 2021-02-26 PROCEDURE — 4040F PNEUMOC VAC/ADMIN/RCVD: CPT | Performed by: NURSE PRACTITIONER

## 2021-02-26 NOTE — PROGRESS NOTES
Patient has given me verbal consent to perform Xgeva Injection yes      Following Afsaneh Jacome CNP plan of care. Aureliano Benne 120MG/1.7ML MG GIVEN S.C left ARM  Lot Number: [id number]  Expiration Date: 05/2023  [facility] #: [id number]    After Injection was given there were no reactions at injection site and patient was feeling well. Patient was notified that possible side effects from injections include: Redness, swelling and itching at the injection site. Possible side effects of androgen deprivation therapy, including hot flashes, flushing of the skin, increased weight, decreased sex drive, and difficulties with ED. Patient was instructed to inform their dental office that they are receiving Aureliano Benne and that major dental procedures should be avoided. Patient was advised to call our office with any further questions or concerns. Any active dental problems, infections, or pain? No, patient has dentures    Date of last dental appointment: unknown    Any planned dental procedures? no    Last Bone Scan was performed on 09/04/20    Last Calcium level drawn on 02/23/21 and Calcium Value was 7.4. Calcium needs to be checked periodically. Ask provider when Calcium needs checked again. Patient supplied their own medications No    Pt [address] therapy first initiated on 02/26/19.

## 2021-02-26 NOTE — PROGRESS NOTES
[address] [facility].  [address]  [facility] [address]  Dept: [phone]  Loc: [phone]    Visit Date: 2/26/2021        HPI:     Kumar Calabrese is a 80 y.o. male who presents today for:  Chief Complaint   Patient presents with    Follow-up    Prostate Cancer       HPI   Pt seen today in follow up for prostate cancer. Mr. Regina Franklin has a hx of PSA 1/31/17 that was 25 at which time family was notified of risk for prostate cancer but did not wish to pursue further testing.  CT of the abdomen and pelvis performed for abdominal pain 12/17/18 noted multiple skeletal sclerotic metastases with pathologic compression fractures of L1-L3, enlarged hterogeneous prostate, and pelvic adenopathy.  PSA performed 12/17/18 was 1,594.  Pt was started on Casodex 12/20/18.  Pt recovered on TCU 12/24/19-1/10/19. [facility] underwent TRUS prostate biopsy 1/24/19 by Dr. Maryjo Ledezma with findings of high grade prostatic adenocarcinoma. Pt was scheduled for review of biopsy results 2/11/19 however pt was admitted to the hospital 2/5/19-2/15/19 and treated for acute PE and DVT and pathologic T6 and T8 fractures.  He underwent T6 and T8 vertebral bone biopsy, RFA for spine metastatic lesion at the levels of T6 and T8, and vertebral augmentation (balloon kyphoplasty) for T6 and T8 on 2/13/19 by Dr. Homer Rowe has completed palliative radiation therapy. Pt started monthly Firmagon and Mario Bill 2/26/19.  Firmagon switched to Lupron 4/30/19.  Pt had anorexia on Casodex and this was held.  Pt and daughter decided to hold casodex.  Pt is on calcium carbonate and vitamin D supplementation.  Last ADT injection with Eligard 45 mg 10/13/2020.       Pt underwent cystoscopy in the office 3/11/19 by Dr. Maryjo Ledezma with findings of a significantly obstructing prostate and mild bladder wall trabeculations and debris.      In December 2020 Yasir's PSA continued to trend upward to recent high of 3.17 with a testosterone <3. At office visit in December we discussed addition of [place] or Casodex and possible side effects. Pt was started on Casodex 12/15/2020 while awaiting coverage of Xtandi. When pt received the [place] he was unable to swallow the pills. As such he remains on Casodex. He had increased fatigue and family started holding casodex starting 2/3/21. Pt was hospitalized and treated for aspiration pneumonia 2/5/21-2/8/21. Pt is here today for Xgeva injection. Serum calcium 8.7 2/7/21, psa 1.57 2/23/21, testosterone <3 2/23/21. Current Outpatient Medications   Medication Sig Dispense Refill    traMADol (ULTRAM) 50 MG tablet Take 1 tablet by mouth every 6 hours as needed for Pain for up to 30 days. 90 tablet 0    carvedilol (COREG) 3.125 MG tablet TAKE 1 TABLET BY MOUTH TWICE DAILY WITH MEALS 180 tablet 3    polyethylene glycol (MIRALAX) 17 g packet Take 17 g by mouth once      albuterol sulfate HFA (VENTOLIN HFA) 108 (90 Base) MCG/ACT inhaler Inhale 2 puffs into the lungs every 6 hours as needed for Wheezing or Shortness of Breath 1 Inhaler 3    finasteride (PROSCAR) 5 MG tablet Take 1 tablet by mouth daily 90 tablet 3    hydrOXYzine (ATARAX) 10 MG tablet Take 1-2 tablets by mouth every 8 hours as needed for Itching 30 tablet 2    Tiotropium Bromide-Olodaterol (STIOLTO RESPIMAT) 2.5-2.5 MCG/ACT AERS INHALE 2 PUFFS BY MOUTH ONCE DAILY 4 g 11    doxazosin (CARDURA) 4 MG tablet Take 1 tablet by mouth nightly 90 tablet 3    leuprolide (LUPRON) 7.5 MG injection Inject 7.5 mg into the muscle once Every 6 months      Cholecalciferol (VITAMIN D3) 125 MCG (5000 UT) CAPS Take 1 capsule by mouth every morning (before breakfast)      Misc.  Devices (ACAPELLA) MISC 1 Device by Does not apply route 4 times daily 1 each 0    calcium carbonate-vitamin D (CALCIUM 600 + D) 600-400 MG-UNIT TABS per tab Take 1 tablet by mouth 2 times daily 60 tablet 5    Multiple Vitamins-Minerals (THERAPEUTIC MULTIVITAMIN-MINERALS) tablet Take 1 tablet by mouth daily      docusate sodium (COLACE, DULCOLAX) 100 MG CAPS Take 100 mg by mouth 2 times daily      acetaminophen (TYLENOL) 500 MG tablet Take 500 mg by mouth every 8 hours as needed for Pain      aspirin 325 MG EC tablet Take 1 tablet by mouth daily      bicalutamide (CASODEX) 50 MG chemo tablet Take 1 tablet by mouth daily (Patient not taking: Reported on 2/26/2021) 90 tablet 3    tiZANidine (ZANAFLEX) 2 MG tablet Take 1 tablet by mouth every 8 hours as needed (spasm) (Patient not taking: Reported on 2/26/2021)       No current facility-administered medications for this visit. Past Medical History  Anam Fay  has a past medical history of Blood circulation, collateral, CAD (coronary artery disease), CAD (coronary artery disease), Cancer ([address] CKD (chronic kidney disease) stage 2, GFR 60-89 ml/min, COPD (chronic obstructive pulmonary disease) ([address] Diverticulitis, DVT of lower extremity, bilateral ([address] GERD (gastroesophageal reflux disease), Hiatal hernia, Hyperlipemia, Hyperlipidemia, Other disorders of kidney and ureter in diseases classified elsewhere, Pleural plaque, Pneumonia of both lower lobes due to infectious organism, Primary adenocarcinoma of prostate ([address] Pulmonary emboli ([address] and Vitamin D deficiency. Past Surgical History  The patient  has a past surgical history that includes hernia repair; Appendectomy; Cardiac catheterization; eye surgery; Cystocopy (03/17/2015); LAPAROTOMY EXPLORATORY (N/A, 12/17/2018); Abdomen surgery; and Kyphosis surgery (N/A, 2/13/2019). Family History  This patient's family history is not on file. Social History  Anam Fay  reports that he quit smoking about 54 years ago. His smoking use included cigarettes. He smoked 1.00 pack per day. He has never used smokeless tobacco. He reports current alcohol use. He reports that he does not use drugs.       Subjective:      Review of Systems Constitutional: Negative for activity change, appetite change, chills, diaphoresis, fatigue, fever and unexpected weight change. Gastrointestinal: Negative for abdominal pain. Genitourinary: Negative for decreased urine volume, difficulty urinating, dysuria, frequency, hematuria and urgency. Musculoskeletal: Negative for back pain. Objective:   Temp 96.8 °F (36 °C)   Ht 5' 7\" (1.702 m)   Wt 128 lb (58.1 kg)   BMI 20.05 kg/m²     Physical Exam  Constitutional:       General: He is not in acute distress. Appearance: Normal appearance. He is not ill-appearing or diaphoretic. HENT:      Head: Normocephalic and atraumatic. Right Ear: External ear normal.      Left Ear: External ear normal.      Nose: Nose normal.   Eyes:      General: No scleral icterus. Right eye: No discharge. Left eye: No discharge. Pulmonary:      Effort: Pulmonary effort is normal. No respiratory distress. Neurological:      Mental Status: He is alert and oriented to person, place, and time. Mental status is at baseline. Psychiatric:         Mood and Affect: Mood normal.         Behavior: Behavior normal.         Thought Content: Thought content normal.         POC  No results found for this visit on 02/26/21. Patients recent PSA values are as follows  Lab Results   Component Value Date    PSA 1.57 (H) 02/23/2021    PSA 1.02 (H) 02/07/2021    PSA 1.36 (H) 01/07/2021        Recent BUN/Creatinine:  Lab Results   Component Value Date    BUN 15 02/07/2021    CREATININE 0.9 02/07/2021         Assessment:   Metastatic castration resistant prostate cancer  Obstructive prostate  Plan:     Pt to receive [place] today. Continue calcium and vitamin D supplementation. Continue Cardura and finasteride. Last ADT injection with Eligard 45 mg 10/13/2020. Next injection due in April. Family wants to continue to hold casodex. No recent hepatic function panel.       Lab/ma visit in 1 month for next [facility]

## 2021-03-02 RX ORDER — FINASTERIDE 5 MG/1
5 TABLET, FILM COATED ORAL DAILY
Qty: 90 TABLET | Refills: 3 | Status: SHIPPED | OUTPATIENT
Start: 2021-03-02 | End: 2022-02-08

## 2021-03-03 ENCOUNTER — CARE COORDINATION (OUTPATIENT)
Dept: FAMILY MEDICINE CLINIC | Age: 86
End: 2021-03-03

## 2021-03-03 NOTE — CARE COORDINATION
Lianet Jha is doing OK. He was able to go to [employer] last Sunday which is something he was looking forward to. He is mostily off of 02 but wore it to bed the other night for comfort. He is not having any pain at this time and has no other complaints. He was eating lunch when I called so I talked to his daughter Xiomara Jhaveri and they have no additional needs at this time.

## 2021-03-04 ENCOUNTER — IMMUNIZATION (OUTPATIENT)
Dept: PRIMARY CARE CLINIC | Age: 86
End: 2021-03-04
Payer: COMMERCIAL

## 2021-03-04 PROCEDURE — 91300 COVID-19, PFIZER VACCINE 30MCG/0.3ML DOSE: CPT

## 2021-03-04 PROCEDURE — 0001A COVID-19, PFIZER VACCINE 30MCG/0.3ML DOSE: CPT

## 2021-03-10 ENCOUNTER — HOSPITAL ENCOUNTER (OUTPATIENT)
Dept: GENERAL RADIOLOGY | Age: 86
Discharge: HOME | End: 2021-03-10
Payer: MEDICARE

## 2021-03-10 ENCOUNTER — HOSPITAL ENCOUNTER (OUTPATIENT)
Age: 86
Discharge: HOME | End: 2021-03-10
Payer: MEDICARE

## 2021-03-10 ENCOUNTER — TELEPHONE (OUTPATIENT)
Dept: FAMILY MEDICINE CLINIC | Age: 86
End: 2021-03-10

## 2021-03-10 DIAGNOSIS — J18.9 PNEUMONIA OF BOTH LOWER LOBES DUE TO INFECTIOUS ORGANISM: ICD-10-CM

## 2021-03-10 PROCEDURE — 71046 X-RAY EXAM CHEST 2 VIEWS: CPT

## 2021-03-10 NOTE — TELEPHONE ENCOUNTER
----- Message from Marline Spatz, MD sent at 3/10/2021  6:07 PM EST -----  Let Cristian Ghosh know the pneumonia is gone.

## 2021-03-15 ENCOUNTER — OFFICE VISIT (OUTPATIENT)
Dept: PULMONOLOGY | Age: 86
End: 2021-03-15
Payer: MEDICARE

## 2021-03-15 VITALS
WEIGHT: 128.6 LBS | HEIGHT: 67 IN | HEART RATE: 116 BPM | DIASTOLIC BLOOD PRESSURE: 62 MMHG | TEMPERATURE: 96.4 F | OXYGEN SATURATION: 90 % | BODY MASS INDEX: 20.18 KG/M2 | SYSTOLIC BLOOD PRESSURE: 92 MMHG

## 2021-03-15 DIAGNOSIS — J44.9 STAGE 3 SEVERE COPD BY GOLD CLASSIFICATION (HCC): Primary | ICD-10-CM

## 2021-03-15 DIAGNOSIS — J18.9 PNEUMONIA OF BOTH LOWER LOBES DUE TO INFECTIOUS ORGANISM: ICD-10-CM

## 2021-03-15 PROCEDURE — G8427 DOCREV CUR MEDS BY ELIG CLIN: HCPCS | Performed by: NURSE PRACTITIONER

## 2021-03-15 PROCEDURE — G8926 SPIRO NO PERF OR DOC: HCPCS | Performed by: NURSE PRACTITIONER

## 2021-03-15 PROCEDURE — 1036F TOBACCO NON-USER: CPT | Performed by: NURSE PRACTITIONER

## 2021-03-15 PROCEDURE — 4040F PNEUMOC VAC/ADMIN/RCVD: CPT | Performed by: NURSE PRACTITIONER

## 2021-03-15 PROCEDURE — G8420 CALC BMI NORM PARAMETERS: HCPCS | Performed by: NURSE PRACTITIONER

## 2021-03-15 PROCEDURE — G8482 FLU IMMUNIZE ORDER/ADMIN: HCPCS | Performed by: NURSE PRACTITIONER

## 2021-03-15 PROCEDURE — 3023F SPIROM DOC REV: CPT | Performed by: NURSE PRACTITIONER

## 2021-03-15 PROCEDURE — 99214 OFFICE O/P EST MOD 30 MIN: CPT | Performed by: NURSE PRACTITIONER

## 2021-03-15 PROCEDURE — 94618 PULMONARY STRESS TESTING: CPT | Performed by: NURSE PRACTITIONER

## 2021-03-15 PROCEDURE — 1123F ACP DISCUSS/DSCN MKR DOCD: CPT | Performed by: NURSE PRACTITIONER

## 2021-03-15 ASSESSMENT — ENCOUNTER SYMPTOMS
EYES NEGATIVE: 1
STRIDOR: 0
DIARRHEA: 0
ALLERGIC/IMMUNOLOGIC NEGATIVE: 1
SHORTNESS OF BREATH: 1
CHEST TIGHTNESS: 0
VOMITING: 0
COUGH: 1
GASTROINTESTINAL NEGATIVE: 1
WHEEZING: 0
NAUSEA: 0

## 2021-03-15 NOTE — PROGRESS NOTES
[facility]    Patient: Miguel Neal, 80 y.o.   : 1926  3/15/2021      Subjective     Chief Complaint   Patient presents with    Follow-up     1 month hospital follow up pneumonia with Chest XR on 3/10/2021      Hospital Stay  Narrative of Hospital Course:  80 y. o. male w/ PMH of metastatic prostate cancer, severe COPD, and dysphagia who presented to [address] [place] [address] on  with 10 day history of increased fatigue, weakness, productive cough, sob, and poor PO intake. Was diagnosed with aspiration pneumonia.       Has been on soft diet for years now, but says he has been cheating a little more and has been slacking on muscle strengthening exercises.   In the ED, Pulse Ox 82% on RA, had to be placed on 4L NC. Na 132, Cl 92, BUN 26 and Cr 1.2. WBC 11.5. Procal 0.10. Troponin 0.014. Negative COVID-19 test      CXR new right lower lobe infiltrates and possible developing infiltrates left upper lobe.     Treated as COPD exacerbation and aspiration pneumonia with zosyn, steroids, breathing treatments. Patient continued to be weaned off oxygen and was nonhypoxic on RA for some time, but did have desaturations at times with minimal exertion. Pulmonology consulted who agreed with plan. SLP consulted, MBS ordered, and recommended continued soft diet. Also given supplemental O2, 2L at rest and 4lpm with exertion. Likely to improve after full course of steroids and antibiotics. Discharged with augmentin and close PCP followup. PARTHA Evansgabbi is here for follow up for SOB with Severe COPD. 6MWT done last visit no home oxygen supplementation needed- after DC from hospital patient is now on 2LPM with rest and 4LPM with activity - presents today on room air but has O2 with him. Nocturnal pulse oximetry never done.  Patient wearing 2LPM and night  CXR done after last office appointment - no evidence for PNA at that time- then later admitted Aspiration PNA (2/5-2/8/2021)  Last CXR reviewed from 3/11/21- showing overall improvement- PNA resolved  MBS reviewed patient to be on soft diet- crushing pills currently and watching diet carefully-   Still lives his own house with family around   Using Stiolto and Albuterol PRN for SOB/wheezing- using 1-2 times/day  Using Acapella and IS- couple times a day   Still feeling fatigued and weak at times-     Progress History:   Since last visit any new medical issues? Yes   New ER or hospital visits? Yes Aspiration PNA  Any new or changes in medicines? Yes   Using inhalers? Yes Stiolto- Albuterol PRN SOB/wheezing   Are they helpful?  Yes   1st dose of Pfizer Covid vaccination done   Past Medical hx   PMH:  Past Medical History:   Diagnosis Date    Blood circulation, collateral     CAD (coronary artery disease)     CAD (coronary artery disease) 9/2/2014    Cancer ([facility]     prostate to bone    CKD (chronic kidney disease) stage 2, GFR 60-89 ml/min 12/24/2018    COPD (chronic obstructive pulmonary disease) ([address]     Diverticulitis     DVT of lower extremity, bilateral ([address] 02/2019    GERD (gastroesophageal reflux disease)     Hiatal hernia     Hyperlipemia 8/12/2013    Hyperlipidemia     Other disorders of kidney and ureter in diseases classified elsewhere     Pleural plaque 8/12/2013    Pneumonia of both lower lobes due to infectious organism     Primary adenocarcinoma of prostate ([address] 01/2019    high grade    Pulmonary emboli ([address] 02/2019    Vitamin D deficiency 05/2020     SURGICAL HISTORY:  Past Surgical History:   Procedure Laterality Date    ABDOMEN SURGERY      APPENDECTOMY      CARDIAC CATHETERIZATION      CYSTOSCOPY  03/17/2015    Litholapaxy-Large    EYE SURGERY      cataracts    HERNIA REPAIR      KYPHOSIS SURGERY N/A 2/13/2019    ABLATION AND KYPHOPLASTY AT T8 AND ABLATION AND KYPHOPLASTY AT T6 performed by Eveline Smart MD at [facility] N/A 12/17/2018    EXPLORATORY LAPAROTOMY, WITH ABDOMINAL WASHOUT performed by Karley Jones MD at [address] [place] [address] Road:  Social History     Tobacco Use    Smoking status: Former Smoker     Packs/day: 1.00     Types: Cigarettes     Quit date: 1966     Years since quittin.9    Smokeless tobacco: Never Used   Vaping Use    Vaping Use: Never used   Substance Use Topics    Alcohol use: Yes     Comment: occasionally    Drug use: No     ALLERGIES:No Known Allergies  FAMILY HISTORY:No family history on file. CURRENT MEDICATIONS:  Current Outpatient Medications   Medication Sig Dispense Refill    finasteride (PROSCAR) 5 MG tablet Take 1 tablet by mouth daily 90 tablet 3    carvedilol (COREG) 3.125 MG tablet TAKE 1 TABLET BY MOUTH TWICE DAILY WITH MEALS 180 tablet 3    polyethylene glycol (MIRALAX) 17 g packet Take 17 g by mouth once      albuterol sulfate HFA (VENTOLIN HFA) 108 (90 Base) MCG/ACT inhaler Inhale 2 puffs into the lungs every 6 hours as needed for Wheezing or Shortness of Breath 1 Inhaler 3    Tiotropium Bromide-Olodaterol (STIOLTO RESPIMAT) 2.5-2.5 MCG/ACT AERS INHALE 2 PUFFS BY MOUTH ONCE DAILY 4 g 11    leuprolide (LUPRON) 7.5 MG injection Inject 7.5 mg into the muscle once Every 6 months      Cholecalciferol (VITAMIN D3) 125 MCG (5000 UT) CAPS Take 1 capsule by mouth every morning (before breakfast)      Misc.  Devices (ACAPELLA) MISC 1 Device by Does not apply route 4 times daily 1 each 0    calcium carbonate-vitamin D (CALCIUM 600 + D) 600-400 MG-UNIT TABS per tab Take 1 tablet by mouth 2 times daily (Patient taking differently: Take 1 tablet by mouth 4 times daily ) 60 tablet 5    Multiple Vitamins-Minerals (THERAPEUTIC MULTIVITAMIN-MINERALS) tablet Take 1 tablet by mouth daily      docusate sodium (COLACE, DULCOLAX) 100 MG CAPS Take 100 mg by mouth 2 times daily      acetaminophen (TYLENOL) 500 MG tablet Take 500 mg by mouth every 8 hours as needed for Pain      aspirin 325 MG EC tablet Take 1 tablet by mouth daily      traMADol (ULTRAM) 50 MG tablet Take 50 mg by mouth every 6 hours as needed for Pain.  doxazosin (CARDURA) 4 MG tablet Take 0.5 tablets by mouth nightly 90 tablet 3    bicalutamide (CASODEX) 50 MG chemo tablet Take 1 tablet by mouth daily (Patient not taking: Reported on 3/30/2021) 90 tablet 3     No current facility-administered medications for this visit. ROS   Review of Systems   Constitutional: Positive for fatigue. Negative for chills, fever and unexpected weight change. HENT: Negative. Eyes: Negative. Respiratory: Positive for cough and shortness of breath. Negative for chest tightness, wheezing and stridor. Cardiovascular: Negative for chest pain and leg swelling. Gastrointestinal: Negative. Negative for diarrhea, nausea and vomiting. Endocrine: Negative. Genitourinary: Negative. Negative for dysuria. Musculoskeletal: Positive for gait problem (uses cane/presents in WC today ). Skin: Negative. Allergic/Immunologic: Negative. Hematological: Negative. Psychiatric/Behavioral: Negative. Physical exam   BP 92/62 (Site: Left Upper Arm, Position: Sitting, Cuff Size: Medium Adult)   Pulse 116   Temp 96.4 °F (35.8 °C) (Temporal)   Ht 5' 7\" (1.702 m)   Wt 128 lb 9.6 oz (58.3 kg)   SpO2 90% Comment: 1 liter at night  BMI 20.14 kg/m²    Wt Readings from Last 3 Encounters:   05/04/21 128 lb (58.1 kg)   03/30/21 128 lb (58.1 kg)   03/15/21 128 lb 9.6 oz (58.3 kg)       Physical Exam  Vitals signs and nursing note reviewed. Constitutional:       General: He is not in acute distress. Appearance: Normal appearance. He is well-developed. Comments: Presents in [facility] on room air and with cane also    HENT:      Head: Normocephalic and atraumatic. Neck:      Trachea: No tracheal deviation. Cardiovascular:      Rate and Rhythm: Normal rate and regular rhythm. Heart sounds: Normal heart sounds. No murmur.    Pulmonary: Effort: Pulmonary effort is normal. No respiratory distress. Breath sounds: No stridor. Examination of the right-lower field reveals decreased breath sounds. Examination of the left-lower field reveals decreased breath sounds. Decreased breath sounds present. No wheezing or rales. Chest:      Chest wall: No tenderness. Abdominal:      General: Bowel sounds are normal. There is no distension. Palpations: Abdomen is soft. Comments: Abd binder on   Skin:     General: Skin is warm and dry. Capillary Refill: Capillary refill takes less than 2 seconds. Neurological:      Mental Status: He is alert and oriented to person, place, and time. Psychiatric:         Behavior: Behavior normal.         Thought Content: Thought content normal.         Judgment: Judgment normal.          results   Lung Nodule Screening     [] Qualifies    [x] Does not qualify   [] Declined    [] Completed  The USPSTF recommends annual screening for lung cancer with low-dose computed tomography (LDCT) in adults aged 54 to [de-identified] years who have a 30 pack-year smoking history and currently smoke or have quit within the past 15 years. Screening should be discontinued once a person has not smoked for 15 years or develops a health problem that substantially limits life expectancy or the ability or willingness to have curative lung surgery. 3/10/2021   XR CHEST (2 VW)   Interval resolution of bibasilar consolidations. Sequelae of pulmonary emphysema. 2/8/2021   FL MODIFIED BARIUM SWALLOW W VIDEO   1. Laryngeal penetration of thin barium without evidence of aspiration. .   2. Additional recommendations from the speech therapist will follow. 1/29/2021   XR CHEST (2 VW)     1. The lung fields are emphysematous. There is no focal consolidation or focal infiltrate. There is no pleural effusion or pulmonary vascular congestion.      2. The nodule within the right mid chest is stable compared to the chest radiograph dated 12/20/2018. 3. There is a questionable developing density versus a summation shadow along the lateral margin of the right mid chest, just superior lateral to the stable nodule. A CT examination of the chest is recommended to differentiate a summation shadow from a developing lung nodule. 4. There are compression fractures at 2 levels at the thoracolumbar junction. The more superior fracture is new and age-indeterminate and the more inferior fracture is stable. Clinical management is recommended. 2/5/2021   CTA CHEST W WO CONTRAST   Bilateral lower lobe consolidation. Atypical or viral type pneumonia cannot be excluded. Correlate for for infiltrate. Extensive emphysema. Extensive known bony metastatic disease secondary to prostate cancer. Small amount of debris, secretions or mucous plugging within the lower lobe bronchi. These may be contributing to patient's symptoms. Six Minute Walk Test  Kumar Calabrese 1/27/1926    Six minute walk test done in my office today by my medical assistant. Yasir's oxygen saturation at rest on room air was 94%. His oxygen saturation never dropped below 88% with ambulation    At the end of the test John Thackeron's oxygen saturation remained at 94% on room air with exertion. Patient ambulated a total of 434 feet with oxygen. Resting Dyspnea/Sunil score was 1 / 3  and 7 / 7  upon completion of the walk. Resting heart rate was  81 bpm and 107 bpm upon completing the walk. Nasal Oxygen order:  0 lpm to be used with:  Rest: No.  Walking: No.   Sleep: No.   POC flow: No.  Continuous flow: No.           Assessment      Diagnosis Orders   1. Stage 3 severe COPD by GOLD classification ([facility])  6 Minute Walk Test   2.  Pneumonia of both lower lobes due to infectious organism  6 Minute Walk Test    Aspiration PNA- resolved         Plan   -CXR reviewed  -Admission notes reviewed from Feb. Admission    -6MWT today no home O2 needs with rest or activity  -Will do nocturnal pulse oximetry on room air- will NOT discontinue O2 until results are in from overnight  -Encouraged IS/Acapella use couple times a day  -Continue current inhaler regimen- Stiolto and Albuterol PRN   -Recommended to continue doing swallowing strengthening exercises. -Advised to maintain pneumonia vaccine with PCP and to take flu vaccine this coming season.  -Advised patient to call office with any changes, questions, or concerns regarding respiratory status  -Discussed Cardiology referral but will see how patient feels at return visit- last ECHO 2019 with no concerning findings at the patients age unsure if this would be beneficial will discuss more at next appt.       Anahy Andrade CNP  3/15/2021

## 2021-03-18 ENCOUNTER — HOSPITAL ENCOUNTER (OUTPATIENT)
Dept: RESPIRATORY THERAPY | Age: 86
Discharge: HOME | End: 2021-03-18
Payer: MEDICARE

## 2021-03-18 PROCEDURE — 94762 N-INVAS EAR/PLS OXIMTRY CONT: CPT

## 2021-03-18 NOTE — PROGRESS NOTES
Instructions were given for an overnight nocturnal pulse oximetry study. The assigned GE number of the pulse oximetry was [id number]. A log sheet was completed. Patient was instructed on documenting any events that occurred throughout the night on the log sheet. The procedure was explained to the learner(s). Patient understanding of the procedure was good. Patient does have a mean of transportation to bring back the study the next day. A patient task was placed in the patients chart for the  to download the nocturnal study and fax the results to the ordering provider for interpretation. The pulse oximetrys memory was cleared. Patient had no questions and was sent home with the pulse oximeter.

## 2021-03-22 DIAGNOSIS — J44.9 STAGE 3 SEVERE COPD BY GOLD CLASSIFICATION (HCC): ICD-10-CM

## 2021-03-22 DIAGNOSIS — J44.9 STAGE 3 SEVERE COPD BY GOLD CLASSIFICATION (HCC): Primary | ICD-10-CM

## 2021-03-25 ENCOUNTER — IMMUNIZATION (OUTPATIENT)
Dept: PRIMARY CARE CLINIC | Age: 86
End: 2021-03-25
Payer: MEDICARE

## 2021-03-25 ENCOUNTER — NURSE ONLY (OUTPATIENT)
Dept: LAB | Age: 86
End: 2021-03-25

## 2021-03-25 DIAGNOSIS — C61 PROSTATE CANCER (HCC): ICD-10-CM

## 2021-03-25 DIAGNOSIS — Z51.81 THERAPEUTIC DRUG MONITORING: ICD-10-CM

## 2021-03-25 LAB
ALBUMIN SERPL-MCNC: 4.2 G/DL (ref 3.5–5.1)
ALP BLD-CCNC: 71 U/L (ref 38–126)
ALT SERPL-CCNC: 9 U/L (ref 11–66)
ANION GAP SERPL CALCULATED.3IONS-SCNC: 9 MEQ/L (ref 8–16)
AST SERPL-CCNC: 23 U/L (ref 5–40)
BILIRUB SERPL-MCNC: 0.6 MG/DL (ref 0.3–1.2)
BUN BLDV-MCNC: 19 MG/DL (ref 7–22)
CALCIUM SERPL-MCNC: 9.6 MG/DL (ref 8.5–10.5)
CHLORIDE BLD-SCNC: 96 MEQ/L (ref 98–111)
CO2: 34 MEQ/L (ref 23–33)
CREAT SERPL-MCNC: 1.1 MG/DL (ref 0.4–1.2)
GFR SERPL CREATININE-BSD FRML MDRD: 62 ML/MIN/1.73M2
GLUCOSE BLD-MCNC: 155 MG/DL (ref 70–108)
POTASSIUM SERPL-SCNC: 3.9 MEQ/L (ref 3.5–5.2)
PROSTATE SPECIFIC ANTIGEN: 1.57 NG/ML (ref 0–1)
SODIUM BLD-SCNC: 139 MEQ/L (ref 135–145)
TOTAL PROTEIN: 6.9 G/DL (ref 6.1–8)

## 2021-03-25 PROCEDURE — 0002A COVID-19, PFIZER VACCINE 30MCG/0.3ML DOSE: CPT | Performed by: PHARMACIST

## 2021-03-25 PROCEDURE — 91300 COVID-19, PFIZER VACCINE 30MCG/0.3ML DOSE: CPT | Performed by: PHARMACIST

## 2021-03-26 LAB — TESTOSTERONE TOTAL: < 3 NG/DL (ref 300–720)

## 2021-03-26 NOTE — PROGRESS NOTES
[place] [address]  [address] [place] [address]  Dept: [phone]  Loc: [phone]    Visit Date: 3/30/2021        HPI:     Krissy Saenz is a 80 y.o. male who presents today for:  Chief Complaint   Patient presents with    Prostate Cancer     Xgeva injection , Blood Work prior    [facility] 1 Year Follow Up       HPI   Pt seen in follow up for prostate cancer.        Mr. Aleida Wooten has a hx of PSA 1/31/17 that was 25 at which time family was notified of risk for prostate cancer but did not wish to pursue further testing.  CT of the abdomen and pelvis performed for abdominal pain 12/17/18 noted multiple skeletal sclerotic metastases with pathologic compression fractures of L1-L3, enlarged hterogeneous prostate, and pelvic adenopathy.  PSA performed 12/17/18 was 1,594.  Pt was started on Casodex 12/20/18.  Pt recovered on TCU 12/24/19-1/10/19. Tika Ojeda underwent TRUS prostate biopsy 1/24/19 by Dr. Tamika Lopez with findings of high grade prostatic adenocarcinoma.  Pt was scheduled for review of biopsy results 2/11/19 however pt was admitted to the hospital 2/5/19-2/15/19 and treated for acute PE and DVT and pathologic T6 and T8 fractures.  He underwent T6 and T8 vertebral bone biopsy, RFA for spine metastatic lesion at the levels of T6 and T8, and vertebral augmentation (balloon kyphoplasty) for T6 and T8 on 2/13/19 by Dr. Benjamin Shells has completed palliative radiation therapy.      Pt started monthly Firmagon and Lucius Rakes 2/26/19.  Firmagon switched to Lupron 4/30/19.  Pt had anorexia on Casodex and this was held. Hyacinth Burden and daughter decided to hold casodex.  Pt is on calcium carbonate and vitamin D supplementation.  Last ADT injection with Eligard 45 mg 10/13/2020.       Pt underwent cystoscopy in the office 3/11/19 by Dr. Tamika Lopez with findings of a significantly obstructing prostate and mild bladder wall trabeculations and debris.       In December 2020 MULTIVITAMIN-MINERALS) tablet Take 1 tablet by mouth daily      tiZANidine (ZANAFLEX) 2 MG tablet Take 1 tablet by mouth every 8 hours as needed (spasm)      docusate sodium (COLACE, DULCOLAX) 100 MG CAPS Take 100 mg by mouth 2 times daily      acetaminophen (TYLENOL) 500 MG tablet Take 500 mg by mouth every 8 hours as needed for Pain      aspirin 325 MG EC tablet Take 1 tablet by mouth daily      bicalutamide (CASODEX) 50 MG chemo tablet Take 1 tablet by mouth daily (Patient not taking: Reported on 3/30/2021) 90 tablet 3     No current facility-administered medications for this visit. Past Medical History  Brandon Heard  has a past medical history of Blood circulation, collateral, CAD (coronary artery disease), CAD (coronary artery disease), Cancer ([address] CKD (chronic kidney disease) stage 2, GFR 60-89 ml/min, COPD (chronic obstructive pulmonary disease) ([address] Diverticulitis, DVT of lower extremity, bilateral ([address] GERD (gastroesophageal reflux disease), Hiatal hernia, Hyperlipemia, Hyperlipidemia, Other disorders of kidney and ureter in diseases classified elsewhere, Pleural plaque, Pneumonia of both lower lobes due to infectious organism, Primary adenocarcinoma of prostate ([address] Pulmonary emboli ([address] and Vitamin D deficiency. Past Surgical History  The patient  has a past surgical history that includes hernia repair; Appendectomy; Cardiac catheterization; eye surgery; Cystocopy (03/17/2015); LAPAROTOMY EXPLORATORY (N/A, 12/17/2018); Abdomen surgery; and Kyphosis surgery (N/A, 2/13/2019). Family History  This patient's family history is not on file. Social History  Brandon Heard  reports that he quit smoking about 54 years ago. His smoking use included cigarettes. He smoked 1.00 pack per day. He has never used smokeless tobacco. He reports current alcohol use. He reports that he does not use drugs.       Subjective:      Review of Systems   Constitutional: Negative for activity change, appetite change, chills, diaphoresis, fatigue, fever and unexpected weight change. Gastrointestinal: Negative for abdominal pain, nausea and vomiting. Genitourinary: Negative for decreased urine volume, difficulty urinating, dysuria, flank pain, frequency, hematuria and urgency. Musculoskeletal: Negative for back pain. Objective:   BP 98/64   Temp 98.6 °F (37 °C)   Ht 5' 6.5\" (1.689 m)   Wt 128 lb (58.1 kg)   BMI 20.35 kg/m²     Physical Exam  Vitals signs reviewed. Constitutional:       General: He is not in acute distress. Appearance: Normal appearance. He is well-developed. He is not ill-appearing or diaphoretic. HENT:      Head: Normocephalic and atraumatic. Right Ear: External ear normal.      Left Ear: External ear normal.      Nose: Nose normal.      Mouth/Throat:      Mouth: Mucous membranes are moist.   Eyes:      General: No scleral icterus. Right eye: No discharge. Left eye: No discharge. Neck:      Vascular: No JVD. Trachea: No tracheal deviation. Pulmonary:      Effort: Pulmonary effort is normal. No respiratory distress. Musculoskeletal: Normal range of motion. Neurological:      Mental Status: He is alert and oriented to person, place, and time. Mental status is at baseline. Psychiatric:         Mood and Affect: Mood normal.         Behavior: Behavior normal.         Thought Content:  Thought content normal.         POC  Results for POC orders placed in visit on 03/30/21   POCT Urinalysis No Micro (Auto)   Result Value Ref Range    Glucose, Ur Negative NEGATIVE mg/dl    Bilirubin Urine Negative     Ketones, Urine Negative NEGATIVE    Specific Gravity, Urine >= 1.030 1.002 - 1.030    Blood, UA POC Trace-intact NEGATIVE    pH, Urine 7.50 5.0 - 9.0    Protein, Urine Negative NEGATIVE mg/dl    Urobilinogen, Urine 0.20 0.0 - 1.0 eu/dl    Nitrite, Urine Negative NEGATIVE    Leukocyte Clumps, Urine Negative NEGATIVE    Color, Urine Yellow YELLOW-STRAW Character, Urine Clear [id number]         Patients recent PSA values are as follows  Lab Results   Component Value Date    PSA 1.57 (H) 03/25/2021    PSA 1.57 (H) 02/23/2021    PSA 1.02 (H) 02/07/2021        Recent BUN/Creatinine:  Lab Results   Component Value Date    BUN 19 03/25/2021    CREATININE 1.1 03/25/2021         Assessment:   Metastatic castration resistant prostate cancer  Obstructive prostate    Plan:     Discussed PSA result. Pt and family would like to continue to monitor PSA at this time rather than resume Casodex. Next ADT injection due in April. Plan Lupron 45 mg injection at that time. XGEVA injection today. Continue calcium and vitamin D supplementation. Decrease doxazosin to 2 mg nightly. Continue finasteride 5 mg daily. F/u in 1 month with PSA, testosterone, bmp prior to appt. Lupron and Xgeva at that visit.

## 2021-03-29 ENCOUNTER — HOSPITAL ENCOUNTER (OUTPATIENT)
Dept: RESPIRATORY THERAPY | Age: 86
Discharge: HOME | End: 2021-03-29
Payer: MEDICARE

## 2021-03-29 VITALS — OXYGEN SATURATION: 96 %

## 2021-03-29 PROCEDURE — 94762 N-INVAS EAR/PLS OXIMTRY CONT: CPT

## 2021-03-30 ENCOUNTER — OFFICE VISIT (OUTPATIENT)
Dept: UROLOGY | Age: 86
End: 2021-03-30
Payer: MEDICARE

## 2021-03-30 VITALS
DIASTOLIC BLOOD PRESSURE: 64 MMHG | SYSTOLIC BLOOD PRESSURE: 98 MMHG | TEMPERATURE: 98.6 F | HEIGHT: 67 IN | BODY MASS INDEX: 20.09 KG/M2 | WEIGHT: 128 LBS

## 2021-03-30 DIAGNOSIS — C79.51 SECONDARY MALIGNANT NEOPLASM OF BONE AND BONE MARROW (HCC): Primary | ICD-10-CM

## 2021-03-30 DIAGNOSIS — C61 PROSTATE CANCER (HCC): ICD-10-CM

## 2021-03-30 DIAGNOSIS — J44.9 STAGE 3 SEVERE COPD BY GOLD CLASSIFICATION (HCC): ICD-10-CM

## 2021-03-30 DIAGNOSIS — C79.52 SECONDARY MALIGNANT NEOPLASM OF BONE AND BONE MARROW (HCC): Primary | ICD-10-CM

## 2021-03-30 LAB
BILIRUBIN URINE: NEGATIVE
BLOOD URINE, POC: NORMAL
CHARACTER, URINE: CLEAR
COLOR, URINE: YELLOW
GLUCOSE URINE: NEGATIVE MG/DL
KETONES, URINE: NEGATIVE
LEUKOCYTE CLUMPS, URINE: NEGATIVE
NITRITE, URINE: NEGATIVE
PH, URINE: 7.5 (ref 5–9)
PROTEIN, URINE: NEGATIVE MG/DL
SPECIFIC GRAVITY, URINE: >= 1.03 (ref 1–1.03)
UROBILINOGEN, URINE: 0.2 EU/DL (ref 0–1)

## 2021-03-30 PROCEDURE — 1123F ACP DISCUSS/DSCN MKR DOCD: CPT | Performed by: NURSE PRACTITIONER

## 2021-03-30 PROCEDURE — 96372 THER/PROPH/DIAG INJ SC/IM: CPT | Performed by: NURSE PRACTITIONER

## 2021-03-30 PROCEDURE — 1036F TOBACCO NON-USER: CPT | Performed by: NURSE PRACTITIONER

## 2021-03-30 PROCEDURE — G8420 CALC BMI NORM PARAMETERS: HCPCS | Performed by: NURSE PRACTITIONER

## 2021-03-30 PROCEDURE — G8482 FLU IMMUNIZE ORDER/ADMIN: HCPCS | Performed by: NURSE PRACTITIONER

## 2021-03-30 PROCEDURE — 4040F PNEUMOC VAC/ADMIN/RCVD: CPT | Performed by: NURSE PRACTITIONER

## 2021-03-30 PROCEDURE — 99214 OFFICE O/P EST MOD 30 MIN: CPT | Performed by: NURSE PRACTITIONER

## 2021-03-30 PROCEDURE — G8427 DOCREV CUR MEDS BY ELIG CLIN: HCPCS | Performed by: NURSE PRACTITIONER

## 2021-03-30 PROCEDURE — 81003 URINALYSIS AUTO W/O SCOPE: CPT | Performed by: NURSE PRACTITIONER

## 2021-03-30 RX ORDER — DOXAZOSIN MESYLATE 4 MG/1
2 TABLET ORAL NIGHTLY
Qty: 90 TABLET | Refills: 3 | Status: SHIPPED
Start: 2021-03-30 | End: 2021-12-14

## 2021-03-30 ASSESSMENT — ENCOUNTER SYMPTOMS
BACK PAIN: 0
VOMITING: 0
NAUSEA: 0
ABDOMINAL PAIN: 0

## 2021-03-30 NOTE — PROGRESS NOTES
Patient has given me verbal consent to perform Xgeva Injection yes      Following Dr. Renetta Almanza [facility]. XGEVA 120MG/1.7ML MG GIVEN S.C right ARM  Lot Number: [id number]  Expiration Date:   [facility] #: [id number]    After Injection was given there were no reactions at injection site and patient was feeling well. Patient was notified that possible side effects from injections include: Redness, swelling and itching at the injection site. Possible side effects of androgen deprivation therapy, including hot flashes, flushing of the skin, increased weight, decreased sex drive, and difficulties with ED. Patient was instructed to inform their dental office that they are receiving Krystle Chang and that major dental procedures should be avoided. Patient was advised to call our office with any further questions or concerns. Any active dental problems, infections, or pain? no    Date of last dental appointment: dentures    Any planned dental procedures? none    Last Bone Scan was performed on 9-4-20    Last Calcium level drawn on 3/25/21 and Calcium Value was 9.6. Calcium needs to be checked periodically. Ask provider when Calcium needs checked again. Patient supplied their own medications No    Pt [address] therapy first initiated on 2-26-19.

## 2021-04-01 ENCOUNTER — TELEPHONE (OUTPATIENT)
Dept: PULMONOLOGY | Age: 86
End: 2021-04-01

## 2021-04-01 NOTE — TELEPHONE ENCOUNTER
Patient needs an order for the oxygen in order to keep it, he only has it for 3 months due discharge from the hospital per patient. Please advise.

## 2021-04-02 DIAGNOSIS — J44.9 STAGE 3 SEVERE COPD BY GOLD CLASSIFICATION (HCC): Primary | ICD-10-CM

## 2021-04-02 NOTE — PROGRESS NOTES
Patient was evaluated today for the diagnosis of COPD. I entered a DME order for home oxygen because the diagnosis and testing requires the patient to have supplemental oxygen. Condition will improve or be benefited by oxygen use. The patient is  able to perform good mobility in a home setting and therefore does not require the use of a portable oxygen system. The need for this equipment was discussed with the patient and he understands and is in agreement.     2LPM at night with sleep

## 2021-04-22 ENCOUNTER — NURSE ONLY (OUTPATIENT)
Dept: LAB | Age: 86
End: 2021-04-22

## 2021-04-22 DIAGNOSIS — C61 PROSTATE CANCER (HCC): ICD-10-CM

## 2021-04-22 LAB
ANION GAP SERPL CALCULATED.3IONS-SCNC: 13 MEQ/L (ref 8–16)
BUN BLDV-MCNC: 21 MG/DL (ref 7–22)
CALCIUM SERPL-MCNC: 10.3 MG/DL (ref 8.5–10.5)
CHLORIDE BLD-SCNC: 95 MEQ/L (ref 98–111)
CO2: 32 MEQ/L (ref 23–33)
CREAT SERPL-MCNC: 1.2 MG/DL (ref 0.4–1.2)
GFR SERPL CREATININE-BSD FRML MDRD: 56 ML/MIN/1.73M2
GLUCOSE BLD-MCNC: 98 MG/DL (ref 70–108)
POTASSIUM SERPL-SCNC: 4.1 MEQ/L (ref 3.5–5.2)
PROSTATE SPECIFIC ANTIGEN: 1.73 NG/ML (ref 0–1)
SODIUM BLD-SCNC: 140 MEQ/L (ref 135–145)

## 2021-04-24 LAB — TESTOSTERONE TOTAL: < 3 NG/DL (ref 300–720)

## 2021-05-04 ENCOUNTER — OFFICE VISIT (OUTPATIENT)
Dept: UROLOGY | Age: 86
End: 2021-05-04
Payer: MEDICARE

## 2021-05-04 VITALS
BODY MASS INDEX: 20.09 KG/M2 | SYSTOLIC BLOOD PRESSURE: 140 MMHG | DIASTOLIC BLOOD PRESSURE: 60 MMHG | WEIGHT: 128 LBS | HEIGHT: 67 IN

## 2021-05-04 DIAGNOSIS — R31.29 MICROSCOPIC HEMATURIA: ICD-10-CM

## 2021-05-04 DIAGNOSIS — C61 PROSTATE CANCER (HCC): Primary | ICD-10-CM

## 2021-05-04 LAB
BILIRUBIN URINE: NEGATIVE
BLOOD URINE, POC: ABNORMAL
CHARACTER, URINE: CLEAR
COLOR, URINE: YELLOW
GLUCOSE URINE: NEGATIVE MG/DL
KETONES, URINE: NEGATIVE
LEUKOCYTE CLUMPS, URINE: ABNORMAL
NITRITE, URINE: NEGATIVE
PH, URINE: 7.5 (ref 5–9)
PROTEIN, URINE: NEGATIVE MG/DL
SPECIFIC GRAVITY, URINE: 1.02 (ref 1–1.03)
UROBILINOGEN, URINE: 0.2 EU/DL (ref 0–1)

## 2021-05-04 PROCEDURE — G8427 DOCREV CUR MEDS BY ELIG CLIN: HCPCS | Performed by: NURSE PRACTITIONER

## 2021-05-04 PROCEDURE — 99213 OFFICE O/P EST LOW 20 MIN: CPT | Performed by: NURSE PRACTITIONER

## 2021-05-04 PROCEDURE — 1123F ACP DISCUSS/DSCN MKR DOCD: CPT | Performed by: NURSE PRACTITIONER

## 2021-05-04 PROCEDURE — 1036F TOBACCO NON-USER: CPT | Performed by: NURSE PRACTITIONER

## 2021-05-04 PROCEDURE — 96401 CHEMO ANTI-NEOPL SQ/IM: CPT | Performed by: NURSE PRACTITIONER

## 2021-05-04 PROCEDURE — G8420 CALC BMI NORM PARAMETERS: HCPCS | Performed by: NURSE PRACTITIONER

## 2021-05-04 PROCEDURE — 96402 CHEMO HORMON ANTINEOPL SQ/IM: CPT | Performed by: NURSE PRACTITIONER

## 2021-05-04 PROCEDURE — 81003 URINALYSIS AUTO W/O SCOPE: CPT | Performed by: NURSE PRACTITIONER

## 2021-05-04 PROCEDURE — 4040F PNEUMOC VAC/ADMIN/RCVD: CPT | Performed by: NURSE PRACTITIONER

## 2021-05-04 RX ORDER — TRAMADOL HYDROCHLORIDE 50 MG/1
50 TABLET ORAL EVERY 6 HOURS PRN
COMMUNITY
End: 2022-05-23 | Stop reason: SDUPTHER

## 2021-05-04 ASSESSMENT — ENCOUNTER SYMPTOMS
NAUSEA: 0
BACK PAIN: 0
VOMITING: 0
ABDOMINAL PAIN: 0

## 2021-05-04 NOTE — PROGRESS NOTES
Patient has given me verbal consent to perform Lupron Injection yes      Following [facility] plan of care. LUPRON 45 MG GIVEN I.M Left UOQ HIP  Lot Number: [id number]  Expiration Date: 09-  [facility] #: [id number]    After Injection was given there were no reactions at injection site and patient was feeling well. Patient was notified that possible side effects from injections include: Redness, swelling and itching at the injection site. Possible side effects of androgen deprivation therapy, including hot flashes, flushing of the skin, increased weight, decreased sex drive, and difficulties with ED. Patient was instructed to call the office with any further questions or concerns. Date of last Calcium/Vit D level: 10.3  Is patient on Calcium/Vit D replacement? yes  Date of last Bone Scan: 9-4-20  Testosterone Level of 3 done on 4/22/21  Psa level of 1.73 done on 4-22-21    Patient supplied their own medications No      Pt [address] therapy first initiated on 2-26-19. Patient has given me verbal consent to perform Xgeva Injection yes      Following Dr. Richter Joseph of care. Carlean Retort 120MG/1.7ML MG GIVEN S.C left ARM  Lot Number: [id number]  Expiration Date: 02/2023  [facility] #: [id number]    After Injection was given there were no reactions at injection site and patient was feeling well. Patient was notified that possible side effects from injections include: Redness, swelling and itching at the injection site. Possible side effects of androgen deprivation therapy, including hot flashes, flushing of the skin, increased weight, decreased sex drive, and difficulties with ED. Patient was instructed to inform their dental office that they are receiving Carlean Retort and that major dental procedures should be avoided. Patient was advised to call our office with any further questions or concerns.      Any active dental problems, infections, or pain? no    Date of last dental appointment: none    Any planned dental procedures? none    Last Bone Scan was performed on (see above)    Last Calcium level drawn on (see above)     Patient supplied their own medications No    Pt [address] therapy first initiated on See above.

## 2021-05-04 NOTE — PROGRESS NOTES
u    [address]  [address]  [facility] [address]  Dept: [phone]  Loc: [phone]    Visit Date: 5/4/2021        HPI:     Marshal Monroe is a 80 y.o. male who presents today for:  Chief Complaint   Patient presents with    Follow-up     1 month follow up, labs prior. HPI   Pt seen in follow up for prostate cancer. Mr. Aleksandra Santana has a hx of PSA 1/31/17 that was 25 at which time family was notified of risk for prostate cancer but did not wish to pursue further testing.  CT of the abdomen and pelvis performed for abdominal pain 12/17/18 noted multiple skeletal sclerotic metastases with pathologic compression fractures of L1-L3, enlarged hterogeneous prostate, and pelvic adenopathy.  PSA performed 12/17/18 was 1,594.  Pt was started on Casodex 12/20/18.  Pt recovered on TCU 12/24/19-1/10/19. Marleen eRdman underwent TRUS prostate biopsy 1/24/19 by Dr. Marshal Serra with findings of high grade prostatic adenocarcinoma.  Pt was scheduled for review of biopsy results 2/11/19 however pt was admitted to the hospital 2/5/19-2/15/19 and treated for acute PE and DVT and pathologic T6 and T8 fractures.  He underwent T6 and T8 vertebral bone biopsy, RFA for spine metastatic lesion at the levels of T6 and T8, and vertebral augmentation (balloon kyphoplasty) for T6 and T8 on 2/13/19 by Dr. Da Silva Call has completed palliative radiation therapy.      Pt started monthly Firmagon and Eleonore Rasmussen 2/26/19.  Firmagon switched to Lupron 4/30/19.  Pt had anorexia on Casodex and this was held. Erin Moss and daughter decided to hold casodex.  Pt is on calcium carbonate and vitamin D supplementation.  Last ADT injection with Eligard 45 mg 10/13/2020.       Pt underwent cystoscopy in the office 3/11/19 by Dr. Marshal Serra with findings of a significantly obstructing prostate and mild bladder wall trabeculations and debris.       In December 2020 Yasir's PSA continued to trend upward to recent high of 3.17 with a testosterone <3.  At office visit in [facility] discussed addition of Gustavo or Casodex and possible side effects.  Pt was started on Casodex 12/15/2020 while awaiting coverage of Andrew Fuentes pt received the Gustavo he was unable to swallow the pills.  As such he remained on Casodex. Madeline Dodd had increased fatigue and family started holding casodex starting 2/3/21.  Pt was hospitalized and treated for aspiration pneumonia 2/5/21-2/8/21.       Pt is here today for XGEVA and Lupron injections. At last office visit  doxazosin was decreased to 2 mg nightly. Pt reports he is still urinating ok and notes tiredness improved. BPs better. Current Outpatient Medications   Medication Sig Dispense Refill    traMADol (ULTRAM) 50 MG tablet Take 50 mg by mouth every 6 hours as needed for Pain.  doxazosin (CARDURA) 4 MG tablet Take 0.5 tablets by mouth nightly 90 tablet 3    finasteride (PROSCAR) 5 MG tablet Take 1 tablet by mouth daily 90 tablet 3    carvedilol (COREG) 3.125 MG tablet TAKE 1 TABLET BY MOUTH TWICE DAILY WITH MEALS 180 tablet 3    polyethylene glycol (MIRALAX) 17 g packet Take 17 g by mouth once      albuterol sulfate HFA (VENTOLIN HFA) 108 (90 Base) MCG/ACT inhaler Inhale 2 puffs into the lungs every 6 hours as needed for Wheezing or Shortness of Breath 1 Inhaler 3    Tiotropium Bromide-Olodaterol (STIOLTO RESPIMAT) 2.5-2.5 MCG/ACT AERS INHALE 2 PUFFS BY MOUTH ONCE DAILY 4 g 11    Cholecalciferol (VITAMIN D3) 125 MCG (5000 UT) CAPS Take 1 capsule by mouth every morning (before breakfast)      Misc.  Devices (ACAPELLA) MISC 1 Device by Does not apply route 4 times daily 1 each 0    calcium carbonate-vitamin D (CALCIUM 600 + D) 600-400 MG-UNIT TABS per tab Take 1 tablet by mouth 2 times daily (Patient taking differently: Take 1 tablet by mouth 4 times daily ) 60 tablet 5    Multiple Vitamins-Minerals (THERAPEUTIC MULTIVITAMIN-MINERALS) tablet Take 1 tablet by mouth daily      docusate sodium (COLACE, DULCOLAX) 100 MG CAPS Take 100 mg by mouth 2 times daily      acetaminophen (TYLENOL) 500 MG tablet Take 500 mg by mouth every 8 hours as needed for Pain      aspirin 325 MG EC tablet Take 1 tablet by mouth daily      bicalutamide (CASODEX) 50 MG chemo tablet Take 1 tablet by mouth daily (Patient not taking: Reported on 3/30/2021) 90 tablet 3    leuprolide (LUPRON) 7.5 MG injection Inject 7.5 mg into the muscle once Every 6 months       No current facility-administered medications for this visit. Past Medical History  Zully Lopez  has a past medical history of Blood circulation, collateral, CAD (coronary artery disease), CAD (coronary artery disease), Cancer ([address] CKD (chronic kidney disease) stage 2, GFR 60-89 ml/min, COPD (chronic obstructive pulmonary disease) ([address] Diverticulitis, DVT of lower extremity, bilateral ([address] GERD (gastroesophageal reflux disease), Hiatal hernia, Hyperlipemia, Hyperlipidemia, Other disorders of kidney and ureter in diseases classified elsewhere, Pleural plaque, Pneumonia of both lower lobes due to infectious organism, Primary adenocarcinoma of prostate ([address] Pulmonary emboli ([address] and Vitamin D deficiency. Past Surgical History  The patient  has a past surgical history that includes hernia repair; Appendectomy; Cardiac catheterization; eye surgery; Cystocopy (03/17/2015); LAPAROTOMY EXPLORATORY (N/A, 12/17/2018); Abdomen surgery; and Kyphosis surgery (N/A, 2/13/2019). Family History  This patient's family history is not on file. Social History  Zully Lopez  reports that he quit smoking about 54 years ago. His smoking use included cigarettes. He smoked 1.00 pack per day. He has never used smokeless tobacco. He reports current alcohol use. He reports that he does not use drugs.       Subjective:      Review of Systems   Constitutional: Negative for activity change, appetite change, chills, diaphoresis, fatigue, fever and PSA 1.73 (H) 04/22/2021    PSA 1.57 (H) 03/25/2021    PSA 1.57 (H) 02/23/2021        Recent BUN/Creatinine:  Lab Results   Component Value Date    BUN 21 04/22/2021    CREATININE 1.2 04/22/2021       Assessment:   Metastatic castration resistant prostate cancer  Obstructive prostate  Plan:     PSA trending up slightly to 1.73. Pt and family would like to still hold off on any antiandrogen therapy. Lupron 45 mg and Xgeva injections today. Continue calcium and vitamin D supplementation. Continue doxazosin 2 mg nightly and finasteride 5 mg daily. Continue monthly lab/ma visits for Jorge Marr in 3 months.

## 2021-05-06 ENCOUNTER — TELEPHONE (OUTPATIENT)
Dept: UROLOGY | Age: 86
End: 2021-05-06

## 2021-05-06 LAB — URINE CULTURE, ROUTINE: NORMAL

## 2021-05-13 ENCOUNTER — TELEPHONE (OUTPATIENT)
Dept: UROLOGY | Age: 86
End: 2021-05-13

## 2021-05-13 ENCOUNTER — TELEPHONE (OUTPATIENT)
Dept: PULMONOLOGY | Age: 86
End: 2021-05-13

## 2021-05-13 NOTE — TELEPHONE ENCOUNTER
Cyrus Angelica returned call to office about pts appt needing to be r/s. Attempted ext [phone], no answer. Pt declined to leave a VM. She stated she is out of town on vacation and will be back this weekend. She said she will call back to the office on Monday 05/17 to r/s when she is able to look at the calendar.

## 2021-05-13 NOTE — TELEPHONE ENCOUNTER
Patient's daughter Tommie Gregory called to cancel his appt on Monday because she said that they were told if he is doing okay that he did not need to come in for the appt. She said that he has been using and has orders for night time oxygen. However, she said that they took away his small day time oxygen. She said he really hasnt need it and doesn't really go any where but she is just kind of wanting to know if it would be possible to have the orders to have it on hand for PRN. shes just worried about if he were to go some where and need it.   DOLV 03/15/2021  Please advise  [phone]

## 2021-05-26 ENCOUNTER — TELEPHONE (OUTPATIENT)
Dept: UROLOGY | Age: 86
End: 2021-05-26

## 2021-05-26 NOTE — TELEPHONE ENCOUNTER
Patient is coming in on 6/1/21 for lab ma xgeva injection. The testosterone, psa,and calcium that are ordered, do you want those done before his three month appointment or now?

## 2021-05-27 ENCOUNTER — NURSE ONLY (OUTPATIENT)
Dept: LAB | Age: 86
End: 2021-05-27

## 2021-05-27 DIAGNOSIS — C61 PROSTATE CANCER (HCC): ICD-10-CM

## 2021-05-27 LAB — PROSTATE SPECIFIC ANTIGEN: 3.73 NG/ML (ref 0–1)

## 2021-05-28 LAB — CALCIUM IONIZED: NORMAL

## 2021-05-30 LAB — TESTOSTERONE TOTAL: < 3 NG/DL (ref 300–720)

## 2021-06-01 ENCOUNTER — NURSE ONLY (OUTPATIENT)
Dept: UROLOGY | Age: 86
End: 2021-06-01
Payer: MEDICARE

## 2021-06-01 DIAGNOSIS — C61 PROSTATE CANCER METASTATIC TO BONE (HCC): ICD-10-CM

## 2021-06-01 DIAGNOSIS — C79.51 PROSTATE CANCER METASTATIC TO BONE (HCC): ICD-10-CM

## 2021-06-01 PROCEDURE — 96372 THER/PROPH/DIAG INJ SC/IM: CPT | Performed by: NURSE PRACTITIONER

## 2021-06-01 NOTE — PROGRESS NOTES
I have personally verified, reviewed, released, signed, authenticated, authorized, confirmed,finalized, and approved the actions of the [facility]. Shraddha Mcmahon today. PSA, testosterone, I-tasha in 1 month. PSA trending up--pt will resume casodex.

## 2021-06-16 ENCOUNTER — TELEPHONE (OUTPATIENT)
Dept: UROLOGY | Age: 86
End: 2021-06-16

## 2021-06-16 DIAGNOSIS — C61 PROSTATE CANCER (HCC): Primary | ICD-10-CM

## 2021-06-16 NOTE — TELEPHONE ENCOUNTER
Patients daughter called and wanted to know if you still wanted blood work done prior to his appt. She stated you usually always order labs.  Please advise Thanks

## 2021-06-17 NOTE — TELEPHONE ENCOUNTER
Patient daughter returned the call and was advised the labs are ordered. They will go before 1000 am. She voiced understanding.

## 2021-06-22 ENCOUNTER — TELEPHONE (OUTPATIENT)
Dept: UROLOGY | Age: 86
End: 2021-06-22

## 2021-06-22 NOTE — TELEPHONE ENCOUNTER
Patients daughter Mauricio Damian (on [employer]) [phone], called and stated that the patient is having upper gum pain, with a sore in his mouth. Patient has an appt with the dentist tomorrow. Advised daughter that if the Dentist does not want the patient to have the xgeva injection we will need a note for the chart with his orders. Marciano Bee wanted to know if there were any restriction for any medications that the Dentist will need to be aware of. Appt for next xgeva injection on 06/30/21.

## 2021-06-22 NOTE — TELEPHONE ENCOUNTER
I called and spoke with Jeanette Zacarias and notified her that Eliot Roper would like her father to hold of on getting his xgeva injection until dental pain and sore are healed. Jeanette Zacarias notified to have Dentist send progress notes to our office.   Jeanette Zacarias given fax # [phone]    appt canceled for 06/30/21

## 2021-06-28 NOTE — TELEPHONE ENCOUNTER
Merlin Ensign at Dr Leyva [place] office [phone] called asking if he can prescribe kenalog and orabase prn for future mouth sores or would this be contraindicated with the xgeva? Please advise. Thank you.

## 2021-06-30 NOTE — TELEPHONE ENCOUNTER
Dawson at Dr Norbert Quiroga office advised those medications are ok but the family will still need to notify the office if he develops sores to avoid the Xgeva until they are healed. She voiced understanding.

## 2021-06-30 NOTE — TELEPHONE ENCOUNTER
That should be fine. Family will need to let us know when he has any active sores and avoid Xgeva until areas are healed.

## 2021-07-30 ENCOUNTER — NURSE ONLY (OUTPATIENT)
Dept: LAB | Age: 86
End: 2021-07-30

## 2021-07-30 DIAGNOSIS — C61 PROSTATE CANCER (HCC): ICD-10-CM

## 2021-07-30 LAB — PROSTATE SPECIFIC ANTIGEN: 2.69 NG/ML (ref 0–1)

## 2021-08-02 LAB
CALCIUM IONIZED: NORMAL
TESTOSTERONE TOTAL: < 3 NG/DL (ref 300–720)

## 2021-08-03 ENCOUNTER — OFFICE VISIT (OUTPATIENT)
Dept: UROLOGY | Age: 86
End: 2021-08-03
Payer: MEDICARE

## 2021-08-03 VITALS
SYSTOLIC BLOOD PRESSURE: 142 MMHG | WEIGHT: 128 LBS | HEIGHT: 67 IN | BODY MASS INDEX: 20.09 KG/M2 | DIASTOLIC BLOOD PRESSURE: 84 MMHG

## 2021-08-03 DIAGNOSIS — C79.51 SECONDARY MALIGNANT NEOPLASM OF BONE AND BONE MARROW (HCC): Primary | ICD-10-CM

## 2021-08-03 DIAGNOSIS — C79.52 SECONDARY MALIGNANT NEOPLASM OF BONE AND BONE MARROW (HCC): Primary | ICD-10-CM

## 2021-08-03 DIAGNOSIS — C61 PROSTATE CANCER (HCC): ICD-10-CM

## 2021-08-03 LAB
BILIRUBIN URINE: NEGATIVE
BLOOD URINE, POC: ABNORMAL
CHARACTER, URINE: CLEAR
COLOR, URINE: YELLOW
GLUCOSE URINE: NEGATIVE MG/DL
KETONES, URINE: NEGATIVE
LEUKOCYTE CLUMPS, URINE: ABNORMAL
NITRITE, URINE: NEGATIVE
PH, URINE: 7 (ref 5–9)
PROTEIN, URINE: NEGATIVE MG/DL
SPECIFIC GRAVITY, URINE: 1.01 (ref 1–1.03)
UROBILINOGEN, URINE: 0.2 EU/DL (ref 0–1)

## 2021-08-03 PROCEDURE — G8427 DOCREV CUR MEDS BY ELIG CLIN: HCPCS | Performed by: NURSE PRACTITIONER

## 2021-08-03 PROCEDURE — 1036F TOBACCO NON-USER: CPT | Performed by: NURSE PRACTITIONER

## 2021-08-03 PROCEDURE — 96372 THER/PROPH/DIAG INJ SC/IM: CPT | Performed by: NURSE PRACTITIONER

## 2021-08-03 PROCEDURE — 4040F PNEUMOC VAC/ADMIN/RCVD: CPT | Performed by: NURSE PRACTITIONER

## 2021-08-03 PROCEDURE — 99213 OFFICE O/P EST LOW 20 MIN: CPT | Performed by: NURSE PRACTITIONER

## 2021-08-03 PROCEDURE — G8420 CALC BMI NORM PARAMETERS: HCPCS | Performed by: NURSE PRACTITIONER

## 2021-08-03 PROCEDURE — 81003 URINALYSIS AUTO W/O SCOPE: CPT | Performed by: NURSE PRACTITIONER

## 2021-08-03 PROCEDURE — 1123F ACP DISCUSS/DSCN MKR DOCD: CPT | Performed by: NURSE PRACTITIONER

## 2021-08-03 NOTE — PROGRESS NOTES
[facility] [place] [address]  [address] [place] [address]  Dept: [phone]  Loc: [phone]    Visit Date: 8/3/2021        HPI:     Sigrid Bledsoe is a 80 y.o. male who presents today for:  Chief Complaint   Patient presents with    Follow-up     prostate ca-injections       HPI   Pt seen in follow up for prostate cancer. Mr. Helen Polanco has a hx of PSA 1/31/17 that was 25 at which time family was notified of risk for prostate cancer but did not wish to pursue further testing.  CT of the abdomen and pelvis performed for abdominal pain 12/17/18 noted multiple skeletal sclerotic metastases with pathologic compression fractures of L1-L3, enlarged hterogeneous prostate, and pelvic adenopathy.  PSA performed 12/17/18 was 1,594.  Pt was started on Casodex 12/20/18.  Pt recovered on TCU 12/24/19-1/10/19. [facility] underwent TRUS prostate biopsy 1/24/19 by Dr. Shanda Hurtado with findings of high grade prostatic adenocarcinoma.  Pt was scheduled for review of biopsy results 2/11/19 however pt was admitted to the hospital 2/5/19-2/15/19 and treated for acute PE and DVT and pathologic T6 and T8 fractures.  He underwent T6 and T8 vertebral bone biopsy, RFA for spine metastatic lesion at the levels of T6 and T8, and vertebral augmentation (balloon kyphoplasty) for T6 and T8 on 2/13/19 by Dr. Margo Sierra has completed palliative radiation therapy.      Pt started monthly Firmagon and Torsten Johnson 2/26/19.  Firmagon switched to Lupron 4/30/19.  Pt had anorexia on Casodex and this was held. Nida Correia and daughter decided to hold casodex.  Pt is on calcium carbonate and vitamin D supplementation.  Last ADT injection with Lupron 45 mg 5/4/21.         In December 2020 Chey Hewitt PSA continued to trend upward to recent high of 3.17 with a testosterone <3.  At office visit in [place] discussed addition of Cyrena Ghee or Casodex and possible side effects.  Pt was started on Casodex apply route 4 times daily 1 each 0    calcium carbonate-vitamin D (CALCIUM 600 + D) 600-400 MG-UNIT TABS per tab Take 1 tablet by mouth 2 times daily (Patient taking differently: Take 1 tablet by mouth 4 times daily ) 60 tablet 5    Multiple Vitamins-Minerals (THERAPEUTIC MULTIVITAMIN-MINERALS) tablet Take 1 tablet by mouth daily      docusate sodium (COLACE, DULCOLAX) 100 MG CAPS Take 100 mg by mouth 2 times daily (Patient taking differently: Take 100 mg by mouth nightly )      acetaminophen (TYLENOL) 500 MG tablet Take 500 mg by mouth every 8 hours as needed for Pain      aspirin 325 MG EC tablet Take 1 tablet by mouth daily       No current facility-administered medications for this visit. Past Medical History  Aurelio Cruz  has a past medical history of Blood circulation, collateral, CAD (coronary artery disease), CAD (coronary artery disease), Cancer ([address] CKD (chronic kidney disease) stage 2, GFR 60-89 ml/min, COPD (chronic obstructive pulmonary disease) ([address] Diverticulitis, DVT of lower extremity, bilateral ([address] GERD (gastroesophageal reflux disease), Hiatal hernia, Hyperlipemia, Hyperlipidemia, Other disorders of kidney and ureter in diseases classified elsewhere, Pleural plaque, Pneumonia of both lower lobes due to infectious organism, Primary adenocarcinoma of prostate ([address] Pulmonary emboli ([address] and Vitamin D deficiency. Past Surgical History  The patient  has a past surgical history that includes hernia repair; Appendectomy; Cardiac catheterization; eye surgery; Cystocopy (03/17/2015); LAPAROTOMY EXPLORATORY (N/A, 12/17/2018); Abdomen surgery; and Kyphosis surgery (N/A, 2/13/2019). Family History  This patient's family history is not on file. Social History  Aurelio Cruz  reports that he quit smoking about 55 years ago. His smoking use included cigarettes. He smoked 1.00 pack per day. He has never used smokeless tobacco. He reports current alcohol use.  He reports that he does not use drugs.      Subjective:      Review of Systems   Constitutional: Negative for activity change, appetite change, chills, diaphoresis, fatigue, fever and unexpected weight change. Gastrointestinal: Negative for abdominal pain, nausea and vomiting. Genitourinary: Negative for decreased urine volume, difficulty urinating, dysuria, flank pain, frequency, hematuria and urgency. Musculoskeletal: Negative for back pain. Objective:   BP (!) 142/84   Ht 5' 7\" (1.702 m)   Wt 128 lb (58.1 kg)   BMI 20.05 kg/m²     Physical Exam  Vitals reviewed. Constitutional:       General: He is not in acute distress. Appearance: Normal appearance. He is well-developed. He is not ill-appearing or diaphoretic. HENT:      Head: Normocephalic and atraumatic. Right Ear: External ear normal.      Left Ear: External ear normal.      Nose: Nose normal.      Mouth/Throat:      Mouth: Mucous membranes are moist.   Eyes:      General: No scleral icterus. Right eye: No discharge. Left eye: No discharge. Neck:      Vascular: No JVD. Trachea: No tracheal deviation. Pulmonary:      Effort: Pulmonary effort is normal. No respiratory distress. Musculoskeletal:         General: Normal range of motion. Neurological:      Mental Status: He is alert and oriented to person, place, and time. Mental status is at baseline. Psychiatric:         Mood and Affect: Mood normal.         Behavior: Behavior normal.         Thought Content:  Thought content normal.         POC  Results for POC orders placed in visit on 08/03/21   POCT Urinalysis No Micro (Auto)   Result Value Ref Range    Glucose, Ur Negative NEGATIVE mg/dl    Bilirubin Urine Negative     Ketones, Urine Negative NEGATIVE    Specific Gravity, Urine 1.015 1.002 - 1.030    Blood, UA POC Small (A) NEGATIVE    pH, Urine 7.00 5.0 - 9.0    Protein, Urine Negative NEGATIVE mg/dl    Urobilinogen, Urine 0.20 0.0 - 1.0 eu/dl    Nitrite, Urine Negative NEGATIVE Leukocyte Clumps, Urine Trace (A) NEGATIVE    Color, Urine Yellow YELLOW-STRAW    Character, Urine Clear [id number]         Patients recent PSA values are as follows  Lab Results   Component Value Date    PSA 2.69 (H) 07/30/2021    PSA 3.73 (H) 05/27/2021    PSA 1.73 (H) 04/22/2021        Recent BUN/Creatinine:  Lab Results   Component Value Date    BUN 21 04/22/2021    CREATININE 1.2 04/22/2021         Assessment:   Metastatic castration resistant prostate cancer  Obstructive prostate    Plan:     Xgeva injection today. Calcium and vit d supplement. Continue doxazosin 2 mg nightly and finasteride 5 mg daily. Continue monthly lab/ma visit for Xgeva.   Next Lupron injection due 11/2021

## 2021-08-11 ASSESSMENT — ENCOUNTER SYMPTOMS
NAUSEA: 0
ABDOMINAL PAIN: 0
VOMITING: 0
BACK PAIN: 0

## 2021-08-26 ENCOUNTER — NURSE ONLY (OUTPATIENT)
Dept: LAB | Age: 86
End: 2021-08-26

## 2021-08-26 DIAGNOSIS — C61 PROSTATE CANCER (HCC): ICD-10-CM

## 2021-08-26 DIAGNOSIS — C79.51 SECONDARY MALIGNANT NEOPLASM OF BONE AND BONE MARROW (HCC): ICD-10-CM

## 2021-08-26 DIAGNOSIS — C79.52 SECONDARY MALIGNANT NEOPLASM OF BONE AND BONE MARROW (HCC): ICD-10-CM

## 2021-08-26 LAB — PROSTATE SPECIFIC ANTIGEN: 2.86 NG/ML (ref 0–1)

## 2021-08-28 LAB
CALCIUM IONIZED: NORMAL
TESTOSTERONE TOTAL: < 3 NG/DL (ref 300–720)

## 2021-08-31 ENCOUNTER — NURSE ONLY (OUTPATIENT)
Dept: UROLOGY | Age: 86
End: 2021-08-31
Payer: MEDICARE

## 2021-08-31 DIAGNOSIS — E55.9 VITAMIN D DEFICIENCY: ICD-10-CM

## 2021-08-31 DIAGNOSIS — C79.51 SECONDARY MALIGNANT NEOPLASM OF BONE AND BONE MARROW (HCC): Primary | ICD-10-CM

## 2021-08-31 DIAGNOSIS — C61 PROSTATE CANCER (HCC): ICD-10-CM

## 2021-08-31 DIAGNOSIS — M81.0 OSTEOPOROSIS, UNSPECIFIED OSTEOPOROSIS TYPE, UNSPECIFIED PATHOLOGICAL FRACTURE PRESENCE: ICD-10-CM

## 2021-08-31 DIAGNOSIS — C79.52 SECONDARY MALIGNANT NEOPLASM OF BONE AND BONE MARROW (HCC): Primary | ICD-10-CM

## 2021-08-31 PROCEDURE — 96372 THER/PROPH/DIAG INJ SC/IM: CPT | Performed by: NURSE PRACTITIONER

## 2021-08-31 NOTE — PROGRESS NOTES
I have personally verified, reviewed, released, signed, authenticated, authorized, confirmed,finalized, and approved the actions of the [facility]. Pt to receive Ilana Amabile today. Repeat PSA, testosterone, ca, d in one month.

## 2021-08-31 NOTE — PROGRESS NOTES
Patient has given me verbal consent to perform Xgeva Injection Yes      Following Dr. Sen Santillan [facility] plan of care. Mignon Lose 120MG/1.7ML MG GIVEN S.C right ARM  Lot Number: [id number]  Expiration Date: 11/2023  [facility] #: [id number]    After Injection was given there were no reactions at injection site and patient was feeling well. Patient was notified that possible side effects from injections include: Redness, swelling and itching at the injection site. Possible side effects of androgen deprivation therapy, including hot flashes, flushing of the skin, increased weight, decreased sex drive, and difficulties with ED. Patient was instructed to inform their dental office that they are receiving Mignon Lose and that major dental procedures should be avoided. Patient was advised to call our office with any further questions or concerns. Any active dental problems, infections, or pain? No    Date of last dental appointment: 06/2021    Any planned dental procedures? No, has dentures    Last Bone Scan was performed on: N/A    Last Calcium level drawn on 08/26/2021 and Calcium Value was 1.24. Calcium needs to be checked periodically. Ask provider when Calcium needs checked again. Patient supplied their own medications No    Pt [address] therapy first initiated on 02/26/2019.

## 2021-09-14 ENCOUNTER — OFFICE VISIT (OUTPATIENT)
Dept: FAMILY MEDICINE CLINIC | Age: 86
End: 2021-09-14

## 2021-09-14 VITALS
TEMPERATURE: 97.3 F | SYSTOLIC BLOOD PRESSURE: 134 MMHG | HEIGHT: 67 IN | RESPIRATION RATE: 18 BRPM | DIASTOLIC BLOOD PRESSURE: 82 MMHG | BODY MASS INDEX: 19.7 KG/M2 | HEART RATE: 68 BPM | WEIGHT: 125.5 LBS

## 2021-09-14 DIAGNOSIS — C79.51 PROSTATE CANCER METASTATIC TO BONE (HCC): ICD-10-CM

## 2021-09-14 DIAGNOSIS — C61 PROSTATE CANCER METASTATIC TO BONE (HCC): ICD-10-CM

## 2021-09-14 DIAGNOSIS — H61.23 BILATERAL IMPACTED CERUMEN: Primary | ICD-10-CM

## 2021-09-14 PROBLEM — I26.99 PULMONARY EMBOLISM (HCC): Status: RESOLVED | Noted: 2019-02-01 | Resolved: 2021-09-14

## 2021-09-14 PROBLEM — I26.99 PULMONARY EMBOLI (HCC): Status: RESOLVED | Noted: 2019-02-01 | Resolved: 2021-09-14

## 2021-09-14 PROCEDURE — 1123F ACP DISCUSS/DSCN MKR DOCD: CPT | Performed by: FAMILY MEDICINE

## 2021-09-14 PROCEDURE — G8420 CALC BMI NORM PARAMETERS: HCPCS | Performed by: FAMILY MEDICINE

## 2021-09-14 PROCEDURE — 99214 OFFICE O/P EST MOD 30 MIN: CPT | Performed by: FAMILY MEDICINE

## 2021-09-14 PROCEDURE — 1036F TOBACCO NON-USER: CPT | Performed by: FAMILY MEDICINE

## 2021-09-14 PROCEDURE — G8427 DOCREV CUR MEDS BY ELIG CLIN: HCPCS | Performed by: FAMILY MEDICINE

## 2021-09-14 PROCEDURE — 69210 REMOVE IMPACTED EAR WAX UNI: CPT | Performed by: FAMILY MEDICINE

## 2021-09-14 PROCEDURE — 4040F PNEUMOC VAC/ADMIN/RCVD: CPT | Performed by: FAMILY MEDICINE

## 2021-09-14 SDOH — ECONOMIC STABILITY: FOOD INSECURITY: WITHIN THE PAST 12 MONTHS, THE FOOD YOU BOUGHT JUST DIDN'T LAST AND YOU DIDN'T HAVE MONEY TO GET MORE.: NEVER TRUE

## 2021-09-14 SDOH — ECONOMIC STABILITY: FOOD INSECURITY: WITHIN THE PAST 12 MONTHS, YOU WORRIED THAT YOUR FOOD WOULD RUN OUT BEFORE YOU GOT MONEY TO BUY MORE.: NEVER TRUE

## 2021-09-14 ASSESSMENT — SOCIAL DETERMINANTS OF HEALTH (SDOH): HOW HARD IS IT FOR YOU TO PAY FOR THE VERY BASICS LIKE FOOD, HOUSING, MEDICAL CARE, AND HEATING?: NOT HARD AT ALL

## 2021-09-14 ASSESSMENT — ENCOUNTER SYMPTOMS
CONSTIPATION: 0
SHORTNESS OF BREATH: 0
SINUS PRESSURE: 0

## 2021-09-14 NOTE — PATIENT INSTRUCTIONS
Soak each canal with peroxide for 5 minutes twice daily.   See me tomorrow afternoon or Thursday morning

## 2021-09-14 NOTE — PROGRESS NOTES
Matthew Reyes (:  1926) is a 80 y.o. male,Established patient, here for evaluation of the following chief complaint(s):  Dysphagia and Ear Fullness (left)         ASSESSMENT/PLAN:      Diagnosis Orders   1. Bilateral impacted cerumen  82792 - IA REMOVE IMPACTED EAR WAX   2. Prostate cancer metastatic to bone [facility])       Soak each canal with peroxide for 5 minutes twice daily. See me tomorrow afternoon or Thursday morning    Subjective   SUBJECTIVE/OBJECTIVE:  HPI  1. There has been a sore area in the mouth the past week. It is somewhat less sore now. 2. The tramadol is needed less and is helpful  3. There is ear congestion more on the left  Review of Systems   Constitutional: Positive for fatigue. HENT: Positive for mouth sores. Negative for sinus pressure. Eyes: Negative for visual disturbance. Respiratory: Negative for shortness of breath. Cardiovascular: Negative for chest pain. Gastrointestinal: Negative for constipation. Genitourinary: Negative. Musculoskeletal: Positive for gait problem. Negative for arthralgias. Skin: Negative for rash. Neurological: Positive for weakness. Negative for headaches. The patient's medications, allergies, past medical problems, surgical, social, and family histories were reviewed and updated as needed. Objective   Physical Exam  Constitutional:       General: He is not in acute distress. Appearance: He is well-developed. HENT:      Head: Normocephalic and atraumatic. Right Ear: There is impacted cerumen. Left Ear: There is impacted cerumen. Ears:      Comments: Wax was removed from the affected ear(s) with a combination of the nurse using the water bottle, and myself using the curette. Inspection of the right sided canal, and the tympanic membrane by myself afterward found an abrasion of the canal with some bleeding but the visualized TM looks intact.      Mouth/Throat:      Mouth: Mucous membranes are moist. Pharynx: No oropharyngeal exudate or posterior oropharyngeal erythema. Comments: I see no mucosal lesion with the lower denture out. Eyes:      General: No scleral icterus. Conjunctiva/sclera: Conjunctivae normal.   Neck:      Trachea: No tracheal deviation. Cardiovascular:      Rate and Rhythm: Normal rate and regular rhythm. Heart sounds: Normal heart sounds. Pulmonary:      Effort: Pulmonary effort is normal.      Breath sounds: Normal breath sounds. Lymphadenopathy:      Cervical: No cervical adenopathy. Skin:     General: Skin is warm and dry. Neurological:      Mental Status: He is alert and oriented to person, place, and time. Psychiatric:         Behavior: Behavior normal.     Blood pressure 134/82, pulse 68, temperature 97.3 °F (36.3 °C), temperature source Skin, resp. rate 18, height 5' 7\" (1.702 m), weight 125 lb 8 oz (56.9 kg). An electronic signature was used to authenticate this note.     --Herson Correa MD

## 2021-09-16 ENCOUNTER — NURSE ONLY (OUTPATIENT)
Dept: FAMILY MEDICINE CLINIC | Age: 86
End: 2021-09-16

## 2021-09-16 NOTE — PROGRESS NOTES
Pieces of cerumen were removed with the curette and the canals were more clear.  I asked them to continue the bid soaking and see me 9/20/21

## 2021-09-20 ENCOUNTER — NURSE ONLY (OUTPATIENT)
Dept: FAMILY MEDICINE CLINIC | Age: 86
End: 2021-09-20

## 2021-09-20 ENCOUNTER — TELEPHONE (OUTPATIENT)
Dept: UROLOGY | Age: 86
End: 2021-09-20

## 2021-09-20 NOTE — TELEPHONE ENCOUNTER
Gemma Kellogg left a message to give you an update. Patient c/o lump below his lower denture plate that was sore. By the time he could be seen by the dentist, it had resolved. He is fine now.     Thank you

## 2021-09-20 NOTE — PROGRESS NOTES
The canals show some old blood bilaterally more in the left than right. The material was easily removed with the curette as the canals had been soaked with peroxide twice daily since the last visit. He tolerated today well and felt his hearing was improved.

## 2021-09-23 ENCOUNTER — NURSE ONLY (OUTPATIENT)
Dept: LAB | Age: 86
End: 2021-09-23

## 2021-09-23 DIAGNOSIS — M81.0 OSTEOPOROSIS, UNSPECIFIED OSTEOPOROSIS TYPE, UNSPECIFIED PATHOLOGICAL FRACTURE PRESENCE: ICD-10-CM

## 2021-09-23 DIAGNOSIS — C61 PROSTATE CANCER (HCC): ICD-10-CM

## 2021-09-23 DIAGNOSIS — E55.9 VITAMIN D DEFICIENCY: ICD-10-CM

## 2021-09-23 LAB
PROSTATE SPECIFIC ANTIGEN: 3.72 NG/ML (ref 0–1)
VITAMIN D 25-HYDROXY: 78 NG/ML (ref 30–100)

## 2021-09-25 LAB
CALCIUM IONIZED: NORMAL
TESTOSTERONE TOTAL: < 3 NG/DL (ref 300–720)

## 2021-09-29 ENCOUNTER — OFFICE VISIT (OUTPATIENT)
Dept: UROLOGY | Age: 86
End: 2021-09-29
Payer: MEDICARE

## 2021-09-29 VITALS — BODY MASS INDEX: 20.33 KG/M2 | WEIGHT: 129.5 LBS | HEIGHT: 67 IN

## 2021-09-29 DIAGNOSIS — C79.52 SECONDARY MALIGNANT NEOPLASM OF BONE AND BONE MARROW (HCC): ICD-10-CM

## 2021-09-29 DIAGNOSIS — C61 MALIGNANT NEOPLASM OF PROSTATE (HCC): ICD-10-CM

## 2021-09-29 DIAGNOSIS — M81.0 OSTEOPOROSIS, UNSPECIFIED OSTEOPOROSIS TYPE, UNSPECIFIED PATHOLOGICAL FRACTURE PRESENCE: ICD-10-CM

## 2021-09-29 DIAGNOSIS — C61 PROSTATE CANCER (HCC): ICD-10-CM

## 2021-09-29 DIAGNOSIS — R30.0 DYSURIA: Primary | ICD-10-CM

## 2021-09-29 DIAGNOSIS — C79.51 SECONDARY MALIGNANT NEOPLASM OF BONE AND BONE MARROW (HCC): ICD-10-CM

## 2021-09-29 LAB
BILIRUBIN URINE: NEGATIVE
BLOOD URINE, POC: ABNORMAL
CHARACTER, URINE: CLEAR
COLOR, URINE: YELLOW
GLUCOSE URINE: NEGATIVE MG/DL
KETONES, URINE: NEGATIVE
LEUKOCYTE CLUMPS, URINE: ABNORMAL
NITRITE, URINE: NEGATIVE
PH, URINE: 8.5 (ref 5–9)
POST VOID RESIDUAL (PVR): 205 ML
PROTEIN, URINE: NEGATIVE MG/DL
SPECIFIC GRAVITY, URINE: 1.02 (ref 1–1.03)
UROBILINOGEN, URINE: 0.2 EU/DL (ref 0–1)

## 2021-09-29 PROCEDURE — G8427 DOCREV CUR MEDS BY ELIG CLIN: HCPCS | Performed by: NURSE PRACTITIONER

## 2021-09-29 PROCEDURE — G8420 CALC BMI NORM PARAMETERS: HCPCS | Performed by: NURSE PRACTITIONER

## 2021-09-29 PROCEDURE — 81003 URINALYSIS AUTO W/O SCOPE: CPT | Performed by: NURSE PRACTITIONER

## 2021-09-29 PROCEDURE — 51798 US URINE CAPACITY MEASURE: CPT | Performed by: NURSE PRACTITIONER

## 2021-09-29 PROCEDURE — 99213 OFFICE O/P EST LOW 20 MIN: CPT | Performed by: NURSE PRACTITIONER

## 2021-09-29 PROCEDURE — 96372 THER/PROPH/DIAG INJ SC/IM: CPT | Performed by: NURSE PRACTITIONER

## 2021-09-29 PROCEDURE — 4040F PNEUMOC VAC/ADMIN/RCVD: CPT | Performed by: NURSE PRACTITIONER

## 2021-09-29 PROCEDURE — 1036F TOBACCO NON-USER: CPT | Performed by: NURSE PRACTITIONER

## 2021-09-29 PROCEDURE — 1123F ACP DISCUSS/DSCN MKR DOCD: CPT | Performed by: NURSE PRACTITIONER

## 2021-09-29 ASSESSMENT — ENCOUNTER SYMPTOMS
NAUSEA: 0
BACK PAIN: 0
VOMITING: 0
ABDOMINAL PAIN: 0

## 2021-09-29 NOTE — PROGRESS NOTES
Patient has given me verbal consent to perform Xgeva Injection yes      Following Enio GLASER CNP plan of care. XGEVA 120MG/1.7ML MG GIVEN Mani Lopez ARM  Lot Number: [id number]  Expiration Date: 12/23  [facility] #: [id number]    After Injection was given there were no reactions at injection site and patient was feeling well. Patient was notified that possible side effects from injections include: Redness, swelling and itching at the injection site. Possible side effects of androgen deprivation therapy, including hot flashes, flushing of the skin, increased weight, decreased sex drive, and difficulties with ED. Patient was instructed to inform their dental office that they are receiving Helon Pill and that major dental procedures should be avoided. Patient was advised to call our office with any further questions or concerns. Any active dental problems, infections, or pain? no    Date of last dental appointment: unknown     Any planned dental procedures? no      Last Calcium level drawn on 9- and Calcium Value was 1.22 and 1.26. Calcium needs to be checked periodically. Ask provider when Calcium needs checked again. Patient supplied their own medications No    Pt [address] therapy first initiated on 2-.

## 2021-09-29 NOTE — PROGRESS NOTES
[address] [facility]  [address]  [facility] [address]  Dept: [phone]  Loc: [phone]    Visit Date: 9/29/2021        HPI:     Jareth Maciel is a 80 y.o. male who presents today for:  Chief Complaint   Patient presents with    Follow-up     lab results and injection       HPI   Pt seen in follow up for prostate cancer.  [facility] has a hx of PSA 1/31/17 that was 25 at which time family was notified of risk for prostate cancer but did not wish to pursue further testing.  CT of the abdomen and pelvis performed for abdominal pain 12/17/18 noted multiple skeletal sclerotic metastases with pathologic compression fractures of L1-L3, enlarged hterogeneous prostate, and pelvic adenopathy.  PSA performed 12/17/18 was 1,594.  Pt was started on Casodex 12/20/18.  Pt recovered on TCU 12/24/19-1/10/19. Gisele Parkinson underwent TRUS prostate biopsy 1/24/19 by Dr. Eugenio Rai with findings of high grade prostatic adenocarcinoma.  Pt was scheduled for review of biopsy results 2/11/19 however pt was admitted to the hospital 2/5/19-2/15/19 and treated for acute PE and DVT and pathologic T6 and T8 fractures.  He underwent T6 and T8 vertebral bone biopsy, RFA for spine metastatic lesion at the levels of T6 and T8, and vertebral augmentation (balloon kyphoplasty) for T6 and T8 on 2/13/19 by Dr. María Mckeon has completed palliative radiation therapy.      Pt started monthly Firmagon and Cristobal Barnes 2/26/19.  Firmagon switched to Lupron 4/30/19.  Pt had anorexia on Casodex and this was held. Chelsy Hines and daughter decided to hold casodex.  Pt is on calcium carbonate and vitamin D supplementation.  Last ADT injection with Lupron 45 mg 5/4/21.         In December 2020 Maryellen Pacheco PSA continued to trend upward to recent high of 3.17 with a testosterone <3.  At office visit in [place] discussed addition of Jason Daft or Casodex and possible side effects.  Pt was started on Casodex 12/15/2020 while awaiting coverage of Soni Goetz pt received the [facility] he was unable to swallow the pills.  As such he remained on Casodex. [facility] had increased fatigue and family started holding casodex starting 2/3/21.  Pt was hospitalized and treated for aspiration pneumonia 2/5/21-2/8/21. PSA has recently continued to trend upward and family resumed casodex in June 2021.      Pt is here today for XGEVA injection.  Next Lupron injection due in November.     Pt underwent cystoscopy in the office 3/11/19 by Dr. Cristal Sanders with findings of a significantly obstructing prostate and mild bladder wall trabeculations and debris. Recently pt's doxazosin was decreased to 2 mg nightly secondary to family concern for tiredness. PVR today slightly up but pt reports he did not fully empty his bladder. Current Outpatient Medications   Medication Sig Dispense Refill    denosumab (XGEVA) 120 MG/1.7ML SOLN SC injection Inject 120 mg into the skin once Every 28 days      traMADol (ULTRAM) 50 MG tablet Take 50 mg by mouth every 6 hours as needed for Pain.       doxazosin (CARDURA) 4 MG tablet Take 0.5 tablets by mouth nightly 90 tablet 3    finasteride (PROSCAR) 5 MG tablet Take 1 tablet by mouth daily 90 tablet 3    carvedilol (COREG) 3.125 MG tablet TAKE 1 TABLET BY MOUTH TWICE DAILY WITH MEALS 180 tablet 3    polyethylene glycol (MIRALAX) 17 g packet Take 17 g by mouth once      bicalutamide (CASODEX) 50 MG chemo tablet Take 1 tablet by mouth daily 90 tablet 3    albuterol sulfate HFA (VENTOLIN HFA) 108 (90 Base) MCG/ACT inhaler Inhale 2 puffs into the lungs every 6 hours as needed for Wheezing or Shortness of Breath 1 Inhaler 3    Tiotropium Bromide-Olodaterol (STIOLTO RESPIMAT) 2.5-2.5 MCG/ACT AERS INHALE 2 PUFFS BY MOUTH ONCE DAILY 4 g 11    leuprolide (LUPRON) 7.5 MG injection Inject 7.5 mg into the muscle once Every 6 months      Cholecalciferol (VITAMIN D3) 125 MCG (5000 UT) CAPS Take 1 capsule by mouth used smokeless tobacco. He reports current alcohol use. He reports that he does not use drugs. Subjective:      Review of Systems   Constitutional: Negative for activity change, appetite change, chills, diaphoresis, fatigue, fever and unexpected weight change. Gastrointestinal: Negative for abdominal pain, nausea and vomiting. Genitourinary: Negative for decreased urine volume, difficulty urinating, dysuria, flank pain, frequency, hematuria and urgency. Musculoskeletal: Negative for back pain. Objective:   Ht 5' 7\" (1.702 m)   Wt 129 lb 8 oz (58.7 kg)   BMI 20.28 kg/m²     Physical Exam  Vitals reviewed. Constitutional:       General: He is not in acute distress. Appearance: Normal appearance. He is well-developed. He is not ill-appearing or diaphoretic. HENT:      Head: Normocephalic and atraumatic. Right Ear: External ear normal.      Left Ear: External ear normal.      Nose: Nose normal.      Mouth/Throat:      Mouth: Mucous membranes are moist.   Eyes:      General: No scleral icterus. Right eye: No discharge. Left eye: No discharge. Neck:      Vascular: No JVD. Trachea: No tracheal deviation. Pulmonary:      Effort: Pulmonary effort is normal. No respiratory distress. Abdominal:      General: There is no distension. Tenderness: There is no abdominal tenderness. There is no right CVA tenderness or left CVA tenderness. Musculoskeletal:         General: Normal range of motion. Neurological:      Mental Status: He is alert and oriented to person, place, and time. Mental status is at baseline. Psychiatric:         Mood and Affect: Mood normal.         Behavior: Behavior normal.         Thought Content:  Thought content normal.         POC  Results for POC orders placed in visit on 09/29/21   POCT Urinalysis No Micro (Auto)   Result Value Ref Range    Glucose, Ur Negative NEGATIVE mg/dl    Bilirubin Urine Negative     Ketones, Urine Negative NEGATIVE Specific Gravity, Urine 1.020 1.002 - 1.030    Blood, UA POC Small (A) NEGATIVE    pH, Urine 8.50 5.0 - 9.0    Protein, Urine Negative NEGATIVE mg/dl    Urobilinogen, Urine 0.20 0.0 - 1.0 eu/dl    Nitrite, Urine Negative NEGATIVE    Leukocyte Clumps, Urine Large (A) NEGATIVE    Color, Urine Yellow YELLOW-STRAW    Character, Urine Clear [id number].[id number]   poct post void residual   Result Value Ref Range    post void residual 205 ml         Patients recent PSA values are as follows  Lab Results   Component Value Date    PSA 3.72 (H) 09/23/2021    PSA 2.86 (H) 08/26/2021    PSA 2.69 (H) 07/30/2021        Recent BUN/Creatinine:  Lab Results   Component Value Date    BUN 21 04/22/2021    CREATININE 1.2 04/22/2021         Assessment:   Metastatic castration resistant prostate cancer  Obstructive prostate    Plan:     Xgeva injection today. Continue doxazosin and finasteride. Monitor PVRs. Pt's PSA trending upward. Will continue to monitor trend over time and doubling time. Family and pt prefer not to trial different anti-androgen at this time. Monthly Xgeva.   Next Lupron and OV in Nov.

## 2021-10-25 ENCOUNTER — NURSE ONLY (OUTPATIENT)
Dept: LAB | Age: 86
End: 2021-10-25

## 2021-10-25 DIAGNOSIS — C61 PROSTATE CANCER (HCC): ICD-10-CM

## 2021-10-25 DIAGNOSIS — M81.0 OSTEOPOROSIS, UNSPECIFIED OSTEOPOROSIS TYPE, UNSPECIFIED PATHOLOGICAL FRACTURE PRESENCE: ICD-10-CM

## 2021-10-25 LAB
PROSTATE SPECIFIC ANTIGEN: 3.01 NG/ML (ref 0–1)
VITAMIN D 25-HYDROXY: 82 NG/ML (ref 30–100)

## 2021-10-27 LAB
CALCIUM IONIZED: NORMAL
TESTOSTERONE TOTAL: < 3 NG/DL (ref 300–720)

## 2021-10-29 ENCOUNTER — NURSE ONLY (OUTPATIENT)
Dept: UROLOGY | Age: 86
End: 2021-10-29
Payer: MEDICARE

## 2021-10-29 DIAGNOSIS — C79.51 PROSTATE CANCER METASTATIC TO BONE (HCC): ICD-10-CM

## 2021-10-29 DIAGNOSIS — C61 PROSTATE CANCER METASTATIC TO BONE (HCC): ICD-10-CM

## 2021-10-29 PROCEDURE — 96372 THER/PROPH/DIAG INJ SC/IM: CPT | Performed by: NURSE PRACTITIONER

## 2021-10-29 NOTE — PROGRESS NOTES
Patient has given me verbal consent to perform Xgeva Injection yes      Following Lacey Heimlich NP plan of care. Ada Hurter 120MG/1.7ML MG GIVEN S.C Right ARM  Lot Number: [id number]  Expiration Date: 12/23  Chris Aguirre #: [id number]    After Injection was given there were no reactions at injection site and patient was feeling well. Patient was notified that possible side effects from injections include: Redness, swelling and itching at the injection site. Possible side effects of androgen deprivation therapy, including hot flashes, flushing of the skin, increased weight, decreased sex drive, and difficulties with ED. Patient was instructed to inform their dental office that they are receiving Ada Hurter and that major dental procedures should be avoided. Patient was advised to call our office with any further questions or concerns. Any active dental problems, infections, or pain? no    Date of last dental appointment: no    Any planned dental procedures? no    Patient supplied their own medications no     Pt [facility] therapy first initiated on 2-.

## 2021-11-23 ENCOUNTER — NURSE ONLY (OUTPATIENT)
Dept: LAB | Age: 86
End: 2021-11-23

## 2021-11-23 DIAGNOSIS — C61 PROSTATE CANCER METASTATIC TO BONE (HCC): ICD-10-CM

## 2021-11-23 DIAGNOSIS — C79.51 PROSTATE CANCER METASTATIC TO BONE (HCC): ICD-10-CM

## 2021-11-23 LAB — PROSTATE SPECIFIC ANTIGEN: 4.37 NG/ML (ref 0–1)

## 2021-11-25 LAB
CALCIUM IONIZED: NORMAL
TESTOSTERONE FREE: NORMAL

## 2021-11-30 ENCOUNTER — OFFICE VISIT (OUTPATIENT)
Dept: UROLOGY | Age: 86
End: 2021-11-30
Payer: MEDICARE

## 2021-11-30 VITALS
DIASTOLIC BLOOD PRESSURE: 78 MMHG | SYSTOLIC BLOOD PRESSURE: 122 MMHG | BODY MASS INDEX: 20.89 KG/M2 | WEIGHT: 130 LBS | HEIGHT: 66 IN

## 2021-11-30 DIAGNOSIS — C79.52 SECONDARY MALIGNANT NEOPLASM OF BONE AND BONE MARROW (HCC): ICD-10-CM

## 2021-11-30 DIAGNOSIS — C61 PROSTATE CANCER (HCC): ICD-10-CM

## 2021-11-30 DIAGNOSIS — C79.51 SECONDARY MALIGNANT NEOPLASM OF BONE AND BONE MARROW (HCC): ICD-10-CM

## 2021-11-30 DIAGNOSIS — Z51.81 THERAPEUTIC DRUG MONITORING: Primary | ICD-10-CM

## 2021-11-30 DIAGNOSIS — E83.51 HYPOCALCEMIA: ICD-10-CM

## 2021-11-30 PROCEDURE — 1036F TOBACCO NON-USER: CPT | Performed by: NURSE PRACTITIONER

## 2021-11-30 PROCEDURE — 1123F ACP DISCUSS/DSCN MKR DOCD: CPT | Performed by: NURSE PRACTITIONER

## 2021-11-30 PROCEDURE — G8484 FLU IMMUNIZE NO ADMIN: HCPCS | Performed by: NURSE PRACTITIONER

## 2021-11-30 PROCEDURE — 4040F PNEUMOC VAC/ADMIN/RCVD: CPT | Performed by: NURSE PRACTITIONER

## 2021-11-30 PROCEDURE — G8420 CALC BMI NORM PARAMETERS: HCPCS | Performed by: NURSE PRACTITIONER

## 2021-11-30 PROCEDURE — G8427 DOCREV CUR MEDS BY ELIG CLIN: HCPCS | Performed by: NURSE PRACTITIONER

## 2021-11-30 PROCEDURE — 96402 CHEMO HORMON ANTINEOPL SQ/IM: CPT | Performed by: NURSE PRACTITIONER

## 2021-11-30 PROCEDURE — 99214 OFFICE O/P EST MOD 30 MIN: CPT | Performed by: NURSE PRACTITIONER

## 2021-11-30 PROCEDURE — 96372 THER/PROPH/DIAG INJ SC/IM: CPT | Performed by: NURSE PRACTITIONER

## 2021-11-30 ASSESSMENT — ENCOUNTER SYMPTOMS
BACK PAIN: 0
VOMITING: 0
NAUSEA: 0
ABDOMINAL PAIN: 0

## 2021-11-30 NOTE — PROGRESS NOTES
[address]  [address] [place] [address]  Dept: [phone]  Loc: [phone]    Visit Date: 11/30/2021        HPI:     Gabe Mendoza is a 80 y.o. male who presents today for:  Chief Complaint   Patient presents with    Follow-up     psa prior    Prostate Cancer       HPI   Pt seen in follow up for prostate cancer.  [facility] has a hx of PSA 1/31/17 that was 25 at which time family was notified of risk for prostate cancer but did not wish to pursue further testing.  CT of the abdomen and pelvis performed for abdominal pain 12/17/18 noted multiple skeletal sclerotic metastases with pathologic compression fractures of L1-L3, enlarged hterogeneous prostate, and pelvic adenopathy.  PSA performed 12/17/18 was 1,594.  Pt was started on Casodex 12/20/18.  Pt recovered on TCU 12/24/19-1/10/19. Ness Garcia underwent TRUS prostate biopsy 1/24/19 by Dr. Luc Boyer with findings of high grade prostatic adenocarcinoma.  Pt was scheduled for review of biopsy results 2/11/19 however pt was admitted to the hospital 2/5/19-2/15/19 and treated for acute PE and DVT and pathologic T6 and T8 fractures.  He underwent T6 and T8 vertebral bone biopsy, RFA for spine metastatic lesion at the levels of T6 and T8, and vertebral augmentation (balloon kyphoplasty) for T6 and T8 on 2/13/19 by Dr. Uli Hines has completed palliative radiation therapy.      Pt started monthly Firmagon and Chasidy Snellen 2/26/19.  Firmagon switched to Lupron 4/30/19.  Pt had anorexia on Casodex and this was held. José Johnson and daughter decided to hold casodex.  Pt is on calcium carbonate and vitamin D supplementation.  Last ADT injection with Lupron 45 mg 5/4/21.         In December 2020 Suresh Deeconstantino PSA continued to trend upward to recent high of 3.17 with a testosterone <3.  At office visit in [place] discussed addition of Xtandi or Casodex and possible side effects.  Pt was started on Casodex 12/15/2020 while awaiting coverage of Sachin Rodrigues pt received the [facility] he was unable to swallow the pills.  As such he remained on Casodex. Rome Myrick had increased fatigue and family started holding casodex starting 2/3/21.  Pt was hospitalized and treated for aspiration pneumonia 2/5/21-2/8/21. PSA has recently continued to trend upward and family resumed casodex in June 2021. Pt is here today for Xgeva and Lupron injection. Denies increase in pain, fatigue. Urinating ok. Stream slightly slow but feels he is emptying his bladder ok. Current Outpatient Medications   Medication Sig Dispense Refill    leuprolide (LUPRON) 45 MG injection Inject 45 mg into the muscle once      denosumab (XGEVA) 120 MG/1.7ML SOLN SC injection Inject 120 mg into the skin once Every 28 days      traMADol (ULTRAM) 50 MG tablet Take 50 mg by mouth every 6 hours as needed for Pain.  doxazosin (CARDURA) 4 MG tablet Take 0.5 tablets by mouth nightly 90 tablet 3    finasteride (PROSCAR) 5 MG tablet Take 1 tablet by mouth daily 90 tablet 3    carvedilol (COREG) 3.125 MG tablet TAKE 1 TABLET BY MOUTH TWICE DAILY WITH MEALS 180 tablet 3    polyethylene glycol (MIRALAX) 17 g packet Take 17 g by mouth once      bicalutamide (CASODEX) 50 MG chemo tablet Take 1 tablet by mouth daily 90 tablet 3    albuterol sulfate HFA (VENTOLIN HFA) 108 (90 Base) MCG/ACT inhaler Inhale 2 puffs into the lungs every 6 hours as needed for Wheezing or Shortness of Breath 1 Inhaler 3    Tiotropium Bromide-Olodaterol (STIOLTO RESPIMAT) 2.5-2.5 MCG/ACT AERS INHALE 2 PUFFS BY MOUTH ONCE DAILY 4 g 11    Cholecalciferol (VITAMIN D3) 125 MCG (5000 UT) CAPS Take 1 capsule by mouth every morning (before breakfast)      Misc.  Devices (ACAPELLA) MISC 1 Device by Does not apply route 4 times daily 1 each 0    calcium carbonate-vitamin D (CALCIUM 600 + D) 600-400 MG-UNIT TABS per tab Take 1 tablet by mouth 2 times daily (Patient taking differently: Take 1 tablet by mouth 4 times daily ) 60 tablet 5    Multiple Vitamins-Minerals (THERAPEUTIC MULTIVITAMIN-MINERALS) tablet Take 1 tablet by mouth daily      docusate sodium (COLACE, DULCOLAX) 100 MG CAPS Take 100 mg by mouth 2 times daily (Patient taking differently: Take 100 mg by mouth nightly )      acetaminophen (TYLENOL) 500 MG tablet Take 500 mg by mouth every 8 hours as needed for Pain      aspirin 325 MG EC tablet Take 1 tablet by mouth daily       No current facility-administered medications for this visit. Past Medical History  Lulú Henderson  has a past medical history of Blood circulation, collateral, CAD (coronary artery disease), CAD (coronary artery disease), Cancer ([address] CKD (chronic kidney disease) stage 2, GFR 60-89 ml/min, COPD (chronic obstructive pulmonary disease) ([address] Diverticulitis, DVT of lower extremity, bilateral ([address] GERD (gastroesophageal reflux disease), Hiatal hernia, Hyperlipemia, Hyperlipidemia, Other disorders of kidney and ureter in diseases classified elsewhere, Pleural plaque, Pneumonia of both lower lobes due to infectious organism, Primary adenocarcinoma of prostate ([address] Pulmonary emboli ([address] and Vitamin D deficiency. Past Surgical History  The patient  has a past surgical history that includes hernia repair; Appendectomy; Cardiac catheterization; eye surgery; Cystocopy (03/17/2015); LAPAROTOMY EXPLORATORY (N/A, 12/17/2018); Abdomen surgery; and Kyphosis surgery (N/A, 2/13/2019). Family History  This patient's family history is not on file. Social History  Lulú Henderson  reports that he quit smoking about 55 years ago. His smoking use included cigarettes. He smoked 1.00 pack per day. He has never used smokeless tobacco. He reports current alcohol use. He reports that he does not use drugs. Subjective:      Review of Systems   Constitutional: Negative for activity change, appetite change, chills, diaphoresis, fatigue, fever and unexpected weight change. Gastrointestinal: Negative for abdominal pain, nausea and vomiting. Genitourinary: Negative for decreased urine volume, difficulty urinating, dysuria, flank pain, frequency, hematuria and urgency. Musculoskeletal: Negative for back pain. Objective:   /78   Ht 5' 6\" (1.676 m)   Wt 130 lb (59 kg)   BMI 20.98 kg/m²     Physical Exam  Vitals reviewed. Constitutional:       General: He is not in acute distress. Appearance: Normal appearance. He is well-developed. He is not ill-appearing or diaphoretic. HENT:      Head: Normocephalic and atraumatic. Right Ear: External ear normal.      Left Ear: External ear normal.      Nose: Nose normal.      Mouth/Throat:      Mouth: Mucous membranes are moist.   Eyes:      General: No scleral icterus. Right eye: No discharge. Left eye: No discharge. Neck:      Vascular: No JVD. Trachea: No tracheal deviation. Cardiovascular:      Heart sounds: Normal heart sounds. Pulmonary:      Effort: Pulmonary effort is normal. No respiratory distress. Breath sounds: Normal breath sounds. Abdominal:      General: There is no distension. Tenderness: There is no abdominal tenderness. There is no right CVA tenderness or left CVA tenderness. Neurological:      Mental Status: He is alert and oriented to person, place, and time. Mental status is at baseline. Psychiatric:         Mood and Affect: Mood normal.         Behavior: Behavior normal.         Thought Content: Thought content normal.         POC  No results found for this visit on 11/30/21.       Patients recent PSA values are as follows  Lab Results   Component Value Date    PSA 4.37 (H) 11/23/2021    PSA 3.01 (H) 10/25/2021    PSA 3.72 (H) 09/23/2021        Recent BUN/Creatinine:  Lab Results   Component Value Date    BUN 21 04/22/2021    CREATININE 1.2 04/22/2021         Assessment:   Metastatic castration resistant prostate cancer  Obstructive prostate    Plan: Xgeva and Lupron injections today. PSA up and down recently over time. Continue to monitor trend. Once doubling time decreases consider changing anti-androgen therapy or referral to medical oncology. Monthly Xgeva. Next OV in 2 months.

## 2021-11-30 NOTE — PROGRESS NOTES
Patient has given me verbal consent to perform Lupron Injection  Yes    Following [facility] plan of care. LUPRON 45 MG GIVEN I.M [place]igh U[employer] HIP  Lot Number: [id number]  Expiration Date: 3-7-2024  [facility] #: [id number]    After Injection was given there were no reactions at injection site and patient was feeling well. Patient was notified that possible side effects from injections include: Redness, swelling and itching at the injection site. Possible side effects of androgen deprivation therapy, including hot flashes, flushing of the skin, increased weight, decreased sex drive, and difficulties with ED. Patient was instructed to call the office with any further questions or concerns. Patient supplied their own medications No    Pt [address] therapy first initiated on 2-. Patient has given me verbal consent to perform Xgeva Injection Yes    Following [facility] plan of care. XGEVA 120MG/1.7ML MG GIVEN S.C Left ARM  Lot Number: [id number]  Expiration Date: 11/1/2023  [facility] #: [id number]    After Injection was given there were no reactions at injection site and patient was feeling well. Patient was notified that possible side effects from injections include: Redness, swelling and itching at the injection site. Possible side effects of androgen deprivation therapy, including hot flashes, flushing of the skin, increased weight, decreased sex drive, and difficulties with ED. Patient was instructed to inform their dental office that they are receiving Alaina Don and that major dental procedures should be avoided. Patient was advised to call our office with any further questions or concerns. Any active dental problems, infections, or pain? no    Date of last dental appointment: no    Any planned dental procedures? no    Patient supplied their own medications No    Pt [address] therapy first initiated on 2-26-19.

## 2021-12-13 DIAGNOSIS — C79.51 PROSTATE CANCER METASTATIC TO BONE (HCC): ICD-10-CM

## 2021-12-13 DIAGNOSIS — C61 PROSTATE CANCER METASTATIC TO BONE (HCC): ICD-10-CM

## 2021-12-13 NOTE — TELEPHONE ENCOUNTER
Date of last visit:  9/14/2021  Date of next visit:  3/18/2022    Requested Prescriptions     Pending Prescriptions Disp Refills    tiotropium-olodaterol (STIOLTO RESPIMAT) 2.5-2.5 MCG/ACT AERS 4 g 0     Sig: INHALE 2 PUFFS BY MOUTH ONCE DAILY

## 2021-12-13 NOTE — TELEPHONE ENCOUNTER
Date of last visit:  9/14/2021  Date of next visit:  3/18/2022    Requested Prescriptions     Pending Prescriptions Disp Refills    [address] 2.5-2.5 MCG/ACT AERS [Pharmacy Med Name: Stiolto Respimat 2.5-2.5 MCG/ACT Inhalation Aerosol Solution] 4 g 0     Sig: INHALE 2 PUFFS BY MOUTH ONCE DAILY

## 2021-12-13 NOTE — TELEPHONE ENCOUNTER
Date of last visit:  9/14/2021  Date of next visit:  12/13/2021    Requested Prescriptions     Pending Prescriptions Disp Refills    traMADol (ULTRAM) 50 MG tablet 90 tablet 0     Sig: Take 1 tablet by mouth every 6 hours as needed for Pain for up to 30 days.

## 2021-12-13 NOTE — TELEPHONE ENCOUNTER
Shirley, daughter, called for a refill of the following medication:    traMADol (ULTRAM) 50 MG tablet Take 50 mg by mouth every 6 hours as needed for Pain    Send to [facility] on [address]

## 2021-12-14 RX ORDER — TRAMADOL HYDROCHLORIDE 50 MG/1
50 TABLET ORAL EVERY 6 HOURS PRN
Qty: 28 TABLET | Refills: 0 | Status: SHIPPED | OUTPATIENT
Start: 2021-12-14 | End: 2021-12-21

## 2021-12-14 RX ORDER — TIOTROPIUM BROMIDE AND OLODATEROL 3.124; 2.736 UG/1; UG/1
SPRAY, METERED RESPIRATORY (INHALATION)
Qty: 4 G | Refills: 1 | Status: SHIPPED | OUTPATIENT
Start: 2021-12-14 | End: 2022-02-02 | Stop reason: SDUPTHER

## 2021-12-21 ENCOUNTER — NURSE ONLY (OUTPATIENT)
Dept: LAB | Age: 86
End: 2021-12-21

## 2021-12-21 DIAGNOSIS — C61 PROSTATE CANCER (HCC): ICD-10-CM

## 2021-12-21 DIAGNOSIS — E83.51 HYPOCALCEMIA: ICD-10-CM

## 2021-12-21 DIAGNOSIS — Z51.81 THERAPEUTIC DRUG MONITORING: ICD-10-CM

## 2021-12-21 LAB — PROSTATE SPECIFIC ANTIGEN: 4.95 NG/ML (ref 0–1)

## 2021-12-23 LAB — CALCIUM IONIZED: NORMAL

## 2021-12-28 ENCOUNTER — NURSE ONLY (OUTPATIENT)
Dept: UROLOGY | Age: 86
End: 2021-12-28
Payer: MEDICARE

## 2021-12-28 DIAGNOSIS — C79.51 SECONDARY MALIGNANT NEOPLASM OF BONE AND BONE MARROW (HCC): ICD-10-CM

## 2021-12-28 DIAGNOSIS — C79.52 SECONDARY MALIGNANT NEOPLASM OF BONE AND BONE MARROW (HCC): ICD-10-CM

## 2021-12-28 DIAGNOSIS — C61 MALIGNANT NEOPLASM OF PROSTATE (HCC): Primary | ICD-10-CM

## 2021-12-28 PROCEDURE — 96372 THER/PROPH/DIAG INJ SC/IM: CPT | Performed by: NURSE PRACTITIONER

## 2021-12-28 NOTE — PROGRESS NOTES
Patient has given me verbal consent to perform Xgeva Injection yes      Following Alena Rodrigues [facility] plan of care. XGEVA 120MG/1.7ML MG GIVEN S.C RIGHT ARM  Lot Number: [id number]  Expiration Date: 02/2024  [facility] #: [id number]    After Injection was given there were no reactions at injection site and patient was feeling well. Patient was notified that possible side effects from injections include: Redness, swelling and itching at the injection site. Possible side effects of androgen deprivation therapy, including hot flashes, flushing of the skin, increased weight, decreased sex drive, and difficulties with ED. Patient was instructed to inform their dental office that they are receiving Sunny Herminio and that major dental procedures should be avoided. Patient was advised to call our office with any further questions or concerns. Any active dental problems, infections, or pain? no    Date of last dental appointment: n/a    Any planned dental procedures? no    Patient supplied their own medications No    Pt [address] therapy first initiated on 2-.

## 2022-01-21 ENCOUNTER — OFFICE VISIT (OUTPATIENT)
Dept: PULMONOLOGY | Age: 87
End: 2022-01-21
Payer: MEDICARE

## 2022-01-21 VITALS
SYSTOLIC BLOOD PRESSURE: 110 MMHG | DIASTOLIC BLOOD PRESSURE: 62 MMHG | HEIGHT: 66 IN | OXYGEN SATURATION: 95 % | WEIGHT: 131.4 LBS | BODY MASS INDEX: 21.12 KG/M2 | TEMPERATURE: 97.4 F | HEART RATE: 83 BPM

## 2022-01-21 DIAGNOSIS — J44.9 STAGE 3 SEVERE COPD BY GOLD CLASSIFICATION (HCC): Primary | ICD-10-CM

## 2022-01-21 DIAGNOSIS — J43.2 CENTRILOBULAR EMPHYSEMA (HCC): ICD-10-CM

## 2022-01-21 PROCEDURE — 99213 OFFICE O/P EST LOW 20 MIN: CPT | Performed by: NURSE PRACTITIONER

## 2022-01-21 PROCEDURE — 1036F TOBACCO NON-USER: CPT | Performed by: NURSE PRACTITIONER

## 2022-01-21 PROCEDURE — G8420 CALC BMI NORM PARAMETERS: HCPCS | Performed by: NURSE PRACTITIONER

## 2022-01-21 PROCEDURE — G8427 DOCREV CUR MEDS BY ELIG CLIN: HCPCS | Performed by: NURSE PRACTITIONER

## 2022-01-21 PROCEDURE — 1123F ACP DISCUSS/DSCN MKR DOCD: CPT | Performed by: NURSE PRACTITIONER

## 2022-01-21 PROCEDURE — 4040F PNEUMOC VAC/ADMIN/RCVD: CPT | Performed by: NURSE PRACTITIONER

## 2022-01-21 PROCEDURE — 3023F SPIROM DOC REV: CPT | Performed by: NURSE PRACTITIONER

## 2022-01-21 PROCEDURE — G8484 FLU IMMUNIZE NO ADMIN: HCPCS | Performed by: NURSE PRACTITIONER

## 2022-01-21 ASSESSMENT — ENCOUNTER SYMPTOMS
WHEEZING: 0
SHORTNESS OF BREATH: 1
GASTROINTESTINAL NEGATIVE: 1
ALLERGIC/IMMUNOLOGIC NEGATIVE: 1
VOMITING: 0
CHEST TIGHTNESS: 0
NAUSEA: 0
DIARRHEA: 0
STRIDOR: 0
EYES NEGATIVE: 1

## 2022-01-21 NOTE — PROGRESS NOTES
[facility]    Patient: Abbie Bass, 80 y.o.   : 1926      Subjective     Chief Complaint   Patient presents with    Follow-up     f2f due to medicare, needs requalified for o2        HPI  Randolph Bustillo is here for face to face per Medicare for his nocturnal home oxygen use. Patient presents today on room air with no pulmonary issues. Patient uses home oxygen supplementation at night 2LPM.   Current use of Stiolto and Albuterol- patient rarely using RAAD  No other new medical issues per patient and daughter that is present today. C/o increased secretions in his throat in the AM at times but clears as the day goes on- states this is due to previous radiation treatments     Progress History:   Since last visit any new medical issues? No  New ER or hospital visits? No  Any new or changes in medicines? No  Using inhalers? Yes   Are they helpful? Yes   Flu vaccine UTD  Pneumonia vaccine UTD  Covid vaccinations done x 2.    Past Medical hx   PMH:  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    CKD (chronic kidney disease) stage 2, GFR 60-89 ml/min 2018    COPD (chronic obstructive pulmonary disease) ([address]     Diverticulitis     DVT of lower extremity, bilateral ([address] 2019    GERD (gastroesophageal reflux disease)     Hiatal hernia     Hyperlipemia 2013    Hyperlipidemia     Other disorders of kidney and ureter in diseases classified elsewhere     Pleural plaque 2013    Pneumonia of both lower lobes due to infectious organism     Primary adenocarcinoma of prostate ([address] 2019    high grade    Pulmonary emboli ([address] 2019    Vitamin D deficiency 2020     SURGICAL HISTORY:  Past Surgical History:   Procedure Laterality Date    ABDOMEN SURGERY      APPENDECTOMY      CARDIAC CATHETERIZATION      CYSTOSCOPY  2015    Litholapaxy-Large    EYE SURGERY      cataracts    HERNIA REPAIR      KYPHOSIS SURGERY N/A 2019    ABLATION AND KYPHOPLASTY AT T8 AND ABLATION AND KYPHOPLASTY AT T6 performed by Howie Dickerson MD at [facility] N/A 2018    EXPLORATORY LAPAROTOMY, WITH ABDOMINAL WASHOUT performed by Pool Sheffield MD at [address] [place] [address] Road:  Social History     Tobacco Use    Smoking status: Former Smoker     Packs/day: 1.00     Types: Cigarettes     Quit date: 1966     Years since quittin.5    Smokeless tobacco: Never Used   Vaping Use    Vaping Use: Never used   Substance Use Topics    Alcohol use: Yes     Comment: occasionally    Drug use: No     ALLERGIES:No Known Allergies  FAMILY HISTORY:No family history on file. CURRENT MEDICATIONS:  Current Outpatient Medications   Medication Sig Dispense Refill    doxazosin (CARDURA) 4 MG tablet Take 1 tablet by mouth nightly 90 tablet 3    STIOLTO RESPIMAT 2.5-2.5 MCG/ACT AERS INHALE 2 PUFFS BY MOUTH ONCE DAILY 4 g 1    leuprolide (LUPRON) 45 MG injection Inject 45 mg into the muscle once      calcium carbonate-vitamin D (CALCIUM 600 + D) 600-400 MG-UNIT TABS per tab Take 1 tablet by mouth 3 times daily 90 tablet 5    denosumab (XGEVA) 120 MG/1.7ML SOLN SC injection Inject 120 mg into the skin once Every 28 days      traMADol (ULTRAM) 50 MG tablet Take 50 mg by mouth every 6 hours as needed for Pain.       finasteride (PROSCAR) 5 MG tablet Take 1 tablet by mouth daily 90 tablet 3    carvedilol (COREG) 3.125 MG tablet TAKE 1 TABLET BY MOUTH TWICE DAILY WITH MEALS 180 tablet 3    polyethylene glycol (MIRALAX) 17 g packet Take 17 g by mouth once      bicalutamide (CASODEX) 50 MG chemo tablet Take 1 tablet by mouth daily 90 tablet 3    albuterol sulfate HFA (VENTOLIN HFA) 108 (90 Base) MCG/ACT inhaler Inhale 2 puffs into the lungs every 6 hours as needed for Wheezing or Shortness of Breath 1 Inhaler 3    Cholecalciferol (VITAMIN D3) 125 MCG (5000 UT) CAPS Take 1 capsule by mouth every morning (before breakfast)      Misc. Devices (ACAPELLA) MISC 1 Device by Does not apply route 4 times daily 1 each 0    Multiple Vitamins-Minerals (THERAPEUTIC MULTIVITAMIN-MINERALS) tablet Take 1 tablet by mouth daily      docusate sodium (COLACE, DULCOLAX) 100 MG CAPS Take 100 mg by mouth 2 times daily (Patient taking differently: Take 100 mg by mouth nightly )      acetaminophen (TYLENOL) 500 MG tablet Take 500 mg by mouth every 8 hours as needed for Pain      aspirin 325 MG EC tablet Take 1 tablet by mouth daily       No current facility-administered medications for this visit. ROS   Review of Systems   Constitutional: Negative. Negative for chills, fever and unexpected weight change. HENT: Negative. Eyes: Negative. Respiratory: Positive for shortness of breath (with exertion only ). Negative for chest tightness, wheezing and stridor. Cardiovascular: Negative for chest pain and leg swelling. Gastrointestinal: Negative. Negative for diarrhea, nausea and vomiting. Endocrine: Negative. Genitourinary: Negative. Negative for dysuria. Musculoskeletal: Positive for gait problem. Skin: Negative. Allergic/Immunologic: Negative. Hematological: Negative. Psychiatric/Behavioral: Negative. Physical exam   /62 (Site: Left Upper Arm, Position: Sitting, Cuff Size: Medium Adult)   Pulse 83   Temp 97.4 °F (36.3 °C) (Oral)   Ht 5' 6\" (1.676 m)   Wt 131 lb 6.4 oz (59.6 kg)   SpO2 95% Comment: room air  BMI 21.21 kg/m²    Wt Readings from Last 3 Encounters:   01/21/22 131 lb 6.4 oz (59.6 kg)   11/30/21 130 lb (59 kg)   09/29/21 129 lb 8 oz (58.7 kg)       Physical Exam  Vitals and nursing note reviewed. Constitutional:       General: He is not in acute distress. Appearance: He is well-developed. HENT:      Head: Normocephalic and atraumatic. Neck:      Trachea: No tracheal deviation. Cardiovascular:      Rate and Rhythm: Normal rate and regular rhythm. Heart sounds: Normal heart sounds. No murmur heard. Pulmonary:      Effort: Pulmonary effort is normal. No respiratory distress. Breath sounds: Normal breath sounds. No stridor. No wheezing or rales. Chest:      Chest wall: No tenderness. Abdominal:      General: Bowel sounds are normal. There is no distension. Palpations: Abdomen is soft. Skin:     General: Skin is warm and dry. Capillary Refill: Capillary refill takes less than 2 seconds. Neurological:      Mental Status: He is alert and oriented to person, place, and time. Gait: Gait abnormal (uses a cane ). Psychiatric:         Behavior: Behavior normal.         Thought Content: Thought content normal.         Judgment: Judgment normal.          results   Lung Nodule Screening     [] Qualifies    [x] Does not qualify   [] Declined    [] Completed  The USPSTF recommends annual screening for lung cancer with low-dose computed tomography (LDCT) in adults aged 48 to [de-identified] years who have a 30 pack-year smoking history and currently smoke or have quit within the past 20 years. Screening should be discontinued once a person has not smoked for 20 years or develops a health problem that substantially limits life expectancy or the ability or willingness to have curative lung surgery. Assessment      Diagnosis Orders   1. Stage 3 severe COPD by GOLD classification ([address]     2.  Centrilobular emphysema ([address]           Plan   -Face to face completed today per Medicare request  -Patient to continue nocturnal home oxygen use at 2LPM with bedtime only- patient benefits from use of his nocturnal oxygen   -Patient on Stiolto per PCP- continue regimen    -Samples given to patient x 2.   -Advised to maintain pneumonia vaccine with PCP and to take flu vaccine this coming season.  -Advised patient to call office with any changes, questions, or concerns regarding respiratory status    Will see Chao Means back in: 1 year no testing needed prior to appt     Abbey Bates CNP  1/21/2022

## 2022-01-25 ENCOUNTER — NURSE ONLY (OUTPATIENT)
Dept: LAB | Age: 87
End: 2022-01-25

## 2022-01-25 DIAGNOSIS — C79.51 SECONDARY MALIGNANT NEOPLASM OF BONE AND BONE MARROW (HCC): ICD-10-CM

## 2022-01-25 DIAGNOSIS — C61 MALIGNANT NEOPLASM OF PROSTATE (HCC): ICD-10-CM

## 2022-01-25 DIAGNOSIS — C79.52 SECONDARY MALIGNANT NEOPLASM OF BONE AND BONE MARROW (HCC): ICD-10-CM

## 2022-01-25 LAB — PROSTATE SPECIFIC ANTIGEN: 7.86 NG/ML (ref 0–1)

## 2022-01-26 LAB — CALCIUM IONIZED: NORMAL

## 2022-01-27 LAB — TESTOSTERONE TOTAL: < 3 NG/DL (ref 300–720)

## 2022-02-01 ENCOUNTER — OFFICE VISIT (OUTPATIENT)
Dept: UROLOGY | Age: 87
End: 2022-02-01
Payer: MEDICARE

## 2022-02-01 VITALS
HEIGHT: 66 IN | SYSTOLIC BLOOD PRESSURE: 104 MMHG | WEIGHT: 131 LBS | BODY MASS INDEX: 21.05 KG/M2 | DIASTOLIC BLOOD PRESSURE: 70 MMHG

## 2022-02-01 DIAGNOSIS — C61 MALIGNANT NEOPLASM OF PROSTATE (HCC): ICD-10-CM

## 2022-02-01 DIAGNOSIS — R30.0 DYSURIA: ICD-10-CM

## 2022-02-01 DIAGNOSIS — C61 PROSTATE CANCER (HCC): ICD-10-CM

## 2022-02-01 DIAGNOSIS — C79.51 SECONDARY MALIGNANT NEOPLASM OF BONE AND BONE MARROW (HCC): Primary | ICD-10-CM

## 2022-02-01 DIAGNOSIS — C79.52 SECONDARY MALIGNANT NEOPLASM OF BONE AND BONE MARROW (HCC): Primary | ICD-10-CM

## 2022-02-01 LAB
BILIRUBIN URINE: NEGATIVE
BLOOD URINE, POC: ABNORMAL
CHARACTER, URINE: CLEAR
COLOR, URINE: YELLOW
GLUCOSE URINE: NEGATIVE MG/DL
KETONES, URINE: NEGATIVE
LEUKOCYTE CLUMPS, URINE: NEGATIVE
NITRITE, URINE: NEGATIVE
PH, URINE: 7.5 (ref 5–9)
POST VOID RESIDUAL (PVR): 64 ML
PROTEIN, URINE: ABNORMAL MG/DL
SPECIFIC GRAVITY, URINE: 1.02 (ref 1–1.03)
UROBILINOGEN, URINE: 0.2 EU/DL (ref 0–1)

## 2022-02-01 PROCEDURE — 81003 URINALYSIS AUTO W/O SCOPE: CPT | Performed by: NURSE PRACTITIONER

## 2022-02-01 PROCEDURE — 51798 US URINE CAPACITY MEASURE: CPT | Performed by: NURSE PRACTITIONER

## 2022-02-01 PROCEDURE — 1036F TOBACCO NON-USER: CPT | Performed by: NURSE PRACTITIONER

## 2022-02-01 PROCEDURE — G8427 DOCREV CUR MEDS BY ELIG CLIN: HCPCS | Performed by: NURSE PRACTITIONER

## 2022-02-01 PROCEDURE — 4040F PNEUMOC VAC/ADMIN/RCVD: CPT | Performed by: NURSE PRACTITIONER

## 2022-02-01 PROCEDURE — 99214 OFFICE O/P EST MOD 30 MIN: CPT | Performed by: NURSE PRACTITIONER

## 2022-02-01 PROCEDURE — G8420 CALC BMI NORM PARAMETERS: HCPCS | Performed by: NURSE PRACTITIONER

## 2022-02-01 PROCEDURE — G8484 FLU IMMUNIZE NO ADMIN: HCPCS | Performed by: NURSE PRACTITIONER

## 2022-02-01 PROCEDURE — 1123F ACP DISCUSS/DSCN MKR DOCD: CPT | Performed by: NURSE PRACTITIONER

## 2022-02-01 PROCEDURE — 96372 THER/PROPH/DIAG INJ SC/IM: CPT | Performed by: NURSE PRACTITIONER

## 2022-02-01 ASSESSMENT — ENCOUNTER SYMPTOMS
NAUSEA: 0
VOMITING: 0
BACK PAIN: 0
ABDOMINAL PAIN: 0

## 2022-02-01 NOTE — PROGRESS NOTES
Patient has given me verbal consent to perform Xgeva Injection yes      Following [facility] AND [facility] plan of care. XGEVA 120MG/1.7ML MG GIVEN S.C Left ARM  Lot Number: [id number]  Expiration Date:   Chris Aguirre #: [id number]    After Injection was given there were no reactions at injection site and patient was feeling well. Patient was notified that possible side effects from injections include: Redness, swelling and itching at the injection site. Possible side effects of androgen deprivation therapy, including hot flashes, flushing of the skin, increased weight, decreased sex drive, and difficulties with ED. Patient was instructed to inform their dental office that they are receiving Odessia Mower and that major dental procedures should be avoided. Patient was advised to call our office with any further questions or concerns. Any active dental problems, infections, or pain? no    Date of last dental appointment: n/a    Any planned dental procedures? no    Patient supplied their own medications No    Pt Karina 86 therapy first initiated on 2-.

## 2022-02-01 NOTE — PROGRESS NOTES
[address] [facility]  [address]  [facility] [address]  Dept: [phone]  Loc: [phone]    Visit Date: 2/1/2022        HPI:     Kimberly Becerril is a 80 y.o. male who presents today for:  Chief Complaint   Patient presents with    Follow-up     psa and calcium prior        HPI   Pt seen in follow up for prostate cancer. Mr. Cristobal Jo has a hx of PSA 1/31/17 that was 25 at which time family was notified of risk for prostate cancer but did not wish to pursue further testing.  CT of the abdomen and pelvis performed for abdominal pain 12/17/18 noted multiple skeletal sclerotic metastases with pathologic compression fractures of L1-L3, enlarged hterogeneous prostate, and pelvic adenopathy.  PSA performed 12/17/18 was 1,594.  Pt was started on Casodex 12/20/18.  Pt recovered on TCU 12/24/19-1/10/19. [facility] underwent TRUS prostate biopsy 1/24/19 by Dr. Kilo Mckinnon with findings of high grade prostatic adenocarcinoma.  Pt was scheduled for review of biopsy results 2/11/19 however pt was admitted to the hospital 2/5/19-2/15/19 and treated for acute PE and DVT and pathologic T6 and T8 fractures.  He underwent T6 and T8 vertebral bone biopsy, RFA for spine metastatic lesion at the levels of T6 and T8, and vertebral augmentation (balloon kyphoplasty) for T6 and T8 on 2/13/19 by Dr. Magnolia Aguillon has completed palliative radiation therapy.      Pt started monthly Firmagon and [place] 2/26/19.  Firmagon switched to Lupron 4/30/19.  Pt had anorexia on Casodex and this was held.  Pt and daughter decided to hold casodex.  Pt is on calcium carbonate and vitamin D supplementation.  Last ADT injection with Lupron 45 mg 11/30/21.         In December 2020 Yasir's PSA continued to trend upward to recent high of 3.17 with a testosterone <3.  At office visit in [place] discussed addition of Amadou Enrike or Casodex and possible side effects.  Pt was started on Casodex 12/15/2020 while awaiting coverage of Ge Johnson pt received the [facility] he was unable to swallow the pills.  As such he remained on Casodex. Valerie Horvath had increased fatigue and family started holding casodex starting 2/3/21.  Pt was hospitalized and treated for aspiration pneumonia 2/5/21-2/8/21. PSA has recently continued to trend upward and family resumed casodex in June 2021.      Pt is here today for Xgeva injection. Reports it can take some time to empty his bladder but denies straining to urinate or significantly weakened stream.  No increased pain. Denies fatigue. Overall doing well. Current Outpatient Medications   Medication Sig Dispense Refill    doxazosin (CARDURA) 4 MG tablet Take 1 tablet by mouth nightly 90 tablet 3    STIOLTO RESPIMAT 2.5-2.5 MCG/ACT AERS INHALE 2 PUFFS BY MOUTH ONCE DAILY 4 g 1    leuprolide (LUPRON) 45 MG injection Inject 45 mg into the muscle once      calcium carbonate-vitamin D (CALCIUM 600 + D) 600-400 MG-UNIT TABS per tab Take 1 tablet by mouth 3 times daily 90 tablet 5    denosumab (XGEVA) 120 MG/1.7ML SOLN SC injection Inject 120 mg into the skin once Every 28 days      traMADol (ULTRAM) 50 MG tablet Take 50 mg by mouth every 6 hours as needed for Pain.  finasteride (PROSCAR) 5 MG tablet Take 1 tablet by mouth daily 90 tablet 3    carvedilol (COREG) 3.125 MG tablet TAKE 1 TABLET BY MOUTH TWICE DAILY WITH MEALS 180 tablet 3    polyethylene glycol (MIRALAX) 17 g packet Take 17 g by mouth once      bicalutamide (CASODEX) 50 MG chemo tablet Take 1 tablet by mouth daily 90 tablet 3    albuterol sulfate HFA (VENTOLIN HFA) 108 (90 Base) MCG/ACT inhaler Inhale 2 puffs into the lungs every 6 hours as needed for Wheezing or Shortness of Breath 1 Inhaler 3    Cholecalciferol (VITAMIN D3) 125 MCG (5000 UT) CAPS Take 1 capsule by mouth every morning (before breakfast)      Misc.  Devices (ACAPELLA) MISC 1 Device by Does not apply route 4 times daily 1 each 0    Multiple Vitamins-Minerals (THERAPEUTIC MULTIVITAMIN-MINERALS) tablet Take 1 tablet by mouth daily      docusate sodium (COLACE, DULCOLAX) 100 MG CAPS Take 100 mg by mouth 2 times daily (Patient taking differently: Take 100 mg by mouth nightly )      acetaminophen (TYLENOL) 500 MG tablet Take 500 mg by mouth every 8 hours as needed for Pain      aspirin 325 MG EC tablet Take 1 tablet by mouth daily       No current facility-administered medications for this visit. Past Medical History  Erick Singh  has a past medical history of Blood circulation, collateral, CAD (coronary artery disease), CAD (coronary artery disease), Cancer ([address] CKD (chronic kidney disease) stage 2, GFR 60-89 ml/min, COPD (chronic obstructive pulmonary disease) ([address] Diverticulitis, DVT of lower extremity, bilateral ([address] GERD (gastroesophageal reflux disease), Hiatal hernia, Hyperlipemia, Hyperlipidemia, Other disorders of kidney and ureter in diseases classified elsewhere, Pleural plaque, Pneumonia of both lower lobes due to infectious organism, Primary adenocarcinoma of prostate ([address] Pulmonary emboli ([facility] and Vitamin D deficiency. Past Surgical History  The patient  has a past surgical history that includes hernia repair; Appendectomy; Cardiac catheterization; eye surgery; Cystocopy (03/17/2015); LAPAROTOMY EXPLORATORY (N/A, 12/17/2018); Abdomen surgery; and Kyphosis surgery (N/A, 2/13/2019). Family History  This patient's family history is not on file. Social History  Erick Singh  reports that he quit smoking about 55 years ago. His smoking use included cigarettes. He smoked 1.00 pack per day. He has never used smokeless tobacco. He reports current alcohol use. He reports that he does not use drugs. Subjective:      Review of Systems   Constitutional: Negative for activity change, appetite change, chills, diaphoresis, fatigue, fever and unexpected weight change.    Gastrointestinal: Negative for abdominal pain, nausea and vomiting. Genitourinary: Negative for decreased urine volume, difficulty urinating, dysuria, flank pain, frequency, hematuria and urgency. Musculoskeletal: Negative for back pain. Objective:   /70   Ht 5' 6\" (1.676 m)   Wt 131 lb (59.4 kg)   BMI 21.14 kg/m²     Physical Exam  Vitals reviewed. Constitutional:       General: He is not in acute distress. Appearance: Normal appearance. He is well-developed. He is not ill-appearing or diaphoretic. HENT:      Head: Normocephalic and atraumatic. Right Ear: External ear normal.      Left Ear: External ear normal.      Nose: Nose normal.      Mouth/Throat:      Mouth: Mucous membranes are moist.   Eyes:      General: No scleral icterus. Right eye: No discharge. Left eye: No discharge. Neck:      Vascular: No JVD. Trachea: No tracheal deviation. Pulmonary:      Effort: Pulmonary effort is normal. No respiratory distress. Abdominal:      General: There is no distension. Tenderness: There is no abdominal tenderness. There is no right CVA tenderness or left CVA tenderness. Musculoskeletal:         General: Normal range of motion. Neurological:      Mental Status: He is alert and oriented to person, place, and time. Mental status is at baseline. Psychiatric:         Mood and Affect: Mood normal.         Behavior: Behavior normal.         Thought Content:  Thought content normal.         POC  Results for POC orders placed in visit on 02/01/22   POCT Urinalysis No Micro (Auto)   Result Value Ref Range    Glucose, Ur Negative NEGATIVE mg/dl    Bilirubin Urine Negative     Ketones, Urine Negative NEGATIVE    Specific Gravity, Urine 1.025 1.002 - 1.030    Blood, UA POC Moderate (A) NEGATIVE    pH, Urine 7.50 5.0 - 9.0    Protein, Urine Trace (A) NEGATIVE mg/dl    Urobilinogen, Urine 0.20 0.0 - 1.0 eu/dl    Nitrite, Urine Negative NEGATIVE    Leukocyte Clumps, Urine Negative NEGATIVE    Color, Urine Yellow YELLOW-STRAW Character, Urine Clear [id number].[id number]   poct post void residual   Result Value Ref Range    post void residual 64 ml         Patients recent PSA values are as follows  Lab Results   Component Value Date    PSA 7.86 (H) 01/25/2022    PSA 4.95 (H) 12/21/2021    PSA 4.37 (H) 11/23/2021        Recent BUN/Creatinine:  Lab Results   Component Value Date    BUN 21 04/22/2021    CREATININE 1.2 04/22/2021         Assessment:   Metastatic castration resistant prostate cancer  Obstructive prostate    Plan:     Xgeva injection today. PSA trending up to 7.86 (15.72 when corrected for finasteride). Send urine for culture--start antibiotic if significant for infection. Discussed medical oncology referral and pt would like to see what PSA is in one month. Next XGEVA injection in one month. PSA, testosterone, ical prior.

## 2022-02-02 NOTE — TELEPHONE ENCOUNTER
Date of last visit:  9/14/2021  Date of next visit:  3/18/2022    Requested Prescriptions     Pending Prescriptions Disp Refills    tiotropium-olodaterol (STIOLTO RESPIMAT) 2.5-2.5 MCG/ACT AERS 4 g 1     Sig: INHALE 2 PUFFS BY MOUTH ONCE DAILY

## 2022-02-02 NOTE — TELEPHONE ENCOUNTER
Milla Ontiveros called stating that bill need's an refill on his [address] 2.5-2.5 MCG/ [address] on [employer]

## 2022-02-03 LAB
ORGANISM: ABNORMAL
URINE CULTURE, ROUTINE: ABNORMAL

## 2022-02-07 ENCOUNTER — TELEPHONE (OUTPATIENT)
Dept: UROLOGY | Age: 87
End: 2022-02-07

## 2022-02-07 RX ORDER — DOXYCYCLINE HYCLATE 100 MG/1
100 CAPSULE ORAL 2 TIMES DAILY
Qty: 28 CAPSULE | Refills: 0 | Status: SHIPPED | OUTPATIENT
Start: 2022-02-07 | End: 2022-02-08

## 2022-02-07 NOTE — TELEPHONE ENCOUNTER
Geremias Abarca on [employer] advised of the urine results. She voiced understanding to start the doxycycline.

## 2022-02-07 NOTE — TELEPHONE ENCOUNTER
Pt's urine culture with growth of contaminants. However with rise in PSA we will trial an empiric course of antibiotics. Script for doxycycline sent to pharmacy.

## 2022-02-07 NOTE — TELEPHONE ENCOUNTER
Rubi Zhang called requesting a refill on the following medications:  Requested Prescriptions     Pending Prescriptions Disp Refills    finasteride (PROSCAR) 5 MG tablet [Pharmacy Med Name: Finasteride 5 MG Oral Tablet] 90 tablet 0     Sig: Take 1 tablet by mouth once daily     Pharmacy verified:  [email or web address]      Date of last visit: 02/01/2022  Date of next visit (if applicable): 0/3/3946

## 2022-02-08 RX ORDER — FINASTERIDE 5 MG/1
TABLET, FILM COATED ORAL
Qty: 90 TABLET | Refills: 0 | Status: SHIPPED | OUTPATIENT
Start: 2022-02-08 | End: 2022-02-16

## 2022-02-08 RX ORDER — CIPROFLOXACIN 500 MG/5ML
500 KIT ORAL 2 TIMES DAILY
Qty: 140 ML | Refills: 0 | Status: SHIPPED | OUTPATIENT
Start: 2022-02-08 | End: 2022-02-08

## 2022-02-08 RX ORDER — CIPROFLOXACIN 500 MG/1
500 TABLET, FILM COATED ORAL 2 TIMES DAILY
Qty: 28 TABLET | Refills: 0 | Status: SHIPPED | OUTPATIENT
Start: 2022-02-08 | End: 2022-02-22

## 2022-02-08 NOTE — TELEPHONE ENCOUNTER
TALKED WITH YAYA) AND EXPLAINED THAT CIPRO SUSPENSION WAS DENIED. SHE STATED THAT SHE TALKED WITH THE PHARMACIST AND PHARMACIST SAID SHE COULD CRUSH THE CIPRO PILLS    PLEASE ORDER CIPRO PILLS.

## 2022-02-08 NOTE — TELEPHONE ENCOUNTER
Brittani Murphy at [facility] called because patient wants to open the doxycycline. It causes esophogeal irritation if opened. Can it be changed? He has a hard time swallowing pills. Please advise.  Thank you

## 2022-02-08 NOTE — TELEPHONE ENCOUNTER
We can send Cipro instead. Are the tablets ok or does family prefer suspension? Suspension may require prior authorization.

## 2022-02-09 NOTE — TELEPHONE ENCOUNTER
Dr. Sherlyn Beach reviewed discharge instructions with the patient. The patient verbalized understanding. Kerri Perry advised cipro was sent to the pharmacy. She voiced understanding.

## 2022-02-16 RX ORDER — FINASTERIDE 5 MG/1
TABLET, FILM COATED ORAL
Qty: 90 TABLET | Refills: 3 | Status: SHIPPED | OUTPATIENT
Start: 2022-02-16

## 2022-02-16 NOTE — TELEPHONE ENCOUNTER
Rosamaria Hackett called requesting a refill on the following medications:  Requested Prescriptions     Pending Prescriptions Disp Refills    finasteride (PROSCAR) 5 MG tablet [Pharmacy Med Name: Finasteride 5 MG Oral Tablet] 90 tablet 0     Sig: Take 1 tablet by mouth once daily     Pharmacy verified:  [email or web address]      Date of last visit: 02/01/2022  Date of next visit (if applicable): 6/6/9780

## 2022-03-01 ENCOUNTER — NURSE ONLY (OUTPATIENT)
Dept: LAB | Age: 87
End: 2022-03-01

## 2022-03-01 DIAGNOSIS — C61 PROSTATE CANCER (HCC): ICD-10-CM

## 2022-03-01 LAB — PROSTATE SPECIFIC ANTIGEN: 8.99 NG/ML (ref 0–1)

## 2022-03-02 LAB — CALCIUM IONIZED: NORMAL

## 2022-03-04 ENCOUNTER — OFFICE VISIT (OUTPATIENT)
Dept: UROLOGY | Age: 87
End: 2022-03-04
Payer: MEDICARE

## 2022-03-04 VITALS
WEIGHT: 132 LBS | DIASTOLIC BLOOD PRESSURE: 80 MMHG | HEIGHT: 67 IN | SYSTOLIC BLOOD PRESSURE: 124 MMHG | BODY MASS INDEX: 20.72 KG/M2

## 2022-03-04 DIAGNOSIS — C79.51 SECONDARY MALIGNANT NEOPLASM OF BONE AND BONE MARROW (HCC): Primary | ICD-10-CM

## 2022-03-04 DIAGNOSIS — C79.52 SECONDARY MALIGNANT NEOPLASM OF BONE AND BONE MARROW (HCC): Primary | ICD-10-CM

## 2022-03-04 DIAGNOSIS — R33.9 INCOMPLETE EMPTYING OF BLADDER: ICD-10-CM

## 2022-03-04 DIAGNOSIS — C61 PROSTATE CANCER (HCC): ICD-10-CM

## 2022-03-04 LAB
BILIRUBIN URINE: NEGATIVE
BLOOD URINE, POC: ABNORMAL
CHARACTER, URINE: CLEAR
COLOR, URINE: YELLOW
GLUCOSE URINE: NEGATIVE MG/DL
KETONES, URINE: NEGATIVE
LEUKOCYTE CLUMPS, URINE: ABNORMAL
NITRITE, URINE: NEGATIVE
PH, URINE: 7 (ref 5–9)
POST VOID RESIDUAL (PVR): 53 ML
PROTEIN, URINE: NEGATIVE MG/DL
SPECIFIC GRAVITY, URINE: 1.02 (ref 1–1.03)
UROBILINOGEN, URINE: 0.2 EU/DL (ref 0–1)

## 2022-03-04 PROCEDURE — 96372 THER/PROPH/DIAG INJ SC/IM: CPT | Performed by: NURSE PRACTITIONER

## 2022-03-04 PROCEDURE — 1123F ACP DISCUSS/DSCN MKR DOCD: CPT | Performed by: NURSE PRACTITIONER

## 2022-03-04 PROCEDURE — 51798 US URINE CAPACITY MEASURE: CPT | Performed by: NURSE PRACTITIONER

## 2022-03-04 PROCEDURE — 4040F PNEUMOC VAC/ADMIN/RCVD: CPT | Performed by: NURSE PRACTITIONER

## 2022-03-04 PROCEDURE — G8427 DOCREV CUR MEDS BY ELIG CLIN: HCPCS | Performed by: NURSE PRACTITIONER

## 2022-03-04 PROCEDURE — G8420 CALC BMI NORM PARAMETERS: HCPCS | Performed by: NURSE PRACTITIONER

## 2022-03-04 PROCEDURE — 1036F TOBACCO NON-USER: CPT | Performed by: NURSE PRACTITIONER

## 2022-03-04 PROCEDURE — 81003 URINALYSIS AUTO W/O SCOPE: CPT | Performed by: NURSE PRACTITIONER

## 2022-03-04 PROCEDURE — 99213 OFFICE O/P EST LOW 20 MIN: CPT | Performed by: NURSE PRACTITIONER

## 2022-03-04 PROCEDURE — G8484 FLU IMMUNIZE NO ADMIN: HCPCS | Performed by: NURSE PRACTITIONER

## 2022-03-04 ASSESSMENT — ENCOUNTER SYMPTOMS
ABDOMINAL PAIN: 0
NAUSEA: 0
VOMITING: 0
BACK PAIN: 0

## 2022-03-04 NOTE — PROGRESS NOTES
[address]  [address]  [facility] [address]  Dept: [phone]  Loc: [phone]    Visit Date: 3/4/2022        HPI:     Anil Rausch is a 80 y.o. male who presents today for:  Chief Complaint   Patient presents with    Follow-up     labs prior, testosterone pending.  Prostate Cancer     Metastatic castration resistant prostate cancer       HPI   Pt seen in follow up for prostate cancer.      Mr. Milligan has a hx of PSA 1/31/17 that was 25 at which time family was notified of risk for prostate cancer but did not wish to pursue further testing.  CT of the abdomen and pelvis performed for abdominal pain 12/17/18 noted multiple skeletal sclerotic metastases with pathologic compression fractures of L1-L3, enlarged hterogeneous prostate, and pelvic adenopathy.  PSA performed 12/17/18 was 1,594.  Pt was started on Casodex 12/20/18.  Pt recovered on TCU 12/24/19-1/10/19. Radha Andrade underwent TRUS prostate biopsy 1/24/19 by Dr. Magi Saldana with findings of high grade prostatic adenocarcinoma.  Pt was scheduled for review of biopsy results 2/11/19 however pt was admitted to the hospital 2/5/19-2/15/19 and treated for acute PE and DVT and pathologic T6 and T8 fractures.  He underwent T6 and T8 vertebral bone biopsy, RFA for spine metastatic lesion at the levels of T6 and T8, and vertebral augmentation (balloon kyphoplasty) for T6 and T8 on 2/13/19 by Dr. Fatuma Gusman has completed palliative radiation therapy.      Pt started monthly Firmagon and [facility] 2/26/19.  Firmagon switched to Lupron 4/30/19.  Pt had anorexia on Casodex and this was held.  Pt and daughter decided to hold casodex.  Pt is on calcium carbonate and vitamin D supplementation.  Last ADT injection with Lupron 45 mg 11/30/21.         In December 2020 Farhana Tomas PSA continued to trend upward to recent high of 3.17 with a testosterone <3.  At office visit in [facility] addition of Go Blizzard or Casodex and possible side effects.  Pt was started on Casodex 12/15/2020 while awaiting coverage of Mariela Joy pt received the Go Blizzard he was unable to swallow the pills.  As such he remained on Casodex. [facility] had increased fatigue and family started holding casodex starting 2/3/21.  Pt was hospitalized and treated for aspiration pneumonia 2/5/21-2/8/21. PSA has recently continued to trend upward and family resumed casodex in June 2021.      Pt is here today for Xgeva injection.  Reports it can take some time to empty his bladder but denies straining to urinate or significantly weakened stream.  No increased pain. Denies fatigue. Overall doing well. Current Outpatient Medications   Medication Sig Dispense Refill    finasteride (PROSCAR) 5 MG tablet Take 1 tablet by mouth once daily 90 tablet 3    tiotropium-olodaterol (STIOLTO RESPIMAT) 2.5-2.5 MCG/ACT AERS INHALE 2 PUFFS BY MOUTH ONCE DAILY 4 g 5    doxazosin (CARDURA) 4 MG tablet Take 1 tablet by mouth nightly 90 tablet 3    leuprolide (LUPRON) 45 MG injection Inject 45 mg into the muscle once      calcium carbonate-vitamin D (CALCIUM 600 + D) 600-400 MG-UNIT TABS per tab Take 1 tablet by mouth 3 times daily 90 tablet 5    denosumab (XGEVA) 120 MG/1.7ML SOLN SC injection Inject 120 mg into the skin once Every 28 days      traMADol (ULTRAM) 50 MG tablet Take 50 mg by mouth every 6 hours as needed for Pain.  carvedilol (COREG) 3.125 MG tablet TAKE 1 TABLET BY MOUTH TWICE DAILY WITH MEALS 180 tablet 3    polyethylene glycol (MIRALAX) 17 g packet Take 17 g by mouth once      albuterol sulfate HFA (VENTOLIN HFA) 108 (90 Base) MCG/ACT inhaler Inhale 2 puffs into the lungs every 6 hours as needed for Wheezing or Shortness of Breath 1 Inhaler 3    Cholecalciferol (VITAMIN D3) 125 MCG (5000 UT) CAPS Take 1 capsule by mouth every morning (before breakfast)      Misc.  Devices (ACAPELLA) MISC 1 Device by Does not apply route 4 times daily 1 each 0    Multiple Vitamins-Minerals (THERAPEUTIC MULTIVITAMIN-MINERALS) tablet Take 1 tablet by mouth daily      docusate sodium (COLACE, DULCOLAX) 100 MG CAPS Take 100 mg by mouth 2 times daily (Patient taking differently: Take 100 mg by mouth nightly )      acetaminophen (TYLENOL) 500 MG tablet Take 500 mg by mouth every 8 hours as needed for Pain      aspirin 325 MG EC tablet Take 1 tablet by mouth daily      bicalutamide (CASODEX) 50 MG chemo tablet Take 1 tablet by mouth daily 90 tablet 3     No current facility-administered medications for this visit. Past Medical History  Christine Rivas  has a past medical history of Blood circulation, collateral, CAD (coronary artery disease), CAD (coronary artery disease), Cancer ([address] CKD (chronic kidney disease) stage 2, GFR 60-89 ml/min, COPD (chronic obstructive pulmonary disease) ([address] Diverticulitis, DVT of lower extremity, bilateral ([address] GERD (gastroesophageal reflux disease), Hiatal hernia, Hyperlipemia, Hyperlipidemia, Other disorders of kidney and ureter in diseases classified elsewhere, Pleural plaque, Pneumonia of both lower lobes due to infectious organism, Primary adenocarcinoma of prostate ([address] Pulmonary emboli ([address] and Vitamin D deficiency. Past Surgical History  The patient  has a past surgical history that includes hernia repair; Appendectomy; Cardiac catheterization; eye surgery; Cystocopy (03/17/2015); LAPAROTOMY EXPLORATORY (N/A, 12/17/2018); Abdomen surgery; and Kyphosis surgery (N/A, 2/13/2019). Family History  This patient's family history is not on file. Social History  Christine Rivas  reports that he quit smoking about 55 years ago. His smoking use included cigarettes. He smoked 1.00 pack per day. He has never used smokeless tobacco. He reports current alcohol use. He reports that he does not use drugs.       Subjective:      Review of Systems   Constitutional: Negative for activity change, appetite change, chills, diaphoresis, fatigue, fever and unexpected weight change. Gastrointestinal: Negative for abdominal pain, nausea and vomiting. Genitourinary: Negative for decreased urine volume, difficulty urinating, dysuria, flank pain, frequency, hematuria and urgency. Musculoskeletal: Negative for back pain. Objective:   /80   Ht 5' 7\" (1.702 m)   Wt 132 lb (59.9 kg)   BMI 20.67 kg/m²     Physical Exam  Vitals reviewed. Constitutional:       General: He is not in acute distress. Appearance: Normal appearance. He is well-developed. He is not ill-appearing or diaphoretic. HENT:      Head: Normocephalic and atraumatic. Right Ear: External ear normal.      Left Ear: External ear normal.      Nose: Nose normal.      Mouth/Throat:      Mouth: Mucous membranes are moist.   Eyes:      General: No scleral icterus. Right eye: No discharge. Left eye: No discharge. Neck:      Vascular: No JVD. Trachea: No tracheal deviation. Pulmonary:      Effort: Pulmonary effort is normal. No respiratory distress. Musculoskeletal:         General: Normal range of motion. Neurological:      Mental Status: He is alert and oriented to person, place, and time. Mental status is at baseline. Psychiatric:         Mood and Affect: Mood normal.         Behavior: Behavior normal.         Thought Content:  Thought content normal.         POC  Results for POC orders placed in visit on 03/04/22   POCT Urinalysis No Micro (Auto)   Result Value Ref Range    Glucose, Ur Negative NEGATIVE mg/dl    Bilirubin Urine Negative     Ketones, Urine Negative NEGATIVE    Specific Gravity, Urine 1.020 1.002 - 1.030    Blood, UA POC Small (A) NEGATIVE    pH, Urine 7.00 5.0 - 9.0    Protein, Urine Negative NEGATIVE mg/dl    Urobilinogen, Urine 0.20 0.0 - 1.0 eu/dl    Nitrite, Urine Negative NEGATIVE    Leukocyte Clumps, Urine Small (A) NEGATIVE    Color, Urine Yellow YELLOW-STRAW    Character, Urine Clear CLR-SL.CLOUD poct post void residual   Result Value Ref Range    post void residual 53 ml     Patients recent PSA values are as follows  Lab Results   Component Value Date    PSA 8.99 (H) 03/01/2022    PSA 7.86 (H) 01/25/2022    PSA 4.95 (H) 12/21/2021      Recent BUN/Creatinine:  Lab Results   Component Value Date    BUN 21 04/22/2021    CREATININE 1.2 04/22/2021       Assessment:   Metastatic castration resistant prostate cancer  Obstructive prostate    Plan:     Xgeva injection today. PSA continuing to trend up to 8.99 (17.98 when corrected for finasteride) despite course of antibiotics. Again discussed medical oncology referral.  Pt would like to await further rise of PSA prior to referral.  Next PSA in 2 months      Next Xgeva in one month--ical prior. Next Lupron injection due in May--PSA prior.

## 2022-03-04 NOTE — PROGRESS NOTES
Patient has given me verbal consent to perform Xgeva Injection Yes      Following Ulises Peguero [facility] plan of care. Quilla Pea 120MG/1.7ML MG GIVEN S.C right ARM  Lot Number: [id number]  Expiration Date: 03/2024  Chris Aguirre #: [phone]    After Injection was given there were no reactions at injection site and patient was feeling well. Patient was notified that possible side effects from injections include: Redness, swelling and itching at the injection site. Possible side effects of androgen deprivation therapy, including hot flashes, flushing of the skin, increased weight, decreased sex drive, and difficulties with ED. Patient was instructed to inform their dental office that they are receiving Quilla Pea and that major dental procedures should be avoided. Patient was advised to call our office with any further questions or concerns. Any active dental problems, infections, or pain? No    Date of last dental appointment: N/A    Any planned dental procedures? No    Patient supplied their own medications No    Pt [address] therapy first initiated on 02/26/2019.

## 2022-03-07 LAB — TESTOSTERONE TOTAL: < 3 NG/DL (ref 300–720)

## 2022-03-15 DIAGNOSIS — I25.10 CORONARY ARTERY DISEASE INVOLVING NATIVE HEART WITHOUT ANGINA PECTORIS, UNSPECIFIED VESSEL OR LESION TYPE: ICD-10-CM

## 2022-03-16 RX ORDER — CARVEDILOL 3.12 MG/1
TABLET ORAL
Qty: 180 TABLET | Refills: 3 | Status: SHIPPED | OUTPATIENT
Start: 2022-03-16

## 2022-03-16 NOTE — TELEPHONE ENCOUNTER
Date of last visit:  9/14/2021  Date of next visit:  3/18/2022    Requested Prescriptions     Pending Prescriptions Disp Refills    carvedilol (COREG) 3.125 MG tablet [Pharmacy Med Name: Carvedilol 3.125 MG Oral Tablet] 180 tablet 0     Sig: TAKE 1 TABLET BY MOUTH TWICE DAILY WITH MEALS

## 2022-03-18 ENCOUNTER — OFFICE VISIT (OUTPATIENT)
Dept: FAMILY MEDICINE CLINIC | Age: 87
End: 2022-03-18

## 2022-03-18 VITALS
WEIGHT: 132.38 LBS | SYSTOLIC BLOOD PRESSURE: 102 MMHG | RESPIRATION RATE: 18 BRPM | HEART RATE: 60 BPM | BODY MASS INDEX: 20.78 KG/M2 | TEMPERATURE: 97.1 F | DIASTOLIC BLOOD PRESSURE: 60 MMHG | HEIGHT: 67 IN

## 2022-03-18 DIAGNOSIS — I82.492 DEEP VEIN THROMBOSIS (DVT) OF OTHER VEIN OF LEFT LOWER EXTREMITY, UNSPECIFIED CHRONICITY (HCC): ICD-10-CM

## 2022-03-18 DIAGNOSIS — E44.0 MODERATE MALNUTRITION (HCC): ICD-10-CM

## 2022-03-18 DIAGNOSIS — Z00.00 MEDICARE ANNUAL WELLNESS VISIT, SUBSEQUENT: Primary | ICD-10-CM

## 2022-03-18 PROCEDURE — 99212 OFFICE O/P EST SF 10 MIN: CPT | Performed by: FAMILY MEDICINE

## 2022-03-18 PROCEDURE — G8427 DOCREV CUR MEDS BY ELIG CLIN: HCPCS | Performed by: FAMILY MEDICINE

## 2022-03-18 PROCEDURE — G8420 CALC BMI NORM PARAMETERS: HCPCS | Performed by: FAMILY MEDICINE

## 2022-03-18 PROCEDURE — 1036F TOBACCO NON-USER: CPT | Performed by: FAMILY MEDICINE

## 2022-03-18 PROCEDURE — 1123F ACP DISCUSS/DSCN MKR DOCD: CPT | Performed by: FAMILY MEDICINE

## 2022-03-18 PROCEDURE — G0439 PPPS, SUBSEQ VISIT: HCPCS | Performed by: FAMILY MEDICINE

## 2022-03-18 ASSESSMENT — PATIENT HEALTH QUESTIONNAIRE - PHQ9
SUM OF ALL RESPONSES TO PHQ9 QUESTIONS 1 & 2: 0
SUM OF ALL RESPONSES TO PHQ QUESTIONS 1-9: 0
1. LITTLE INTEREST OR PLEASURE IN DOING THINGS: 0
2. FEELING DOWN, DEPRESSED OR HOPELESS: 0
SUM OF ALL RESPONSES TO PHQ QUESTIONS 1-9: 0

## 2022-03-18 ASSESSMENT — LIFESTYLE VARIABLES
HOW MANY STANDARD DRINKS CONTAINING ALCOHOL DO YOU HAVE ON A TYPICAL DAY: 1 OR 2
HOW OFTEN DO YOU HAVE A DRINK CONTAINING ALCOHOL: MONTHLY OR LESS

## 2022-03-18 NOTE — PATIENT INSTRUCTIONS
Personalized Preventive Plan for Laura Reed - 3/18/2022  Medicare offers a range of preventive health benefits. Some of the tests and screenings are paid in full while other may be subject to a deductible, co-insurance, and/or copay. Some of these benefits include a comprehensive review of your medical history including lifestyle, illnesses that may run in your family, and various assessments and screenings as appropriate. After reviewing your medical record and screening and assessments performed today your provider may have ordered immunizations, labs, imaging, and/or referrals for you. A list of these orders (if applicable) as well as your Preventive Care list are included within your After Visit Summary for your review. Other Preventive Recommendations:    · A preventive eye exam performed by an eye specialist is recommended every 1-2 years to screen for glaucoma; cataracts, macular degeneration, and other eye disorders. · A preventive dental visit is recommended every 6 months. · Try to get at least 150 minutes of exercise per week or 10,000 steps per day on a pedometer . · Order or download the FREE \"Exercise & Physical Activity: Your Everyday Guide\" from [employer]. Call 9-340.184.8526 or search [employer] online. · You need 8385-6218 mg of calcium and 7618-8662 IU of vitamin D per day. It is possible to meet your calcium requirement with diet alone, but a vitamin D supplement is usually necessary to meet this goal.  · When exposed to the sun, use a sunscreen that protects against both UVA and UVB radiation with an SPF of 30 or greater. Reapply every 2 to 3 hours or after sweating, drying off with a towel, or swimming. · Always wear a seat belt when traveling in a car. Always wear a helmet when riding a bicycle or motorcycle.

## 2022-03-18 NOTE — PROGRESS NOTES
Medicare Annual Wellness Visit    Austyn Thomas is here for Medicare AWV    Assessment & Plan   Moderate malnutrition ([facility] [address]  Deep vein thrombosis (DVT) of other vein of left lower extremity, unspecified chronicity ([facility] [address]      Recommendations for Preventive Services Due: see orders and patient instructions/AVS.  Recommended screening schedule for the next 5-10 years is provided to the patient in written form: see Patient Instructions/AVS.     No follow-ups on file. Subjective   1. We reviewed how the PSA has been increasing some and the urologist has suggested possible need for the medical oncologist.  2. There seems to be some cough and he uses the acapella  3. The weight is stable and no trouble from the past DVT  4. His tramadol continues to be of help    Patient's complete Health Risk Assessment and screening values have been reviewed and are found in Flowsheets. The following problems were reviewed today and where indicated follow up appointments were made and/or referrals ordered. Positive Risk Factor Screenings with Interventions:               Opioid Risk: (Low risk score <55) Opioid risk score: 6    Patient is low risk for opioid use disorder or overdose. Last PDMP Radha Diaz as Reviewed:  Review User Review Instant Review Result   ALISON LAZAR 12/14/2021  8:00 AM Reviewed PDMP [1]     Last Controlled Substance Monitoring Documentation      Office Visit from 3/18/2022 in [facility]   Periodic Controlled Substance Monitoring No signs of potential drug abuse or diversion identified.  filed at 03/18/2022 7082          Health Habits/Nutrition:     Physical Activity: Inactive    Days of Exercise per Week: 0 days    Minutes of Exercise per Session: 0 min     Have you lost any weight without trying in the past 3 months?: No  Body mass index: 20.73  Have you seen the dentist within the past year?: Yes    Health Habits/Nutrition Interventions:  · the answers were acceptable Hearing/Vision:  Do you or your family notice any trouble with your hearing that hasn't been managed with hearing aids?: (!) Yes  Do you have difficulty driving, watching TV, or doing any of your daily activities because of your eyesight?: No  Have you had an eye exam within the past year?: (!) No  No exam data present    Hearing/Vision Interventions:  · he feels the hearing is ok   · We reviewed the need for the eye doctor            Objective   Vitals:    03/18/22 1040   BP: 102/60   Site: Right Upper Arm   Position: Sitting   Cuff Size: Medium Adult   Pulse: 60   Resp: 18   Temp: 97.1 °F (36.2 °C)   TempSrc: Skin   Weight: 132 lb 6 oz (60 kg)   Height: 5' 7\" (1.702 m)      Body mass index is 20.73 kg/m². General Appearance: alert and oriented to person, place and time, well-developed and well-nourished, in no acute distress  Skin: warm and dry, no rash or erythema  Head: normocephalic and atraumatic  ENT: bilateral cerumen impaction  Pulmonary/Chest: clear to auscultation bilaterally- no wheezes, rales or rhonchi, normal air movement, no respiratory distress  Cardiovascular: normal rate, regular rhythm, normal S1 and S2, no murmurs and no gallops  Extremities: no cyanosis and no clubbing  Musculoskeletal: normal range of motion, no joint swelling, deformity or tenderness  Neurologic: gait and coordination normal and speech normal       No Known Allergies  Prior to Visit Medications    Medication Sig Taking?  Authorizing Provider   carvedilol (COREG) 3.125 MG tablet TAKE 1 TABLET BY MOUTH TWICE DAILY WITH MEALS  Real MD Jaquelin   finasteride (PROSCAR) 5 MG tablet Take 1 tablet by mouth once daily  [facility], APRN - CNP   tiotropium-olodaterol (STIOLTO RESPIMAT) 2.5-2.5 MCG/ACT AERS INHALE 2 PUFFS BY MOUTH ONCE DAILY  Hao Hammer MD   doxazosin (CARDURA) 4 MG tablet Take 1 tablet by mouth nightly  [facility], APRVIN - CNP   leuprolide (LUPRON) 45 MG injection Inject 45 mg into the muscle once  Historical Provider, MD   calcium carbonate-vitamin D (CALCIUM 600 + D) 600-400 MG-UNIT TABS per tab Take 1 tablet by mouth 3 times daily  JAILYN Mckeon CNP   denosumab (XGEVA) 120 MG/1.7ML SOLN SC injection Inject 120 mg into the skin once Every 28 days  Historical Provider, MD   traMADol (ULTRAM) 50 MG tablet Take 50 mg by mouth every 6 hours as needed for Pain. Historical Provider, MD   polyethylene glycol (MIRALAX) 17 g packet Take 17 g by mouth once  Historical Provider, MD   bicalutamide (CASODEX) 50 MG chemo tablet Take 1 tablet by mouth daily  JAILYN Mckeon CNP   albuterol sulfate HFA (VENTOLIN HFA) 108 (90 Base) MCG/ACT inhaler Inhale 2 puffs into the lungs every 6 hours as needed for Wheezing or Shortness of Breath  Darryl Favre, APRN - CNP   Cholecalciferol (VITAMIN D3) 125 MCG (5000 UT) CAPS Take 1 capsule by mouth every morning (before breakfast)  Yakov Hauser MD   Misc.  Devices (ACAPELLA) MISC 1 Device by Does not apply route 4 times daily  JAILYN Moy CNP   Multiple Vitamins-Minerals (THERAPEUTIC MULTIVITAMIN-MINERALS) tablet Take 1 tablet by mouth daily  Nella Serrato MD   docusate sodium (COLACE, DULCOLAX) 100 MG CAPS Take 100 mg by mouth 2 times daily  Patient taking differently: Take 100 mg by mouth nightly   Nella Serrato MD   acetaminophen (TYLENOL) 500 MG tablet Take 500 mg by mouth every 8 hours as needed for Pain  Historical Provider, MD   aspirin 325 MG EC tablet Take 1 tablet by mouth daily  Yakov Hauser MD       [facility] (Including outside providers/suppliers regularly involved in providing care):   Patient Care Team:  Yakov Hauser MD as PCP - General (Family Medicine)  Yakov Hauser MD [facility] PCP [facility]    Reviewed and updated this visit:  Tobacco  Allergies  Meds  Med Hx  Surg Hx  Soc Hx  Fam Hx

## 2022-03-30 ENCOUNTER — NURSE ONLY (OUTPATIENT)
Dept: LAB | Age: 87
End: 2022-03-30

## 2022-03-30 DIAGNOSIS — C61 PROSTATE CANCER (HCC): ICD-10-CM

## 2022-03-31 LAB — CALCIUM IONIZED: NORMAL

## 2022-04-02 ENCOUNTER — APPOINTMENT (OUTPATIENT)
Dept: GENERAL RADIOLOGY | Age: 87
DRG: 190 | End: 2022-04-02
Payer: MEDICARE

## 2022-04-02 ENCOUNTER — HOSPITAL ENCOUNTER (INPATIENT)
Age: 87
LOS: 2 days | Discharge: HOME | DRG: 190 | End: 2022-04-05
Attending: EMERGENCY MEDICINE | Admitting: HOSPITALIST
Payer: MEDICARE

## 2022-04-02 DIAGNOSIS — J96.21 ACUTE ON CHRONIC RESPIRATORY FAILURE WITH HYPOXIA (HCC): ICD-10-CM

## 2022-04-02 DIAGNOSIS — J44.1 COPD EXACERBATION (HCC): Primary | ICD-10-CM

## 2022-04-02 LAB
ALBUMIN SERPL-MCNC: 4.5 G/DL (ref 3.5–5.1)
ALP BLD-CCNC: 63 U/L (ref 38–126)
ALT SERPL-CCNC: 15 U/L (ref 11–66)
ANION GAP SERPL CALCULATED.3IONS-SCNC: 14 MEQ/L (ref 8–16)
APTT: 29.6 SECONDS (ref 22–38)
AST SERPL-CCNC: 35 U/L (ref 5–40)
BASOPHILS # BLD: 0.4 %
BASOPHILS ABSOLUTE: 0 THOU/MM3 (ref 0–0.1)
BILIRUB SERPL-MCNC: 0.4 MG/DL (ref 0.3–1.2)
BILIRUBIN DIRECT: < 0.2 MG/DL (ref 0–0.3)
BUN BLDV-MCNC: 22 MG/DL (ref 7–22)
CALCIUM SERPL-MCNC: 10 MG/DL (ref 8.5–10.5)
CHLORIDE BLD-SCNC: 89 MEQ/L (ref 98–111)
CO2: 32 MEQ/L (ref 23–33)
CREAT SERPL-MCNC: 1.2 MG/DL (ref 0.4–1.2)
D-DIMER QUANTITATIVE: 477 NG/ML FEU (ref 0–500)
EOSINOPHIL # BLD: 0.6 %
EOSINOPHILS ABSOLUTE: 0 THOU/MM3 (ref 0–0.4)
ERYTHROCYTE [DISTWIDTH] IN BLOOD BY AUTOMATED COUNT: 12 % (ref 11.5–14.5)
ERYTHROCYTE [DISTWIDTH] IN BLOOD BY AUTOMATED COUNT: 44.7 FL (ref 35–45)
FLU A ANTIGEN: NEGATIVE
FLU B ANTIGEN: NEGATIVE
GFR SERPL CREATININE-BSD FRML MDRD: 56 ML/MIN/1.73M2
GLUCOSE BLD-MCNC: 129 MG/DL (ref 70–108)
HCT VFR BLD CALC: 42 % (ref 42–52)
HEMOGLOBIN: 14 GM/DL (ref 14–18)
IMMATURE GRANS (ABS): 0.03 THOU/MM3 (ref 0–0.07)
IMMATURE GRANULOCYTES: 0.6 %
INR BLD: 1.02 (ref 0.85–1.13)
LYMPHOCYTES # BLD: 15.2 %
LYMPHOCYTES ABSOLUTE: 0.7 THOU/MM3 (ref 1–4.8)
MCH RBC QN AUTO: 33.8 PG (ref 26–33)
MCHC RBC AUTO-ENTMCNC: 33.3 GM/DL (ref 32.2–35.5)
MCV RBC AUTO: 101.4 FL (ref 80–94)
MONOCYTES # BLD: 12.1 %
MONOCYTES ABSOLUTE: 0.6 THOU/MM3 (ref 0.4–1.3)
NUCLEATED RED BLOOD CELLS: 0 /100 WBC
OSMOLALITY CALCULATION: 275.1 MOSMOL/KG (ref 275–300)
PLATELET # BLD: 182 THOU/MM3 (ref 130–400)
PMV BLD AUTO: 8.8 FL (ref 9.4–12.4)
POTASSIUM REFLEX MAGNESIUM: 4 MEQ/L (ref 3.5–5.2)
PRO-BNP: 393.3 PG/ML (ref 0–1800)
RBC # BLD: 4.14 MILL/MM3 (ref 4.7–6.1)
SARS-COV-2, NAAT: NOT  DETECTED
SEG NEUTROPHILS: 71.1 %
SEGMENTED NEUTROPHILS ABSOLUTE COUNT: 3.5 THOU/MM3 (ref 1.8–7.7)
SODIUM BLD-SCNC: 135 MEQ/L (ref 135–145)
TOTAL PROTEIN: 7.3 G/DL (ref 6.1–8)
TROPONIN T: < 0.01 NG/ML
WBC # BLD: 4.9 THOU/MM3 (ref 4.8–10.8)

## 2022-04-02 PROCEDURE — 84484 ASSAY OF TROPONIN QUANT: CPT

## 2022-04-02 PROCEDURE — 96374 THER/PROPH/DIAG INJ IV PUSH: CPT

## 2022-04-02 PROCEDURE — 83880 ASSAY OF NATRIURETIC PEPTIDE: CPT

## 2022-04-02 PROCEDURE — 85610 PROTHROMBIN TIME: CPT

## 2022-04-02 PROCEDURE — 80076 HEPATIC FUNCTION PANEL: CPT

## 2022-04-02 PROCEDURE — 71045 X-RAY EXAM CHEST 1 VIEW: CPT

## 2022-04-02 PROCEDURE — 93005 ELECTROCARDIOGRAM TRACING: CPT | Performed by: EMERGENCY MEDICINE

## 2022-04-02 PROCEDURE — 6360000002 HC RX W HCPCS: Performed by: EMERGENCY MEDICINE

## 2022-04-02 PROCEDURE — 6370000000 HC RX 637 (ALT 250 FOR IP): Performed by: EMERGENCY MEDICINE

## 2022-04-02 PROCEDURE — 99285 EMERGENCY DEPT VISIT HI MDM: CPT

## 2022-04-02 PROCEDURE — 87804 INFLUENZA ASSAY W/OPTIC: CPT

## 2022-04-02 PROCEDURE — 85730 THROMBOPLASTIN TIME PARTIAL: CPT

## 2022-04-02 PROCEDURE — 87635 SARS-COV-2 COVID-19 AMP PRB: CPT

## 2022-04-02 PROCEDURE — 85379 FIBRIN DEGRADATION QUANT: CPT

## 2022-04-02 PROCEDURE — 80048 BASIC METABOLIC PNL TOTAL CA: CPT

## 2022-04-02 PROCEDURE — 85025 COMPLETE CBC W/AUTO DIFF WBC: CPT

## 2022-04-02 RX ORDER — METHYLPREDNISOLONE SODIUM SUCCINATE 125 MG/2ML
125 INJECTION, POWDER, LYOPHILIZED, FOR SOLUTION INTRAMUSCULAR; INTRAVENOUS ONCE
Status: COMPLETED | OUTPATIENT
Start: 2022-04-02 | End: 2022-04-02

## 2022-04-02 RX ORDER — IPRATROPIUM BROMIDE AND ALBUTEROL SULFATE 2.5; .5 MG/3ML; MG/3ML
1 SOLUTION RESPIRATORY (INHALATION) ONCE
Status: COMPLETED | OUTPATIENT
Start: 2022-04-02 | End: 2022-04-02

## 2022-04-02 RX ADMIN — METHYLPREDNISOLONE SODIUM SUCCINATE 125 MG: 125 INJECTION, POWDER, FOR SOLUTION INTRAMUSCULAR; INTRAVENOUS at 22:20

## 2022-04-02 RX ADMIN — IPRATROPIUM BROMIDE AND ALBUTEROL SULFATE 1 AMPULE: .5; 3 SOLUTION RESPIRATORY (INHALATION) at 22:19

## 2022-04-02 ASSESSMENT — ENCOUNTER SYMPTOMS
ABDOMINAL PAIN: 0
VOMITING: 0
BACK PAIN: 0
COUGH: 1
SHORTNESS OF BREATH: 1
WHEEZING: 1

## 2022-04-03 PROBLEM — J44.9 COPD (CHRONIC OBSTRUCTIVE PULMONARY DISEASE) (HCC): Status: ACTIVE | Noted: 2022-04-03

## 2022-04-03 LAB
EKG ATRIAL RATE: 74 BPM
EKG P AXIS: 83 DEGREES
EKG P-R INTERVAL: 158 MS
EKG Q-T INTERVAL: 402 MS
EKG QRS DURATION: 82 MS
EKG QTC CALCULATION (BAZETT): 446 MS
EKG R AXIS: 43 DEGREES
EKG T AXIS: 78 DEGREES
EKG VENTRICULAR RATE: 74 BPM

## 2022-04-03 PROCEDURE — 93010 ELECTROCARDIOGRAM REPORT: CPT | Performed by: NUCLEAR MEDICINE

## 2022-04-03 PROCEDURE — 2700000000 HC OXYGEN THERAPY PER DAY

## 2022-04-03 PROCEDURE — 1200000003 HC TELEMETRY R&B

## 2022-04-03 PROCEDURE — 2580000003 HC RX 258: Performed by: STUDENT IN AN ORGANIZED HEALTH CARE EDUCATION/TRAINING PROGRAM

## 2022-04-03 PROCEDURE — 99232 SBSQ HOSP IP/OBS MODERATE 35: CPT | Performed by: OPHTHALMOLOGY

## 2022-04-03 PROCEDURE — 2500000003 HC RX 250 WO HCPCS: Performed by: STUDENT IN AN ORGANIZED HEALTH CARE EDUCATION/TRAINING PROGRAM

## 2022-04-03 PROCEDURE — 6370000000 HC RX 637 (ALT 250 FOR IP): Performed by: STUDENT IN AN ORGANIZED HEALTH CARE EDUCATION/TRAINING PROGRAM

## 2022-04-03 PROCEDURE — 6360000002 HC RX W HCPCS: Performed by: STUDENT IN AN ORGANIZED HEALTH CARE EDUCATION/TRAINING PROGRAM

## 2022-04-03 PROCEDURE — 94760 N-INVAS EAR/PLS OXIMETRY 1: CPT

## 2022-04-03 PROCEDURE — 6370000000 HC RX 637 (ALT 250 FOR IP): Performed by: OPHTHALMOLOGY

## 2022-04-03 PROCEDURE — 94640 AIRWAY INHALATION TREATMENT: CPT

## 2022-04-03 RX ORDER — VITAMIN B COMPLEX
5000 TABLET ORAL
Status: DISCONTINUED | OUTPATIENT
Start: 2022-04-03 | End: 2022-04-05 | Stop reason: HOSPADM

## 2022-04-03 RX ORDER — SODIUM CHLORIDE 0.9 % (FLUSH) 0.9 %
5-40 SYRINGE (ML) INJECTION PRN
Status: DISCONTINUED | OUTPATIENT
Start: 2022-04-03 | End: 2022-04-05 | Stop reason: HOSPADM

## 2022-04-03 RX ORDER — POLYETHYLENE GLYCOL 3350 17 G/17G
17 POWDER, FOR SOLUTION ORAL ONCE
Status: COMPLETED | OUTPATIENT
Start: 2022-04-03 | End: 2022-04-03

## 2022-04-03 RX ORDER — TRAMADOL HYDROCHLORIDE 50 MG/1
50 TABLET ORAL EVERY 6 HOURS PRN
Status: DISCONTINUED | OUTPATIENT
Start: 2022-04-03 | End: 2022-04-05 | Stop reason: HOSPADM

## 2022-04-03 RX ORDER — ALBUTEROL SULFATE 90 UG/1
2 AEROSOL, METERED RESPIRATORY (INHALATION) EVERY 6 HOURS PRN
Status: DISCONTINUED | OUTPATIENT
Start: 2022-04-03 | End: 2022-04-05 | Stop reason: HOSPADM

## 2022-04-03 RX ORDER — SODIUM CHLORIDE 9 MG/ML
INJECTION, SOLUTION INTRAVENOUS PRN
Status: DISCONTINUED | OUTPATIENT
Start: 2022-04-03 | End: 2022-04-05 | Stop reason: HOSPADM

## 2022-04-03 RX ORDER — SODIUM CHLORIDE 0.9 % (FLUSH) 0.9 %
5-40 SYRINGE (ML) INJECTION EVERY 12 HOURS SCHEDULED
Status: DISCONTINUED | OUTPATIENT
Start: 2022-04-03 | End: 2022-04-05 | Stop reason: HOSPADM

## 2022-04-03 RX ORDER — ACETAMINOPHEN 650 MG/1
650 SUPPOSITORY RECTAL EVERY 6 HOURS PRN
Status: DISCONTINUED | OUTPATIENT
Start: 2022-04-03 | End: 2022-04-05 | Stop reason: HOSPADM

## 2022-04-03 RX ORDER — ACETAMINOPHEN 325 MG/1
650 TABLET ORAL EVERY 6 HOURS PRN
Status: DISCONTINUED | OUTPATIENT
Start: 2022-04-03 | End: 2022-04-05 | Stop reason: HOSPADM

## 2022-04-03 RX ORDER — CARVEDILOL 3.12 MG/1
3.12 TABLET ORAL 2 TIMES DAILY
Status: DISCONTINUED | OUTPATIENT
Start: 2022-04-03 | End: 2022-04-05 | Stop reason: HOSPADM

## 2022-04-03 RX ORDER — FINASTERIDE 5 MG/1
5 TABLET, FILM COATED ORAL DAILY
Status: DISCONTINUED | OUTPATIENT
Start: 2022-04-03 | End: 2022-04-05 | Stop reason: HOSPADM

## 2022-04-03 RX ORDER — CARVEDILOL 3.12 MG/1
3.12 TABLET ORAL 2 TIMES DAILY
Status: DISCONTINUED | OUTPATIENT
Start: 2022-04-03 | End: 2022-04-03

## 2022-04-03 RX ORDER — MULTIVITAMIN WITH IRON
1 TABLET ORAL DAILY
Status: DISCONTINUED | OUTPATIENT
Start: 2022-04-03 | End: 2022-04-05 | Stop reason: HOSPADM

## 2022-04-03 RX ORDER — DOXAZOSIN MESYLATE 4 MG/1
4 TABLET ORAL DAILY
Status: DISCONTINUED | OUTPATIENT
Start: 2022-04-03 | End: 2022-04-05 | Stop reason: HOSPADM

## 2022-04-03 RX ORDER — IPRATROPIUM BROMIDE AND ALBUTEROL SULFATE 2.5; .5 MG/3ML; MG/3ML
1 SOLUTION RESPIRATORY (INHALATION)
Status: DISCONTINUED | OUTPATIENT
Start: 2022-04-03 | End: 2022-04-03

## 2022-04-03 RX ORDER — PREDNISONE 10 MG/1
10 TABLET ORAL DAILY
Status: DISCONTINUED | OUTPATIENT
Start: 2022-04-03 | End: 2022-04-03

## 2022-04-03 RX ORDER — PREDNISONE 10 MG/1
10 TABLET ORAL DAILY
Status: DISCONTINUED | OUTPATIENT
Start: 2022-04-03 | End: 2022-04-04

## 2022-04-03 RX ORDER — ONDANSETRON 4 MG/1
4 TABLET, ORALLY DISINTEGRATING ORAL EVERY 8 HOURS PRN
Status: DISCONTINUED | OUTPATIENT
Start: 2022-04-03 | End: 2022-04-05 | Stop reason: HOSPADM

## 2022-04-03 RX ORDER — AZITHROMYCIN 250 MG/1
500 TABLET, FILM COATED ORAL DAILY
Status: DISCONTINUED | OUTPATIENT
Start: 2022-04-03 | End: 2022-04-04

## 2022-04-03 RX ORDER — POLYETHYLENE GLYCOL 3350 17 G/17G
17 POWDER, FOR SOLUTION ORAL DAILY PRN
Status: DISCONTINUED | OUTPATIENT
Start: 2022-04-03 | End: 2022-04-05 | Stop reason: HOSPADM

## 2022-04-03 RX ORDER — ONDANSETRON 2 MG/ML
4 INJECTION INTRAMUSCULAR; INTRAVENOUS EVERY 6 HOURS PRN
Status: DISCONTINUED | OUTPATIENT
Start: 2022-04-03 | End: 2022-04-05 | Stop reason: HOSPADM

## 2022-04-03 RX ORDER — IPRATROPIUM BROMIDE AND ALBUTEROL SULFATE 2.5; .5 MG/3ML; MG/3ML
1 SOLUTION RESPIRATORY (INHALATION) 2 TIMES DAILY
Status: DISCONTINUED | OUTPATIENT
Start: 2022-04-03 | End: 2022-04-03

## 2022-04-03 RX ORDER — IPRATROPIUM BROMIDE AND ALBUTEROL SULFATE 2.5; .5 MG/3ML; MG/3ML
1 SOLUTION RESPIRATORY (INHALATION) EVERY 4 HOURS PRN
Status: DISCONTINUED | OUTPATIENT
Start: 2022-04-03 | End: 2022-04-05 | Stop reason: HOSPADM

## 2022-04-03 RX ORDER — IPRATROPIUM BROMIDE AND ALBUTEROL SULFATE 2.5; .5 MG/3ML; MG/3ML
1 SOLUTION RESPIRATORY (INHALATION) 3 TIMES DAILY
Status: DISCONTINUED | OUTPATIENT
Start: 2022-04-04 | End: 2022-04-04

## 2022-04-03 RX ADMIN — IPRATROPIUM BROMIDE AND ALBUTEROL SULFATE 1 AMPULE: .5; 3 SOLUTION RESPIRATORY (INHALATION) at 17:51

## 2022-04-03 RX ADMIN — AZITHROMYCIN MONOHYDRATE 500 MG: 250 TABLET ORAL at 08:46

## 2022-04-03 RX ADMIN — SODIUM CHLORIDE, PRESERVATIVE FREE 10 ML: 5 INJECTION INTRAVENOUS at 20:45

## 2022-04-03 RX ADMIN — Medication 1 TABLET: at 08:48

## 2022-04-03 RX ADMIN — FINASTERIDE 5 MG: 5 TABLET, FILM COATED ORAL at 08:45

## 2022-04-03 RX ADMIN — Medication 1 TABLET: at 14:55

## 2022-04-03 RX ADMIN — ASPIRIN 325 MG: 325 TABLET, COATED ORAL at 08:43

## 2022-04-03 RX ADMIN — Medication 5000 UNITS: at 06:44

## 2022-04-03 RX ADMIN — SODIUM CHLORIDE, PRESERVATIVE FREE 10 ML: 5 INJECTION INTRAVENOUS at 08:49

## 2022-04-03 RX ADMIN — CARVEDILOL 3.12 MG: 3.12 TABLET, FILM COATED ORAL at 08:45

## 2022-04-03 RX ADMIN — POLYETHYLENE GLYCOL 3350 17 G: 17 POWDER, FOR SOLUTION ORAL at 03:29

## 2022-04-03 RX ADMIN — ENOXAPARIN SODIUM 40 MG: 40 INJECTION SUBCUTANEOUS at 08:43

## 2022-04-03 RX ADMIN — DOXAZOSIN 4 MG: 4 TABLET ORAL at 08:49

## 2022-04-03 RX ADMIN — PREDNISONE 10 MG: 10 TABLET ORAL at 03:28

## 2022-04-03 RX ADMIN — CEFTRIAXONE SODIUM 1000 MG: 1 INJECTION, POWDER, FOR SOLUTION INTRAMUSCULAR; INTRAVENOUS at 03:28

## 2022-04-03 RX ADMIN — CARVEDILOL 3.12 MG: 3.12 TABLET, FILM COATED ORAL at 17:27

## 2022-04-03 RX ADMIN — Medication 1 TABLET: at 08:47

## 2022-04-03 RX ADMIN — Medication 1 TABLET: at 20:41

## 2022-04-03 RX ADMIN — IPRATROPIUM BROMIDE AND ALBUTEROL SULFATE 1 AMPULE: .5; 3 SOLUTION RESPIRATORY (INHALATION) at 09:30

## 2022-04-03 ASSESSMENT — PAIN SCALES - GENERAL
PAINLEVEL_OUTOF10: 0

## 2022-04-03 NOTE — H&P
Hospitalist - History & Physical      Patient: Kaylee Jade    Unit/Bed:MENDOZA /MENDOZA  YOB: 1926  MRN: [id number]   Acct: [de-identified]   PCP: Luiz Villafuerte MD    Chief Complaint:  SOB    Assessment and Plan:      1. COPD with acute exacerbation: baseline required 2L NC at night. Stage 3 severe COPD by GOLD classification. Home med include Stiolto BID, Ventolin q6PRN. Plan: Prednisone 10 mg x 5 days, DuoNeb q4wa, resume home med on discharged     2. Acute hypoxic respiratory failure 2/2 COPD exacerbation:  Currently on 4L NC with baseline 2L NC at night. Plan: wean off O2 as tolearte, keep O2 Sat > 89%    3. Hx primary adenocarcinoma of the Prostate:  Seen by Arvid Canavan APRN-CNP. On Xgeva injection monthly, Lupron. Plan: follow up with Urology as outpatient     4. 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 Plan: cont Calcium carbonate and Vit D supplementation    5. Hx of PE & DVT in 2019: not on blood thinner. No hypoxia or sinus tachycardia. PE is less likely. Hypoxia is highly from COPD exaccerbation. Plan: will consider CTA if patient clinical not improved after steroid and DuoNeb. Date of Service: Pt seen/examined on 04/03/22  and Admitted to Inpatient with expected LOS greater than two midnights due to medical therapy.      Chief Complaint:  SOB    HPI:  Kaylee Jade is a 80 y.o., , male who  has a past medical history of Blood circulation, collateral, CAD (coronary artery disease), CAD (coronary artery disease), Cancer ([address] CKD (chronic kidney disease) stage 2, GFR 60-89 ml/min, COPD (chronic obstructive pulmonary disease) ([address] Diverticulitis, DVT of lower extremity, bilateral ([address] GERD (gastroesophageal reflux disease), Hiatal hernia, Hyperlipemia, Hyperlipidemia, Other disorders of kidney and ureter in diseases classified elsewhere, Pleural plaque, Pneumonia of both lower lobes due to infectious organism, Primary adenocarcinoma of prostate ([address] Pulmonary emboli ([address] and Vitamin D deficiency. that is hospitalized for COPD exacerbation. Patient reports symptom onset over last for 5 days. Associated symptoms include no tightness in her chest, right cough with no sputum production, feeling cannot cough up his mucus stuck down in his lung. Denies any fever, chills, hemoptysis, recent sick contact, chest pain, pressure in his chest, palpitation. Patient had been diagnostic for COPD which required 2 L nasal cannula nightly. Have been using Stiolto twice daily and Ventolin every 6 as needed. Patient lives at home by himself. Past medical history significant with prostate cancer in which patient see neurologist and received Macy Dryer. Patient also had a remote history with PE and DVT secondary to prostate cancer which he has been off blood thiner over last few years. In the ED patient found to have hypoxia with he was placed on 4 L nasal cannula then wean off to 3 L nasal cannula with O2 sat 92%. Flu AMB negative, Covid negative. Chest x-ray showed moderate emphysema with granuloma right middle lobe. Patient was admitted and managed for acute hypoxic respiratory failure secondary to COPD exacerbation.      PAST MEDICAL HISTORY:    Past Medical History:   Diagnosis Date    Blood circulation, collateral     CAD (coronary artery disease)     CAD (coronary artery disease) 9/2/2014    Cancer ([address]     prostate to bone    CKD (chronic kidney disease) stage 2, GFR 60-89 ml/min 12/24/2018    COPD (chronic obstructive pulmonary disease) ([address]     Diverticulitis     DVT of lower extremity, bilateral ([address] 02/2019    GERD (gastroesophageal reflux disease)     Hiatal hernia     Hyperlipemia 8/12/2013    Hyperlipidemia     Other disorders of kidney and ureter in diseases classified elsewhere     Pleural plaque 8/12/2013    Pneumonia of both lower lobes due to infectious organism  Primary adenocarcinoma of prostate [facility] 2019    high grade    Pulmonary emboli ([facility] 2019    Vitamin D deficiency 2020     PAST SURGICAL HISTORY:    Past Surgical History:   Procedure Laterality Date    ABDOMEN SURGERY      APPENDECTOMY      CARDIAC CATHETERIZATION      CYSTOSCOPY  2015    Litholapaxy-Large    EYE SURGERY      cataracts    HERNIA REPAIR      KYPHOSIS SURGERY N/A 2019    ABLATION AND KYPHOPLASTY AT T8 AND ABLATION AND KYPHOPLASTY AT T6 performed by Julita Zendejas MD at [place] [id number] 2018    EXPLORATORY LAPAROTOMY, WITH ABDOMINAL WASHOUT performed by Eran Betancourt MD at [address] [place] [address]:  History reviewed. No pertinent family history. SOCIAL HISTORY:    Social History     Socioeconomic History    Marital status:       Spouse name: Not on file    Number of children: 11    Years of education: Not on file    Highest education level: Not on file   Occupational History    Occupation: Retired   Tobacco Use    Smoking status: Former Smoker     Packs/day: 1.00     Types: Cigarettes     Quit date: 1966     Years since quittin.7    Smokeless tobacco: Never Used   Vaping Use    Vaping Use: Never used   Substance and Sexual Activity    Alcohol use: Yes     Comment: occasionally    Drug use: No    Sexual activity: Not Currently   Other Topics Concern    Not on file   Social History Narrative    - Former    Lives alone but has lots of family support that goes in and helps him    Son will spend the night most nights    Family assists with transportation    No social barriers noted    No barriers with medication affordability     Social Determinants of Health     Financial Resource Strain: Low Risk     Difficulty of Paying Living Expenses: Not hard at all   Food Insecurity: No Food Insecurity    Worried About [address] in the Last Year: Never true    [address] in the Last Year: Never true once Every 28 days    Historical Provider, MD   traMADol (ULTRAM) 50 MG tablet Take 50 mg by mouth every 6 hours as needed for Pain. Historical Provider, MD   polyethylene glycol (MIRALAX) 17 g packet Take 17 g by mouth once    Historical Provider, MD   bicalutamide (CASODEX) 50 MG chemo tablet Take 1 tablet by mouth daily 2/1/21 2/1/22  Jonellee Phoenix, APRN - CNP   albuterol sulfate HFA (VENTOLIN HFA) 108 (90 Base) MCG/ACT inhaler Inhale 2 puffs into the lungs every 6 hours as needed for Wheezing or Shortness of Breath 1/29/21   JAILYN Jaquez CNP   Cholecalciferol (VITAMIN D3) 125 MCG (5000 UT) CAPS Take 1 capsule by mouth every morning (before breakfast) 6/17/20   Ezra Cross MD   Misc. Devices (ACAPELLA) MISC 1 Device by Does not apply route 4 times daily 4/30/19   JAILYN Crowell CNP   Multiple Vitamins-Minerals (THERAPEUTIC MULTIVITAMIN-MINERALS) tablet Take 1 tablet by mouth daily 2/16/19   Sujata Alvarez MD   docusate sodium (COLACE, DULCOLAX) 100 MG CAPS Take 100 mg by mouth 2 times daily  Patient taking differently: Take 100 mg by mouth nightly  2/15/19   Sujata Alvarez MD   acetaminophen (TYLENOL) 500 MG tablet Take 500 mg by mouth every 8 hours as needed for Pain    Historical Provider, MD   aspirin 325 MG EC tablet Take 1 tablet by mouth daily 1/23/19   Ezra Cross MD       ALLERGIES: Patient has no known allergies. REVIEW OF SYSTEM:     Constitutional: Negative for appetite change, chills, diaphoresis, fever and unexpected weight change. HENT: Negative for congestion, dental problem, ear discharge, mouth sores, nosebleeds, sinus pain, tinnitus and trouble swallowing. Respiratory: Positive for shortness of breath. Negative for choking, chest tightness, wheezing and stridor.     Cardiovascular: Negative for chest pain and palpitations, leg swelling  Gastrointestinal: Negative for abdominal pain, anal bleeding, blood in stool, constipation, diarrhea, nausea and vomiting. Genitourinary: Negative for dysuria, flank pain, hematuria and urgency. Musculoskeletal: Negative for back pain, gait problem, neck pain and neck stiffness. Skin: Negative for color change, pallor, rash and wound. Neurological: Negative for dizziness, tremors, seizures, syncope, speech difficulty, numbness and headaches. Hematological: Negative for adenopathy. Does not bruise/bleed easily. Psychiatric/Behavioral: Negative for agitation, behavioral problems, confusion, decreased concentration, dysphoric mood and hallucinations. OBJECTIVE  PHYSICAL EXAM:   BP (!) 148/67   Pulse 68   Temp 97.9 °F (36.6 °C) (Oral)   Resp 20   Ht 5' 7\" (1.702 m)   Wt 132 lb (59.9 kg)   SpO2 96%   BMI 20.67 kg/m²   Vitals reviewed. Constitutional:       General: He is not in acute distress. Appearance: Normal appearance. He is normal weight. He is not ill-appearing, toxic-appearing or diaphoretic. HENT:      Head: Normocephalic and atraumatic. Nose: No congestion, rhinorrhea     Mouth: Mucous membranes are moist.      Pharynx: Oropharynx is clear. No oropharyngeal exudate or posterior oropharyngeal erythema. Neck:      Vascular: No carotid bruit. Cardiovascular:      Rate and Rhythm: Normal rate and regular rhythm. Pulses: Normal pulses. Heart sounds: Normal heart sounds. No murmur heard. No friction rub. No gallop. Pulmonary:      Effort: No respiratory distress. Breath sounds: No stridor, rales. (+) wheezing  Chest:      Chest wall: No tenderness. Abdominal:      General: Abdomen is flat. Bowel sounds are normal. There is no distension. Palpations: Abdomen is soft. There is no mass. Tenderness: There is no abdominal tenderness. There is no right CVA tenderness, left CVA tenderness, guarding or rebound. Hernia: No hernia is present. Musculoskeletal:         General: No swelling, tenderness, deformity or signs of injury.  Normal range of motion. Cervical back: Normal range of motion and neck supple. No rigidity or tenderness. Right lower leg: No edema. Left lower leg: No edema. Lymphadenopathy:      Cervical: No cervical adenopathy. DATA:     Diagnostic tests reviewed for today's visit:    Most recent labs and imaging results reviewed.      LABS:    Recent Results (from the past 24 hour(s))   EKG 12 Lead    Collection Time: 04/02/22  9:45 PM   Result Value Ref Range    Ventricular Rate 74 BPM    Atrial Rate 74 BPM    P-R Interval 158 ms    QRS Duration 82 ms    Q-T Interval 402 ms    QTc Calculation (Bazett) 446 ms    P Axis 83 degrees    R Axis 43 degrees    T Axis 78 degrees   Basic Metabolic Panel w/ Reflex to MG    Collection Time: 04/02/22  9:55 PM   Result Value Ref Range    Sodium 135 135 - 145 meq/L    Potassium reflex Magnesium 4.0 3.5 - 5.2 meq/L    Chloride 89 (L) 98 - 111 meq/L    CO2 32 23 - 33 meq/L    Glucose 129 (H) 70 - 108 mg/dL    BUN 22 7 - 22 mg/dL    CREATININE 1.2 0.4 - 1.2 mg/dL    Calcium 10.0 8.5 - 10.5 mg/dL   Brain Natriuretic Peptide    Collection Time: 04/02/22  9:55 PM   Result Value Ref Range    Pro-.3 0.0 - 1800.0 pg/mL   CBC with Auto Differential    Collection Time: 04/02/22  9:55 PM   Result Value Ref Range    WBC 4.9 4.8 - 10.8 thou/mm3    RBC 4.14 (L) 4.70 - 6.10 mill/mm3    Hemoglobin 14.0 14.0 - 18.0 gm/dl    Hematocrit 42.0 42.0 - 52.0 %    .4 (H) 80.0 - 94.0 fL    MCH 33.8 (H) 26.0 - 33.0 pg    MCHC 33.3 32.2 - 35.5 gm/dl    RDW-CV 12.0 11.5 - 14.5 %    RDW-SD 44.7 35.0 - 45.0 fL    Platelets 952 245 - 192 thou/mm3    MPV 8.8 (L) 9.4 - 12.4 fL    Seg Neutrophils 71.1 %    Lymphocytes 15.2 %    Monocytes 12.1 %    Eosinophils 0.6 %    Basophils 0.4 %    Immature Granulocytes 0.6 %    Segs Absolute 3.5 1.8 - 7.7 thou/mm3    Lymphocytes Absolute 0.7 (L) 1.0 - 4.8 thou/mm3    Monocytes Absolute 0.6 0.4 - 1.3 thou/mm3    Eosinophils Absolute 0.0 0.0 - 0.4 thou/mm3    Basophils metastatic disease. Impression: Moderate emphysema. Granuloma right midlung.  This document has been electronically signed by: Gibran French MD on 04/02/2022 11:32 PM      VTE Prophylaxis: Lovenox SQ      Plan of care discussed with: patient and family    SIGNATURE: Lidia Velarde DO  DATE: April 3, 2022  TIME: 1:55 AM   Sarabjit Min MD  - supervising physician

## 2022-04-03 NOTE — PLAN OF CARE
Problem: RESPIRATORY  Goal: Clear lung sounds  Outcome: Ongoing  Note:  Patient lung sounds are considered normal for their current lung condition. No signs of distress noted.  Current treatment regimen appropriate

## 2022-04-03 NOTE — PROGRESS NOTES
Hospitalist Progress Note    Patient:  Jill العلي      Unit/Bed:8A-08/008-A    YOB: 1926    MRN: [id number]       Acct: [de-identified]     PCP: Ezra Cross MD    Date of Admission: 4/2/2022     Assessment and Plan:       1. COPD with acute exacerbation: baseline required 2L NC at night. Stage 3 severe COPD by GOLD classification. Home med include Stiolto BID, Ventolin q6PRN. Plan: Prednisone 10 mg x 5 days, DuoNeb q4wa, resume home med on discharged. Started on abx on admission. Incentive spirometry     2. Acute hypoxic respiratory failure 2/2 COPD exacerbation:  Currently on 4L NC with baseline 2L NC at night. Plan: wean off O2 as tolearte, keep O2 Sat > 89%     3. Hx primary adenocarcinoma of the Prostate:  Seen by Kel CARTWRIGHT. On Xgeva injection monthly, Lupron. Plan: follow up with Urology as outpatient      4. 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 Plan: cont Calcium carbonate and Vit D supplementation     5. Hx of PE & DVT in 2019: not on blood thinner. No hypoxia or sinus tachycardia. PE is less likely. Hypoxia is highly from COPD exaccerbation. Plan: will consider CTA if patient clinical not improved after steroid and DuoNeb.      consult PT/OT    Chief Complaint:  SOB     HPI:  Jill العلي is a 80 y.o., , male who  has a past medical history of Blood circulation, collateral, CAD (coronary artery disease), CAD (coronary artery disease), Cancer ([address] CKD (chronic kidney disease) stage 2, GFR 60-89 ml/min, COPD (chronic obstructive pulmonary disease) ([address] Diverticulitis, DVT of lower extremity, bilateral ([address] GERD (gastroesophageal reflux disease), Hiatal hernia, Hyperlipemia, Hyperlipidemia, Other disorders of kidney and ureter in diseases classified elsewhere, Pleural plaque, Pneumonia of both lower lobes due to infectious organism, Primary adenocarcinoma of prostate [facility]), Pulmonary emboli ([facility] and Vitamin D deficiency. that is hospitalized for COPD exacerbation. Patient reports symptom onset over last for 5 days. Associated symptoms include no tightness in her chest, right cough with no sputum production, feeling cannot cough up his mucus stuck down in his lung. Denies any fever, chills, hemoptysis, recent sick contact, chest pain, pressure in his chest, palpitation. Patient had been diagnostic for COPD which required 2 L nasal cannula nightly. Have been using Stiolto twice daily and Ventolin every 6 as needed. Patient lives at home by himself. Past medical history significant with prostate cancer in which patient see neurologist and received [place]. Patient also had a remote history with PE and DVT secondary to prostate cancer which he has been off blood thiner over last few years.      In the ED patient found to have hypoxia with he was placed on 4 L nasal cannula then wean off to 3 L nasal cannula with O2 sat 92%. Flu AMB negative, Covid negative. Chest x-ray showed moderate emphysema with granuloma right middle lobe. Patient was admitted and managed for acute hypoxic respiratory failure secondary to COPD exacerbation.      Subjective (past 24 hours)  Feeling better    Diet:  ADULT DIET;  Regular      Medications:  Scheduled Meds:   sodium chloride flush  5-40 mL IntraVENous 2 times per day    enoxaparin  40 mg SubCUTAneous Daily    cefTRIAXone (ROCEPHIN) IV  1,000 mg IntraVENous Q24H    azithromycin  500 mg Oral Daily    aspirin  325 mg Oral Daily    calcium-cholecalciferol  1 tablet Oral TID    carvedilol  3.125 mg Oral BID    Vitamin D  5,000 Units Oral QAM AC    doxazosin  4 mg Oral Daily    multivitamin  1 tablet Oral Daily    [Held by provider] tiotropium-olodaterol  2 puff Inhalation Daily    finasteride  5 mg Oral Daily    ipratropium-albuterol  1 ampule Inhalation Q4H WA    predniSONE  10 mg Oral Daily     Continuous masses or organomegaly   Obese: No; Protuberant: Bowel sounds heard  Neurological - A&O 3  , normal speech, no focal findings or movement disorder noted   Musculoskeletal - no joint tenderness, deformity or swelling   Extremities - peripheral pulses normal, no pedal edema, no clubbing or cyanosis  24 hour intake/output:    Intake/Output Summary (Last 24 hours) at 4/3/2022 0731  Last data filed at 4/3/2022 1594  Gross per 24 hour   Intake --   Output 300 ml   Net -300 ml     Assessment/Plan:    Principal Problem:    COPD (chronic obstructive pulmonary disease) (HCC)  Resolved Problems:    * No resolved hospital problems.  *        PT/OT Eval Status: consult  DVT prophylaxis: [x] Lovenox                                 [] SCDs                                 [] SQ Heparin                                 [] Encourage ambulation           [] Already on Anticoagulation  Code Status: DNR-CC    Lisa Cabrera MD on 4/3/2022 at 7:31 AM  Rounding Hospitalist

## 2022-04-03 NOTE — ED TRIAGE NOTES
Pt arrives with c/o SOB. Hx of COPD. Pt 84% on RA. Pt reports he wears 2L NC at home while sleeping but no O2 during the day. Pt denies having any pain. Hx COPD and prostate cancer. Pt placed on 6L NC upon entering ER room. Family at bedside. EKG complete.

## 2022-04-03 NOTE — PROGRESS NOTES
Pharmacy Renal Adjustment    Alicia Newton is a 80 y.o. male. Pharmacy renally adjust the following medications per P&T approved policy: Lovenox    Height:   Ht Readings from Last 1 Encounters:   04/02/22 5' 7\" (1.702 m)     Weight:  Wt Readings from Last 1 Encounters:   04/03/22 127 lb (57.6 kg)     Recent Labs     04/02/22  2155   BUN 22   CREATININE 1.2     Estimated Creatinine Clearance: 29 mL/min (based on SCr of 1.2 mg/dL).   Calculated CrCl:    Plan:   Decrease Lovenox from 40 mg daily to 30 mg daily                 Nicolasa Lora PharmD 4/3/2022 1:57 PM

## 2022-04-03 NOTE — RT PROTOCOL NOTE
RT Inhaler-Nebulizer Bronchodilator Protocol Note    There is a bronchodilator order in the chart from a provider indicating to follow the RT Bronchodilator Protocol and there is an Initiate RT Inhaler-Nebulizer Bronchodilator Protocol order as well (see protocol at bottom of note). CXR Findings:  XR CHEST PORTABLE    Result Date: 4/2/2022  Impression: Moderate emphysema. Granuloma right midlung. This document has been electronically signed by: Zaina Hernandez MD on 04/02/2022 11:32 PM      The findings from the last RT Protocol Assessment were as follows:   History Pulmonary Disease: Chronic pulmonary disease  Respiratory Pattern: Regular pattern and RR 12-20 bpm  Breath Sounds: Inspiratory and expiratory or bilateral wheezing and/or rhonchi  Cough: Strong, spontaneous, non-productive  Indication for Bronchodilator Therapy: Wheezing associated with pulm disorder  Bronchodilator Assessment Score: 8    Aerosolized bronchodilator medication orders have been revised according to the RT Inhaler-Nebulizer Bronchodilator Protocol below. Respiratory Therapist to perform RT Therapy Protocol Assessment initially then follow the protocol. Repeat RT Therapy Protocol Assessment PRN for score 0-3 or on second treatment, BID, and PRN for scores above 3. No Indications - adjust the frequency to every 6 hours PRN wheezing or bronchospasm, if no treatments needed after 48 hours then discontinue using Per Protocol order mode. If indication present, adjust the RT bronchodilator orders based on the Bronchodilator Assessment Score as indicated below. Use Inhaler orders unless patient has one or more of the following: on home nebulizer, not able to hold breath for 10 seconds, is not alert and oriented, cannot activate and use MDI correctly, or respiratory rate 25 breaths per minute or more, then use the equivalent nebulizer order(s) with same Frequency and PRN reasons based on the score.   If a patient is on this medication at home then do not decrease Frequency below that used at home. 0-3 - enter or revise RT bronchodilator order(s) to equivalent RT Bronchodilator order with Frequency of every 4 hours PRN for wheezing or increased work of breathing using Per Protocol order mode. 4-6 - enter or revise RT Bronchodilator order(s) to two equivalent RT bronchodilator orders with one order with BID Frequency and one order with Frequency of every 4 hours PRN wheezing or increased work of breathing using Per Protocol order mode. 7-10 - enter or revise RT Bronchodilator order(s) to two equivalent RT bronchodilator orders with one order with TID Frequency and one order with Frequency of every 4 hours PRN wheezing or increased work of breathing using Per Protocol order mode. 11-13 - enter or revise RT Bronchodilator order(s) to one equivalent RT bronchodilator order with QID Frequency and an Albuterol order with Frequency of every 4 hours PRN wheezing or increased work of breathing using Per Protocol order mode. Greater than 13 - enter or revise RT Bronchodilator order(s) to one equivalent RT bronchodilator order with every 4 hours Frequency and an Albuterol order with Frequency of every 2 hours PRN wheezing or increased work of breathing using Per Protocol order mode. RT to enter RT Home Evaluation for COPD & MDI Assessment order using Per Protocol order mode.     Electronically signed by Eduard Paredes RCP on 4/3/2022 at 6:05 PM

## 2022-04-03 NOTE — ED PROVIDER NOTES
[facility] [address] [address] EMERGENCY DEPT    EMERGENCY MEDICINE     Pt Name: Sigrid Bledsoe  MRN: [id number]  [place] 1/27/1926  Date of evaluation: 4/2/2022  Provider: Marysol Quijano MD    CHIEF COMPLAINT       Chief Complaint   Patient presents with    Shortness of Breath       HISTORY OF PRESENT ILLNESS    Sigrid Bledsoe is a pleasant 80 y.o. male   Presents to the emergency department from home complaining of about 3-4 days of worsening severe fatigue. He reports he just doesn't have any energy. He also reports he has been having increased shortness of breath, states he feels winded with any movement. He denies any chest pain, denies any leg pain or swelling. He denies any fever. He does have a history of copd and reports he has been coughing more than usual, feels like there is phlegm in his chest but he cant cough it up. He is not producing sputum. He also has a history of metastatic prostate cancer and reports he has a previous history of blood clots in his legs, but is not longer on blood thinners, besides aspirin. His daughter reports he sounded wheezy and she noted that his oxygen was in the low 80s at home, not normally on oxygen other than when he sleeps. No other complaints, no other known exacerbating/relieving factors. Triage notes and Nursing notes were reviewed by myself. Any discrepancies are addressed above.     PAST MEDICAL HISTORY     Past Medical History:   Diagnosis Date    Blood circulation, collateral     CAD (coronary artery disease)     CAD (coronary artery disease) 9/2/2014    Cancer ([address]     prostate to bone    CKD (chronic kidney disease) stage 2, GFR 60-89 ml/min 12/24/2018    COPD (chronic obstructive pulmonary disease) ([address]     Diverticulitis     DVT of lower extremity, bilateral ([address] 02/2019    GERD (gastroesophageal reflux disease)     Hiatal hernia     Hyperlipemia 8/12/2013    Hyperlipidemia     Other disorders of kidney and ureter in diseases classified elsewhere     Pleural plaque 8/12/2013    Pneumonia of both lower lobes due to infectious organism     Primary adenocarcinoma of prostate ([facility] 01/2019    high grade    Pulmonary emboli ([facility] 02/2019    Vitamin D deficiency 05/2020       SURGICAL HISTORY       Past Surgical History:   Procedure Laterality Date    ABDOMEN SURGERY      APPENDECTOMY      CARDIAC CATHETERIZATION      CYSTOSCOPY  03/17/2015    Litholapaxy-Large    EYE SURGERY      cataracts    HERNIA REPAIR      KYPHOSIS SURGERY N/A 2/13/2019    ABLATION AND KYPHOPLASTY AT T8 AND ABLATION AND KYPHOPLASTY AT T6 performed by Lee Martel MD at [facility] 12/17/2018    EXPLORATORY LAPAROTOMY, WITH ABDOMINAL WASHOUT performed by Geremias Marsh MD at [address] [place] [address]       Previous Medications    ACETAMINOPHEN (TYLENOL) 500 MG TABLET    Take 500 mg by mouth every 8 hours as needed for Pain    ALBUTEROL SULFATE HFA (VENTOLIN HFA) 108 (90 BASE) MCG/ACT INHALER    Inhale 2 puffs into the lungs every 6 hours as needed for Wheezing or Shortness of Breath    ASPIRIN 325 MG EC TABLET    Take 1 tablet by mouth daily    BICALUTAMIDE (CASODEX) 50 MG CHEMO TABLET    Take 1 tablet by mouth daily    CALCIUM CARBONATE-VITAMIN D (CALCIUM 600 + D) 600-400 MG-UNIT TABS PER TAB    Take 1 tablet by mouth 3 times daily    CARVEDILOL (COREG) 3.125 MG TABLET    TAKE 1 TABLET BY MOUTH TWICE DAILY WITH MEALS    CHOLECALCIFEROL (VITAMIN D3) 125 MCG (5000 UT) CAPS    Take 1 capsule by mouth every morning (before breakfast)    DENOSUMAB (XGEVA) 120 MG/1.7ML SOLN SC INJECTION    Inject 120 mg into the skin once Every 28 days    DOCUSATE SODIUM (COLACE, DULCOLAX) 100 MG CAPS    Take 100 mg by mouth 2 times daily    DOXAZOSIN (CARDURA) 4 MG TABLET    Take 1 tablet by mouth nightly    FINASTERIDE (PROSCAR) 5 MG TABLET    Take 1 tablet by mouth once daily    LEUPROLIDE (LUPRON) 45 MG INJECTION    Inject 45 mg into the muscle once MISC. DEVICES (ACAPELLA) MISC    1 Device by Does not apply route 4 times daily    MULTIPLE VITAMINS-MINERALS (THERAPEUTIC MULTIVITAMIN-MINERALS) TABLET    Take 1 tablet by mouth daily    POLYETHYLENE GLYCOL (MIRALAX) 17 G PACKET    Take 17 g by mouth once    TIOTROPIUM-OLODATEROL (STIOLTO RESPIMAT) 2.5-2.5 MCG/ACT AERS    INHALE 2 PUFFS BY MOUTH ONCE DAILY    TRAMADOL (ULTRAM) 50 MG TABLET    Take 50 mg by mouth every 6 hours as needed for Pain. ALLERGIES     Patient has no known allergies. FAMILY HISTORY     History reviewed. No pertinent family history. SOCIAL HISTORY       Social History     Socioeconomic History    Marital status:      Spouse name: None    Number of children: 11    Years of education: None    Highest education level: None   Occupational History    Occupation: Retired   Tobacco Use    Smoking status: Former Smoker     Packs/day: 1.00     Types: Cigarettes     Quit date: 1966     Years since quittin.7    Smokeless tobacco: Never Used   Vaping Use    Vaping Use: Never used   Substance and Sexual Activity    Alcohol use: Yes     Comment: occasionally    Drug use: No    Sexual activity: Not Currently   Other Topics Concern    None   Social History Narrative    - Former    Lives alone but has lots of family support that goes in and helps him    Son will spend the night most nights    Family assists with transportation    No social barriers noted    No barriers with medication affordability     Social Determinants of Health     Financial Resource Strain: Low Risk     Difficulty of Paying Living Expenses: Not hard at all   Food Insecurity: No Food Insecurity    Worried About [address] in the Last Year: Never true    [address] in the Last Year: Never true   Transportation Needs:     Lack of Transportation (Medical): Not on file    Lack of Transportation (Non-Medical):  Not on file   Physical Activity: Inactive    Days of Exercise per Week: 0 days    Minutes of Exercise per Session: 0 min   Stress:     Feeling of Stress : Not on file   Social Connections:     Frequency of Communication with Friends and Family: Not on file    Frequency of Social Gatherings with Friends and Family: Not on file    Attends Evangelical Services: Not on file    Active Member of Clubs or Organizations: Not on file    Attends Club or Organization Meetings: Not on file    Marital Status: Not on file   Intimate Partner Violence:     Fear of Current or Ex-Partner: Not on file    Emotionally Abused: Not on file    Physically Abused: Not on file    Sexually Abused: Not on file   Housing Stability:     Unable to Pay for Housing in the Last Year: Not on file    Number of [place] in the Last Year: Not on file    Unstable Housing in the Last Year: Not on file       REVIEW OF SYSTEMS     Review of Systems   Constitutional: Negative for chills and fever. Respiratory: Positive for cough, shortness of breath and wheezing. Cardiovascular: Negative for chest pain and leg swelling. Gastrointestinal: Negative for abdominal pain and vomiting. Musculoskeletal: Negative for back pain and neck pain. Skin: Negative for rash and wound. Neurological: Negative for weakness and numbness. All other systems reviewed and are negative. Except as noted above the remainder of the review of systems was reviewed and is. PHYSICAL EXAM    (up to 7 for level 4, 8 or more for level 5)     ED Triage Vitals   BP Temp Temp Source Pulse Resp SpO2 Height Weight   04/02/22 2150 04/02/22 2150 04/02/22 2150 04/02/22 2150 04/02/22 2150 04/02/22 2148 04/02/22 2150 04/02/22 2150   (!) 156/94 97.9 °F (36.6 °C) Oral 78 20 (!) 84 % 5' 7\" (1.702 m) 132 lb (59.9 kg)       Physical Exam  Vitals and nursing note reviewed. Constitutional:       General: He is awake. HENT:      Head: Normocephalic and atraumatic.       Mouth/Throat:      Mouth: Mucous membranes are moist.   Eyes: General: Lids are normal.      Conjunctiva/sclera: Conjunctivae normal.   Neck:      Vascular: No JVD. Trachea: No tracheal deviation. Cardiovascular:      Rate and Rhythm: Normal rate and regular rhythm. Pulses: Normal pulses. Heart sounds: No murmur heard. No friction rub. No gallop. Comments: No calf tenderness  Pulmonary:      Effort: Pulmonary effort is normal. No respiratory distress. Breath sounds: No rhonchi or rales. Comments: Scattered wheezes b/l  Abdominal:      Palpations: Abdomen is soft. Tenderness: There is no abdominal tenderness. Musculoskeletal:      Cervical back: Neck supple. Right lower leg: No edema. Left lower leg: No edema. Skin:     General: Skin is warm. Findings: No rash. Neurological:      General: No focal deficit present. Mental Status: He is alert. Sensory: No sensory deficit. Motor: No weakness. Psychiatric:         Behavior: Behavior is cooperative. DIAGNOSTIC RESULTS     EKG:(none if blank)  All EKG's are interpreted by the[facility] Physician who either signs or Co-signs this chart in the absence of a cardiologist.    2145: NSR at rate of 74, some artifact present, sinus arrhythmia, no stemi    RADIOLOGY: (none if blank)   Interpretation per the Radiologistbelow, if available at the time of this note:    XR CHEST PORTABLE   Final Result   Impression:   Moderate emphysema. Granuloma right midlung.       This document has been electronically signed by: Leila Oh MD on    04/02/2022 11:32 PM          LABS:  Labs Reviewed   BASIC METABOLIC PANEL W/ REFLEX TO MG FOR LOW K - Abnormal; Notable for the following components:       Result Value    Chloride 89 (*)     Glucose 129 (*)     All other components within normal limits   CBC WITH AUTO DIFFERENTIAL - Abnormal; Notable for the following components:    RBC 4.14 (*)     .4 (*)     MCH 33.8 (*)     MPV 8.8 (*) Lymphocytes Absolute 0.7 (*)     All other components within normal limits   GLOMERULAR FILTRATION RATE, ESTIMATED - Abnormal; Notable for the following components:    Est, Glom Filt Rate 56 (*)     All other components within normal limits   COVID-19, RAPID   RAPID INFLUENZA A/B ANTIGENS   BRAIN NATRIURETIC PEPTIDE   TROPONIN   D-DIMER, QUANTITATIVE   HEPATIC FUNCTION PANEL   PROTIME-INR   APTT   ANION GAP   OSMOLALITY       All other labs were within normal range or not returned as of this dictation. Please note, any cultures that may have been sent were not resulted at the time of this patient visit. EMERGENCY DEPARTMENT COURSE andMedical Decision Making:     MDM/   Pt presents to the ER with cough, fatigue, and wheezing, not normally requireing 02 during day, however o2 sats 84% on RA, doing well on nasal cannula, improved with duoneb, however still quite fatigued, still increased o2, will admit for further treatment, consider pt eval.        Strict returnprecautions and follow up instructions were discussed with the patient with which the patient agrees      ED Medications administered this visit:    Medications   ipratropium-albuterol (DUONEB) nebulizer solution 1 ampule (1 ampule Inhalation Given 4/2/22 2219)   methylPREDNISolone sodium (SOLU-MEDROL) injection 125 mg (125 mg IntraVENous Given 4/2/22 2220)         Procedures: (None if blank)         CLINICAL IMPRESSION     1. COPD exacerbation ([facility]    2. Acute on chronic respiratory failure with hypoxia [facility])          DISPOSITION/PLAN   DISPOSITION Decision To Admit 04/02/2022 11:48:21 PM      PATIENT REFERRED TO:  No follow-up provider specified.     DISCHARGE MEDICATIONS:  New Prescriptions    No medications on file           (Please note that portions of this note were completed with a voice recognition program.  Efforts were made to edit the dictations but occasionallywords are mis-transcribed.)      Valeria Fernández MD,FACEP (electronically signed)  Attending Physician, Emergency Department        Trupti Andres MD  04/02/22 7848

## 2022-04-03 NOTE — ED NOTES
ED to inpatient nurses report    Chief Complaint   Patient presents with    Shortness of Breath      Present to ED from home  LOC: alert and orientated to name, place, date  Vital signs   Vitals:    04/02/22 2148 04/02/22 2150 04/02/22 2220 04/03/22 0028   BP:  (!) 156/94 (!) 148/89 (!) 148/67   Pulse:  78 66 68   Resp:  20 20 20   Temp:  97.9 °F (36.6 °C)     TempSrc:  Oral     SpO2: (!) 84% 94% 96% 96%   Weight:  132 lb (59.9 kg)     Height:  5' 7\" (1.702 m)        Oxygen Baseline 2 lpm nasal cannula    Current needs required 4 lpm nasal cannula    LDAs:   Peripheral IV 04/02/22 Right Upper arm (Active)   Site Assessment Clean;Dry; Intact 04/02/22 2202   Line Status Flushed 04/02/22 2202   Dressing Status Clean;Dry; Intact 04/02/22 2202     Mobility: Requires assistance * 1  Pending ED orders: none  Present condition: stable    Our promise was given to patient    Electronically signed by Nadine Velásquez RN on 4/3/2022 at [address], RN  04/03/22 6845

## 2022-04-03 NOTE — RT PROTOCOL NOTE
RT Inhaler-Nebulizer Bronchodilator Protocol Note    There is a bronchodilator order in the chart from a provider indicating to follow the RT Bronchodilator Protocol and there is an Initiate RT Inhaler-Nebulizer Bronchodilator Protocol order as well (see protocol at bottom of note). CXR Findings:  XR CHEST PORTABLE    Result Date: 4/2/2022  Impression: Moderate emphysema. Granuloma right midlung. This document has been electronically signed by: Tracey Ruth MD on 04/02/2022 11:32 PM      The findings from the last RT Protocol Assessment were as follows:   History Pulmonary Disease: Chronic pulmonary disease  Respiratory Pattern: Regular pattern and RR 12-20 bpm  Breath Sounds: Intermittent or unilateral wheezes  Cough: Strong, spontaneous, non-productive  Indication for Bronchodilator Therapy: Wheezing associated with pulm disorder  Bronchodilator Assessment Score: 6    Aerosolized bronchodilator medication orders have been revised according to the RT Inhaler-Nebulizer Bronchodilator Protocol below. Respiratory Therapist to perform RT Therapy Protocol Assessment initially then follow the protocol. Repeat RT Therapy Protocol Assessment PRN for score 0-3 or on second treatment, BID, and PRN for scores above 3. No Indications - adjust the frequency to every 6 hours PRN wheezing or bronchospasm, if no treatments needed after 48 hours then discontinue using Per Protocol order mode. If indication present, adjust the RT bronchodilator orders based on the Bronchodilator Assessment Score as indicated below. Use Inhaler orders unless patient has one or more of the following: on home nebulizer, not able to hold breath for 10 seconds, is not alert and oriented, cannot activate and use MDI correctly, or respiratory rate 25 breaths per minute or more, then use the equivalent nebulizer order(s) with same Frequency and PRN reasons based on the score.   If a patient is on this medication at home then do not decrease Frequency below that used at home. 0-3 - enter or revise RT bronchodilator order(s) to equivalent RT Bronchodilator order with Frequency of every 4 hours PRN for wheezing or increased work of breathing using Per Protocol order mode. 4-6 - enter or revise RT Bronchodilator order(s) to two equivalent RT bronchodilator orders with one order with BID Frequency and one order with Frequency of every 4 hours PRN wheezing or increased work of breathing using Per Protocol order mode. 7-10 - enter or revise RT Bronchodilator order(s) to two equivalent RT bronchodilator orders with one order with TID Frequency and one order with Frequency of every 4 hours PRN wheezing or increased work of breathing using Per Protocol order mode. 11-13 - enter or revise RT Bronchodilator order(s) to one equivalent RT bronchodilator order with QID Frequency and an Albuterol order with Frequency of every 4 hours PRN wheezing or increased work of breathing using Per Protocol order mode. Greater than 13 - enter or revise RT Bronchodilator order(s) to one equivalent RT bronchodilator order with every 4 hours Frequency and an Albuterol order with Frequency of every 2 hours PRN wheezing or increased work of breathing using Per Protocol order mode. RT to enter RT Home Evaluation for COPD & MDI Assessment order using Per Protocol order mode.     Electronically signed by Claudia Archibald RCP on 4/3/2022 at 12:53 PM

## 2022-04-04 PROCEDURE — 99232 SBSQ HOSP IP/OBS MODERATE 35: CPT | Performed by: PHYSICIAN ASSISTANT

## 2022-04-04 PROCEDURE — 6370000000 HC RX 637 (ALT 250 FOR IP): Performed by: PHYSICIAN ASSISTANT

## 2022-04-04 PROCEDURE — 1200000003 HC TELEMETRY R&B

## 2022-04-04 PROCEDURE — 2700000000 HC OXYGEN THERAPY PER DAY

## 2022-04-04 PROCEDURE — 94760 N-INVAS EAR/PLS OXIMETRY 1: CPT

## 2022-04-04 PROCEDURE — 6360000002 HC RX W HCPCS: Performed by: STUDENT IN AN ORGANIZED HEALTH CARE EDUCATION/TRAINING PROGRAM

## 2022-04-04 PROCEDURE — 94640 AIRWAY INHALATION TREATMENT: CPT

## 2022-04-04 PROCEDURE — 6370000000 HC RX 637 (ALT 250 FOR IP): Performed by: OPHTHALMOLOGY

## 2022-04-04 PROCEDURE — 6360000002 HC RX W HCPCS: Performed by: PHYSICIAN ASSISTANT

## 2022-04-04 PROCEDURE — 2580000003 HC RX 258: Performed by: STUDENT IN AN ORGANIZED HEALTH CARE EDUCATION/TRAINING PROGRAM

## 2022-04-04 PROCEDURE — 6370000000 HC RX 637 (ALT 250 FOR IP): Performed by: STUDENT IN AN ORGANIZED HEALTH CARE EDUCATION/TRAINING PROGRAM

## 2022-04-04 RX ORDER — IPRATROPIUM BROMIDE AND ALBUTEROL SULFATE 2.5; .5 MG/3ML; MG/3ML
1 SOLUTION RESPIRATORY (INHALATION)
Status: DISCONTINUED | OUTPATIENT
Start: 2022-04-04 | End: 2022-04-05 | Stop reason: HOSPADM

## 2022-04-04 RX ORDER — METHYLPREDNISOLONE SODIUM SUCCINATE 125 MG/2ML
60 INJECTION, POWDER, LYOPHILIZED, FOR SOLUTION INTRAMUSCULAR; INTRAVENOUS EVERY 12 HOURS
Status: DISCONTINUED | OUTPATIENT
Start: 2022-04-04 | End: 2022-04-05 | Stop reason: HOSPADM

## 2022-04-04 RX ADMIN — AZITHROMYCIN MONOHYDRATE 500 MG: 250 TABLET ORAL at 08:35

## 2022-04-04 RX ADMIN — CARVEDILOL 3.12 MG: 3.12 TABLET, FILM COATED ORAL at 17:04

## 2022-04-04 RX ADMIN — IPRATROPIUM BROMIDE AND ALBUTEROL SULFATE 1 AMPULE: .5; 3 SOLUTION RESPIRATORY (INHALATION) at 13:29

## 2022-04-04 RX ADMIN — SODIUM CHLORIDE, PRESERVATIVE FREE 10 ML: 5 INJECTION INTRAVENOUS at 08:43

## 2022-04-04 RX ADMIN — Medication 5000 UNITS: at 06:26

## 2022-04-04 RX ADMIN — Medication 1 TABLET: at 20:25

## 2022-04-04 RX ADMIN — DOXAZOSIN 4 MG: 4 TABLET ORAL at 08:35

## 2022-04-04 RX ADMIN — Medication 1 TABLET: at 17:03

## 2022-04-04 RX ADMIN — CARVEDILOL 3.12 MG: 3.12 TABLET, FILM COATED ORAL at 08:35

## 2022-04-04 RX ADMIN — ASPIRIN 325 MG: 325 TABLET, COATED ORAL at 08:35

## 2022-04-04 RX ADMIN — SODIUM CHLORIDE, PRESERVATIVE FREE 10 ML: 5 INJECTION INTRAVENOUS at 20:27

## 2022-04-04 RX ADMIN — Medication 1 TABLET: at 08:35

## 2022-04-04 RX ADMIN — IPRATROPIUM BROMIDE AND ALBUTEROL SULFATE 1 AMPULE: .5; 3 SOLUTION RESPIRATORY (INHALATION) at 08:16

## 2022-04-04 RX ADMIN — CEFTRIAXONE SODIUM 1000 MG: 1 INJECTION, POWDER, FOR SOLUTION INTRAMUSCULAR; INTRAVENOUS at 01:31

## 2022-04-04 RX ADMIN — PREDNISONE 10 MG: 10 TABLET ORAL at 08:35

## 2022-04-04 RX ADMIN — ENOXAPARIN SODIUM 30 MG: 100 INJECTION SUBCUTANEOUS at 08:35

## 2022-04-04 RX ADMIN — IPRATROPIUM BROMIDE AND ALBUTEROL SULFATE 1 AMPULE: .5; 3 SOLUTION RESPIRATORY (INHALATION) at 20:24

## 2022-04-04 RX ADMIN — IPRATROPIUM BROMIDE AND ALBUTEROL SULFATE 1 AMPULE: .5; 3 SOLUTION RESPIRATORY (INHALATION) at 16:55

## 2022-04-04 RX ADMIN — METHYLPREDNISOLONE SODIUM SUCCINATE 60 MG: 125 INJECTION, POWDER, FOR SOLUTION INTRAMUSCULAR; INTRAVENOUS at 15:01

## 2022-04-04 RX ADMIN — FINASTERIDE 5 MG: 5 TABLET, FILM COATED ORAL at 08:35

## 2022-04-04 ASSESSMENT — PAIN SCALES - GENERAL
PAINLEVEL_OUTOF10: 0

## 2022-04-04 NOTE — CARE COORDINATION
4/4/22, 2:06 PM EDT  DISCHARGE PLANNING EVALUATION:    Justa Smith       Admitted: 4/2/2022/ 2148   Hospital day: 1   Location: 8A-08/008-A Reason for admit: COPD (chronic obstructive pulmonary disease) ([facility] [J44.9]  COPD exacerbation (HCC) [J44.1]  Acute on chronic respiratory failure with hypoxia ([facility] [J96.21]   PMH:  has a past medical history of Blood circulation, collateral, CAD (coronary artery disease), CAD (coronary artery disease), Cancer ([facility] CKD (chronic kidney disease) stage 2, GFR 60-89 ml/min, COPD (chronic obstructive pulmonary disease) ([facility] Diverticulitis, DVT of lower extremity, bilateral ([facility] GERD (gastroesophageal reflux disease), Hiatal hernia, Hyperlipemia, Hyperlipidemia, Other disorders of kidney and ureter in diseases classified elsewhere, Pleural plaque, Pneumonia of both lower lobes due to infectious organism, Primary adenocarcinoma of prostate ([facility] emb[facility] and Vitamin D deficiency. Procedure:   4/2 CXR:   Moderate emphysema.       Granuloma right midlung. Barriers to Discharge:  Requiring increased oxygen, baseline is 2L at night only. Weaning as tolerates. PO zithromax. IV rocephin. Prednisone. Nebs. PT/OT. PCP: Terence Maria MD  Readmission Risk Score: 17.7 ( )%    Patient Goals/Plan/Treatment Preferences: Spoke with Kishore Segundo and family. Plan is to return home alone, his family checks on frequently. Home O2 is from [place]. Uses a cane and has a walker from a previous surgery. Denies need for home services. CM to follow. Transportation/Food Security/Housekeeping Addressed:  No issues identified.

## 2022-04-04 NOTE — PLAN OF CARE
Problem: Skin Integrity:  Goal: Absence of new skin breakdown  Description: Absence of new skin breakdown  4/4/2022 0950 by Ivelisse Alvarez RN  Outcome: Met This Shift     Problem: Falls - Risk of:  Goal: Absence of physical injury  Description: Absence of physical injury  4/4/2022 0950 by Ivelisse Alvarez RN  Outcome: Met This Shift     Problem: Airway Clearance - Ineffective:  Goal: Ability to maintain a clear airway will improve  Description: Ability to maintain a clear airway will improve  Outcome: Met This Shift     Problem: Gas Exchange - Impaired:  Goal: Levels of oxygenation will improve  Description: Levels of oxygenation will improve  4/4/2022 0950 by Ivelisse Alvarez RN  Outcome: Met This Shift     Problem: RESPIRATORY  Goal: Clear lung sounds  4/4/2022 0837 by Sandy Ackerman RCP  Outcome: Ongoing  Note: Duoneb to improve breath sounds. Problem: Skin Integrity:  Goal: Will show no infection signs and symptoms  Description: Will show no infection signs and symptoms  4/4/2022 0950 by Ivelisse Alvarez RN  Outcome: Ongoing  Note: No new skin breakdown. Turns self     Problem: Falls - Risk of:  Goal: Will remain free from falls  Description: Will remain free from falls  4/4/2022 0950 by Ivelisse Alvarez RN  Outcome: Ongoing  Note: On fall precautions. Up with assist and use of cane. No falls this shift     Problem: Discharge Planning:  Goal: Discharged to appropriate level of care  Description: Discharged to appropriate level of care  Outcome: Ongoing  Note: Plans to return home at discharge with family support. Problem:  Activity Intolerance:  Goal: Ability to tolerate increased activity will improve  Description: Ability to tolerate increased activity will improve  Outcome: Ongoing  Note: Dyspneic with exertion     Problem: Breathing Pattern - Ineffective:  Goal: Ability to achieve and maintain a regular respiratory rate will improve  Description: Ability to achieve and maintain a regular respiratory rate will improve  Outcome: Ongoing  Note: Dyspneic with exertion. O2 at 2L/NC to keep O2 sat greater than 90%    Care plan reviewed with patient and family. Patient and family verbalize understanding of the plan of care and contribute to goal setting.

## 2022-04-04 NOTE — PLAN OF CARE
Problem: Skin Integrity:  Goal: Will show no infection signs and symptoms  Description: Will show no infection signs and symptoms  Outcome: Ongoing  Goal: Absence of new skin breakdown  Description: Absence of new skin breakdown  Outcome: Ongoing     Problem: Falls - Risk of:  Goal: Will remain free from falls  Description: Will remain free from falls  Outcome: Ongoing  Goal: Absence of physical injury  Description: Absence of physical injury  Outcome: Ongoing     Problem: Gas Exchange - Impaired:  Goal: Levels of oxygenation will improve  Description: Levels of oxygenation will improve  Note: Patient maintained O2 sats above 90 on 3L.

## 2022-04-04 NOTE — PROGRESS NOTES
Hospitalist Progress Note    Patient:  Margret Ruiz    Unit/Bed:8A-08/008-A  YOB: 1926  MRN: [id number]   Acct: [de-identified]   PCP: Daryn Batista MD  Code Status: DNR-CC  Date of Admission: 4/2/2022    Expected Discharge: 4/5? Disposition: Home. IV steroids, wean O2 as tolerated. Assessment/Plan:    1. COPD with acute exacerbation: Resume home Stiolto, Ventolin. On admission, pt was started on prednisone 10mg daily x 5 days. Pt with continued wheezing on exam, stop PO prednisone and start IV Solu-Medrol. Stop ABX as there are no signs of infection. Wean O2 as tolerated. 2. Acute hypoxic respiratory failure: 2/2 #1. Baseline O2 is 2L NC at night only. Treatment as above. Wean O2 as tolerated for SpO2 goal 88-92%. May need home O2 eval prior to discharge. 3. 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 Follows with Urology outpatient. Continue Calcium and vit D supplements. 4. Hx PE, DVT 2019: not on [facility] at home. No evidence of PE or DVT at this time. Chief Complaint: SOB    HPI / Hospital Course: Per HPI: Lottie Hodgkins is a 80 y.o., , male who  has a past medical history of Blood circulation, collateral, CAD (coronary artery disease), CAD (coronary artery disease), Cancer ([address] CKD (chronic kidney disease) stage 2, GFR 60-89 ml/min, COPD (chronic obstructive pulmonary disease) ([address] Diverticulitis, DVT of lower extremity, bilateral ([address] GERD (gastroesophageal reflux disease), Hiatal hernia, Hyperlipemia, Hyperlipidemia, Other disorders of kidney and ureter in diseases classified elsewhere, Pleural plaque, Pneumonia of both lower lobes due to infectious organism, Primary adenocarcinoma of prostate ([address] Pulmonary emboli ([address] and Vitamin D deficiency. that is hospitalized for COPD exacerbation. Patient reports symptom onset over last for 5 days.   Associated symptoms include no tightness in her chest, right cough with no sputum production, feeling cannot cough up his mucus stuck down in his lung. Denies any fever, chills, hemoptysis, recent sick contact, chest pain, pressure in his chest, palpitation. Patient had been diagnostic for COPD which required 2 L nasal cannula nightly. Have been using Stiolto twice daily and Ventolin every 6 as needed. Patient lives at home by himself. Past medical history significant with prostate cancer in which patient see neurologist and received [place] [place]. Patient also had a remote history with PE and DVT secondary to prostate cancer which he has been off blood thiner over last few years.      In the ED patient found to have hypoxia with he was placed on 4 L nasal cannula then wean off to 3 L nasal cannula with O2 sat 92%. Flu AMB negative, Covid negative. Chest x-ray showed moderate emphysema with granuloma right middle lobe. Patient was admitted and managed for acute hypoxic respiratory failure secondary to COPD exacerbation. \"     Subjective (past 24 hours): Pt reports continued SOB but improving since arrival. Daughter states his wheezing is less audible. he denies fever/chills, CP, abd pain, nvd. Pt states he lives at home alone but his children check on and assist him daily. Stopped antibiotics and po prednisone. Start IV Solu-Medrol 60mg BID.        ROS: Pertinent positives as noted in HPI. All other systems reviewed and negative. Past medical history, family history, social history and allergies reviewed again and is unchanged since admission. Medications:  Reviewed.   Infusion Medications    sodium chloride       Scheduled Medications    methylPREDNISolone  60 mg IntraVENous Q12H    sodium chloride flush  5-40 mL IntraVENous 2 times per day    aspirin  325 mg Oral Daily    calcium-cholecalciferol  1 tablet Oral TID    Vitamin D  5,000 Units Oral QAM AC    doxazosin  4 mg Oral Daily    multivitamin  1 tablet Oral Daily    [Held by provider] tiotropium-olodaterol  2 puff Inhalation Daily    finasteride  5 mg Oral Daily    carvedilol  3.125 mg Oral BID    enoxaparin  30 mg SubCUTAneous Daily    ipratropium-albuterol  1 ampule Inhalation TID     PRN Meds: sodium chloride flush, sodium chloride, ondansetron **OR** ondansetron, polyethylene glycol, acetaminophen **OR** acetaminophen, [Held by provider] albuterol sulfate HFA, traMADol, ipratropium-albuterol    I/O:     Intake/Output Summary (Last 24 hours) at 4/4/2022 1808  Last data filed at 4/4/2022 1155  Gross per 24 hour   Intake 400 ml   Output 800 ml   Net -400 ml       Diet:  ADULT DIET; Regular    Exam:  /67   Pulse 78   Temp 98 °F (36.7 °C) (Oral)   Resp 16   Ht 5' 7\" (1.702 m)   Wt 127 lb (57.6 kg)   SpO2 95%   BMI 19.89 kg/m²   General:   Pleasant male. NAD. HEENT:  normocephalic and atraumatic. No scleral icterus. PERR. Neck: supple. No JVD. No thyromegaly. Lungs: Diffuse wheezing. No retractions  Cardiac: RRR without murmur. Abdomen: soft. Nontender. Bowel sounds positive. Extremities:  No clubbing, cyanosis, or edema x 4. Vasculature: capillary refill < 3 seconds. Palpable LE pulses bilaterally. Skin:  warm and dry. Psych:  Alert and oriented x3. Affect appropriate  Lymph:  No supraclavicular adenopathy. Neurologic:  No focal deficit. No seizures. Data: (All radiographs, tracings, PFTs, and imaging are personally viewed and interpreted unless otherwise noted)  Labs:   Recent Labs     04/02/22 2155   WBC 4.9   HGB 14.0   HCT 42.0        Recent Labs     04/02/22 2155      K 4.0   CL 89*   CO2 32   BUN 22   CREATININE 1.2   CALCIUM 10.0     Recent Labs     04/02/22 2155   AST 35   ALT 15   BILIDIR <0.2   BILITOT 0.4   ALKPHOS 63     Recent Labs     04/02/22 2155   INR 1.02     No results for input(s): Alisia Oliveira in the last 72 hours.   Urinalysis:   Lab Results   Component Value Date    NITRU Negative 03/04/2022 WBCUA 0-2 02/05/2019    BACTERIA MODERATE 02/05/2019    RBCUA 3-5 02/05/2019    BLOODU Small 03/04/2022    BLOODU SMALL 02/05/2019    SPECGRAV 1.025 08/14/2020    SPECGRAV <1.005 01/05/2019    GLUCOSEU Negative 03/04/2022     Urine culture:   Lab Results   Component Value Date    LABURIN  02/01/2022     No growth-preliminary Growth of Contaminants. The mixture of organisms present are not a common cause of urinary tract infections and probably represent skin lucia or distal urethral lucia. Micro:   Blood culture #1:   Lab Results   Component Value Date    BC No growth-preliminary No growth  02/05/2021     Blood culture #2:No results found for: Anne Cava  Organism:  Lab Results   Component Value Date    ORG Growth of Contaminants 02/01/2022       No results found for: LABGRAM  MRSA culture only:No results found for: [facility]  Respiratory culture: No results found for: CULTRESP  Aerobic and Anaerobic :  No results found for: LABAERO  No results found for: [facility]    Radiology Reports:  XR CHEST PORTABLE   Final Result   Impression:   Moderate emphysema. Granuloma right midlung. This document has been electronically signed by: Clementina Leon MD on    04/02/2022 11:32 PM        XR CHEST PORTABLE    Result Date: 4/2/2022  Single view of the chest Comparison:  CR,SR - XR CHEST STANDARD (2 VW) - 03/10/2021 11:27 AM EST  CT,KOSR - CTA CHEST W WO CONTRAST - 02/05/2021 05:23 PM EST Findings: Moderate emphysema. Granuloma right midlung. Otherwise normal heart, lungs and mediastinum. No pneumonia. Multifocal osseous metastatic disease. Impression: Moderate emphysema. Granuloma right midlung.  This document has been electronically signed by: Clementina Leon MD on 04/02/2022 11:32 PM          Active Hospital Problems    Diagnosis Date Noted    COPD (chronic obstructive pulmonary disease) ([facility] [J44.9] 04/03/2022       Electronically signed by Miley Younger PA-C on 4/4/2022 at 6:08 PM

## 2022-04-04 NOTE — PROGRESS NOTES
Ambulated into [facility] for about 12 feet on room air. Pulse oximeter dropped to 77% Walked back to room and O2 at 2L/NC applied.  Pulse oximeter back up to 90% Dorsal Nasal Flap Text: The defect edges were debeveled with a #15 scalpel blade.  Given the location of the defect and the proximity to free margins a dorsal nasal flap was deemed most appropriate.  Using a sterile surgical marker, an appropriate dorsal nasal flap was drawn around the defect.    The area thus outlined was incised deep to adipose tissue with a #15 scalpel blade.  The skin margins were undermined to an appropriate distance in all directions utilizing iris scissors.

## 2022-04-04 NOTE — PLAN OF CARE
Problem: RESPIRATORY  Goal: Clear lung sounds  Outcome: Ongoing  Note: Duoneb to improve breath sounds.

## 2022-04-04 NOTE — RT PROTOCOL NOTE
RT Inhaler-Nebulizer Bronchodilator Protocol Note    There is a bronchodilator order in the chart from a provider indicating to follow the RT Bronchodilator Protocol and there is an Initiate RT Inhaler-Nebulizer Bronchodilator Protocol order as well (see protocol at bottom of note). CXR Findings:  XR CHEST PORTABLE    Result Date: 4/2/2022  Impression: Moderate emphysema. Granuloma right midlung. This document has been electronically signed by: Rabia Bauman MD on 04/02/2022 11:32 PM      The findings from the last RT Protocol Assessment were as follows:   History Pulmonary Disease: Chronic pulmonary disease  Respiratory Pattern: Regular pattern and RR 12-20 bpm  Breath Sounds: Inspiratory and expiratory or bilateral wheezing and/or rhonchi  Cough: Strong, spontaneous, non-productive  Indication for Bronchodilator Therapy: Wheezing associated with pulm disorder  Bronchodilator Assessment Score: 8    Aerosolized bronchodilator medication orders have been revised according to the RT Inhaler-Nebulizer Bronchodilator Protocol below. Respiratory Therapist to perform RT Therapy Protocol Assessment initially then follow the protocol. Repeat RT Therapy Protocol Assessment PRN for score 0-3 or on second treatment, BID, and PRN for scores above 3. No Indications - adjust the frequency to every 6 hours PRN wheezing or bronchospasm, if no treatments needed after 48 hours then discontinue using Per Protocol order mode. If indication present, adjust the RT bronchodilator orders based on the Bronchodilator Assessment Score as indicated below. Use Inhaler orders unless patient has one or more of the following: on home nebulizer, not able to hold breath for 10 seconds, is not alert and oriented, cannot activate and use MDI correctly, or respiratory rate 25 breaths per minute or more, then use the equivalent nebulizer order(s) with same Frequency and PRN reasons based on the score.   If a patient is on this medication at home then do not decrease Frequency below that used at home. 0-3 - enter or revise RT bronchodilator order(s) to equivalent RT Bronchodilator order with Frequency of every 4 hours PRN for wheezing or increased work of breathing using Per Protocol order mode. 4-6 - enter or revise RT Bronchodilator order(s) to two equivalent RT bronchodilator orders with one order with BID Frequency and one order with Frequency of every 4 hours PRN wheezing or increased work of breathing using Per Protocol order mode. 7-10 - enter or revise RT Bronchodilator order(s) to two equivalent RT bronchodilator orders with one order with TID Frequency and one order with Frequency of every 4 hours PRN wheezing or increased work of breathing using Per Protocol order mode. 11-13 - enter or revise RT Bronchodilator order(s) to one equivalent RT bronchodilator order with QID Frequency and an Albuterol order with Frequency of every 4 hours PRN wheezing or increased work of breathing using Per Protocol order mode. Greater than 13 - enter or revise RT Bronchodilator order(s) to one equivalent RT bronchodilator order with every 4 hours Frequency and an Albuterol order with Frequency of every 2 hours PRN wheezing or increased work of breathing using Per Protocol order mode. RT to enter RT Home Evaluation for COPD & MDI Assessment order using Per Protocol order mode.     Electronically signed by Filemon Valadez RCP on 4/4/2022 at 8:39 AM

## 2022-04-05 VITALS
BODY MASS INDEX: 19.93 KG/M2 | WEIGHT: 127 LBS | HEART RATE: 83 BPM | TEMPERATURE: 98.1 F | SYSTOLIC BLOOD PRESSURE: 130 MMHG | OXYGEN SATURATION: 93 % | HEIGHT: 67 IN | RESPIRATION RATE: 18 BRPM | DIASTOLIC BLOOD PRESSURE: 65 MMHG

## 2022-04-05 LAB
ANION GAP SERPL CALCULATED.3IONS-SCNC: 17 MEQ/L (ref 8–16)
BUN BLDV-MCNC: 21 MG/DL (ref 7–22)
CALCIUM SERPL-MCNC: 10.4 MG/DL (ref 8.5–10.5)
CHLORIDE BLD-SCNC: 92 MEQ/L (ref 98–111)
CO2: 27 MEQ/L (ref 23–33)
CREAT SERPL-MCNC: 1.1 MG/DL (ref 0.4–1.2)
ERYTHROCYTE [DISTWIDTH] IN BLOOD BY AUTOMATED COUNT: 11.7 % (ref 11.5–14.5)
ERYTHROCYTE [DISTWIDTH] IN BLOOD BY AUTOMATED COUNT: 42.4 FL (ref 35–45)
GFR SERPL CREATININE-BSD FRML MDRD: 62 ML/MIN/1.73M2
GLUCOSE BLD-MCNC: 145 MG/DL (ref 70–108)
HCT VFR BLD CALC: 42.2 % (ref 42–52)
HEMOGLOBIN: 14.6 GM/DL (ref 14–18)
MCH RBC QN AUTO: 34.1 PG (ref 26–33)
MCHC RBC AUTO-ENTMCNC: 34.6 GM/DL (ref 32.2–35.5)
MCV RBC AUTO: 98.6 FL (ref 80–94)
PLATELET # BLD: 199 THOU/MM3 (ref 130–400)
PMV BLD AUTO: 9 FL (ref 9.4–12.4)
POTASSIUM SERPL-SCNC: 4.2 MEQ/L (ref 3.5–5.2)
RBC # BLD: 4.28 MILL/MM3 (ref 4.7–6.1)
SODIUM BLD-SCNC: 136 MEQ/L (ref 135–145)
WBC # BLD: 6.4 THOU/MM3 (ref 4.8–10.8)

## 2022-04-05 PROCEDURE — 6370000000 HC RX 637 (ALT 250 FOR IP): Performed by: PHYSICIAN ASSISTANT

## 2022-04-05 PROCEDURE — 6370000000 HC RX 637 (ALT 250 FOR IP): Performed by: OPHTHALMOLOGY

## 2022-04-05 PROCEDURE — 85027 COMPLETE CBC AUTOMATED: CPT

## 2022-04-05 PROCEDURE — 94760 N-INVAS EAR/PLS OXIMETRY 1: CPT

## 2022-04-05 PROCEDURE — 97530 THERAPEUTIC ACTIVITIES: CPT

## 2022-04-05 PROCEDURE — 36415 COLL VENOUS BLD VENIPUNCTURE: CPT

## 2022-04-05 PROCEDURE — 99239 HOSP IP/OBS DSCHRG MGMT >30: CPT | Performed by: PHYSICIAN ASSISTANT

## 2022-04-05 PROCEDURE — 2700000000 HC OXYGEN THERAPY PER DAY

## 2022-04-05 PROCEDURE — 6360000002 HC RX W HCPCS: Performed by: PHYSICIAN ASSISTANT

## 2022-04-05 PROCEDURE — 97165 OT EVAL LOW COMPLEX 30 MIN: CPT

## 2022-04-05 PROCEDURE — 80048 BASIC METABOLIC PNL TOTAL CA: CPT

## 2022-04-05 PROCEDURE — 97162 PT EVAL MOD COMPLEX 30 MIN: CPT

## 2022-04-05 PROCEDURE — 97535 SELF CARE MNGMENT TRAINING: CPT

## 2022-04-05 PROCEDURE — 6370000000 HC RX 637 (ALT 250 FOR IP): Performed by: STUDENT IN AN ORGANIZED HEALTH CARE EDUCATION/TRAINING PROGRAM

## 2022-04-05 PROCEDURE — 6360000002 HC RX W HCPCS: Performed by: STUDENT IN AN ORGANIZED HEALTH CARE EDUCATION/TRAINING PROGRAM

## 2022-04-05 PROCEDURE — 94640 AIRWAY INHALATION TREATMENT: CPT

## 2022-04-05 RX ORDER — PREDNISONE 20 MG/1
40 TABLET ORAL DAILY
Qty: 8 TABLET | Refills: 0 | Status: SHIPPED | OUTPATIENT
Start: 2022-04-05 | End: 2022-04-09

## 2022-04-05 RX ADMIN — Medication 1 TABLET: at 11:01

## 2022-04-05 RX ADMIN — ENOXAPARIN SODIUM 30 MG: 100 INJECTION SUBCUTANEOUS at 11:01

## 2022-04-05 RX ADMIN — Medication 5000 UNITS: at 11:01

## 2022-04-05 RX ADMIN — CARVEDILOL 3.12 MG: 3.12 TABLET, FILM COATED ORAL at 11:02

## 2022-04-05 RX ADMIN — DOXAZOSIN 4 MG: 4 TABLET ORAL at 11:01

## 2022-04-05 RX ADMIN — IPRATROPIUM BROMIDE AND ALBUTEROL SULFATE 1 AMPULE: .5; 3 SOLUTION RESPIRATORY (INHALATION) at 15:48

## 2022-04-05 RX ADMIN — METHYLPREDNISOLONE SODIUM SUCCINATE 60 MG: 125 INJECTION, POWDER, FOR SOLUTION INTRAMUSCULAR; INTRAVENOUS at 03:28

## 2022-04-05 RX ADMIN — IPRATROPIUM BROMIDE AND ALBUTEROL SULFATE 1 AMPULE: .5; 3 SOLUTION RESPIRATORY (INHALATION) at 07:40

## 2022-04-05 RX ADMIN — IPRATROPIUM BROMIDE AND ALBUTEROL SULFATE 1 AMPULE: .5; 3 SOLUTION RESPIRATORY (INHALATION) at 11:36

## 2022-04-05 RX ADMIN — ASPIRIN 325 MG: 325 TABLET, COATED ORAL at 11:02

## 2022-04-05 RX ADMIN — FINASTERIDE 5 MG: 5 TABLET, FILM COATED ORAL at 11:02

## 2022-04-05 RX ADMIN — METHYLPREDNISOLONE SODIUM SUCCINATE 60 MG: 125 INJECTION, POWDER, FOR SOLUTION INTRAMUSCULAR; INTRAVENOUS at 15:14

## 2022-04-05 RX ADMIN — Medication 1 TABLET: at 11:02

## 2022-04-05 ASSESSMENT — PAIN SCALES - GENERAL
PAINLEVEL_OUTOF10: 0
PAINLEVEL_OUTOF10: 0

## 2022-04-05 NOTE — PLAN OF CARE
Home O2 eval compete.  Pt requiring 4lpm with exertion and room air at rest. Pt will continue on bronchodilator therapy while in the hospital.

## 2022-04-05 NOTE — PLAN OF CARE
Patient was evaluated today for the diagnosis of COPD. I entered a DME order for home oxygen because the diagnosis and testing requires the patient to have supplemental oxygen. Condition will improve or be benefited by oxygen use. The patient is  able to perform good mobility in a home setting and therefore does require the use of a portable oxygen system. The need for this equipment was discussed with the patient and he understands and is in agreement.       Electronically signed by Haseeb De La O PA-C on 4/5/2022 at 12:13 PM

## 2022-04-05 NOTE — CARE COORDINATION
4/5/22, 2:01 PM EDT  Anticipated d/c later today. Home alone with family support. Pt and daughter both refused home health. Denies needs. New O2 4L with activity. Patient goals/plan/ treatment preferences discussed by  and . Patient goals/plan/ treatment preferences reviewed with patient/ family. Patient/ family verbalize understanding of discharge plan and are in agreement with goal/plan/treatment preferences. Understanding was demonstrated using the teach back method. AVS provided by RN at time of discharge, which includes all necessary medical information pertaining to the patients current course of illness, treatment, post-discharge goals of care, and treatment preferences.         IMM Letter  IMM Letter given to Patient/Family/Significant other/Guardian/POA/by[de-identified] admitting  IMM Letter date given[de-identified] 04/03/22  IMM Letter time given[de-identified] 1570

## 2022-04-05 NOTE — PLAN OF CARE
Problem: RESPIRATORY  Goal: Clear lung sounds  4/4/2022 2031 by Odell Hines RCP  Outcome: Met This Shift  Note:  Patient lung sounds are considered normal for their current lung condition. No signs of distress noted.  Current treatment regimen appropriate

## 2022-04-05 NOTE — PROGRESS NOTES
[address]  INPATIENT OCCUPATIONAL THERAPY  [facility] MED SURG 8A  EVALUATION    Time:   Time In: 9520  Time Out: 1421  Timed Code Treatment Minutes: 12 Minutes  Minutes: 22          Date: 2022  Patient Name: Damaso Whyte,   Gender: male      MRN: [id number]  : 1926  (80 y.o.)  Referring Practitioner: Dr. Sarah Espinosa MD  Diagnosis: COPD  Additional Pertinent Hx: per chart review; Damaso Whyte is a pleasant 80 y.o. male Presents to the emergency department from home complaining of about 3-4 days of worsening severe fatigue. He reports he just doesn't have any energy. He also reports he has been having increased shortness of breath, states he feels winded with any movement. He denies any chest pain, denies any leg pain or swelling. He denies any fever. He does have a history of copd and reports he has been coughing more than usual, feels like there is phlegm in his chest but he cant cough it up. He is not producing sputum. He also has a history of metastatic prostate cancer and reports he has a previous history of blood clots in his legs, but is not longer on blood thinners, besides aspirin. His daughter reports he sounded wheezy and she noted that his oxygen was in the low 80s at home, not normally on oxygen other than when he sleeps. No other complaints, no other known exacerbating/relieving factors.     Restrictions/Precautions:  Restrictions/Precautions: Fall Risk,General Precautions  Required Braces or Orthoses  Spinal: Lumbar Corset  Position Activity Restriction  Spinal Precautions: No Bending,No Lifting,No Twisting  Other position/activity restrictions: per [employer] pt ok for mobility with LSO or abdominal binder for comfort per Jazmin Silva PA for hx of pathologic compression fx L1-L3 managed conservatively    Subjective  Chart Reviewed: Yes,Orders,Progress Notes,History and Physical,Imaging  Patient assessed for rehabilitation services?: Yes  Response to previous treatment: Patient with no complaints from previous session  Family / Caregiver Present: Yes (daughter)    Subjective: RN approved session; patient supine in bed upon OT arrival with daughter present and pleasant and agreeable to eval. report he is being discharged today. A & O x 4. patient and daughter had no concerns with returning home. Pain:  Pain Assessment  Patient Currently in Pain: Denies    Vitals: Oxygen: 2 L O2; demo audible wheezing; cues for pursed lip breathing and pacing self     Social/Functional History:  Lives With: Son  Type of Home: House  Home Layout: One level  Home Access: Stairs to enter with rails  Entrance Stairs - Number of Steps: 5  Entrance Stairs - Rails: Right  Home Equipment: Cane,Rolling walker,Reacher,Lift chair   Bathroom Shower/Tub: Tub/Shower unit,Walk-in shower (patient prefers to take sponge baths)  Bathroom Toilet: Handicap height  Bathroom Accessibility: Accessible    Receives Help From: Family  ADL Assistance: Independent (family supervises as needed, reports some difficulty with socks)  Homemaking Assistance: Independent (family brings meals)  Ambulation Assistance: Independent  Transfer Assistance: Independent    Active : No  Patient's  Info: family provides transportation     Additional Comments: Pt ambulating with cane, his son lives with him. No falls in past 6 mo. Pt has assistance fro his kids for cooking cleaning,    VISION:Corrected    HEARING:  WFL    COGNITION: Impulsive    RANGE OF MOTION:  Right Upper Extremity: Impaired - shldr flex AROM to approx 75 degrees, PROM to approx 90 degrees with firm end feel and crepitus with shldr extension   Left Upper Extremity:  [facility]  Patient is R hand dominant-reports has an old rotator cuff injury.     STRENGTH:  Right Upper Extremity: elbow flex 4+/5 ext 4/5  (F+)  Left Upper Extremity:  shldr 4/5 elbow flex and ext 4+/5  (F+)    SENSATION:   WFL    ADL:   Lower Extremity Dressing: Stand By Assistance. seated EOB to doff/don slipper socks; demo good compliance with spinal precautions   Toilet Transfer: [employer]. with use of standard cane and grab bars; assist to manage O2 tubing   donned/doffed lumbar corset with MAX A seated EOB. BALANCE:  Sitting Balance:  Stand By Assistance. seated EOB due to impulsiveness  Standing Balance: Contact Guard Assistance. with use of standard cane    BED MOBILITY:  Supine to Sit: Modified Independent with use of bedrails  Sit to Supine: Modified Independent cues for effective bed mobility tech    TRANSFERS:  Sit to Stand:  Contact Guard Assistance. cues for hand placement for effective sit to stand    FUNCTIONAL MOBILITY:  Assistive Device: Straight Cane  Assist Level:  Contact Guard Assistance. Distance: To and from bathroom  Assist for O2 tubing mgmt         Activity Tolerance:  Patient tolerance of  treatment: fair. O2 dependent, audible wheezing, no LOB during eval      Assessment:  Assessment: patient is motivated to participate in OT and demonstrates decreased activity tolerance impacting his ability to complete ADLs and functional transfers and is O2 dependent requiring assist with O2 tubing mgmt increasing his fall risk during functional mobility and ADLs. patient would benefit from continued, skilled OT to increaes activity tolerance, ease and (I) with ADLs and functional transfers, decrease caregiver burden, and increase occupational performance. Performance deficits / Impairments: Decreased functional mobility ,Decreased ADL status,Decreased endurance,Decreased ROM  Prognosis: Good  REQUIRES OT FOLLOW UP: Yes  Decision Making: Low Complexity    Treatment Initiated: Treatment and education initiated within context of evaluation.   Evaluation time included review of current medical information, gathering information related to past medical, social and functional history, completion of standardized testing, formal and informal observation precautions in place.

## 2022-04-05 NOTE — FLOWSHEET NOTE
Pt was in bed at the time of the visit. His daughter was visiting with him. His wife  four years ago, they said. He was dealing with COPD. He was hopeful but was bles     [id number]   Encounter Summary   Services provided to: Patient   Referral/Consult From: [facility]   Support System Children   Continue Visiting Yes  ()   Complexity of Encounter Moderate   Length of Encounter 15 minutes   Spiritual/Religion   Type Spiritual support   Assessment Approachable;Calm   Intervention Prayer;Nurtured hope; Active listening;Empowerment;Sustaining presence/ Ministry of presence   Outcome Connection/belonging;Expressed gratitude;Encouraged; Hopeful;Receptive   sed.

## 2022-04-05 NOTE — PROGRESS NOTES
services?: Yes  Family / Caregiver Present: Yes (daughter)  Subjective: RN approved session, when asked about hx of compression fx, states pt is okay for mobility, has already been up with nursing. Perfect[employer]erve to Yonatan NAVA who states pt ok for mobility with LSO PRN as he does at home. Pt states no concern with d/c home with assistance of his children. Pt declines stair trial this date, him and his daughter state no concerns with this. Increased time this session to discuss pt's history of compression fx, and doctor recommending use of LSO as needed for comfort. Time to assess SpO2 and for pursed lip breathing to recover increase in SOB, discuss safety upon d/c home, to accept assistance and not overexert himself, for use of RW and for continued PT. Pt receptive to this.     General:  Overall Orientation Status: Within Functional Limits (not formally assessed)  Follows Commands: Within Functional Limits    Vision: Within Functional Limits    Hearing: Within functional limits       Pain: 0/10: pt denies    Vitals: Oxygen: dropped to 85% following ambulation to the chair, recovered to 93% with prolonged seated rest break and cues for pursed lip breathing   Heart Rate: WFL   *Pt on 1L O2  *RN aware of drop in SpO2    Social/Functional History:    Lives With: Son  Type of Home: House  Home Layout: One level  Home Access: Stairs to enter with rails  Entrance Stairs - Number of Steps: 5  Entrance Stairs - Rails: Right  Home Equipment: Cane,Rolling walker,Reacher,Lift chair     Bathroom Shower/Tub: Tub/Shower unit,Walk-in shower (patient prefers to take sponge baths)  Bathroom Toilet: Handicap height  Bathroom Accessibility: Accessible    Receives Help From: Family  ADL Assistance: Independent (family supervises as needed, reports some difficulty with socks)  Homemaking Assistance: Independent (family brings meals)  Ambulation Assistance: Independent  Transfer Assistance: Independent    Active : No Additional Comments: Pt ambulating with cane, his son lives with him. No falls in past 6 mo. Pt has assistance fro his kids for cooking cleaning,    OBJECTIVE:  Range of Motion:  Bilateral Lower Extremity: WFL    Strength:  Bilateral Lower Extremity: [facility]  Not specifically tested, but noted to be a min of 3+/5 with functional mobility     Balance:  Static Sitting Balance:  Stand By Assistance  Dynamic Sitting Balance: Stand By Assistance  Static Standing Balance: Contact Guard Assistance    Bed Mobility:  Supine to Sit: Contact Guard Assistance, with head of bed raised, with increased time for completion   *HOB~ 25degrees  *Cues for log roll technique, CGA to assist with this     Transfers:  Sit to Stand: Contact Guard Assistance  Stand to Sit:Contact Guard Assistance   *Pt completed with cane in L UE       Ambulation:  Contact Guard Assistance, with verbal cues , with increased time for completion  Distance: ~4'  Surface: Level Tile  Device:Cane  Gait Deviations: Forward Flexed Posture, Slow Salma, Decreased Step Length Bilaterally, Decreased Heel Strike Bilaterally, Mild Path Deviations and Unsteady Gait  *Pt ambulated with cane in L UE, noted to have some unsteadiness requiring CGA to stabilize. Pt noted to have increased SOB following mobility, required prolonged seated rest break to recover this with cues for pursed lip breathing to increase SpO2 drop. *Pt educated on use of RW with assistance with mobility, pt receptive to this     Exercise:  None completed this session    Functional Outcome Measures: Completed  AM-PAC Inpatient Mobility without Stair Climbing Raw Score : 15  AM-PAC Inpatient without Stair Climbing T-Scale Score : 43.03    ASSESSMENT:  Activity Tolerance:  Patient tolerance of  treatment: fair. Pt required CGA for stability with ambulation, and prolonged seated rest with cues for pursed lip breathing to regain shortness of breath and drop in SpO2 following ambulation.        Treatment Initiated: Treatment and education initiated within context of evaluation. Evaluation time included review of current medical information, gathering information related to past medical, social and functional history, completion of standardized testing, formal and informal observation of tasks, assessment of data and development of plan of care and goals. Treatment time included skilled education and facilitation of tasks to increase safety and independence with functional mobility for improved independence and quality of life. Assessment: Body structures, Functions, Activity limitations: Decreased functional mobility ,Decreased balance,Decreased endurance,Decreased sensation,Decreased strength  Assessment: This patient is a 80 y.o. who presents with COPD. This is a decline from the patient's baseline status of mod I with use of cane and living at home with his son present intermittenly. The patient is observed to have deficits in strength, balance, activity tolerance, and safety awareness and would benefit from skilled PT services to progress functional mobility, safety awareness, and to decrease overall risk of falls. PT recommending 24/7 supervision, continued PT, and use of RW for improved safety with functional mobility.      Prognosis: Good    REQUIRES PT FOLLOW UP: Yes    Discharge Recommendations:  Discharge Recommendations: Continue to assess pending progress,24 hour supervision or assist,Patient would benefit from continued therapy after discharge    Patient Education:  PT Education: Libby Batista of Care,Precautions,Gait Training,General Safety,Functional Mobility Training,Transfer Training    Equipment Recommendations:  Equipment Needed: No (continue to assess needs)    Plan:  Times per week: 5x GM  Current Treatment Recommendations: Igor Faye Re-education,Patient/Caregiver Education & Training,Equipment Evaluation, Education, & procurement,Balance Training,Gait Training,Home Exercise Program,Functional Mobility Training,Stair training,Safety Education & Training    Goals:  Patient goals : regain his strength  Short term goals  Time Frame for Short term goals: by hospital d/c  Short term goal 1: Pt to demo supine <->sit mod I for ability to get in and out of bed easily  Short term goal 2: Pt to demo sit <->stand mod I for ability to get up from any surface safely  Short term goal 3: Pt to ambulate >=50' with LRAD with S for improved ability to maneuver in his environment  Short term goal 4: Pt to ascend/descend 5 steps with uni HR with SBA for safety with home entry  Long term goals  Time Frame for Long term goals : NA due to short ELOS    Following session, patient left in safe position with all fall risk precautions in place.

## 2022-04-05 NOTE — PROGRESS NOTES
A home oxygen evaluation has been completed. [x]Patient is an inpatient. It is expected that the patient will be discharged within the next 48 hours. Qualified provider to write order for home prescription if patient qualifies. Social service/care managers will arrange for home oxygen. If patient is active, arrange for Home Medical supplier to assess for Oxygen Conserving Device per pulse oximetry. []Patient is an outpatient. Results will be faxed to the ordering provider. Qualified provider to write order for home prescription if patient qualifies and arranges for home oxygen. Patient was placed on room air for 10 minutes. SpO2 was 90 % on room air at rest.  Actual SpO2 was 90 %. Patient can ambulate for exercise flow rate. Patients was ambulated with assistance from walker, SpO2 dropped to 80%. Oxygen was increased slowing to 4lpm. SoO2 was 90% on 4 lpm to maintain SpO2 between 90-92% while exercising. If oxygen need is greater than 4 lpm the SpO2 on 4 lpm was 90%. Note: For any SpO2 at 32% see policy and procedure for possible qualifications.

## 2022-04-06 ENCOUNTER — CARE COORDINATION (OUTPATIENT)
Dept: FAMILY MEDICINE CLINIC | Age: 87
End: 2022-04-06

## 2022-04-07 NOTE — DISCHARGE SUMMARY
Hospitalist Discharge Summary    Patient: Jason Dutta  YOB: 1926  MRN: [id number]   Acct: [de-identified]    Primary Care Physician: Miller Tabares MD    Admit date  4/2/2022    Discharge date: 4/5/2022     Discharge Assessment and Plan:    1. COPD with acute exacerbation: Resume home Stiolto, Ventolin. On admission, pt was started on prednisone 10mg daily x 5 days. PO prednisone was stopped and pt treated with IV Solu-Medrol. ABX were stopped as there were no signs of infection. No further wheezing on exam. SOB resolved. Continue PO prednisone 40mg for 4 more days at discharge. Follow up with PCP/Pulm OP.      2. Acute on chronic hypoxic respiratory failure: 2/2 #1. Baseline O2 is 2L NC at night only. Pt treated for COPD as stated above. He is stable on RA at rest, required O2 with activity. Home O2 eval done and DME order placed.       3. 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 Follows with Urology outpatient. Continue Calcium and vit D supplements.     4. Hx PE, DVT 2019: not on [address] at home. No evidence of PE or DVT at this time.          Chief Complaint on presentation: SOB    Initial H&P / Hospital Course: Per HPI: \"Yasir Caldwell is a 80 y.o., , male who  has a past medical history of Blood circulation, collateral, CAD (coronary artery disease), CAD (coronary artery disease), Cancer ([address] CKD (chronic kidney disease) stage 2, GFR 60-89 ml/min, COPD (chronic obstructive pulmonary disease) ([address] Diverticulitis, DVT of lower extremity, bilateral ([address] GERD (gastroesophageal reflux disease), Hiatal hernia, Hyperlipemia, Hyperlipidemia, Other disorders of kidney and ureter in diseases classified elsewhere, Pleural plaque, Pneumonia of both lower lobes due to infectious organism, Primary adenocarcinoma of prostate ([address] Pulmonary emboli ([address] and Vitamin D deficiency. that is hospitalized for COPD exacerbation.  Patient reports symptom onset over last for 5 days.  Associated symptoms include no tightness in her chest, right cough with no sputum production, feeling cannot cough up his mucus stuck down in his lung.  Denies any fever, chills, hemoptysis, recent sick contact, chest pain, pressure in his chest, palpitation.  Patient had been diagnostic for COPD which required 2 L nasal cannula nightly.  Have been using Stiolto twice daily and Ventolin every 6 as needed.  Patient lives at home by himself.  Past medical history significant with prostate cancer in which patient see neurologist and received [facility] also had a remote history with PE and DVT secondary to prostate cancer which he has been off blood thiner over last few years.      In the ED patient found to have hypoxia with he was placed on 4 L nasal cannula then wean off to 3 L nasal cannula with O2 sat 92%.  Flu AMB negative, Covid negative.  Chest x-ray showed moderate emphysema with granuloma right middle lobe. Patient was admitted and managed for acute hypoxic respiratory failure secondary to COPD exacerbation. \"    4/4:  Pt reports continued SOB but improving since arrival. Daughter states his wheezing is less audible. he denies fever/chills, CP, abd pain, nvd. Pt states he lives at home alone but his children check on and assist him daily. Stopped antibiotics and po prednisone. Start IV Solu-Medrol 60mg BID. Subjective (day of discharge): No wheezing on exam today. Patient states his SOB is improved. He denies fever/chills, cp, abd pain, nvd. Patient responded well to medical management. The patient was discharged in stable condition with appropriate outpatient follow up arranged. Physical Exam:-  Vitals: No data found.   Weight: Weight: 127 lb (57.6 kg)   24 hour intake/output: No intake or output data in the 24 hours ending 04/07/22 0752    General appearance: No apparent distress, appears stated age and cooperative. HEENT: Normal cephalic, atraumatic without obvious deformity. Pupils equal, round, and reactive to light. Extra ocular muscles intact. Conjunctivae/corneas clear. Neck: Supple, with full range of motion. No jugular venous distention. Trachea midline. Respiratory:  Normal respiratory effort. Clear to auscultation, bilaterally without Rales/Wheezes/Rhonchi. Cardiovascular: Regular rate and rhythm with normal S1/S2 without murmurs, rubs or gallops. Abdomen: Soft, non-tender, non-distended with normal bowel sounds. Musculoskeletal:  No clubbing, cyanosis or edema bilaterally. Skin: Skin color, texture, turgor normal.  No rashes or lesions. Neurologic:  Neurovascularly intact without any focal sensory/motor deficits.  Cranial nerves: II-XII intact, grossly non-focal.  Psychiatric: Alert and oriented, thought content appropriate, normal insight  Capillary Refill: Brisk,< 3 seconds   Peripheral Pulses: +2 palpable, equal bilaterally     Labs :  Recent Results (from the past 72 hour(s))   Basic Metabolic Panel    Collection Time: 04/05/22  5:30 AM   Result Value Ref Range    Sodium 136 135 - 145 meq/L    Potassium 4.2 3.5 - 5.2 meq/L    Chloride 92 (L) 98 - 111 meq/L    CO2 27 23 - 33 meq/L    Glucose 145 (H) 70 - 108 mg/dL    BUN 21 7 - 22 mg/dL    CREATININE 1.1 0.4 - 1.2 mg/dL    Calcium 10.4 8.5 - 10.5 mg/dL   CBC    Collection Time: 04/05/22  5:30 AM   Result Value Ref Range    WBC 6.4 4.8 - 10.8 thou/mm3    RBC 4.28 (L) 4.70 - 6.10 mill/mm3    Hemoglobin 14.6 14.0 - 18.0 gm/dl    Hematocrit 42.2 42.0 - 52.0 %    MCV 98.6 (H) 80.0 - 94.0 fL    MCH 34.1 (H) 26.0 - 33.0 pg    MCHC 34.6 32.2 - 35.5 gm/dl    RDW-CV 11.7 11.5 - 14.5 %    RDW-SD 42.4 35.0 - 45.0 fL    Platelets 412 813 - 412 thou/mm3    MPV 9.0 (L) 9.4 - 12.4 fL   Anion Gap    Collection Time: 04/05/22  5:30 AM   Result Value Ref Range    Anion Gap 17.0 (H) 8.0 - 16.0 meq/L   Glomerular Filtration Rate, Estimated    Collection Time: 04/05/22  5:30 AM   Result Value Ref Range    Est, Glom Filt Rate 62 (A) ml/min/1.73m2        Microbiology:    Blood culture #1:   Lab Results   Component Value Date    BC No growth-preliminary No growth  02/05/2021     Blood culture #2:No results found for: Dyllan Art  Organism:  No results found for: LABGRAM  MRSA culture only:No results found for: [address]   Urine culture:   Lab Results   Component Value Date    LABURIN  02/01/2022     No growth-preliminary Growth of Contaminants. The mixture of organisms present are not a common cause of urinary tract infections and probably represent skin lucia or distal urethral lucia. Lab Results   Component Value Date    ORG Growth of Contaminants 02/01/2022      Respiratory culture: No results found for: CULTRESP  Aerobic and Anaerobic :  No results found for: LABAERO  No results found for: LABANAE    Urinalysis:     Lab Results   Component Value Date    NITRU Negative 03/04/2022    WBCUA 0-2 02/05/2019    BACTERIA MODERATE 02/05/2019    RBCUA 3-5 02/05/2019    BLOODU Small 03/04/2022    BLOODU SMALL 02/05/2019    SPECGRAV 1.025 08/14/2020    SPECGRAV <1.005 01/05/2019    GLUCOSEU Negative 03/04/2022       Radiology:  XR CHEST PORTABLE    Result Date: 4/2/2022  Single view of the chest Comparison:  CR,SR - XR CHEST STANDARD (2 VW) - 03/10/2021 11:27 AM EST  CT,KOSR - CTA CHEST W WO CONTRAST - 02/05/2021 05:23 PM EST Findings: Moderate emphysema. Granuloma right midlung. Otherwise normal heart, lungs and mediastinum. No pneumonia. Multifocal osseous metastatic disease. Impression: Moderate emphysema. Granuloma right midlung.  This document has been electronically signed by: Angie Cortez MD on 04/02/2022 11:32 PM       Consults:   [address]    Discharge Medications:      Medication List      START taking these medications    predniSONE 20 MG tablet  Commonly known as: DELTASONE  Take 2 tablets by mouth daily for 4 days        CHANGE how you take none  Activity: activity as tolerated  Diet: No diet orders on file      Follow-up visits:   Kaylynbrian Dixon Antolin  [address] [id number] [phone]    On 4/12/2022  Follow-up appointment April 12, 2022 at 10:30am.         Disposition: home  Condition at Discharge: Stable    Time Spent: 50 minutes    Signed: Thank you Kathe Wolfe MD for the opportunity to be involved in this patient's care.     Electronically signed by Teresa Greer PA-C on 4/7/2022 at 7:52 AM  Discharging Hospitalist

## 2022-04-08 ENCOUNTER — NURSE ONLY (OUTPATIENT)
Dept: UROLOGY | Age: 87
End: 2022-04-08
Payer: MEDICARE

## 2022-04-08 DIAGNOSIS — C79.51 SECONDARY MALIGNANT NEOPLASM OF BONE AND BONE MARROW (HCC): Primary | ICD-10-CM

## 2022-04-08 DIAGNOSIS — C79.52 SECONDARY MALIGNANT NEOPLASM OF BONE AND BONE MARROW (HCC): Primary | ICD-10-CM

## 2022-04-08 DIAGNOSIS — C61 MALIGNANT NEOPLASM OF PROSTATE (HCC): ICD-10-CM

## 2022-04-08 PROCEDURE — 96372 THER/PROPH/DIAG INJ SC/IM: CPT | Performed by: NURSE PRACTITIONER

## 2022-04-08 NOTE — PROGRESS NOTES
Patient has given me verbal consent to perform Xgeva Injection yes      Following Mike Younger CNP plan of care. XGEVA 120MG/1.7ML MG GIVEN S.C LEFT ARM  Lot Number: [id number]  Expiration Date: 05/24  [facility] #: [id number]    After Injection was given there were no reactions at injection site and patient was feeling well. Patient was notified that possible side effects from injections include: Redness, swelling and itching at the injection site. Possible side effects of androgen deprivation therapy, including hot flashes, flushing of the skin, increased weight, decreased sex drive, and difficulties with ED. Patient was instructed to inform their dental office that they are receiving Kaylyn Livings and that major dental procedures should be avoided. Patient was advised to call our office with any further questions or concerns. Any active dental problems, infections, or pain? no    Date of last dental appointment: n/a    Any planned dental procedures? no    Patient supplied their own medications No    Pt [address] therapy first initiated on 2-.

## 2022-04-12 ENCOUNTER — CARE COORDINATION (OUTPATIENT)
Dept: FAMILY MEDICINE CLINIC | Age: 87
End: 2022-04-12

## 2022-04-12 ENCOUNTER — OFFICE VISIT (OUTPATIENT)
Dept: FAMILY MEDICINE CLINIC | Age: 87
End: 2022-04-12

## 2022-04-12 VITALS
SYSTOLIC BLOOD PRESSURE: 112 MMHG | RESPIRATION RATE: 16 BRPM | WEIGHT: 126 LBS | HEART RATE: 72 BPM | TEMPERATURE: 97.3 F | HEIGHT: 67 IN | BODY MASS INDEX: 19.78 KG/M2 | DIASTOLIC BLOOD PRESSURE: 66 MMHG

## 2022-04-12 DIAGNOSIS — J44.9 STAGE 3 SEVERE COPD BY GOLD CLASSIFICATION (HCC): ICD-10-CM

## 2022-04-12 DIAGNOSIS — J44.9 CHRONIC OBSTRUCTIVE PULMONARY DISEASE, UNSPECIFIED COPD TYPE (HCC): ICD-10-CM

## 2022-04-12 DIAGNOSIS — I25.10 CORONARY ARTERIOSCLEROSIS: ICD-10-CM

## 2022-04-12 DIAGNOSIS — C79.51 PROSTATE CANCER METASTATIC TO BONE (HCC): ICD-10-CM

## 2022-04-12 DIAGNOSIS — C61 PROSTATE CANCER METASTATIC TO BONE (HCC): ICD-10-CM

## 2022-04-12 DIAGNOSIS — E78.41 ELEVATED LIPOPROTEIN(A): Primary | ICD-10-CM

## 2022-04-12 PROCEDURE — 99496 TRANSJ CARE MGMT HIGH F2F 7D: CPT | Performed by: FAMILY MEDICINE

## 2022-04-12 RX ORDER — ALBUTEROL SULFATE 2.5 MG/3ML
2.5 SOLUTION RESPIRATORY (INHALATION) 2 TIMES DAILY
Qty: 180 ML | Refills: 11 | Status: SHIPPED | OUTPATIENT
Start: 2022-04-12 | End: 2023-04-12

## 2022-04-12 ASSESSMENT — ENCOUNTER SYMPTOMS
COUGH: 0
EYE PAIN: 0
BACK PAIN: 0
SORE THROAT: 0
ABDOMINAL PAIN: 0
CONSTIPATION: 0
TROUBLE SWALLOWING: 0
BLOOD IN STOOL: 0
SHORTNESS OF BREATH: 0
CHEST TIGHTNESS: 0
NAUSEA: 0

## 2022-04-12 NOTE — CARE COORDINATION
I saw Rexine Bernheim with his daughter after his appointment with the Dr. He is still needing the 02 with activity but is down to 3 lpm. He is going to initiate scheduled nebulizer treatments to help break up some of his congestion. He is hoping to get back to using the 02 only at night. His family is very supportive and he is doing well. His gait is steady once he gets his balance and his color is good. He is not short of breath at rest. I will follow up with him next week.

## 2022-04-12 NOTE — PROGRESS NOTES
Post-Discharge Transitional Care Follow Up      Alicia Newton   YOB: 1926    Date of Office Visit:  4/12/2022  Date of Hospital Admission: 4/2/22  Date of Hospital Discharge: 4/5/22  Readmission Risk Score (high >=14%. Medium >=10%):Readmission Risk Score: 16.3 ( )      Care management risk score Rising risk (score 2-5) and Complex Care (Scores >=6): 5     Non face to face  following discharge, date last encounter closed (first attempt may have been earlier): 4/8/2022 10:39 AM     Call initiated 2 business days of discharge: Yes            Subjective:   HPI    Inpatient course: Discharge summary reviewed- see chart.     Interval history/Current status:     Copd  Exacerbation and    Better      Prostate  Cancer    To bone and  radiation    To  Upper  Chest  affect  Swallowing        ashd   stable    Patient Active Problem List   Diagnosis    Hyperlipidemia    Pleural plaque    Coronary arteriosclerosis    Urinary bladder stone    Stage 3 severe COPD by GOLD classification ([address]    S/P exploratory laparotomy    Prostate cancer metastatic to bone ([address]    Raised prostate specific antigen    Benign prostatic hyperplasia with urinary obstruction    Trauma of urethra    Acute midline low back pain    Acute postoperative pulmonary insufficiency (HCC)    Acute on chronic respiratory failure with hypoxia (HCC)    Centrilobular emphysema (HCC)    Asthenia    Stage 2 chronic kidney disease    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    Pathological fracture of sacral vertebra due to neoplastic disease    Mild bibasilar atelectasis    Pleural effusion, right    Microscopic hematuria    Nutritional marasmus (HCC)    Pathological fracture of vertebra due to neoplastic disease    Acute deep vein thrombosis of lower limb (HCC)    Adenocarcinoma of prostate ([address]    DVT of lower extremity, bilateral (HCC)    Vitamin D deficiency    Pneumonia of both lower lobes due to infectious organism    Dysphagia    Moderate malnutrition (HCC)    Acute respiratory failure with hypoxia (HCC)    COPD (chronic obstructive pulmonary disease) [address]       Medications listed as ordered at the time of discharge from hospital     Medication List          Accurate as of April 12, 2022 10:36 AM. If you have any questions, ask your nurse or doctor. CHANGE how you take these medications    docusate 100 MG Caps  Commonly known as: COLACE, DULCOLAX  Take 100 mg by mouth 2 times daily  What changed: when to take this     doxazosin 4 MG tablet  Commonly known as: CARDURA  Take 1 tablet by mouth nightly  What changed: See the new instructions.         CONTINUE taking these medications    Acapella Misc  1 Device by Does not apply route 4 times daily     acetaminophen 500 MG tablet  Commonly known as: TYLENOL     albuterol sulfate  (90 Base) MCG/ACT inhaler  Commonly known as: Ventolin HFA  Inhale 2 puffs into the lungs every 6 hours as needed for Wheezing or Shortness of Breath     aspirin 325 MG EC tablet  Take 1 tablet by mouth daily     bicalutamide 50 MG chemo tablet  Commonly known as: Casodex  Take 1 tablet by mouth daily     calcium carbonate-vitamin D 600-400 MG-UNIT Tabs per tab  Commonly known as: Calcium 600 + D  Take 1 tablet by mouth 3 times daily     carvedilol 3.125 MG tablet  Commonly known as: COREG  TAKE 1 TABLET BY MOUTH TWICE DAILY WITH MEALS     denosumab 120 MG/1.7ML Soln SC injection  Commonly known as: XGEVA     finasteride 5 MG tablet  Commonly known as: PROSCAR  Take 1 tablet by mouth once daily     leuprolide 45 MG injection  Commonly known as: LUPRON     MiraLax 17 g packet  Generic drug: polyethylene glycol     therapeutic multivitamin-minerals tablet  Take 1 tablet by mouth daily     tiotropium-olodaterol 2.5-2.5 MCG/ACT Aers  Commonly known as: Stiolto Respimat  INHALE 2 PUFFS BY MOUTH ONCE DAILY     traMADol 50 MG tablet  Commonly known as: mouth every 8 hours as needed for Pain      aspirin 325 MG EC tablet Take 1 tablet by mouth daily          Medications patient taking as of now reconciled against medications ordered at time of hospital discharge: Yes    Review of Systems   Constitutional: Negative for fatigue and fever. HENT: Negative for congestion, ear pain, postnasal drip, sore throat and trouble swallowing. Eyes: Negative for pain. Respiratory: Negative for cough, chest tightness and shortness of breath. Less  cough and  dyspnea  Noted    Cardiovascular: Negative for chest pain, palpitations and leg swelling. Gastrointestinal: Negative for abdominal pain, blood in stool, constipation and nausea. Genitourinary: Negative for difficulty urinating, frequency and urgency. Musculoskeletal: Negative for arthralgias, back pain, joint swelling and neck stiffness. Skin: Negative for rash. Neurological: Negative for dizziness, weakness and headaches. Hematological: Negative for adenopathy. Does not bruise/bleed easily. Psychiatric/Behavioral: Negative for behavioral problems, dysphoric mood and sleep disturbance. Objective:    /66 (Site: Right Upper Arm, Position: Sitting, Cuff Size: Medium Adult)   Pulse 72   Temp 97.3 °F (36.3 °C) (Infrared)   Resp 16   Ht 5' 7\" (1.702 m)   Wt 126 lb (57.2 kg)   BMI 19.73 kg/m²   Physical Exam  Vitals and nursing note reviewed. Constitutional:       Appearance: He is well-developed. HENT:      Head: Normocephalic and atraumatic. Right Ear: External ear normal.      Left Ear: External ear normal.      Nose: Nose normal.   Eyes:      Conjunctiva/sclera: Conjunctivae normal.      Pupils: Pupils are equal, round, and reactive to light. Comments: Fundi nl   Neck:      Thyroid: No thyromegaly. Cardiovascular:      Rate and Rhythm: Normal rate and regular rhythm. Heart sounds: Normal heart sounds.    Pulmonary:      Effort: Pulmonary effort is normal. Breath sounds: Rhonchi present. No wheezing or rales. Abdominal:      General: Bowel sounds are normal.      Palpations: Abdomen is soft. There is no mass. Tenderness: There is no abdominal tenderness. Musculoskeletal:         General: Normal range of motion. Cervical back: Normal range of motion and neck supple. Lymphadenopathy:      Cervical: No cervical adenopathy. Skin:     General: Skin is warm and dry. Findings: No rash. Neurological:      Mental Status: He is alert and oriented to person, place, and time. Cranial Nerves: No cranial nerve deficit. Deep Tendon Reflexes: Reflexes are normal and symmetric. IMPRESSION     Diagnosis Orders   1. Elevated lipoprotein(a)     2. Chronic obstructive pulmonary disease, unspecified COPD type (HCC)  albuterol (PROVENTIL) (2.5 MG/3ML) 0.083% nebulizer solution   3. Prostate cancer metastatic to bone (HCC)     4. Stage 3 severe COPD by GOLD classification ([facility] [address]     5.  Coronary arteriosclerosis     PLAN   Current Outpatient Medications   Medication Sig Dispense Refill    albuterol (PROVENTIL) (2.5 MG/3ML) 0.083% nebulizer solution Take 3 mLs by nebulization 2 times daily And every 6  Hours  prn 180 mL 11    carvedilol (COREG) 3.125 MG tablet TAKE 1 TABLET BY MOUTH TWICE DAILY WITH MEALS 180 tablet 3    finasteride (PROSCAR) 5 MG tablet Take 1 tablet by mouth once daily 90 tablet 3    tiotropium-olodaterol (STIOLTO RESPIMAT) 2.5-2.5 MCG/ACT AERS INHALE 2 PUFFS BY MOUTH ONCE DAILY 4 g 5    doxazosin (CARDURA) 4 MG tablet Take 1 tablet by mouth nightly (Patient taking differently: 2 mg ) 90 tablet 3    leuprolide (LUPRON) 45 MG injection Inject 45 mg into the muscle once       calcium carbonate-vitamin D (CALCIUM 600 + D) 600-400 MG-UNIT TABS per tab Take 1 tablet by mouth 3 times daily 90 tablet 5    denosumab (XGEVA) 120 MG/1.7ML SOLN SC injection Inject 120 mg into the skin once Every 28 days       traMADol (ULTRAM) 50 MG tablet Take 50 mg by mouth every 6 hours as needed for Pain.  polyethylene glycol (MIRALAX) 17 g packet Take 17 g by mouth once      bicalutamide (CASODEX) 50 MG chemo tablet Take 1 tablet by mouth daily 90 tablet 3    albuterol sulfate HFA (VENTOLIN HFA) 108 (90 Base) MCG/ACT inhaler Inhale 2 puffs into the lungs every 6 hours as needed for Wheezing or Shortness of Breath 1 Inhaler 3    Cholecalciferol (VITAMIN D3) 125 MCG (5000 UT) CAPS Take 1 capsule by mouth every morning (before breakfast)      Misc. Devices (ACAPELLA) MISC 1 Device by Does not apply route 4 times daily 1 each 0    Multiple Vitamins-Minerals (THERAPEUTIC MULTIVITAMIN-MINERALS) tablet Take 1 tablet by mouth daily      docusate sodium (COLACE, DULCOLAX) 100 MG CAPS Take 100 mg by mouth 2 times daily (Patient taking differently: Take 100 mg by mouth nightly )      acetaminophen (TYLENOL) 500 MG tablet Take 500 mg by mouth every 8 hours as needed for Pain      aspirin 325 MG EC tablet Take 1 tablet by mouth daily       No current facility-administered medications for this visit. Aerosols brian dn  In am and pm    See in   6  Weeks     An electronic signature was used to authenticate this note.   --Elen Kulkarni MD

## 2022-04-21 ENCOUNTER — CARE COORDINATION (OUTPATIENT)
Dept: FAMILY MEDICINE CLINIC | Age: 87
End: 2022-04-21

## 2022-04-21 NOTE — CARE COORDINATION
I spoke with Gino's daughter. She stated he is doing even better than when he was here for the appointment. He is needing the oxygen less during the day. He has no additional needs at this time. I will follow up with them next week.

## 2022-04-28 ENCOUNTER — NURSE ONLY (OUTPATIENT)
Dept: LAB | Age: 87
End: 2022-04-28

## 2022-04-28 ENCOUNTER — CARE COORDINATION (OUTPATIENT)
Dept: FAMILY MEDICINE CLINIC | Age: 87
End: 2022-04-28

## 2022-04-28 DIAGNOSIS — C61 PROSTATE CANCER (HCC): ICD-10-CM

## 2022-04-28 LAB — PROSTATE SPECIFIC ANTIGEN: 9.82 NG/ML (ref 0–1)

## 2022-04-28 NOTE — CARE COORDINATION
I spoke with Gino's daughter. He has his good days and not so good days but overall he is feeling better. He is not having pain. He does get short of breath and fatigued when he exerts himself. He is still needing to wear the oxygen during the day but often on needs 2L. His oxygen level drops with exertion if he does not wear it. He had his PSA drawn this week and follows up with urology. I will follow up with them next week.

## 2022-05-05 ENCOUNTER — CARE COORDINATION (OUTPATIENT)
Dept: FAMILY MEDICINE CLINIC | Age: 87
End: 2022-05-05

## 2022-05-06 ENCOUNTER — OFFICE VISIT (OUTPATIENT)
Dept: UROLOGY | Age: 87
End: 2022-05-06
Payer: MEDICARE

## 2022-05-06 VITALS
SYSTOLIC BLOOD PRESSURE: 122 MMHG | WEIGHT: 122 LBS | HEIGHT: 67 IN | BODY MASS INDEX: 19.15 KG/M2 | DIASTOLIC BLOOD PRESSURE: 74 MMHG

## 2022-05-06 DIAGNOSIS — C79.52 SECONDARY MALIGNANT NEOPLASM OF BONE AND BONE MARROW (HCC): Primary | ICD-10-CM

## 2022-05-06 DIAGNOSIS — C79.51 SECONDARY MALIGNANT NEOPLASM OF BONE AND BONE MARROW (HCC): Primary | ICD-10-CM

## 2022-05-06 DIAGNOSIS — C79.51 PROSTATE CANCER METASTATIC TO BONE (HCC): Primary | ICD-10-CM

## 2022-05-06 DIAGNOSIS — C61 PROSTATE CANCER METASTATIC TO BONE (HCC): Primary | ICD-10-CM

## 2022-05-06 DIAGNOSIS — Z51.81 THERAPEUTIC DRUG MONITORING: ICD-10-CM

## 2022-05-06 DIAGNOSIS — C61 PROSTATE CANCER (HCC): ICD-10-CM

## 2022-05-06 PROCEDURE — 96372 THER/PROPH/DIAG INJ SC/IM: CPT | Performed by: NURSE PRACTITIONER

## 2022-05-06 PROCEDURE — G8420 CALC BMI NORM PARAMETERS: HCPCS | Performed by: NURSE PRACTITIONER

## 2022-05-06 PROCEDURE — 1036F TOBACCO NON-USER: CPT | Performed by: NURSE PRACTITIONER

## 2022-05-06 PROCEDURE — 99214 OFFICE O/P EST MOD 30 MIN: CPT | Performed by: NURSE PRACTITIONER

## 2022-05-06 PROCEDURE — 1123F ACP DISCUSS/DSCN MKR DOCD: CPT | Performed by: NURSE PRACTITIONER

## 2022-05-06 PROCEDURE — 4040F PNEUMOC VAC/ADMIN/RCVD: CPT | Performed by: NURSE PRACTITIONER

## 2022-05-06 PROCEDURE — 81003 URINALYSIS AUTO W/O SCOPE: CPT | Performed by: NURSE PRACTITIONER

## 2022-05-06 PROCEDURE — G8427 DOCREV CUR MEDS BY ELIG CLIN: HCPCS | Performed by: NURSE PRACTITIONER

## 2022-05-06 ASSESSMENT — ENCOUNTER SYMPTOMS
NAUSEA: 0
BACK PAIN: 0
VOMITING: 0
ABDOMINAL PAIN: 0

## 2022-05-06 NOTE — PROGRESS NOTES
Patient has given me verbal consent to perform Xgeva Injection yes      Following Cleve Paget CNP plan of care. Kaylyn Livings 120MG/1.7ML MG GIVEN S.C right ARM  Lot Number: [id number]  Expiration Date: 05/24  Chris Aguirre #: [id number]    After Injection was given there were no reactions at injection site and patient was feeling well. Patient was notified that possible side effects from injections include: Redness, swelling and itching at the injection site. Possible side effects of androgen deprivation therapy, including hot flashes, flushing of the skin, increased weight, decreased sex drive, and difficulties with ED. Patient was instructed to inform their dental office that they are receiving Kaylyn Livings and that major dental procedures should be avoided. Patient was advised to call our office with any further questions or concerns. Any active dental problems, infections, or pain? No, patient has dentures    Date of last dental appointment: unknown      Any planned dental procedures? no    Patient supplied their own medications No    Pt [address] therapy first initiated on 02/26/19.

## 2022-05-06 NOTE — CARE COORDINATION
I spoke with Jose Sosa his daughter. She said he is doing well at this time and they have no home needs. She will call me if they need anything in the future. She thanked me for calling to check on him.

## 2022-05-06 NOTE — PROGRESS NOTES
[address] [facility]  [address]  [facility] [address]  Dept: [phone]  Loc: [phone]    Visit Date: 5/6/2022        HPI:     Dunia Cintron is a 80 y.o. male who presents today for:  Chief Complaint   Patient presents with    Follow-up     psa prior     Injections     today        HPI   Mr. Bret Maldonado has a hx of PSA 1/31/17 that was 25 at which time family was notified of risk for prostate cancer but did not wish to pursue further testing.  CT of the abdomen and pelvis performed for abdominal pain 12/17/18 noted multiple skeletal sclerotic metastases with pathologic compression fractures of L1-L3, enlarged hterogeneous prostate, and pelvic adenopathy.  PSA performed 12/17/18 was 1,594.  Pt was started on Casodex 12/20/18.  Pt recovered on TCU 12/24/19-1/10/19. [facility] underwent TRUS prostate biopsy 1/24/19 by Dr. Hanna Multani with findings of high grade prostatic adenocarcinoma.  Pt was scheduled for review of biopsy results 2/11/19 however pt was admitted to the hospital 2/5/19-2/15/19 and treated for acute PE and DVT and pathologic T6 and T8 fractures.  He underwent T6 and T8 vertebral bone biopsy, RFA for spine metastatic lesion at the levels of T6 and T8, and vertebral augmentation (balloon kyphoplasty) for T6 and T8 on 2/13/19 by Dr. Haas Brea has completed palliative radiation therapy.      Pt started monthly Firmagon and Mirta Feli 2/26/19.  Firmagon switched to Lupron 4/30/19.  Pt had anorexia on Casodex and this was held. [place] and daughter decided to hold casodex.  Pt is on calcium carbonate and vitamin D supplementation.  Last ADT injection with Lupron 45 mg 11/30/21.         In December 2020 Yasir's PSA continued to trend upward to 3.17 with a testosterone <3.  At office visit in [place] discussed addition of Liddie Edilson or Casodex and possible side effects.  Pt was started on Casodex 12/15/2020 while awaiting coverage of Xtandi.  When pt received the [facility] he was unable to swallow the pills.  As such he remained on Casodex. Ardeth Grade had increased fatigue and family started holding casodex starting 2/3/21.  Pt was hospitalized and treated for aspiration pneumonia 2/5/21-2/8/21. PSA has recently continued to trend upward and family resumed casodex in June 2021. Most recent PSA 9.82 4/28/22. Pt had a COPD exacerbation since last appt. Wears O2 yet with activity. Denies increased fatigue. Reports urination stable. Current Outpatient Medications   Medication Sig Dispense Refill    albuterol (PROVENTIL) (2.5 MG/3ML) 0.083% nebulizer solution Take 3 mLs by nebulization 2 times daily And every 6  Hours  prn 180 mL 11    carvedilol (COREG) 3.125 MG tablet TAKE 1 TABLET BY MOUTH TWICE DAILY WITH MEALS 180 tablet 3    finasteride (PROSCAR) 5 MG tablet Take 1 tablet by mouth once daily 90 tablet 3    tiotropium-olodaterol (STIOLTO RESPIMAT) 2.5-2.5 MCG/ACT AERS INHALE 2 PUFFS BY MOUTH ONCE DAILY 4 g 5    doxazosin (CARDURA) 4 MG tablet Take 1 tablet by mouth nightly (Patient taking differently: 2 mg ) 90 tablet 3    leuprolide (LUPRON) 45 MG injection Inject 45 mg into the muscle once       calcium carbonate-vitamin D (CALCIUM 600 + D) 600-400 MG-UNIT TABS per tab Take 1 tablet by mouth 3 times daily 90 tablet 5    denosumab (XGEVA) 120 MG/1.7ML SOLN SC injection Inject 120 mg into the skin once Every 28 days       traMADol (ULTRAM) 50 MG tablet Take 50 mg by mouth every 6 hours as needed for Pain.  polyethylene glycol (MIRALAX) 17 g packet Take 17 g by mouth once      albuterol sulfate HFA (VENTOLIN HFA) 108 (90 Base) MCG/ACT inhaler Inhale 2 puffs into the lungs every 6 hours as needed for Wheezing or Shortness of Breath 1 Inhaler 3    Cholecalciferol (VITAMIN D3) 125 MCG (5000 UT) CAPS Take 1 capsule by mouth every morning (before breakfast)      Misc.  Devices (ACAPELLA) MISC 1 Device by Does not apply route 4 times daily 1 each 0  Multiple Vitamins-Minerals (THERAPEUTIC MULTIVITAMIN-MINERALS) tablet Take 1 tablet by mouth daily      docusate sodium (COLACE, DULCOLAX) 100 MG CAPS Take 100 mg by mouth 2 times daily (Patient taking differently: Take 100 mg by mouth nightly )      acetaminophen (TYLENOL) 500 MG tablet Take 500 mg by mouth every 8 hours as needed for Pain      aspirin 325 MG EC tablet Take 1 tablet by mouth daily      bicalutamide (CASODEX) 50 MG chemo tablet Take 1 tablet by mouth daily 90 tablet 3     No current facility-administered medications for this visit. Past Medical History  Alexis Ruiz  has a past medical history of Blood circulation, collateral, CAD (coronary artery disease), CAD (coronary artery disease), Cancer ([address] CKD (chronic kidney disease) stage 2, GFR 60-89 ml/min, COPD (chronic obstructive pulmonary disease) ([address] Diverticulitis, DVT of lower extremity, bilateral ([address] GERD (gastroesophageal reflux disease), Hiatal hernia, Hyperlipemia, Hyperlipidemia, Other disorders of kidney and ureter in diseases classified elsewhere, Pleural plaque, Pneumonia of both lower lobes due to infectious organism, Primary adenocarcinoma of prostate ([address] Pulmonary emboli ([address] and Vitamin D deficiency. Past Surgical History  The patient  has a past surgical history that includes hernia repair; Appendectomy; Cardiac catheterization; eye surgery; Cystocopy (03/17/2015); LAPAROTOMY EXPLORATORY (N/A, 12/17/2018); Abdomen surgery; and Kyphosis surgery (N/A, 2/13/2019). Family History  This patient's family history is not on file. Social History  Alexis Ruiz  reports that he quit smoking about 55 years ago. His smoking use included cigarettes. He smoked 1.00 pack per day. He has never used smokeless tobacco. He reports current alcohol use. He reports that he does not use drugs.       Subjective:      Review of Systems   Constitutional: Negative for activity change, appetite change, chills, diaphoresis, fatigue, fever and unexpected weight change. Gastrointestinal: Negative for abdominal pain, nausea and vomiting. Genitourinary: Negative for decreased urine volume, difficulty urinating, dysuria, flank pain, frequency, hematuria and urgency. Musculoskeletal: Negative for back pain. Objective:   /74   Ht 5' 7\" (1.702 m)   Wt 122 lb (55.3 kg)   BMI 19.11 kg/m²     Physical Exam  Vitals reviewed. Constitutional:       General: He is not in acute distress. Appearance: Normal appearance. He is well-developed. He is not ill-appearing or diaphoretic. HENT:      Head: Normocephalic and atraumatic. Right Ear: External ear normal.      Left Ear: External ear normal.      Nose: Nose normal.      Mouth/Throat:      Mouth: Mucous membranes are moist.   Eyes:      General: No scleral icterus. Right eye: No discharge. Left eye: No discharge. Neck:      Vascular: No JVD. Trachea: No tracheal deviation. Pulmonary:      Effort: Pulmonary effort is normal. No respiratory distress. Comments: On NC O2. Abdominal:      General: There is no distension. Tenderness: There is no abdominal tenderness. There is no right CVA tenderness or left CVA tenderness. Skin:     General: Skin is warm and dry. Neurological:      Mental Status: He is alert and oriented to person, place, and time. Mental status is at baseline. Psychiatric:         Mood and Affect: Mood normal.         Behavior: Behavior normal.         Thought Content:  Thought content normal.         POC  Results for POC orders placed in visit on 05/06/22   POCT Urinalysis No Micro (Auto)   Result Value Ref Range    Glucose, Ur Negative NEGATIVE mg/dl    Bilirubin Urine Negative     Ketones, Urine Negative NEGATIVE    Specific Gravity, Urine 1.020 1.002 - 1.030    Blood, UA POC Moderate (A) NEGATIVE    pH, Urine 7.00 5.0 - 9.0    Protein, Urine Negative NEGATIVE mg/dl    Urobilinogen, Urine 0.20 0.0 - 1.0 eu/dl    Nitrite, Urine Negative NEGATIVE    Leukocyte Clumps, Urine Moderate (A) NEGATIVE    Color, Urine Yellow [id number]    Character, Urine Clear [id number]     Patients recent PSA values are as follows  Lab Results   Component Value Date    PSA 9.82 (H) 04/28/2022    PSA 8.99 (H) 03/01/2022    PSA 7.86 (H) 01/25/2022      Recent BUN/Creatinine:  Lab Results   Component Value Date    BUN 21 04/05/2022    CREATININE 1.1 04/05/2022     Assessment:   Metastatic castration resistant prostate cancer  Obstructive prostate    Plan:     Marcene Marking injection today. Lupron due at next visit. Pt's psa trending up to high of 9.82 (19.64 when corrected for finasteride). Continue casodex. Referral to medical oncology. Lab/ma for Lupron/Xgeva in 1 month.   OV in 2 months

## 2022-05-23 ENCOUNTER — OFFICE VISIT (OUTPATIENT)
Dept: FAMILY MEDICINE CLINIC | Age: 87
End: 2022-05-23

## 2022-05-23 VITALS
BODY MASS INDEX: 20.46 KG/M2 | DIASTOLIC BLOOD PRESSURE: 60 MMHG | HEART RATE: 64 BPM | WEIGHT: 130.38 LBS | SYSTOLIC BLOOD PRESSURE: 106 MMHG | RESPIRATION RATE: 18 BRPM | HEIGHT: 67 IN

## 2022-05-23 DIAGNOSIS — C61 PROSTATE CANCER METASTATIC TO BONE (HCC): Primary | ICD-10-CM

## 2022-05-23 DIAGNOSIS — C79.51 PROSTATE CANCER METASTATIC TO BONE (HCC): Primary | ICD-10-CM

## 2022-05-23 DIAGNOSIS — J44.9 STAGE 3 SEVERE COPD BY GOLD CLASSIFICATION (HCC): ICD-10-CM

## 2022-05-23 DIAGNOSIS — R06.02 SHORTNESS OF BREATH: ICD-10-CM

## 2022-05-23 PROCEDURE — 1036F TOBACCO NON-USER: CPT | Performed by: FAMILY MEDICINE

## 2022-05-23 PROCEDURE — 99213 OFFICE O/P EST LOW 20 MIN: CPT | Performed by: FAMILY MEDICINE

## 2022-05-23 PROCEDURE — 1123F ACP DISCUSS/DSCN MKR DOCD: CPT | Performed by: FAMILY MEDICINE

## 2022-05-23 PROCEDURE — G8420 CALC BMI NORM PARAMETERS: HCPCS | Performed by: FAMILY MEDICINE

## 2022-05-23 PROCEDURE — G8427 DOCREV CUR MEDS BY ELIG CLIN: HCPCS | Performed by: FAMILY MEDICINE

## 2022-05-23 RX ORDER — ALBUTEROL SULFATE 90 UG/1
2 AEROSOL, METERED RESPIRATORY (INHALATION) EVERY 6 HOURS PRN
Qty: 1 EACH | Refills: 1 | Status: SHIPPED | OUTPATIENT
Start: 2022-05-23

## 2022-05-23 RX ORDER — TRAMADOL HYDROCHLORIDE 50 MG/1
50 TABLET ORAL EVERY 8 HOURS PRN
Qty: 90 TABLET | Refills: 0 | Status: SHIPPED | OUTPATIENT
Start: 2022-05-23 | End: 2022-06-22

## 2022-05-23 ASSESSMENT — ENCOUNTER SYMPTOMS
CONSTIPATION: 0
SHORTNESS OF BREATH: 0
SINUS PRESSURE: 0

## 2022-05-23 NOTE — PROGRESS NOTES
No components found for: CHLPL  Lab Results   Component Value Date    TRIG 55 06/05/2013     Lab Results   Component Value Date    HDL 63 06/05/2013     Lab Results   Component Value Date    LDLCALC 56 06/05/2013     No results found for: LABVLDL    Lab Results   Component Value Date    ALT 15 04/02/2022    AST 35 04/02/2022    ALKPHOS 63 04/02/2022    BILITOT 0.4 04/02/2022           Is patient currently taking any cholesterol medications? No   If yes, see med list as above    Is the patient reporting any side effects of cholesterol medications? No    Is the patient taking any over the counter medications? Yes   If yes, see med list as above    Is the patient taking a daily aspirin? Yes      Patient Self-Management Goal for Chronic Condition  Goal: I will take all medications as prescribed by my doctor, and I will call the office if I am having any medication problems. Barriers to success: none  Plan for overcoming my barriers: N/A     Confidence: 9/10  Date goal set: 5/23/22  Date goal attained:     Have you seen any other physician or provider since your last visit no    Have you had any other diagnostic tests since your last visit? no    Have you changed or stopped any medications since your last visit including any over-the-counter medicines, vitamins, or herbal medicines? no     Are you taking all your prescribed medications?  Yes    If NO, why?

## 2022-05-23 NOTE — PROGRESS NOTES
Seven Cleary (:  1926) is a 80 y.o. male,Established patient, here for evaluation of the following chief complaint(s):  No chief complaint on file. ASSESSMENT/PLAN:       Diagnosis Orders   1. Prostate cancer metastatic to bone (HCC)  traMADol (ULTRAM) 50 MG tablet   2. Shortness of breath  albuterol sulfate HFA (VENTOLIN HFA) 108 (90 Base) MCG/ACT inhaler   3. Stage 3 severe COPD by GOLD classification (HCC)  albuterol sulfate HFA (VENTOLIN HFA) 108 (90 Base) MCG/ACT inhaler     Wilso Madiha was evaluated today and a DME order was entered for a nebulizer compressor in order to administer Albuterol due to the diagnosis of COPD. The need for this equipment and treatment was discussed with the patient and he understands and is in agreement. See me in 6 months  Subjective   SUBJECTIVE/OBJECTIVE:  HPI  The oxygen helps with exertion but not needed at rest  The right great toe is sore to the medial border. He wonders if it is related to shoes being too tight. Review of Systems   Constitutional: Negative for fatigue. HENT: Negative for sinus pressure. Eyes: Negative for visual disturbance. Respiratory: Negative for shortness of breath. Cardiovascular: Negative for chest pain. Gastrointestinal: Negative for constipation. Genitourinary: Negative. Musculoskeletal: Negative for arthralgias. Skin: Negative for rash. Neurological: Negative for headaches. The patient's medications, allergies, past medical problems, surgical, social, and family histories were reviewed and updated as needed. Objective   Physical Exam  Constitutional:       General: He is not in acute distress. Appearance: He is well-developed. HENT:      Head: Normocephalic and atraumatic. Eyes:      General: No scleral icterus. Conjunctiva/sclera: Conjunctivae normal.   Neck:      Trachea: No tracheal deviation. Cardiovascular:      Rate and Rhythm: Normal rate and regular rhythm. Heart sounds: Normal heart sounds. Pulmonary:      Effort: Pulmonary effort is normal.      Breath sounds: Normal breath sounds. Musculoskeletal:      Comments: There is some slight redness to the medial boarder of the right great toenail   Skin:     General: Skin is warm and dry. Neurological:      Mental Status: He is alert and oriented to person, place, and time. Psychiatric:         Behavior: Behavior normal.     Blood pressure 106/60, pulse 64, resp. rate 18, height 5' 7\" (1.702 m), weight 130 lb 6 oz (59.1 kg). An electronic signature was used to authenticate this note.     --Kathe Wolfe MD

## 2022-05-26 ENCOUNTER — CLINICAL DOCUMENTATION (OUTPATIENT)
Dept: CASE MANAGEMENT | Age: 87
End: 2022-05-26

## 2022-05-26 ENCOUNTER — OFFICE VISIT (OUTPATIENT)
Dept: ONCOLOGY | Age: 87
End: 2022-05-26
Payer: MEDICARE

## 2022-05-26 ENCOUNTER — HOSPITAL ENCOUNTER (OUTPATIENT)
Dept: INFUSION THERAPY | Age: 87
Discharge: HOME | End: 2022-05-26
Payer: MEDICARE

## 2022-05-26 VITALS
RESPIRATION RATE: 18 BRPM | SYSTOLIC BLOOD PRESSURE: 108 MMHG | DIASTOLIC BLOOD PRESSURE: 64 MMHG | HEART RATE: 94 BPM | TEMPERATURE: 98.9 F

## 2022-05-26 VITALS
HEART RATE: 94 BPM | OXYGEN SATURATION: 93 % | DIASTOLIC BLOOD PRESSURE: 64 MMHG | TEMPERATURE: 98.9 F | WEIGHT: 132 LBS | HEIGHT: 67 IN | SYSTOLIC BLOOD PRESSURE: 108 MMHG | BODY MASS INDEX: 20.72 KG/M2 | RESPIRATION RATE: 18 BRPM

## 2022-05-26 DIAGNOSIS — C79.51 OSSEOUS METASTASIS (HCC): ICD-10-CM

## 2022-05-26 DIAGNOSIS — C61 PROSTATE CA (HCC): Primary | ICD-10-CM

## 2022-05-26 DIAGNOSIS — C61 PROSTATE CA (HCC): ICD-10-CM

## 2022-05-26 LAB
ABSOLUTE IMMATURE GRANULOCYTE: 0.01 THOU/MM3 (ref 0–0.07)
ALBUMIN SERPL-MCNC: 4.5 G/DL (ref 3.5–5.1)
ALP BLD-CCNC: 61 U/L (ref 38–126)
ALT SERPL-CCNC: 11 U/L (ref 11–66)
AST SERPL-CCNC: 27 U/L (ref 5–40)
BASINOPHIL, AUTOMATED: 0 % (ref 0–3)
BASOPHILS ABSOLUTE: 0 THOU/MM3 (ref 0–0.1)
BILIRUB SERPL-MCNC: 0.4 MG/DL (ref 0.3–1.2)
BILIRUBIN DIRECT: < 0.2 MG/DL (ref 0–0.3)
BUN, WHOLE BLOOD: 17 MG/DL (ref 8–26)
CHLORIDE, WHOLE BLOOD: 96 MEQ/L (ref 98–109)
CREATININE, WHOLE BLOOD: 1.3 MG/DL (ref 0.5–1.2)
EOSINOPHILS ABSOLUTE: 0.1 THOU/MM3 (ref 0–0.4)
EOSINOPHILS RELATIVE PERCENT: 2 % (ref 0–4)
GFR, ESTIMATED: 54 ML/MIN/1.73M2
GLUCOSE, WHOLE BLOOD: 99 MG/DL (ref 70–108)
HCT VFR BLD CALC: 40.3 % (ref 42–52)
HEMOGLOBIN: 13.5 GM/DL (ref 14–18)
IMMATURE GRANULOCYTES: 0 %
IONIZED CALCIUM, WHOLE BLOOD: 1.17 MMOL/L (ref 1.12–1.32)
LYMPHOCYTES # BLD: 9 % (ref 15–47)
LYMPHOCYTES ABSOLUTE: 0.5 THOU/MM3 (ref 1–4.8)
MCH RBC QN AUTO: 34.6 PG (ref 26–33)
MCHC RBC AUTO-ENTMCNC: 33.5 GM/DL (ref 32.2–35.5)
MCV RBC AUTO: 103 FL (ref 80–94)
MONOCYTES ABSOLUTE: 0.6 THOU/MM3 (ref 0.4–1.3)
MONOCYTES: 10 % (ref 0–12)
PDW BLD-RTO: 13 % (ref 11.5–14.5)
PLATELET # BLD: 190 THOU/MM3 (ref 130–400)
PMV BLD AUTO: 9.2 FL (ref 9.4–12.4)
POTASSIUM, WHOLE BLOOD: 4.3 MEQ/L (ref 3.5–4.9)
PROSTATE SPECIFIC ANTIGEN: 11.57 NG/ML (ref 0–1)
RBC # BLD: 3.9 MILL/MM3 (ref 4.7–6.1)
SEG NEUTROPHILS: 80 % (ref 43–75)
SEGMENTED NEUTROPHILS ABSOLUTE COUNT: 4.8 THOU/MM3 (ref 1.8–7.7)
SODIUM, WHOLE BLOOD: 135 MEQ/L (ref 138–146)
TOTAL CO2, WHOLE BLOOD: 31 MEQ/L (ref 23–33)
TOTAL PROTEIN: 6.4 G/DL (ref 6.1–8)
WBC # BLD: 6.1 THOU/MM3 (ref 4.8–10.8)

## 2022-05-26 PROCEDURE — 80076 HEPATIC FUNCTION PANEL: CPT

## 2022-05-26 PROCEDURE — 85025 COMPLETE CBC W/AUTO DIFF WBC: CPT

## 2022-05-26 PROCEDURE — 80047 BASIC METABLC PNL IONIZED CA: CPT

## 2022-05-26 PROCEDURE — 1036F TOBACCO NON-USER: CPT | Performed by: INTERNAL MEDICINE

## 2022-05-26 PROCEDURE — 99204 OFFICE O/P NEW MOD 45 MIN: CPT | Performed by: INTERNAL MEDICINE

## 2022-05-26 PROCEDURE — 84153 ASSAY OF PSA TOTAL: CPT

## 2022-05-26 PROCEDURE — 1123F ACP DISCUSS/DSCN MKR DOCD: CPT | Performed by: INTERNAL MEDICINE

## 2022-05-26 PROCEDURE — G8427 DOCREV CUR MEDS BY ELIG CLIN: HCPCS | Performed by: INTERNAL MEDICINE

## 2022-05-26 PROCEDURE — 99211 OFF/OP EST MAY X REQ PHY/QHP: CPT

## 2022-05-26 PROCEDURE — 36415 COLL VENOUS BLD VENIPUNCTURE: CPT

## 2022-05-26 PROCEDURE — G8420 CALC BMI NORM PARAMETERS: HCPCS | Performed by: INTERNAL MEDICINE

## 2022-05-26 NOTE — PROGRESS NOTES
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 Chief Complaint   Patient presents with    New Patient     Prostate cancer metastatic to bone [facility])          HPI: Elia Lewis is a delightful 55-year-old gentleman  was diagnosed with prostate cancer in January 2016. PSA in January 2017 was 25. He developed abdominal pain and bone pain and CT abdomen pelvis 12/17 states 18 showed multiple bone mets and pathologic compression fracture L1-3. He had enlarged prostate and pelvic adenopathy. PSA December 2018 was 1594. Started Casodex December 2018. TRUS prostate biopsy 1/24/2019 by Dr. Delfino Montiel showed a high-grade prostate adenocarcinoma high-grade clinical stage [id number] stage IVb. Treatment has included Lupron every 6 months since diagnosis Next due May 2022. He has been on Casodex other than a 6-month break several years ago remains on Casodex at this time. He received radiation to the T-spine T3-T9 through 1000 cGy completed March 2019. PSA has been steadily rising over the last several months on current therapy. He remains at his baseline with no new bone or back pain or decline. 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     PMH:   Past Medical History:   Diagnosis Date    Blood circulation, collateral     CAD (coronary artery disease)     CAD (coronary artery disease) 9/2/2014    Cancer (HCC)     prostate to bone    CKD (chronic kidney disease) stage 2, GFR 60-89 ml/min 12/24/2018    COPD (chronic obstructive pulmonary disease) ([facility]     Diverticulitis     DVT of lower extremity, bilateral ([facility] 2019    GERD (gastroesophageal reflux disease)     Hiatal hernia     Hyperlipemia 2013    Hyperlipidemia     Other disorders of kidney and ureter in diseases classified elsewhere     Pleural plaque 2013    Pneumonia of both lower lobes due to infectious organism     Primary adenocarcinoma of prostate ([facility] 2019    high grade    Pulmonary emboli ([facility] 2019    Vitamin D deficiency 2020    COPD; O2 dependent. Quit . Coronary stents x2 no history of MI no history of congestive heart failure or arrhythmias. History of DVT and PE in 2019 just on aspirin  No history of TIA or or seizures. Social HX:   Social History     Socioeconomic History    Marital status:       Spouse name: Not on file    Number of children: 11    Years of education: Not on file    Highest education level: Not on file   Occupational History    Occupation: Retired   Tobacco Use    Smoking status: Former Smoker     Packs/day: 1.00     Types: Cigarettes     Quit date: 1966     Years since quittin.9    Smokeless tobacco: Never Used   Vaping Use    Vaping Use: Never used   Substance and Sexual Activity    Alcohol use: Yes     Comment: occasionally    Drug use: No    Sexual activity: Not Currently   Other Topics Concern    Not on file   Social History Narrative    - Former    Lives alone but has lots of family support that goes in and helps him    Son will spend the night most nights    Family assists with transportation    No social barriers noted    No barriers with medication affordability     Social Determinants of Health     Financial Resource Strain: Low Risk     Difficulty of Paying Living Expenses: Not hard at all   Food Insecurity: No Food Insecurity    Worried About [address] in the Last Year: Never true    [address]  [employer] in the Last Year: Never true   Transportation Needs:     Lack of Transportation (Medical): Not on file    Lack of Transportation (Non-Medical): Not on file   Physical Activity: Inactive    Days of Exercise per Week: 0 days    Minutes of Exercise per Session: 0 min   Stress:     Feeling of Stress : Not on file   Social Connections:     Frequency of Communication with Friends and Family: Not on file    Frequency of Social Gatherings with Friends and Family: Not on file    Attends Yazidi Services: Not on file    Active Member of [address] [place] [address] or Organizations: Not on file    Attends Club or Organization Meetings: Not on file    Marital Status: Not on file   Intimate Partner Violence:     Fear of Current or Ex-Partner: Not on file    Emotionally Abused: Not on file    Physically Abused: Not on file    Sexually Abused: Not on file   Housing Stability:     Unable to Pay for Housing in the Last Year: Not on file    Number of [place] in the Last Year: Not on file    Unstable Housing in the Last Year: Not on file        Spouse:     Phone: [phone]     [address] [phone]    Employment:  [employer] [address] [place] [address] in [employer] .     Immunizations:  Immunization History   Administered Date(s) Administered    COVID-19, Pfizer Purple top, DILUTE for use, 12+ yrs, 30mcg/0.3mL dose 2021, 2021    Influenza 2012    Influenza Virus Vaccine 2014, 2015    Influenza, MDCK Quadv, IM, PF (Flucelvax 2 yrs and older) 10/26/2017    Influenza, MDCK Quadv, with preserv IM (Flucelvax 2 yrs and older) 2019    Influenza, Rosa Awan, 6 mo and older, IM (Fluzone, Flulaval) 10/18/2018    Influenza, Rosa Lv, IM, (6 mo and older Fluzone, Flulaval, Fluarix and 3 yrs and older Afluria) 10/07/2016, 2021    PPD Test 2018, 2019    Pneumococcal Conjugate 13-valent ([id number]) 2015    Pneumococcal Polysaccharide ([id number]) 2007    Tdap (Boostrix, Adacel) 07/31/2013        Health Screenings:    Prostate:   Lab Results   Component Value Date    PSA 9.82 (H) 04/28/2022    PSA 8.99 (H) 03/01/2022    PSA 7.86 (H) 01/25/2022          Health Maintenance Due   Topic Date Due    Shingles vaccine (1 of 2) Never done    COVID-19 Vaccine (3 - Pfizer risk 4-dose series) 04/22/2021        Interests:       Fam HX:   Family History   Problem Relation Age of Onset    Cancer Mother     Heart Disease Brother         Hospitalizations:   Copd exacerbation  4/22    Allergies:  No Known Allergies     Adult Illness:  Patient Active Problem List   Diagnosis    Hyperlipidemia    Pleural plaque    Coronary arteriosclerosis    Urinary bladder stone    Stage 3 severe COPD by GOLD classification ([address]    S/P exploratory laparotomy    Prostate cancer metastatic to bone ([address]    Raised prostate specific antigen    Benign prostatic hyperplasia with urinary obstruction    Trauma of urethra    Acute midline low back pain    Acute postoperative pulmonary insufficiency (HCC)    Acute on chronic respiratory failure with hypoxia (HCC)    Centrilobular emphysema (HCC)    Asthenia    Stage 2 chronic kidney disease    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    Pathological fracture of sacral vertebra due to neoplastic disease    Mild bibasilar atelectasis    Pleural effusion, right    Microscopic hematuria    Nutritional marasmus (HCC)    Pathological fracture of vertebra due to neoplastic disease    Acute deep vein thrombosis of lower limb (HCC)    Adenocarcinoma of prostate ([address]    DVT of lower extremity, bilateral (HCC)    Vitamin D deficiency    Pneumonia of both lower lobes due to infectious organism    Dysphagia    Moderate malnutrition (HCC)    Acute respiratory failure with hypoxia (HCC)    COPD (chronic obstructive pulmonary disease) ([address]        Surgery:  Past Surgical History:   Procedure Laterality Date    ABDOMEN SURGERY      APPENDECTOMY      CARDIAC CATHETERIZATION      CYSTOSCOPY  03/17/2015    Litholapaxy-Large    EYE SURGERY      cataracts    HERNIA REPAIR      KYPHOSIS SURGERY N/A 2/13/2019    ABLATION AND KYPHOPLASTY AT T8 AND ABLATION AND KYPHOPLASTY AT T6 performed by Lucy Lyman MD at [address] N/A 12/17/2018    EXPLORATORY LAPAROTOMY, WITH ABDOMINAL WASHOUT performed by Petra Juarez MD at [facility]        Medications:  Current Outpatient Medications   Medication Sig Dispense Refill    traMADol (ULTRAM) 50 MG tablet Take 1 tablet by mouth every 8 hours as needed for Pain for up to 30 days.  90 tablet 0    albuterol sulfate HFA (VENTOLIN HFA) 108 (90 Base) MCG/ACT inhaler Inhale 2 puffs into the lungs every 6 hours as needed for Wheezing or Shortness of Breath 1 each 1    albuterol (PROVENTIL) (2.5 MG/3ML) 0.083% nebulizer solution Take 3 mLs by nebulization 2 times daily And every 6  Hours  prn 180 mL 11    carvedilol (COREG) 3.125 MG tablet TAKE 1 TABLET BY MOUTH TWICE DAILY WITH MEALS 180 tablet 3    finasteride (PROSCAR) 5 MG tablet Take 1 tablet by mouth once daily 90 tablet 3    tiotropium-olodaterol (STIOLTO RESPIMAT) 2.5-2.5 MCG/ACT AERS INHALE 2 PUFFS BY MOUTH ONCE DAILY 4 g 5    doxazosin (CARDURA) 4 MG tablet Take 1 tablet by mouth nightly (Patient taking differently: 2 mg ) 90 tablet 3    leuprolide (LUPRON) 45 MG injection Inject 45 mg into the muscle once       calcium carbonate-vitamin D (CALCIUM 600 + D) 600-400 MG-UNIT TABS per tab Take 1 tablet by mouth 3 times daily 90 tablet 5    denosumab (XGEVA) 120 MG/1.7ML SOLN SC injection Inject 120 mg into the skin once Every 28 days       polyethylene glycol (MIRALAX) 17 g packet Take 17 g by mouth once      bicalutamide (CASODEX) 50 MG chemo tablet Take 1 tablet by mouth daily 90 tablet 3    Cholecalciferol (VITAMIN D3) 125 MCG (5000 UT) CAPS Take 1 capsule by mouth every morning (before breakfast)  Misc. Devices (ACAPELLA) MISC 1 Device by Does not apply route 4 times daily 1 each 0    Multiple Vitamins-Minerals (THERAPEUTIC MULTIVITAMIN-MINERALS) tablet Take 1 tablet by mouth daily      docusate sodium (COLACE, DULCOLAX) 100 MG CAPS Take 100 mg by mouth 2 times daily (Patient taking differently: Take 100 mg by mouth nightly )      acetaminophen (TYLENOL) 500 MG tablet Take 500 mg by mouth every 8 hours as needed for Pain      aspirin 325 MG EC tablet Take 1 tablet by mouth daily       No current facility-administered medications for this visit. EXAM:   height is 5' 7\" (1.702 m) and weight is 132 lb (59.9 kg). His oral temperature is 98.9 °F (37.2 °C). His blood pressure is 108/64 and his pulse is 94. His respiration is 18 and oxygen saturation is 93%. Estimated body surface area is 1.68 meters squared as calculated from the following:    Height as of this encounter: 5' 7\" (1.702 m). Weight as of this encounter: 132 lb (59.9 kg). ECO showed male wearing oxygen very pleasant and in no distress  General: Non-ill appearing. Appears a little bit younger than his stated age sound mind and hearing is fairly good  HEENT: NC/AT,nonicteric, perrla,  Neck: normal thyroid, no masses. Nodes: No adenopathy  Lungs/chest: clear, no rales,rhonchi or wheezing, lung bases clear  CV: rrr, no rubs ,gallops or murmurs  Breasts: Not examined  Abd/Rectal: soft, non-tender,bowel sounds normal , no HSM,no masses  Back: normal curvature, No midline tenderness. flanks nontender  : Not Examined  Extremities: no cyanosis,clubbing or edema. In extremities. Skin: unremarkable  Neuro: A and O x 4, CN exam nonfocal, Motor- no deficits, Sensory- no deficits, gait-nl, speech- fluent, no ataxia.   Devices: none      DATA:    LAB:    CBC CMP PSA ordered    CBC with Differential:      Lab Results   Component Value Date    WBC 6.4 2022    RBC 4.28 2022    RBC 4.44 2017    HGB 14.6 2022 HCT 42.2 04/05/2022     04/05/2022    MCV 98.6 04/05/2022    MCH 34.1 04/05/2022    MCHC 34.6 04/05/2022    RDW 11.8 04/06/2017    NRBC 0 04/02/2022    SEGSPCT 71.1 04/02/2022    LYMPHOPCT 21.1 04/06/2017    MONOPCT 12.1 04/02/2022    EOSPCT 1.0 04/06/2017    BASOPCT 0.5 04/06/2017    MONOSABS 0.6 04/02/2022    LYMPHSABS 0.7 04/02/2022    EOSABS 0.0 04/02/2022    BASOSABS 0.0 04/02/2022    DIFFTYPE see below 12/18/2018     Lab Results   Component Value Date/Time    SEGSABS 3.5 04/02/2022 09:55 PM       CMP:    Lab Results   Component Value Date     04/05/2022    K 4.2 04/05/2022    K 4.0 04/02/2022    CL 92 04/05/2022    CO2 27 04/05/2022    BUN 21 04/05/2022    CREATININE 1.1 04/05/2022    LABGLOM 62 04/05/2022    GLUCOSE 145 04/05/2022    GLUCOSE 96 04/06/2017    PROT 7.3 04/02/2022    LABALBU 4.5 04/02/2022    CALCIUM 10.4 04/05/2022    BILITOT 0.4 04/02/2022    BILITOT NEGATIVE 12/09/2016    ALKPHOS 63 04/02/2022    AST 35 04/02/2022    ALT 15 04/02/2022       BMP:    Lab Results   Component Value Date     04/05/2022    K 4.2 04/05/2022    K 4.0 04/02/2022    CL 92 04/05/2022    CO2 27 04/05/2022    BUN 21 04/05/2022    LABALBU 4.5 04/02/2022    CREATININE 1.1 04/05/2022    CALCIUM 10.4 04/05/2022    LABGLOM 62 04/05/2022    GLUCOSE 145 04/05/2022    GLUCOSE 96 04/06/2017       Magnesium:    Lab Results   Component Value Date    MG 1.9 02/06/2021     PT/INR:    Lab Results   Component Value Date    INR 1.02 04/02/2022     TSH:    Lab Results   Component Value Date    TSH 2.130 05/22/2020     VITAMIN B12: No components found for: B12  FOLATE:  No results found for: FOLATE  IRON:  No results found for: IRON  Iron Saturation:  No components found for: PERCENTFE  TIBC:  No results found for: TIBC  FERRITIN:  No results found for: FERRITIN  PSA:   Lab Results   Component Value Date    PSA 9.82 04/28/2022            IMAGING:  MRI thoracic spine 2/7/2019  MRI lumbar spine 2/8/2019 2/5/2019 CT chest thoracic and lumbar spine abdomen pelvis. 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 Treatment thus far Lupron ongoing. Casodex since December 2018 ongoing. Radiation to his spine as above. Asymptomatic at this point. Referred because of rising PSA on current therapy. 2) Prognosis / Disease Status: Slight prostate cancer to bone with a slowly rising PSA; castrate resistance. 3) Work-up:    Labs: BC, CMP PSA   Imaging: Bone scan   Procedures: None   Consults: None   Other: Continue to follow-up with urology due for every 6-month Lupron this month. 4) Symptom Management: None new needed      5) Supportive care provided. Level of care is appropriate. Teaching done today. Reviewed treatment options. 6) Treatment goal:      Treatment plan:     Start simply with Casodex withdrawal.  Discontinue Casodex today 5/26  Probably need to start a second generation antiandrogen sometime in the next several months. 7) Medications reviewed. Prescriptions today: None            No orders of the defined types were placed in this encounter. OARRS:  Controlled Substance Monitoring:    Acute and Chronic Pain Monitoring:   RX Monitoring 3/18/2022   Attestation -   Periodic Controlled Substance Monitoring No signs of potential drug abuse or diversion identified. 8) Research Options:   None      9) Other:       None    10) Follow Up:   Follow-up with me late July and will review for check Sharan Hackett MD

## 2022-05-26 NOTE — PATIENT INSTRUCTIONS
Cbc,cmp,PSA today  Bone scan  Cont lupron q 6 months per urology ,due 5/22  DC Casodex   fu 2 months w me  7/25/22  1 week BEFORE- PSA  7/18/22

## 2022-05-26 NOTE — PROGRESS NOTES
Name: Sigrid Bledsoe  : 1926  MRN: [id number]    Oncology Navigation- Initial Note:    Intake-  Contact Type: Medical Oncology    Diagnosis: Prostate  DX 2018, +Bone mets  Tx with Casodex, Lupron, Xgeva    PSA trending upward agai, Urology request for ONC input for tx recommendations. Home Disposition: Lives alone  -son stays with pt at night  -4 children assist as needed. -Needs assistance with bathing  -Children drive pt to appts.  -Children bring in meals, do cleaning, & laundry    -HX PE  -S/p Kypohoplasty x2 for compression FXS. -Wears binder back brace for back support  -Uses 1/3 of a 50 mg Ultram for pain relief prn  -O2 at 1 lpm while up & about; uses 2 lpm at night. +COPD    Patient needs and barriers to care: Coordination of Care, Knowledge deficit and Symptom Management     Referral Source: Outpatient    Receptive to Advanced Care Planning/ Palliative Care:  Deferred today    Interventions-   General Interventions: [facility] program discussed; welcome folder given & reviewed. Education/Screenings:  yes -     ONC recommendations for Care:  -STOP Casodex  -Bone Scan & PSA   -Return  to see [address] today     Currently on ADT: yes - Managed at [facility]  STARTED 2019     Referrals: Supportive Therapies      Continuum of Care: Diagnosis/Active Treatment    Notes: Jayden available to assist as needed.     Electronically signed by Jacky Allen RN on 2022 at 4:35 PM

## 2022-06-06 ENCOUNTER — NURSE ONLY (OUTPATIENT)
Dept: UROLOGY | Age: 87
End: 2022-06-06
Payer: MEDICARE

## 2022-06-06 DIAGNOSIS — C79.51 SECONDARY MALIGNANT NEOPLASM OF BONE AND BONE MARROW (HCC): Primary | ICD-10-CM

## 2022-06-06 DIAGNOSIS — C61 MALIGNANT NEOPLASM OF PROSTATE (HCC): ICD-10-CM

## 2022-06-06 DIAGNOSIS — C79.52 SECONDARY MALIGNANT NEOPLASM OF BONE AND BONE MARROW (HCC): Primary | ICD-10-CM

## 2022-06-06 PROCEDURE — 96372 THER/PROPH/DIAG INJ SC/IM: CPT | Performed by: UROLOGY

## 2022-06-06 PROCEDURE — 96402 CHEMO HORMON ANTINEOPL SQ/IM: CPT | Performed by: UROLOGY

## 2022-06-06 NOTE — PROGRESS NOTES
Patient has given me verbal consent to perform Lupron Injection  Yes    Following Dr. Loyola Shown of care. LUPRON 45 MG GIVEN I.M Left UOQ HIP  Lot Number: [id number]  Expiration Date: 10-1-2024  [facility] #: [id number]    After Injection was given there were no reactions at injection site and patient was feeling well. Patient was notified that possible side effects from injections include: Redness, swelling and itching at the injection site. Possible side effects of androgen deprivation therapy, including hot flashes, flushing of the skin, increased weight, decreased sex drive, and difficulties with ED. Patient was instructed to call the office with any further questions or concerns. Patient supplied their own medications No    Pt [address] therapy first initiated on 2-. Patient has given me verbal consent to perform Xgeva Injection yes    Following Dr. Loyola Shown of care. XGEVA 120MG/1.7ML MG GIVEN S.C Left ARM  Lot Number: [id number]  Expiration Date:   [facility] #: [id number]    After Injection was given there were no reactions at injection site and patient was feeling well. Patient was notified that possible side effects from injections include: Redness, swelling and itching at the injection site. Possible side effects of androgen deprivation therapy, including hot flashes, flushing of the skin, increased weight, decreased sex drive, and difficulties with ED. Patient was instructed to inform their dental office that they are receiving [place] and that major dental procedures should be avoided. Patient was advised to call our office with any further questions or concerns. Any active dental problems, infections, or pain? no    Date of last dental appointment: unknown    Any planned dental procedures? no    Patient supplied their own medications No    Pt [address] therapy first initiated on 2-.

## 2022-06-07 ENCOUNTER — HOSPITAL ENCOUNTER (OUTPATIENT)
Dept: NUCLEAR MEDICINE | Age: 87
Discharge: HOME | End: 2022-06-07
Payer: MEDICARE

## 2022-06-07 DIAGNOSIS — C61 PROSTATE CA (HCC): ICD-10-CM

## 2022-06-07 DIAGNOSIS — C79.51 OSSEOUS METASTASIS (HCC): ICD-10-CM

## 2022-06-07 PROCEDURE — A9503 TC99M MEDRONATE: HCPCS | Performed by: INTERNAL MEDICINE

## 2022-06-07 PROCEDURE — 3430000000 HC RX DIAGNOSTIC RADIOPHARMACEUTICAL: Performed by: INTERNAL MEDICINE

## 2022-06-07 PROCEDURE — 78306 BONE IMAGING WHOLE BODY: CPT

## 2022-06-07 RX ORDER — TC 99M MEDRONATE 20 MG/10ML
22.2 INJECTION, POWDER, LYOPHILIZED, FOR SOLUTION INTRAVENOUS
Status: COMPLETED | OUTPATIENT
Start: 2022-06-07 | End: 2022-06-07

## 2022-06-07 RX ADMIN — TC 99M MEDRONATE 22.2 MILLICURIE: 20 INJECTION, POWDER, LYOPHILIZED, FOR SOLUTION INTRAVENOUS at 11:00

## 2022-07-07 ENCOUNTER — NURSE ONLY (OUTPATIENT)
Dept: LAB | Age: 87
End: 2022-07-07

## 2022-07-07 DIAGNOSIS — C79.52 SECONDARY MALIGNANT NEOPLASM OF BONE AND BONE MARROW (HCC): ICD-10-CM

## 2022-07-07 DIAGNOSIS — Z51.81 THERAPEUTIC DRUG MONITORING: ICD-10-CM

## 2022-07-07 DIAGNOSIS — C79.51 SECONDARY MALIGNANT NEOPLASM OF BONE AND BONE MARROW (HCC): ICD-10-CM

## 2022-07-07 LAB
ANION GAP SERPL CALCULATED.3IONS-SCNC: 18 MEQ/L (ref 8–16)
BUN BLDV-MCNC: 18 MG/DL (ref 7–22)
CALCIUM SERPL-MCNC: 9.9 MG/DL (ref 8.5–10.5)
CHLORIDE BLD-SCNC: 94 MEQ/L (ref 98–111)
CO2: 28 MEQ/L (ref 23–33)
CREAT SERPL-MCNC: 1.1 MG/DL (ref 0.4–1.2)
GFR SERPL CREATININE-BSD FRML MDRD: 62 ML/MIN/1.73M2
GLUCOSE BLD-MCNC: 120 MG/DL (ref 70–108)
POTASSIUM SERPL-SCNC: 4.3 MEQ/L (ref 3.5–5.2)
PROSTATE SPECIFIC ANTIGEN: 13.42 NG/ML (ref 0–1)
SODIUM BLD-SCNC: 140 MEQ/L (ref 135–145)

## 2022-07-09 LAB — TESTOSTERONE TOTAL: < 3 NG/DL (ref 300–720)

## 2022-07-12 ENCOUNTER — OFFICE VISIT (OUTPATIENT)
Dept: UROLOGY | Age: 87
End: 2022-07-12
Payer: MEDICARE

## 2022-07-12 VITALS
WEIGHT: 131 LBS | HEIGHT: 67 IN | BODY MASS INDEX: 20.56 KG/M2 | SYSTOLIC BLOOD PRESSURE: 106 MMHG | DIASTOLIC BLOOD PRESSURE: 72 MMHG

## 2022-07-12 DIAGNOSIS — C61 MALIGNANT NEOPLASM OF PROSTATE (HCC): Primary | ICD-10-CM

## 2022-07-12 DIAGNOSIS — C79.51 PROSTATE CANCER METASTATIC TO BONE (HCC): ICD-10-CM

## 2022-07-12 DIAGNOSIS — C61 PROSTATE CANCER METASTATIC TO BONE (HCC): ICD-10-CM

## 2022-07-12 PROCEDURE — 1123F ACP DISCUSS/DSCN MKR DOCD: CPT | Performed by: NURSE PRACTITIONER

## 2022-07-12 PROCEDURE — G8420 CALC BMI NORM PARAMETERS: HCPCS | Performed by: NURSE PRACTITIONER

## 2022-07-12 PROCEDURE — 1036F TOBACCO NON-USER: CPT | Performed by: NURSE PRACTITIONER

## 2022-07-12 PROCEDURE — 96372 THER/PROPH/DIAG INJ SC/IM: CPT | Performed by: NURSE PRACTITIONER

## 2022-07-12 PROCEDURE — 99213 OFFICE O/P EST LOW 20 MIN: CPT | Performed by: NURSE PRACTITIONER

## 2022-07-12 PROCEDURE — G8427 DOCREV CUR MEDS BY ELIG CLIN: HCPCS | Performed by: NURSE PRACTITIONER

## 2022-07-12 ASSESSMENT — ENCOUNTER SYMPTOMS
VOMITING: 0
BACK PAIN: 0
ABDOMINAL PAIN: 0
NAUSEA: 0

## 2022-07-21 ENCOUNTER — HOSPITAL ENCOUNTER (OUTPATIENT)
Age: 87
Discharge: HOME | End: 2022-07-21
Payer: MEDICARE

## 2022-07-21 DIAGNOSIS — C61 PROSTATE CA (HCC): ICD-10-CM

## 2022-07-21 DIAGNOSIS — C79.51 OSSEOUS METASTASIS (HCC): ICD-10-CM

## 2022-07-21 LAB — PROSTATE SPECIFIC ANTIGEN: 13.78 NG/ML (ref 0–1)

## 2022-07-21 PROCEDURE — 36415 COLL VENOUS BLD VENIPUNCTURE: CPT

## 2022-07-21 PROCEDURE — 84153 ASSAY OF PSA TOTAL: CPT

## 2022-07-22 ENCOUNTER — TELEPHONE (OUTPATIENT)
Dept: FAMILY MEDICINE CLINIC | Age: 87
End: 2022-07-22

## 2022-07-22 NOTE — TELEPHONE ENCOUNTER
Jodie Mcdermott from [facility] stated they do need a RX as it goes through the FDA but it will not be covered if it is less than 5 years from the time the pt received the prior acapella. They pt will need to pay for it.

## 2022-07-22 NOTE — TELEPHONE ENCOUNTER
Jair Oquendo, please call [facility] as I don't believe a Rx makes any difference in cost for an acapella

## 2022-07-22 NOTE — TELEPHONE ENCOUNTER
I could only find a respiratory therapy order for acapella in Epic.  I ordered it in a letter format to be faxed to [address] [place] [address] Sean Martin

## 2022-07-22 NOTE — TELEPHONE ENCOUNTER
Rayray Ziegler called stating pt. May have accidentally acapella meter in the garbage. She req Rx for new acapella meter. [address] Please call Rayray Ziegler once ordered.  [phone]

## 2022-07-25 ENCOUNTER — OFFICE VISIT (OUTPATIENT)
Dept: ONCOLOGY | Age: 87
End: 2022-07-25
Payer: MEDICARE

## 2022-07-25 ENCOUNTER — HOSPITAL ENCOUNTER (OUTPATIENT)
Dept: INFUSION THERAPY | Age: 87
Discharge: HOME | End: 2022-07-25
Payer: MEDICARE

## 2022-07-25 VITALS
HEART RATE: 88 BPM | SYSTOLIC BLOOD PRESSURE: 96 MMHG | RESPIRATION RATE: 16 BRPM | DIASTOLIC BLOOD PRESSURE: 60 MMHG | TEMPERATURE: 96.5 F

## 2022-07-25 VITALS
RESPIRATION RATE: 16 BRPM | HEIGHT: 67 IN | HEART RATE: 88 BPM | OXYGEN SATURATION: 93 % | TEMPERATURE: 96.5 F | DIASTOLIC BLOOD PRESSURE: 60 MMHG | SYSTOLIC BLOOD PRESSURE: 96 MMHG | BODY MASS INDEX: 20.56 KG/M2 | WEIGHT: 131 LBS

## 2022-07-25 DIAGNOSIS — C79.51 OSSEOUS METASTASIS (HCC): ICD-10-CM

## 2022-07-25 DIAGNOSIS — C61 PROSTATE CA (HCC): Primary | ICD-10-CM

## 2022-07-25 DIAGNOSIS — N18.31 STAGE 3A CHRONIC KIDNEY DISEASE (HCC): ICD-10-CM

## 2022-07-25 PROBLEM — N18.30 CHRONIC RENAL DISEASE, STAGE III (HCC): Status: ACTIVE | Noted: 2022-07-25

## 2022-07-25 PROCEDURE — 1123F ACP DISCUSS/DSCN MKR DOCD: CPT | Performed by: INTERNAL MEDICINE

## 2022-07-25 PROCEDURE — 99213 OFFICE O/P EST LOW 20 MIN: CPT | Performed by: INTERNAL MEDICINE

## 2022-07-25 PROCEDURE — 99211 OFF/OP EST MAY X REQ PHY/QHP: CPT

## 2022-07-25 NOTE — PATIENT INSTRUCTIONS
Fu 2 months w me 9/26  1 week before - cbc cmp,psa 9/19  Continue Xgeva monthly by urology  Continue Lupron per urology every 6 months.

## 2022-07-25 NOTE — PROGRESS NOTES
Systems 14 point negative except as above. PMH:   Past Medical History:   Diagnosis Date    Blood circulation, collateral     CAD (coronary artery disease)     CAD (coronary artery disease) 2014    Cancer ([facility]     prostate to bone    CKD (chronic kidney disease) stage 2, GFR 60-89 ml/min 2018    COPD (chronic obstructive pulmonary disease) ([facility]     Diverticulitis     DVT of lower extremity, bilateral ([facility] 2019    GERD (gastroesophageal reflux disease)     Hiatal hernia     Hyperlipemia 2013    Hyperlipidemia     Other disorders of kidney and ureter in diseases classified elsewhere     Pleural plaque 2013    Pneumonia of both lower lobes due to infectious organism     Primary adenocarcinoma of prostate ([facility] 2019    high grade    Pulmonary emboli ([facility] 2019    Vitamin D deficiency 2020    COPD; O2 dependent. Quit . Coronary stents x2 no history of MI no history of congestive heart failure or arrhythmias. History of DVT and PE in 2019 just on aspirin  No history of TIA or or seizures. Social HX:   Social History     Socioeconomic History    Marital status:       Spouse name: Not on file    Number of children: 5    Years of education: Not on file    Highest education level: Not on file   Occupational History    Occupation: Retired   Tobacco Use    Smoking status: Former     Packs/day: 1.00     Types: Cigarettes     Quit date: 1966     Years since quittin.1    Smokeless tobacco: Never   Vaping Use    Vaping Use: Never used   Substance and Sexual Activity    Alcohol use: Yes     Comment: occasionally    Drug use: No    Sexual activity: Not Currently   Other Topics Concern    Not on file   Social History Narrative    - Former    Lives alone but has lots of family support that goes in and helps him    Son will spend the night most nights    Family assists with transportation    No social barriers noted    No barriers with medication affordability Social Determinants of Health     Financial Resource Strain: Low Risk     Difficulty of Paying Living Expenses: Not hard at all   Food Insecurity: No Food Insecurity    Worried About Running Out of Food in the Last Year: Never true    Ran Out of Food in the Last Year: Never true   Transportation Needs: Not on file   Physical Activity: Inactive    Days of Exercise per Week: 0 days    Minutes of Exercise per Session: 0 min   Stress: Not on file   Social Connections: Not on file   Intimate Partner Violence: Not on file   Housing Stability: Not on file        Spouse:     Phone: [phone]     [place]  [place] [address] [phone]    Employment:  [employer] in [facility] [employer] .     Immunizations:  Immunization History   Administered Date(s) Administered    COVID-19, PFIZER PURPLE top, DILUTE for use, (age 15 y+), 30mcg/0.3mL 2021, 2021    Influenza 2012    Influenza Virus Vaccine 2014, 2015    Influenza, MDCK Quadv, IM, PF (Flucelvax 2 yrs and older) 10/26/2017    Influenza, MDCK Quadv, with preserv IM (Flucelvax 2 yrs and older) 2019    Influenza, Quadv, 6 mo and older, IM (Fluzone, Flulaval) 10/18/2018    Influenza, Quadv, IM, (6 mo and older Fluzone, Flulaval, Fluarix and 3 yrs and older Afluria) 10/07/2016, 2021    PPD Test 2018, 2019    Pneumococcal Conjugate 13-valent ([id number]) 2015    Pneumococcal Polysaccharide ([id number]) 2007    Tdap (Boostrix, Adacel) 2013        Health Screenings:    Prostate:   Lab Results   Component Value Date    PSA 13.78 (H) 2022    PSA 13.42 (H) 2022    PSA 11.57 (H) 2022          Health Maintenance Due   Topic Date Due    Shingles vaccine (1 of 2) Never done    COVID-19 Vaccine (3 - Pfizer risk series) 2021        Interests:       Fam HX:   Family History   Problem Relation Age of Onset    Cancer Mother     Heart Medications   Medication Sig Dispense Refill    albuterol sulfate HFA (VENTOLIN HFA) 108 (90 Base) MCG/ACT inhaler Inhale 2 puffs into the lungs every 6 hours as needed for Wheezing or Shortness of Breath 1 each 1    carvedilol (COREG) 3.125 MG tablet TAKE 1 TABLET BY MOUTH TWICE DAILY WITH MEALS 180 tablet 3    finasteride (PROSCAR) 5 MG tablet Take 1 tablet by mouth once daily 90 tablet 3    tiotropium-olodaterol (STIOLTO RESPIMAT) 2.5-2.5 MCG/ACT AERS INHALE 2 PUFFS BY MOUTH ONCE DAILY 4 g 5    doxazosin (CARDURA) 4 MG tablet Take 1 tablet by mouth nightly (Patient taking differently: 2 mg) 90 tablet 3    leuprolide (LUPRON) 45 MG injection Inject 45 mg into the muscle once       calcium carbonate-vitamin D (CALCIUM 600 + D) 600-400 MG-UNIT TABS per tab Take 1 tablet by mouth 3 times daily 90 tablet 5    denosumab (XGEVA) 120 MG/1.7ML SOLN SC injection Inject 120 mg into the skin once Every 28 days       polyethylene glycol (GLYCOLAX) 17 g packet Take 17 g by mouth once      Cholecalciferol (VITAMIN D3) 125 MCG (5000 UT) CAPS Take 1 capsule by mouth every morning (before breakfast)      Misc.  Devices (ACAPELLA) MISC 1 Device by Does not apply route 4 times daily 1 each 0    Multiple Vitamins-Minerals (THERAPEUTIC MULTIVITAMIN-MINERALS) tablet Take 1 tablet by mouth daily      docusate sodium (COLACE, DULCOLAX) 100 MG CAPS Take 100 mg by mouth 2 times daily (Patient taking differently: Take 100 mg by mouth nightly)      acetaminophen (TYLENOL) 500 MG tablet Take 500 mg by mouth every 8 hours as needed for Pain      aspirin 325 MG EC tablet Take 1 tablet by mouth daily      albuterol (PROVENTIL) (2.5 MG/3ML) 0.083% nebulizer solution Take 3 mLs by nebulization 2 times daily And every 6  Hours  prn (Patient not taking: Reported on 7/25/2022) 180 mL 11    bicalutamide (CASODEX) 50 MG chemo tablet Take 1 tablet by mouth daily (Patient not taking: No sig reported) 90 tablet 3     No current facility-administered medications for this visit. EXAM:   height is 5' 6.5\" (1.689 m) and weight is 131 lb (59.4 kg). His oral temperature is 96.5 °F (35.8 °C) (abnormal). His blood pressure is 96/60 and his pulse is 88. His respiration is 16 and oxygen saturation is 93%. Estimated body surface area is 1.67 meters squared as calculated from the following:    Height as of this encounter: 5' 6.5\" (1.689 m). Weight as of this encounter: 131 lb (59.4 kg). ECOG: male wearing oxygen very pleasant and in no distress sitting in a wheelchair with daughter present. General: Non-ill appearing. Appears a little bit younger than his stated age sound mind and hearing is fairly good  HEENT: NC/AT,nonicteric, perrla,  Neck: normal thyroid, no masses. Nodes: No adenopathy  Lungs/chest: clear, no rales,rhonchi or wheezing, lung bases clear  CV: rrr, no rubs ,gallops or murmurs  Breasts: Not examined  Abd/Rectal: soft, non-tender,bowel sounds normal , no HSM,no masses  Back: normal curvature, No midline tenderness. flanks nontender  : Not Examined  Extremities: no cyanosis,clubbing or edema. In extremities. Skin: unremarkable  Neuro: A and O x 4, CN exam nonfocal, Motor- no deficits, Sensory- no deficits, gait-nl, speech- fluent, no ataxia.   Devices: none      DATA:    LAB:       CBC with Differential:      Lab Results   Component Value Date/Time    WBC 6.1 05/26/2022 02:18 PM    RBC 3.90 05/26/2022 02:18 PM    RBC 4.44 04/06/2017 12:22 PM    HGB 13.5 05/26/2022 02:18 PM    HCT 40.3 05/26/2022 02:18 PM     05/26/2022 02:18 PM     05/26/2022 02:18 PM    MCH 34.6 05/26/2022 02:18 PM    MCHC 33.5 05/26/2022 02:18 PM    RDW 13.0 05/26/2022 02:18 PM    NRBC 0 04/02/2022 09:55 PM    SEGSPCT 71.1 04/02/2022 09:55 PM    LYMPHOPCT 21.1 04/06/2017 12:22 PM    MONOPCT 12.1 04/02/2022 09:55 PM    EOSPCT 1.0 04/06/2017 12:22 PM    BASOPCT 0.5 04/06/2017 12:22 PM    MONOSABS 0.6 05/26/2022 02:18 PM    LYMPHSABS 0.5 05/26/2022 02:18 PM    EOSABS 0.1 05/26/2022 02:18 PM    BASOSABS 0.0 05/26/2022 02:18 PM    DIFFTYPE see below 12/18/2018 05:45 AM     Lab Results   Component Value Date/Time    SEGSABS 4.8 05/26/2022 02:18 PM       CMP:    Lab Results   Component Value Date/Time     07/07/2022 09:55 AM    K 4.3 07/07/2022 09:55 AM    K 4.0 04/02/2022 09:55 PM    CL 94 07/07/2022 09:55 AM    CO2 28 07/07/2022 09:55 AM    BUN 18 07/07/2022 09:55 AM    CREATININE 1.1 07/07/2022 09:55 AM    LABGLOM 62 07/07/2022 09:55 AM    GLUCOSE 120 07/07/2022 09:55 AM    GLUCOSE 96 04/06/2017 12:22 PM    PROT 6.4 05/26/2022 02:18 PM    LABALBU 4.5 05/26/2022 02:18 PM    CALCIUM 9.9 07/07/2022 09:55 AM    BILITOT 0.4 05/26/2022 02:18 PM    BILITOT NEGATIVE 12/09/2016 12:55 PM    ALKPHOS 61 05/26/2022 02:18 PM    AST 27 05/26/2022 02:18 PM    ALT 11 05/26/2022 02:18 PM       BMP:    Lab Results   Component Value Date/Time     07/07/2022 09:55 AM    K 4.3 07/07/2022 09:55 AM    K 4.0 04/02/2022 09:55 PM    CL 94 07/07/2022 09:55 AM    CO2 28 07/07/2022 09:55 AM    BUN 18 07/07/2022 09:55 AM    LABALBU 4.5 05/26/2022 02:18 PM    CREATININE 1.1 07/07/2022 09:55 AM    CALCIUM 9.9 07/07/2022 09:55 AM    LABGLOM 62 07/07/2022 09:55 AM    GLUCOSE 120 07/07/2022 09:55 AM    GLUCOSE 96 04/06/2017 12:22 PM       Magnesium:    Lab Results   Component Value Date/Time    MG 1.9 02/06/2021 03:17 AM     PT/INR:    Lab Results   Component Value Date/Time    INR 1.02 04/02/2022 09:55 PM     TSH:    Lab Results   Component Value Date/Time    TSH 2.130 05/22/2020 10:46 AM     VITAMIN B12: No components found for: B12  FOLATE:  No results found for: FOLATE  IRON:  No results found for: IRON  Iron Saturation:  No components found for: PERCENTFE  TIBC:  No results found for: TIBC  FERRITIN:  No results found for: FERRITIN  PSA:   Lab Results   Component Value Date/Time    PSA 13.78 07/21/2022 10:10 AM   5/26/2022 PSA = 11.6  1/25/2022 PSA = 10  September 2021 PSA = 10  April 2021 PSA = 10        IMAGING:  Bone scan 5/26/2022  Compared to 2019. CT  Multiple areas of increased activity throughout the axial and appendicular skeleton consistent with metastatic disease. Increased activity suspicious for metastatic disease at the L1 and L2 vertebral bodies S1 vertebral body multiple bilateral ribs. No prior bone scan for direct comparison. MRI thoracic spine 2/7/2019  MRI lumbar spine 2/8/2019 2/5/2019 CT chest thoracic and lumbar spine abdomen pelvis. 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 Treatment thus far Lupron ongoing. Casodex since December 2018 ongoing. Radiation to his spine as above. Asymptomatic at this point. Referred because of rising PSA on current therapy. Casodex stopped in May PSA is gone from 11-13.7. See above PSA summary. 2) Prognosis / Disease Status: prostate cancer to bone with a slowly rising PSA; castrate resistance. 3) Work-up:    Labs: BC, CMP PSA    Imaging: Bone scan referenced above. No prior bone scan for comparison. Procedures: None   Consults: None   Other: Continue to follow-up with urology due for every 6-month Lupron this month. 4) Symptom Management: None new needed      5) Supportive care provided. Level of care is appropriate. Teaching done today. Reviewed treatment options. 6) Treatment goal:      Treatment plan:     Recent trial of Casodex withdrawal.  Discontinued Casodex on 5/26  From May until now his PSA is risen from 11.6-13.7. Plan to monitor 2 more months and a further increase options include restarting Casodex versus another second-generation antiandrogen. 7) Medications reviewed.    Prescriptions today: None            No orders of the defined types were placed in this encounter. [place]ARRS:  Controlled Substance Monitoring:    Acute and Chronic Pain Monitoring:   RX Monitoring 3/18/2022   Attestation -   Periodic Controlled Substance Monitoring No signs of potential drug abuse or diversion identified. 8) Research Options:   None      9) Other:       None    10) Follow Up: Follow-up with me in 2 months September 26 and will be for September 19 get labs including PSA.       Michelle Del Rosario MD

## 2022-08-09 ENCOUNTER — NURSE ONLY (OUTPATIENT)
Dept: UROLOGY | Age: 87
End: 2022-08-09
Payer: MEDICARE

## 2022-08-09 DIAGNOSIS — C79.52 SECONDARY MALIGNANT NEOPLASM OF BONE AND BONE MARROW (HCC): Primary | ICD-10-CM

## 2022-08-09 DIAGNOSIS — C61 MALIGNANT NEOPLASM OF PROSTATE (HCC): ICD-10-CM

## 2022-08-09 DIAGNOSIS — C79.51 SECONDARY MALIGNANT NEOPLASM OF BONE AND BONE MARROW (HCC): Primary | ICD-10-CM

## 2022-08-09 PROCEDURE — 96372 THER/PROPH/DIAG INJ SC/IM: CPT | Performed by: NURSE PRACTITIONER

## 2022-08-09 NOTE — PROGRESS NOTES
Patient has given me verbal consent to perform Xgeva Injection yes      Following [facility] plan of care. XGEVA 120MG/1.7ML MG GIVEN S.C Right ARM  Lot Number: [id number]  Expiration Date: 05/2024  Chris Aguirre #: [id number]    After Injection was given there were no reactions at injection site and patient was feeling well. Patient was notified that possible side effects from injections include: Redness, swelling and itching at the injection site. Possible side effects of androgen deprivation therapy, including hot flashes, flushing of the skin, increased weight, decreased sex drive, and difficulties with ED. Patient was instructed to inform their dental office that they are receiving Edyth Cam and that major dental procedures should be avoided. Patient was advised to call our office with any further questions or concerns. Any active dental problems, infections, or pain? no    Date of last dental appointment: unknown    Any planned dental procedures? no    Patient supplied their own medications No    Pt [address] therapy first initiated on 2/26/2019. Pt to get labs done prior to next appt with [facility]. RIK Jauregui

## 2022-09-01 ENCOUNTER — TELEPHONE (OUTPATIENT)
Dept: FAMILY MEDICINE CLINIC | Age: 87
End: 2022-09-01

## 2022-09-01 DIAGNOSIS — J44.9 STAGE 3 SEVERE COPD BY GOLD CLASSIFICATION (HCC): Primary | ICD-10-CM

## 2022-09-01 NOTE — TELEPHONE ENCOUNTER
Kelly Victoria called asking if you would be able to order a Nebulizer for patient. He had one but it stopped working. Last night he went through a choking spill and they thought giving a treatment might has help patient more. [address]      Kelly Victoria may be reached at [phone]

## 2022-09-01 NOTE — TELEPHONE ENCOUNTER
Let her know that the order is in the system. She could contact the [facility] to arrange a time to pick it up.

## 2022-09-01 NOTE — TELEPHONE ENCOUNTER
Farheen Luz informed by phone and faxed the order to [facility].

## 2022-09-02 ENCOUNTER — NURSE ONLY (OUTPATIENT)
Dept: LAB | Age: 87
End: 2022-09-02

## 2022-09-02 DIAGNOSIS — C79.51 OSSEOUS METASTASIS (HCC): ICD-10-CM

## 2022-09-02 DIAGNOSIS — C61 PROSTATE CA (HCC): ICD-10-CM

## 2022-09-02 LAB
ALBUMIN SERPL-MCNC: 4.8 G/DL (ref 3.5–5.1)
ALP BLD-CCNC: 58 U/L (ref 38–126)
ALT SERPL-CCNC: 11 U/L (ref 11–66)
ANION GAP SERPL CALCULATED.3IONS-SCNC: 9 MEQ/L (ref 8–16)
AST SERPL-CCNC: 27 U/L (ref 5–40)
BASOPHILS # BLD: 0.4 %
BASOPHILS ABSOLUTE: 0 THOU/MM3 (ref 0–0.1)
BILIRUB SERPL-MCNC: 0.6 MG/DL (ref 0.3–1.2)
BUN BLDV-MCNC: 22 MG/DL (ref 7–22)
CALCIUM SERPL-MCNC: 9.6 MG/DL (ref 8.5–10.5)
CHLORIDE BLD-SCNC: 95 MEQ/L (ref 98–111)
CO2: 33 MEQ/L (ref 23–33)
CREAT SERPL-MCNC: 1 MG/DL (ref 0.4–1.2)
EOSINOPHIL # BLD: 3.1 %
EOSINOPHILS ABSOLUTE: 0.2 THOU/MM3 (ref 0–0.4)
ERYTHROCYTE [DISTWIDTH] IN BLOOD BY AUTOMATED COUNT: 12.4 % (ref 11.5–14.5)
ERYTHROCYTE [DISTWIDTH] IN BLOOD BY AUTOMATED COUNT: 47.3 FL (ref 35–45)
GFR SERPL CREATININE-BSD FRML MDRD: 69 ML/MIN/1.73M2
GLUCOSE BLD-MCNC: 132 MG/DL (ref 70–108)
HCT VFR BLD CALC: 40 % (ref 42–52)
HEMOGLOBIN: 13.2 GM/DL (ref 14–18)
IMMATURE GRANS (ABS): 0.04 THOU/MM3 (ref 0–0.07)
IMMATURE GRANULOCYTES: 0.5 %
LYMPHOCYTES # BLD: 7.6 %
LYMPHOCYTES ABSOLUTE: 0.6 THOU/MM3 (ref 1–4.8)
MCH RBC QN AUTO: 34.4 PG (ref 26–33)
MCHC RBC AUTO-ENTMCNC: 33 GM/DL (ref 32.2–35.5)
MCV RBC AUTO: 104.2 FL (ref 80–94)
MONOCYTES # BLD: 7.7 %
MONOCYTES ABSOLUTE: 0.6 THOU/MM3 (ref 0.4–1.3)
NUCLEATED RED BLOOD CELLS: 0 /100 WBC
PLATELET # BLD: 186 THOU/MM3 (ref 130–400)
PMV BLD AUTO: 9.7 FL (ref 9.4–12.4)
POTASSIUM SERPL-SCNC: 3.9 MEQ/L (ref 3.5–5.2)
PROSTATE SPECIFIC ANTIGEN: 23.69 NG/ML (ref 0–1)
RBC # BLD: 3.84 MILL/MM3 (ref 4.7–6.1)
SEG NEUTROPHILS: 80.7 %
SEGMENTED NEUTROPHILS ABSOLUTE COUNT: 6.1 THOU/MM3 (ref 1.8–7.7)
SODIUM BLD-SCNC: 137 MEQ/L (ref 135–145)
TOTAL PROTEIN: 6.2 G/DL (ref 6.1–8)
WBC # BLD: 7.6 THOU/MM3 (ref 4.8–10.8)

## 2022-09-06 ENCOUNTER — OFFICE VISIT (OUTPATIENT)
Dept: UROLOGY | Age: 87
End: 2022-09-06
Payer: MEDICARE

## 2022-09-06 VITALS
BODY MASS INDEX: 20.56 KG/M2 | SYSTOLIC BLOOD PRESSURE: 108 MMHG | WEIGHT: 131 LBS | RESPIRATION RATE: 18 BRPM | HEIGHT: 67 IN | DIASTOLIC BLOOD PRESSURE: 68 MMHG

## 2022-09-06 DIAGNOSIS — Z51.81 THERAPEUTIC DRUG MONITORING: Primary | ICD-10-CM

## 2022-09-06 DIAGNOSIS — C61 PROSTATE CANCER (HCC): ICD-10-CM

## 2022-09-06 DIAGNOSIS — C79.51 SECONDARY MALIGNANT NEOPLASM OF BONE AND BONE MARROW (HCC): ICD-10-CM

## 2022-09-06 DIAGNOSIS — C79.52 SECONDARY MALIGNANT NEOPLASM OF BONE AND BONE MARROW (HCC): ICD-10-CM

## 2022-09-06 PROCEDURE — G8427 DOCREV CUR MEDS BY ELIG CLIN: HCPCS | Performed by: NURSE PRACTITIONER

## 2022-09-06 PROCEDURE — G8420 CALC BMI NORM PARAMETERS: HCPCS | Performed by: NURSE PRACTITIONER

## 2022-09-06 PROCEDURE — 1123F ACP DISCUSS/DSCN MKR DOCD: CPT | Performed by: NURSE PRACTITIONER

## 2022-09-06 PROCEDURE — 99213 OFFICE O/P EST LOW 20 MIN: CPT | Performed by: NURSE PRACTITIONER

## 2022-09-06 PROCEDURE — 1036F TOBACCO NON-USER: CPT | Performed by: NURSE PRACTITIONER

## 2022-09-06 PROCEDURE — 96401 CHEMO ANTI-NEOPL SQ/IM: CPT | Performed by: NURSE PRACTITIONER

## 2022-09-06 ASSESSMENT — ENCOUNTER SYMPTOMS
ABDOMINAL PAIN: 0
VOMITING: 0
NAUSEA: 0
BACK PAIN: 0

## 2022-09-06 NOTE — PROGRESS NOTES
[address]  [address]  [place] [address]  Dept: [phone]  Loc: [phone]    Visit Date: 9/6/2022        HPI:     Kimo Bangura is a 80 y.o. male who presents today for:  Chief Complaint   Patient presents with    Follow-up    Prostate Cancer         HPI  Pt seen in follow up for prostate cancer. Mr. Fadumo Acuña has a hx of PSA 1/31/17 that was 25 at which time family was notified of risk for prostate cancer but did not wish to pursue further testing. CT of the abdomen and pelvis performed for abdominal pain 12/17/18 noted multiple skeletal sclerotic metastases with pathologic compression fractures of L1-L3, enlarged heterogeneous prostate, and pelvic adenopathy. PSA performed 12/17/18 was 1,594. Pt was started on Casodex 12/20/18. Pt recovered on TCU 12/24/19-1/10/19. He underwent TRUS prostate biopsy 1/24/19 by Dr. Yesenia Montague with findings of high grade prostatic adenocarcinoma. Pt was scheduled for review of biopsy results 2/11/19 however pt was admitted to the hospital 2/5/19-2/15/19 and treated for acute PE and DVT and pathologic T6 and T8 fractures. He underwent T6 and T8 vertebral bone biopsy, RFA for spine metastatic lesion at the levels of T6 and T8, and vertebral augmentation (balloon kyphoplasty) for T6 and T8 on 2/13/19 by Dr. Rupert Leonard. He has completed palliative radiation therapy. Pt started monthly Firmagon and Xgeva 2/26/19. Firmagon switched to Lupron 4/30/19. Pt had anorexia on Casodex and this was held. Pt and daughter decided to hold casodex. Pt is on calcium carbonate and vitamin D supplementation. Last ADT injection with Lupron 45 mg 6/6/22. In December 2020 Yasir's PSA continued to trend upward to 3.17 with a testosterone <3. At office visit in December we discussed addition of Lorrayne Brandon or Casodex and possible side effects.   Pt was started on Casodex 12/15/2020 while awaiting coverage of Xtandi. When pt received the Sadia [place] he was unable to swallow the pills. As such he remained on Casodex. He had increased fatigue and family started holding casodex starting 2/3/21. Pt was hospitalized and treated for aspiration pneumonia 2/5/21-2/8/21. PSA has recently continued to trend upward and family resumed casodex in June 2021. Despite resumption of casodex it continued to increase. Pt was referred to medical oncology and saw Dr. Paco Aleman. Casodex stopped 5/26/22. PSA 9/2/22 23.69  (technically 46 when corrected for finasteride)     Reports he is urinating without difficulty. Has some weakened stream overall but feels he is emptying ok. Wakes 1 x per night. Continues on finasteride and doxazosin 2 mg daily.       Current Outpatient Medications   Medication Sig Dispense Refill    albuterol sulfate HFA (VENTOLIN HFA) 108 (90 Base) MCG/ACT inhaler Inhale 2 puffs into the lungs every 6 hours as needed for Wheezing or Shortness of Breath 1 each 1    albuterol (PROVENTIL) (2.5 MG/3ML) 0.083% nebulizer solution Take 3 mLs by nebulization 2 times daily And every 6  Hours  prn (Patient taking differently: Take 2.5 mg by nebulization 2 times daily And every 6  Hours  prn) 180 mL 11    carvedilol (COREG) 3.125 MG tablet TAKE 1 TABLET BY MOUTH TWICE DAILY WITH MEALS 180 tablet 3    finasteride (PROSCAR) 5 MG tablet Take 1 tablet by mouth once daily 90 tablet 3    tiotropium-olodaterol (STIOLTO RESPIMAT) 2.5-2.5 MCG/ACT AERS INHALE 2 PUFFS BY MOUTH ONCE DAILY 4 g 5    doxazosin (CARDURA) 4 MG tablet Take 1 tablet by mouth nightly (Patient taking differently: 2 mg) 90 tablet 3    leuprolide (LUPRON) 45 MG injection Inject 45 mg into the muscle once       calcium carbonate-vitamin D (CALCIUM 600 + D) 600-400 MG-UNIT TABS per tab Take 1 tablet by mouth 3 times daily 90 tablet 5    denosumab (XGEVA) 120 MG/1.7ML SOLN SC injection Inject 120 mg into the skin once Every 28 days       polyethylene glycol (GLYCOLAX) 17 g packet Take 17 g by mouth once      Cholecalciferol (VITAMIN D3) 125 MCG (5000 UT) CAPS Take 1 capsule by mouth every morning (before breakfast)      Misc. Devices (ACAPELLA) MISC 1 Device by Does not apply route 4 times daily 1 each 0    Multiple Vitamins-Minerals (THERAPEUTIC MULTIVITAMIN-MINERALS) tablet Take 1 tablet by mouth daily      docusate sodium (COLACE, DULCOLAX) 100 MG CAPS Take 100 mg by mouth 2 times daily (Patient taking differently: Take 100 mg by mouth nightly)      acetaminophen (TYLENOL) 500 MG tablet Take 500 mg by mouth every 8 hours as needed for Pain      aspirin 325 MG EC tablet Take 1 tablet by mouth daily      bicalutamide (CASODEX) 50 MG chemo tablet Take 1 tablet by mouth daily (Patient not taking: No sig reported) 90 tablet 3     No current facility-administered medications for this visit. Past Medical History  Lulú Henderson  has a past medical history of Blood circulation, collateral, CAD (coronary artery disease), CAD (coronary artery disease), Cancer ([address] CKD (chronic kidney disease) stage 2, GFR 60-89 ml/min, COPD (chronic obstructive pulmonary disease) ([address] Diverticulitis, DVT of lower extremity, bilateral ([address] GERD (gastroesophageal reflux disease), Hiatal hernia, Hyperlipemia, Hyperlipidemia, Other disorders of kidney and ureter in diseases classified elsewhere, Pleural plaque, Pneumonia of both lower lobes due to infectious organism, Primary adenocarcinoma of prostate ([address] Pulmonary emboli ([address] and Vitamin D deficiency. Past Surgical History  The patient  has a past surgical history that includes hernia repair; Appendectomy; Cardiac catheterization; eye surgery; Cystocopy (03/17/2015); LAPAROTOMY EXPLORATORY (N/A, 12/17/2018); Abdomen surgery; and Kyphosis surgery (N/A, 2/13/2019). Family History  This patient's family history includes Cancer in his mother; Heart Disease in his brother.     Social History  Lulú Henderson  reports that he quit smoking about 56 years ago. His smoking use included cigarettes. He smoked an average of 1 pack per day. He has never used smokeless tobacco. He reports current alcohol use. He reports that he does not use drugs. Subjective:      Review of Systems   Constitutional:  Negative for activity change, appetite change, chills, diaphoresis, fatigue, fever and unexpected weight change. Gastrointestinal:  Negative for abdominal pain, nausea and vomiting. Genitourinary:  Negative for decreased urine volume, difficulty urinating, dysuria, flank pain, frequency, hematuria and urgency. Musculoskeletal:  Negative for back pain. Objective:   /68   Resp 18   Ht 5' 7\" (1.702 m)   Wt 131 lb (59.4 kg)   BMI 20.52 kg/m²     Physical Exam  Vitals reviewed. Constitutional:       General: He is not in acute distress. Appearance: Normal appearance. He is well-developed. He is not ill-appearing or diaphoretic. HENT:      Head: Normocephalic and atraumatic. Right Ear: External ear normal.      Left Ear: External ear normal.      Nose: Nose normal.      Mouth/Throat:      Mouth: Mucous membranes are moist.   Eyes:      General: No scleral icterus. Right eye: No discharge. Left eye: No discharge. Neck:      Vascular: No JVD. Trachea: No tracheal deviation. Pulmonary:      Effort: Pulmonary effort is normal. No respiratory distress. Abdominal:      General: There is no distension. Tenderness: There is no abdominal tenderness. There is no right CVA tenderness or left CVA tenderness. Musculoskeletal:      Comments: Seated in wheelchair   Skin:     General: Skin is warm and dry. Neurological:      Mental Status: He is alert and oriented to person, place, and time. Mental status is at baseline. Psychiatric:         Mood and Affect: Mood normal.         Behavior: Behavior normal.         Thought Content:  Thought content normal.       POC  No results found for this visit on 09/06/22. Patients recent PSA values are as follows  Lab Results   Component Value Date    PSA 23.69 (H) 09/02/2022    PSA 13.78 (H) 07/21/2022    PSA 13.42 (H) 07/07/2022        Recent BUN/Creatinine:  Lab Results   Component Value Date/Time    BUN 22 09/02/2022 09:39 AM    CREATININE 1.0 09/02/2022 09:39 AM       Assessment:   Metastatic castration resistant prostate cancer  Obstructive prostate    Plan:     Xgeva injection today. Next Lupron 45 mg due 12/2022. Continue doxazosin and finasteride. Continue Lab/ma monthly for Xgeva--BMP prior to next injection.   OV in Dec.

## 2022-09-06 NOTE — PROGRESS NOTES
Patient has given me verbal consent to perform Xgeva Injection yes      Following Neeta GLASER plan of care. Alesha Scot 120MG/1.7ML MG GIVEN S.C left ARM  Lot Number: [id number]  Expiration Date: 05/2024  Chris Aguirre #: [id number]    After Injection was given there were no reactions at injection site and patient was feeling well. Patient was notified that possible side effects from injections include: Redness, swelling and itching at the injection site. Possible side effects of androgen deprivation therapy, including hot flashes, flushing of the skin, increased weight, decreased sex drive, and difficulties with ED. Patient was instructed to inform their dental office that they are receiving Alesha Scot and that major dental procedures should be avoided. Patient was advised to call our office with any further questions or concerns. Any active dental problems, infections, or pain? no    Date of last dental appointment: unknown    Any planned dental procedures? no    Patient supplied their own medications No    Pt [facility] therapy first initiated on 02/26/2019.

## 2022-09-13 NOTE — TELEPHONE ENCOUNTER
Per Verbal order of  Sent prescription to pharmacy for pt.      Date of last visit:  5/23/2022  Date of next visit:  11/18/2022    Requested Prescriptions     Signed Prescriptions Disp Refills    tiotropium-olodaterol (STIOLTO RESPIMAT) 2.5-2.5 MCG/ACT AERS 4 g 5     Sig: INHALE 2 PUFFS BY MOUTH ONCE DAILY     Authorizing Provider: Cleo Carroll     Ordering User: Micheal Dowd

## 2022-09-13 NOTE — TELEPHONE ENCOUNTER
Date of last visit:  5/23/2022  Date of next visit:  11/18/2022    Requested Prescriptions     Pending Prescriptions Disp Refills    tiotropium-olodaterol ([address]) 2.5-2.5 MCG/ACT AERS [Pharmacy Med Name: Stiolto Respimat 2.5-2.5 MCG/ACT Inhalation Aerosol Solution] 4 g 0     Sig: INHALE 2 PUFFS BY MOUTH ONCE DAILY

## 2022-09-28 ENCOUNTER — NURSE ONLY (OUTPATIENT)
Dept: LAB | Age: 87
End: 2022-09-28

## 2022-09-28 DIAGNOSIS — Z51.81 THERAPEUTIC DRUG MONITORING: ICD-10-CM

## 2022-09-28 DIAGNOSIS — C61 PROSTATE CANCER (HCC): ICD-10-CM

## 2022-09-28 LAB
ANION GAP SERPL CALCULATED.3IONS-SCNC: 7 MEQ/L (ref 8–16)
BUN BLDV-MCNC: 21 MG/DL (ref 7–22)
CALCIUM SERPL-MCNC: 9.7 MG/DL (ref 8.5–10.5)
CHLORIDE BLD-SCNC: 97 MEQ/L (ref 98–111)
CO2: 32 MEQ/L (ref 23–33)
CREAT SERPL-MCNC: 1 MG/DL (ref 0.4–1.2)
GFR SERPL CREATININE-BSD FRML MDRD: 69 ML/MIN/1.73M2
GLUCOSE BLD-MCNC: 107 MG/DL (ref 70–108)
POTASSIUM SERPL-SCNC: 4.3 MEQ/L (ref 3.5–5.2)
PROSTATE SPECIFIC ANTIGEN: 31.34 NG/ML (ref 0–1)
SODIUM BLD-SCNC: 136 MEQ/L (ref 135–145)

## 2022-10-04 ENCOUNTER — HOSPITAL ENCOUNTER (OUTPATIENT)
Dept: INFUSION THERAPY | Age: 87
Discharge: HOME | End: 2022-10-04
Payer: MEDICARE

## 2022-10-04 ENCOUNTER — OFFICE VISIT (OUTPATIENT)
Dept: ONCOLOGY | Age: 87
End: 2022-10-04
Payer: MEDICARE

## 2022-10-04 ENCOUNTER — CLINICAL DOCUMENTATION (OUTPATIENT)
Dept: CASE MANAGEMENT | Age: 87
End: 2022-10-04

## 2022-10-04 VITALS
HEART RATE: 85 BPM | DIASTOLIC BLOOD PRESSURE: 64 MMHG | RESPIRATION RATE: 16 BRPM | BODY MASS INDEX: 19.93 KG/M2 | TEMPERATURE: 97.6 F | HEIGHT: 67 IN | SYSTOLIC BLOOD PRESSURE: 98 MMHG | OXYGEN SATURATION: 96 % | WEIGHT: 127 LBS

## 2022-10-04 VITALS
HEART RATE: 85 BPM | TEMPERATURE: 97.6 F | RESPIRATION RATE: 16 BRPM | SYSTOLIC BLOOD PRESSURE: 98 MMHG | DIASTOLIC BLOOD PRESSURE: 64 MMHG

## 2022-10-04 DIAGNOSIS — C61 PROSTATE CA (HCC): Primary | ICD-10-CM

## 2022-10-04 DIAGNOSIS — C79.51 OSSEOUS METASTASIS (HCC): ICD-10-CM

## 2022-10-04 PROCEDURE — 99214 OFFICE O/P EST MOD 30 MIN: CPT | Performed by: INTERNAL MEDICINE

## 2022-10-04 PROCEDURE — 1036F TOBACCO NON-USER: CPT | Performed by: INTERNAL MEDICINE

## 2022-10-04 PROCEDURE — G8427 DOCREV CUR MEDS BY ELIG CLIN: HCPCS | Performed by: INTERNAL MEDICINE

## 2022-10-04 PROCEDURE — G8484 FLU IMMUNIZE NO ADMIN: HCPCS | Performed by: INTERNAL MEDICINE

## 2022-10-04 PROCEDURE — 99211 OFF/OP EST MAY X REQ PHY/QHP: CPT

## 2022-10-04 PROCEDURE — 1123F ACP DISCUSS/DSCN MKR DOCD: CPT | Performed by: INTERNAL MEDICINE

## 2022-10-04 PROCEDURE — G8420 CALC BMI NORM PARAMETERS: HCPCS | Performed by: INTERNAL MEDICINE

## 2022-10-04 RX ORDER — ENZALUTAMIDE 40 MG/1
120 TABLET ORAL DAILY
Qty: 90 TABLET | Refills: 5 | Status: ACTIVE | OUTPATIENT
Start: 2022-10-04

## 2022-10-04 NOTE — PATIENT INSTRUCTIONS
E prescription for Xtandi 40 mg tablets take 3 p.o. daily #90 refill x5  Patient/daughter will contact us once the medications arrive. Schedule teaching regarding Nilo Worthington with the nurse practitioner. Leroy jasso 11/16 .

## 2022-10-04 NOTE — PROGRESS NOTES
Name: Tabatha Cuenca  : 1926  MRN: [id number]    Oncology Navigation Follow-Up Note    Contact Type:  Medical Oncology    Subjective: appt with onc    Objective:   PSA trending upward:  31.34 on 22;  23.69 on 22;  13.78 on 22;  13.42 on 22;  9.82 on 22;  8.99 on 3/1/22    STOPPED Casodex in May 2022    ONC POC:  -Pt cont with Lupron & Vignesh Amato through Urology office  -Almon  discussed XTANDI- 3 tabs daily (120mg)- dtr voiced concern if pt can swallow it, since pt has difficulty with large pills, but is willing to try. -Chemo teach with C-NP  -Return to see ONC 22  -Dtr request Specialty pharmacy contact her at [phone] to coordinate the home delivery. ->Jayden updated [facility], [facility]den  -Dtr to call Jayden when Meghana Royals is received for ONC to define start date. Assistance Needed: denies    Receptive to Advanced Care Planning / Palliative Care:  deferred    Referrals: [facility] for XTANDI    Education: POC reiterated    Notes: Jayden following to assist as needed.       Electronically signed by Hang Harvey RN on 10/4/2022 at 3:04 PM

## 2022-10-04 NOTE — PROGRESS NOTES
[address] CANCER [address] [place] [address]  Dept: [phone]  Loc: [phone]   Hematology/Oncology Consult (Clinic)        7/25/22     Kennedy Foil   1/27/1926     No ref. 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 No chief complaint on file. DIAGNOSIS:  -Prostate cancer with bone mets. TREATMENT:  -Casodex. Casodex stopped May 2022. May resume depending on next PSA value.  -Lupron every 6 months per urology  -Halley Ortiz monthly per urology    PARAMETERS:  -PSA September 2021 = 3.72            April 2022 = 9.82            July 2022 = 13.78             September 2022 = 31.34  -Bone imaging    SUBJECTIVE: Marifer Abraham is doing well with no new complaints. No new bone or back pain. He did stop Casodex in May. HPI: Marifer Abraham is a delightful 80-year-old gentleman  was diagnosed with prostate cancer in January 2016. PSA in January 2017 was 25. He developed abdominal pain and bone pain and CT abdomen pelvis 12/17 states 18 showed multiple bone mets and pathologic compression fracture L1-3. He had enlarged prostate and pelvic adenopathy. PSA December 2018 was 1594. Started Casodex December 2018. TRUS prostate biopsy 1/24/2019 by Dr. Ann Ye showed a high-grade prostate adenocarcinoma high-grade clinical stage [id number] stage IVb. Treatment has included Lupron every 6 months since diagnosis Next due May 2022. He has been on Casodex other than a 6-month break several years ago remains on Casodex at this time. He received radiation to the T-spine T3-T9 through 1000 cGy completed March 2019. PSA has been steadily rising over the last several months on current therapy. He remains at his baseline with no new bone or back pain or decline.   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 PMH:   Past Medical History:   Diagnosis Date    Blood circulation, collateral     CAD (coronary artery disease)     CAD (coronary artery disease) 2014    Cancer ([facility]     prostate to bone    CKD (chronic kidney disease) stage 2, GFR 60-89 ml/min 2018    COPD (chronic obstructive pulmonary disease) ([facility]     Diverticulitis     DVT of lower extremity, bilateral ([facility] 2019    GERD (gastroesophageal reflux disease)     Hiatal hernia     Hyperlipemia 2013    Hyperlipidemia     Other disorders of kidney and ureter in diseases classified elsewhere     Pleural plaque 2013    Pneumonia of both lower lobes due to infectious organism     Primary adenocarcinoma of prostate ([facility] 2019    high grade    Pulmonary emboli ([facility] 2019    Vitamin D deficiency 2020    COPD; O2 dependent. Quit . Coronary stents x2 no history of MI no history of congestive heart failure or arrhythmias. History of DVT and PE in 2019 just on aspirin  No history of TIA or or seizures. Social HX:   Social History     Socioeconomic History    Marital status:       Spouse name: Not on file    Number of children: 5    Years of education: Not on file    Highest education level: Not on file   Occupational History    Occupation: Retired   Tobacco Use    Smoking status: Former     Packs/day: 1.00     Types: Cigarettes     Quit date: 1966     Years since quittin.2    Smokeless tobacco: Never   Vaping Use    Vaping Use: Never used   Substance and Sexual Activity    Alcohol use: Yes     Comment: occasionally    Drug use: No    Sexual activity: Not Currently   Other Topics Concern    Not on file   Social History Narrative    - Former    Lives alone but has lots of family support that goes in and helps him    Son will spend the night most nights    Family assists with transportation    No social barriers noted    No barriers with medication affordability     Social Determinants of Health     Financial Resource Strain: Not on file   Food Insecurity: Not on file   Transportation Needs: Not on file   Physical Activity: Inactive    Days of Exercise per Week: 0 days    Minutes of Exercise per Session: 0 min   Stress: Not on file   Social Connections: Not on file   Intimate Partner Violence: Not on file   Housing Stability: Not on file        Spouse:     Phone: [phone]     [place]  [place] [address] [phone]    Employment:  [address] in [employer] .     Immunizations:  Immunization History   Administered Date(s) Administered    COVID-19, PFIZER PURPLE top, DILUTE for use, (age 15 y+), 30mcg/0.3mL 2021, 2021    Influenza 2012    Influenza Virus Vaccine 2014, 2015    Influenza, AFLURIA (age 1 yrs+), FLUZONE, (age 10 mo+), MDV, 0.5mL 10/07/2016, 2021    Influenza, FLUCELVAX, (age 10 mo+), MDCK, MDV, 0.5mL 2019    Influenza, FLUCELVAX, (age 10 mo+), MDCK, PF, 0.5mL 10/26/2017    Influenza, Quadv, 6 mo and older, IM (Fluzone, Flulaval) 10/18/2018    PPD Test 2018, 2019    Pneumococcal Conjugate 13-valent ([id number]) 2015    Pneumococcal Polysaccharide ([id number]) 2007    Tdap (Boostrix, Adacel) 2013        Health Screenings:    Prostate:   Lab Results   Component Value Date    PSA 31.34 (H) 2022    PSA 23.69 (H) 2022    PSA 13.78 (H) 2022          Health Maintenance Due   Topic Date Due    Shingles vaccine (1 of 2) Never done    COVID-19 Vaccine (3 - Pfizer risk series) 2021    Flu vaccine (1) 2022        Interests:       Fam HX:   Family History   Problem Relation Age of Onset    Cancer Mother     Heart Disease Brother         Hospitalizations:   Copd exacerbation      Allergies:  No Known Allergies     Adult Illness:  Patient Active Problem List   Diagnosis    Hyperlipidemia    Pleural plaque    Coronary arteriosclerosis    Urinary bladder stone    Stage 3 severe COPD by GOLD classification ([address]    S/P exploratory laparotomy    Prostate cancer metastatic to bone ([address]    Raised prostate specific antigen    Benign prostatic hyperplasia with urinary obstruction    Trauma of urethra    Acute midline low back pain    Acute postoperative pulmonary insufficiency (HCC)    Acute on chronic respiratory failure with hypoxia (HCC)    Centrilobular emphysema (HCC)    Asthenia    Stage 2 chronic kidney disease    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    Mild bibasilar atelectasis    Pleural effusion, right    Microscopic hematuria    Nutritional marasmus ([address]    Pathological fracture of vertebra due to neoplastic disease    Acute deep vein thrombosis of lower limb (HCC)    Adenocarcinoma of prostate ([address]    DVT of lower extremity, bilateral (HCC)    Vitamin D deficiency    Pneumonia of both lower lobes due to infectious organism    Dysphagia    Moderate malnutrition (HCC)    Acute respiratory failure with hypoxia (HCC)    COPD (chronic obstructive pulmonary disease) ([address]    Chronic renal disease, stage III ([address] [890864]        Surgery:  Past Surgical History:   Procedure Laterality Date    ABDOMEN SURGERY      APPENDECTOMY      CARDIAC CATHETERIZATION      CYSTOSCOPY  03/17/2015    Litholapaxy-Large    EYE SURGERY      cataracts    HERNIA REPAIR      KYPHOSIS SURGERY N/A 2/13/2019    ABLATION AND KYPHOPLASTY AT T8 AND ABLATION AND KYPHOPLASTY AT T6 performed by Raymond Choudhary MD at [address] 12/17/2018    EXPLORATORY LAPAROTOMY, WITH ABDOMINAL WASHOUT performed by Kel Coronado MD at [address] [facility] [address]        Medications:  Current Outpatient Medications   Medication Sig Dispense Refill    tiotropium-olodaterol (STIOLTO RESPIMAT) 2.5-2.5 MCG/ACT AERS INHALE 2 PUFFS BY MOUTH ONCE DAILY 4 g 5    albuterol sulfate HFA (VENTOLIN HFA) 108 (90 Base) MCG/ACT inhaler Inhale 2 puffs into the lungs every 6 hours as needed for Wheezing or Shortness of Breath 1 each 1    albuterol (PROVENTIL) (2.5 MG/3ML) 0.083% nebulizer solution Take 3 mLs by nebulization 2 times daily And every 6  Hours  prn (Patient taking differently: Take 2.5 mg by nebulization 2 times daily And every 6  Hours  prn) 180 mL 11    carvedilol (COREG) 3.125 MG tablet TAKE 1 TABLET BY MOUTH TWICE DAILY WITH MEALS 180 tablet 3    finasteride (PROSCAR) 5 MG tablet Take 1 tablet by mouth once daily 90 tablet 3    doxazosin (CARDURA) 4 MG tablet Take 1 tablet by mouth nightly (Patient taking differently: 2 mg) 90 tablet 3    leuprolide (LUPRON) 45 MG injection Inject 45 mg into the muscle once       calcium carbonate-vitamin D (CALCIUM 600 + D) 600-400 MG-UNIT TABS per tab Take 1 tablet by mouth 3 times daily 90 tablet 5    denosumab (XGEVA) 120 MG/1.7ML SOLN SC injection Inject 120 mg into the skin once Every 28 days       polyethylene glycol (GLYCOLAX) 17 g packet Take 17 g by mouth once      bicalutamide (CASODEX) 50 MG chemo tablet Take 1 tablet by mouth daily (Patient not taking: No sig reported) 90 tablet 3    Cholecalciferol (VITAMIN D3) 125 MCG (5000 UT) CAPS Take 1 capsule by mouth every morning (before breakfast)      Misc. Devices (ACAPELLA) MISC 1 Device by Does not apply route 4 times daily 1 each 0    Multiple Vitamins-Minerals (THERAPEUTIC MULTIVITAMIN-MINERALS) tablet Take 1 tablet by mouth daily      docusate sodium (COLACE, DULCOLAX) 100 MG CAPS Take 100 mg by mouth 2 times daily (Patient taking differently: Take 100 mg by mouth nightly)      acetaminophen (TYLENOL) 500 MG tablet Take 500 mg by mouth every 8 hours as needed for Pain      aspirin 325 MG EC tablet Take 1 tablet by mouth daily       No current facility-administered medications for this visit.          EXAM:   vitals were not taken for this visit. Estimated body surface area is 1.68 meters squared as calculated from the following:    Height as of 9/6/22: 5' 7\" (1.702 m). Weight as of 9/6/22: 131 lb (59.4 kg). ECOG: male wearing oxygen very pleasant and in no distress sitting in a wheelchair with daughter present. General: Non-ill appearing. Appears a little bit younger than his stated age sound mind and hearing is fairly good  HEENT: NC/AT,nonicteric, perrla,  Neck: normal thyroid, no masses. Nodes: No adenopathy  Lungs/chest: clear, no rales,rhonchi or wheezing, lung bases clear  CV: rrr, no rubs ,gallops or murmurs  Breasts: Not examined  Abd/Rectal: soft, non-tender,bowel sounds normal , no HSM,no masses  Back: normal curvature, No midline tenderness. flanks nontender  : Not Examined  Extremities: no cyanosis,clubbing or edema. In extremities. Skin: unremarkable  Neuro: A and O x 4, CN exam nonfocal, Motor- no deficits, Sensory- no deficits, gait-nl, speech- fluent, no ataxia.   Devices: none      DATA:    LAB:       CBC with Differential:      Lab Results   Component Value Date/Time    WBC 7.6 09/02/2022 09:38 AM    RBC 3.84 09/02/2022 09:38 AM    RBC 4.44 04/06/2017 12:22 PM    HGB 13.2 09/02/2022 09:38 AM    HCT 40.0 09/02/2022 09:38 AM     09/02/2022 09:38 AM    .2 09/02/2022 09:38 AM    MCH 34.4 09/02/2022 09:38 AM    MCHC 33.0 09/02/2022 09:38 AM    RDW 13.0 05/26/2022 02:18 PM    NRBC 0 09/02/2022 09:38 AM    SEGSPCT 80.7 09/02/2022 09:38 AM    LYMPHOPCT 21.1 04/06/2017 12:22 PM    MONOPCT 7.7 09/02/2022 09:38 AM    EOSPCT 1.0 04/06/2017 12:22 PM    BASOPCT 0.5 04/06/2017 12:22 PM    MONOSABS 0.6 09/02/2022 09:38 AM    LYMPHSABS 0.6 09/02/2022 09:38 AM    EOSABS 0.2 09/02/2022 09:38 AM    BASOSABS 0.0 09/02/2022 09:38 AM    DIFFTYPE see below 12/18/2018 05:45 AM     Lab Results   Component Value Date/Time    SEGSABS 6.1 09/02/2022 09:38 AM       CMP:    Lab Results   Component Value Date/Time    NA 136 09/28/2022 08:42 AM    K 4.3 09/28/2022 08:42 AM    K 4.0 04/02/2022 09:55 PM    CL 97 09/28/2022 08:42 AM    CO2 32 09/28/2022 08:42 AM    BUN 21 09/28/2022 08:42 AM    CREATININE 1.0 09/28/2022 08:42 AM    LABGLOM 69 09/28/2022 08:42 AM    GLUCOSE 107 09/28/2022 08:42 AM    GLUCOSE 96 04/06/2017 12:22 PM    PROT 6.2 09/02/2022 09:39 AM    LABALBU 4.8 09/02/2022 09:39 AM    CALCIUM 9.7 09/28/2022 08:42 AM    BILITOT 0.6 09/02/2022 09:39 AM    BILITOT NEGATIVE 12/09/2016 12:55 PM    ALKPHOS 58 09/02/2022 09:39 AM    AST 27 09/02/2022 09:39 AM    ALT 11 09/02/2022 09:39 AM       BMP:    Lab Results   Component Value Date/Time     09/28/2022 08:42 AM    K 4.3 09/28/2022 08:42 AM    K 4.0 04/02/2022 09:55 PM    CL 97 09/28/2022 08:42 AM    CO2 32 09/28/2022 08:42 AM    BUN 21 09/28/2022 08:42 AM    LABALBU 4.8 09/02/2022 09:39 AM    CREATININE 1.0 09/28/2022 08:42 AM    CALCIUM 9.7 09/28/2022 08:42 AM    LABGLOM 69 09/28/2022 08:42 AM    GLUCOSE 107 09/28/2022 08:42 AM    GLUCOSE 96 04/06/2017 12:22 PM       Magnesium:    Lab Results   Component Value Date/Time    MG 1.9 02/06/2021 03:17 AM     PT/INR:    Lab Results   Component Value Date/Time    INR 1.02 04/02/2022 09:55 PM     TSH:    Lab Results   Component Value Date/Time    TSH 2.130 05/22/2020 10:46 AM     VITAMIN B12: No components found for: B12  FOLATE:  No results found for: FOLATE  IRON:  No results found for: IRON  Iron Saturation:  No components found for: PERCENTFE  TIBC:  No results found for: TIBC  FERRITIN:  No results found for: FERRITIN  PSA:   Lab Results   Component Value Date/Time    PSA 31.34 09/28/2022 08:42 AM   5/26/2022 PSA = 11.6  1/25/2022 PSA = 10  September 2021 PSA = 10  April 2021 PSA = 10        IMAGING:  Bone scan 5/26/2022  Compared to 2019. CT  Multiple areas of increased activity throughout the axial and appendicular skeleton consistent with metastatic disease.   Increased activity suspicious for metastatic disease at the L1 and L2 vertebral bodies S1 vertebral body multiple bilateral ribs. No prior bone scan for direct comparison. MRI thoracic spine 2/7/2019  MRI lumbar spine 2/8/2019 2/5/2019 CT chest thoracic and lumbar spine abdomen pelvis. 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 Treatment thus far Lupron ongoing. Casodex since December 2018 ongoing. Radiation to his spine as above. Asymptomatic at this point. Referred because of rising PSA on current therapy. Casodex stopped in May PSA is gone from 11-13.7. See above PSA summary. 2) Prognosis / Disease Status: prostate cancer to bone with a slowly rising PSA; castrate resistance. 3) Work-up:    Labs: BC, CMP PSA    Imaging: Bone scan referenced above. No prior bone scan for comparison. Procedures: None   Consults: None   Other: Continue to follow-up with urology due for every 6-month Lupron this month. 4) Symptom Management: None new needed      5) Supportive care provided. Level of care is appropriate. Teaching done today. Reviewed treatment options. 6) Treatment goal:      Treatment plan:     Recent trial of Casodex withdrawal.  Discontinued Casodex on 5/26  From May until now his PSA is risen from 11.6-13.7. Plan to monitor 2 more months and a further increase options include restarting Casodex versus another second-generation antiandrogen. 7) Medications reviewed. Prescriptions today: None            No orders of the defined types were placed in this encounter. [place]ARRS:  Controlled Substance Monitoring:    Acute and Chronic Pain Monitoring:   RX Monitoring 3/18/2022   Attestation -   Periodic Controlled Substance Monitoring No signs of potential drug abuse or diversion identified. 8) Research Options:   None      9) Other:       None    10) Follow Up: Follow-up with me in 2 months September 26 and will be for September 19 get labs including PSA.       Ashley Huston MD

## 2022-10-05 ENCOUNTER — NURSE ONLY (OUTPATIENT)
Dept: UROLOGY | Age: 87
End: 2022-10-05
Payer: MEDICARE

## 2022-10-05 DIAGNOSIS — C61 MALIGNANT NEOPLASM OF PROSTATE (HCC): ICD-10-CM

## 2022-10-05 DIAGNOSIS — C79.51 SECONDARY MALIGNANT NEOPLASM OF BONE AND BONE MARROW (HCC): Primary | ICD-10-CM

## 2022-10-05 DIAGNOSIS — C79.52 SECONDARY MALIGNANT NEOPLASM OF BONE AND BONE MARROW (HCC): Primary | ICD-10-CM

## 2022-10-05 PROCEDURE — 96372 THER/PROPH/DIAG INJ SC/IM: CPT | Performed by: NURSE PRACTITIONER

## 2022-10-05 NOTE — PROGRESS NOTES
New chemotherapy validation note:    Diagnosis for chemotherapy: Prostate Cancer     Regimen ordered:   Enzalutamide Raisabrian Huber) 40 MG TABS [[id number]]     Order Details  Dose: 120 mg Route: Oral Frequency: DAILY   Dispense Quantity: 90 tablet Refills: 5          Sig: Take 120 mg by mouth daily     Reference or literature used for validation: NCCN      Date literature or guideline last updated 11/2021     Deviation from literature or guideline used: Lower dose than guideline (160mg vs. 120mg)     Summary of any verbal or telephone information obtained: Discussed with Dr. Schwartz - lower initial dose to prevent toxicities given patient age and status    [address], PharmD, BCPS  Clinical Pharmacist   10/5/2022 9:50 AM

## 2022-10-05 NOTE — PROGRESS NOTES
Patient has given me verbal consent to perform Xgeva Injection yes      Following JAILYN Cormier plan of care. Dillard Minesh 120MG/1.7ML MG GIVEN S.C right ARM  Lot Number: [id number]  Expiration Date: 05/24  Chris Aguirre #: [id number]    After Injection was given there were no reactions at injection site and patient was feeling well. Patient was notified that possible side effects from injections include: Redness, swelling and itching at the injection site. Possible side effects of androgen deprivation therapy, including hot flashes, flushing of the skin, increased weight, decreased sex drive, and difficulties with ED. Patient was instructed to inform their dental office that they are receiving Dillard Minesh and that major dental procedures should be avoided. Patient was advised to call our office with any further questions or concerns. Any active dental problems, infections, or pain? no    Date of last dental appointment: unknown     Any planned dental procedures? no    Patient supplied their own medications No    Pt LHRH therapy (Firmagon, Eligard, Lupron) first initiated on 02/26/2019.

## 2022-10-25 DIAGNOSIS — C61 PROSTATE CA (HCC): Primary | ICD-10-CM

## 2022-10-26 ENCOUNTER — OFFICE VISIT (OUTPATIENT)
Dept: ONCOLOGY | Age: 87
End: 2022-10-26
Payer: MEDICARE

## 2022-10-26 ENCOUNTER — HOSPITAL ENCOUNTER (OUTPATIENT)
Dept: INFUSION THERAPY | Age: 87
Discharge: HOME | End: 2022-10-26
Payer: MEDICARE

## 2022-10-26 VITALS
BODY MASS INDEX: 19.9 KG/M2 | DIASTOLIC BLOOD PRESSURE: 73 MMHG | WEIGHT: 126.8 LBS | RESPIRATION RATE: 16 BRPM | HEIGHT: 67 IN | OXYGEN SATURATION: 94 % | SYSTOLIC BLOOD PRESSURE: 134 MMHG | TEMPERATURE: 98 F | HEART RATE: 93 BPM

## 2022-10-26 VITALS
HEART RATE: 93 BPM | TEMPERATURE: 98 F | RESPIRATION RATE: 16 BRPM | DIASTOLIC BLOOD PRESSURE: 73 MMHG | SYSTOLIC BLOOD PRESSURE: 134 MMHG

## 2022-10-26 DIAGNOSIS — C61 PROSTATE CA (HCC): Primary | ICD-10-CM

## 2022-10-26 DIAGNOSIS — C61 PROSTATE CA (HCC): ICD-10-CM

## 2022-10-26 DIAGNOSIS — Z79.818 ENCOUNTER FOR MONITORING ANDROGEN DEPRIVATION THERAPY: ICD-10-CM

## 2022-10-26 LAB
ABSOLUTE IMMATURE GRANULOCYTE: 0.02 THOU/MM3 (ref 0–0.07)
ALBUMIN SERPL-MCNC: 4 G/DL (ref 3.5–5.1)
ALP BLD-CCNC: 62 U/L (ref 38–126)
ALT SERPL-CCNC: 10 U/L (ref 11–66)
AST SERPL-CCNC: 28 U/L (ref 5–40)
BASINOPHIL, AUTOMATED: 0 % (ref 0–3)
BASOPHILS ABSOLUTE: 0 THOU/MM3 (ref 0–0.1)
BILIRUB SERPL-MCNC: 0.3 MG/DL (ref 0.3–1.2)
BILIRUBIN DIRECT: < 0.2 MG/DL (ref 0–0.3)
BUN, WHOLE BLOOD: 20 MG/DL (ref 8–26)
CHLORIDE, WHOLE BLOOD: 92 MEQ/L (ref 98–109)
CREATININE, WHOLE BLOOD: 1.4 MG/DL (ref 0.5–1.2)
EOSINOPHILS ABSOLUTE: 0.1 THOU/MM3 (ref 0–0.4)
EOSINOPHILS RELATIVE PERCENT: 2 % (ref 0–4)
GFR SERPL CREATININE-BSD FRML MDRD: 46 ML/MIN/1.73M2
GLUCOSE, WHOLE BLOOD: 116 MG/DL (ref 70–108)
HCT VFR BLD CALC: 40.2 % (ref 42–52)
HEMOGLOBIN: 13 GM/DL (ref 14–18)
IMMATURE GRANULOCYTES: 0 %
IONIZED CALCIUM, WHOLE BLOOD: 1.17 MMOL/L (ref 1.12–1.32)
LYMPHOCYTES # BLD: 13 % (ref 15–47)
LYMPHOCYTES ABSOLUTE: 0.7 THOU/MM3 (ref 1–4.8)
MCH RBC QN AUTO: 33.4 PG (ref 26–33)
MCHC RBC AUTO-ENTMCNC: 32.3 GM/DL (ref 32.2–35.5)
MCV RBC AUTO: 103 FL (ref 80–94)
MONOCYTES ABSOLUTE: 0.6 THOU/MM3 (ref 0.4–1.3)
MONOCYTES: 11 % (ref 0–12)
PDW BLD-RTO: 12.5 % (ref 11.5–14.5)
PLATELET # BLD: 220 THOU/MM3 (ref 130–400)
PMV BLD AUTO: 8.9 FL (ref 9.4–12.4)
POTASSIUM, WHOLE BLOOD: 4.2 MEQ/L (ref 3.5–4.9)
PROSTATE SPECIFIC ANTIGEN: 47.1 NG/ML (ref 0–1)
RBC # BLD: 3.89 MILL/MM3 (ref 4.7–6.1)
SEG NEUTROPHILS: 74 % (ref 43–75)
SEGMENTED NEUTROPHILS ABSOLUTE COUNT: 3.9 THOU/MM3 (ref 1.8–7.7)
SODIUM, WHOLE BLOOD: 135 MEQ/L (ref 138–146)
TOTAL CO2, WHOLE BLOOD: 35 MEQ/L (ref 23–33)
TOTAL PROTEIN: 5.9 G/DL (ref 6.1–8)
WBC # BLD: 5.3 THOU/MM3 (ref 4.8–10.8)

## 2022-10-26 PROCEDURE — 36415 COLL VENOUS BLD VENIPUNCTURE: CPT

## 2022-10-26 PROCEDURE — 80047 BASIC METABLC PNL IONIZED CA: CPT

## 2022-10-26 PROCEDURE — 99214 OFFICE O/P EST MOD 30 MIN: CPT | Performed by: NURSE PRACTITIONER

## 2022-10-26 PROCEDURE — G8427 DOCREV CUR MEDS BY ELIG CLIN: HCPCS | Performed by: NURSE PRACTITIONER

## 2022-10-26 PROCEDURE — 84153 ASSAY OF PSA TOTAL: CPT

## 2022-10-26 PROCEDURE — 99211 OFF/OP EST MAY X REQ PHY/QHP: CPT

## 2022-10-26 PROCEDURE — G8484 FLU IMMUNIZE NO ADMIN: HCPCS | Performed by: NURSE PRACTITIONER

## 2022-10-26 PROCEDURE — 1036F TOBACCO NON-USER: CPT | Performed by: NURSE PRACTITIONER

## 2022-10-26 PROCEDURE — 80076 HEPATIC FUNCTION PANEL: CPT

## 2022-10-26 PROCEDURE — G8420 CALC BMI NORM PARAMETERS: HCPCS | Performed by: NURSE PRACTITIONER

## 2022-10-26 PROCEDURE — 85025 COMPLETE CBC W/AUTO DIFF WBC: CPT

## 2022-10-26 PROCEDURE — 1123F ACP DISCUSS/DSCN MKR DOCD: CPT | Performed by: NURSE PRACTITIONER

## 2022-10-26 NOTE — PROGRESS NOTES
[facility] [address] [place] [address]  [address] [facility] [address], [address] [facility]  [address] [address]  Dept: [phone]  Dept Fax: [phone]: [phone]      Visit Date:10/26/2022     Michelle Rocha is a 80 y.o. male who presents today for:   Chief Complaint   Patient presents with    Consultation     Chemo teaching        HPI:   Michelle Rocha is a 80 y.o. male who follows in our office with Dr. Marisabel De Santiago with prostate cancer. HPI per last note in our office on 10/4/2022:  Matt Ivory is a delightful 75-year-old gentleman  was diagnosed with prostate cancer in 2016. PSA in 2017 was 25. He developed abdominal pain and bone pain and CT abdomen pelvis  showed multiple bone mets and pathologic compression fracture L1-3. He had enlarged prostate and pelvic adenopathy. PSA 2018 was 1594. Started Casodex 2018. TRUS prostate biopsy 2019 by Dr. [place] Branch showed a high-grade prostate adenocarcinoma high-grade clinical stage [id number] stage IVb. Treatment has included Lupron every 6 months since diagnosis Next due May 2022. He has been on Casodex other than a 6-month break several years ago remains on Casodex at this time. He received radiation to the T-spine T3-T9 through 1000 cGy completed 2019. PSA has been steadily rising over the last several months on current therapy. He remains at his baseline with no new bone or back pain or decline. 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 This medication start date is today, 10/26/2022.       Name of Oral Chemotherapy Medication:  Xtandi/enzalutamide  Date started:  10/26/2022  Regimen/Dosin mg daily  Barriers identified:  none  What to do if miss a dose:  take at next scheduled day/time  Plan for tracking medication daily:    [x]  Pill box  []  Calendar  []  Smartphone santos  []  Daily reminder on phone  []  Daily timer on phone  Patient Resource Guide given:  [x]  Specialty Pharmacy process reviewed:  [x]  Safe Oral Chemotherapy Handling reviewed:  [x]  Reducing risk of infection reviewed:  [x]  Bleeding precautions reviewed:  [x]  Importance of hydration/nutrition reviewed:  [x]  Modified Acid Fat - Jasper diet reviewed:  [x]  When to contact providers reviewed:  [x]  Potential side effects reviewed:  [x]  What to do if have side effects reviewed:  [x]  Patient education folder given to patient:  [x]  Medication specific education information given to patient:  [x]  Any financial concerns?  none  Any other concerns?  none      PMH, SH, and FH:  I reviewed the patient's medication and allergy lists as noted on the electronic medical record. The PMH, SH, and FH were also reviewed as noted on the EMR.         Past Medical History:   Diagnosis Date    Blood circulation, collateral     CAD (coronary artery disease)     CAD (coronary artery disease) 9/2/2014    Cancer ([facility]     prostate to bone    CKD (chronic kidney disease) stage 2, GFR 60-89 ml/min 12/24/2018    COPD (chronic obstructive pulmonary disease) ([facility] [address]     Diverticulitis     DVT of lower extremity, bilateral ([facility] [address] 02/2019    GERD (gastroesophageal reflux disease)     Hiatal hernia     Hyperlipemia 8/12/2013    Hyperlipidemia     Other disorders of kidney and ureter in diseases classified elsewhere     Pleural plaque 8/12/2013    Pneumonia of both lower lobes due to infectious organism     Primary adenocarcinoma of prostate ([facility] [address] 01/2019    high grade    Pulmonary emboli ([facility] [address] 02/2019    Vitamin D deficiency 05/2020      Past Surgical History:   Procedure Laterality Date    ABDOMEN SURGERY      APPENDECTOMY      CARDIAC CATHETERIZATION      CYSTOSCOPY  03/17/2015    Litholapaxy-Large    EYE SURGERY      cataracts    HERNIA REPAIR      KYPHOSIS SURGERY N/A 2019    ABLATION AND KYPHOPLASTY AT T8 AND ABLATION AND KYPHOPLASTY AT T6 performed by Noble Gutiérrez MD at [facility] 2 2018    EXPLORATORY LAPAROTOMY, WITH ABDOMINAL WASHOUT performed by Janace Lesches, MD at [address] [place] [address] History   Problem Relation Age of Onset    Cancer Mother     Heart Disease Brother       Social History     Tobacco Use    Smoking status: Former     Packs/day: 1.00     Types: Cigarettes     Quit date: 1966     Years since quittin.3    Smokeless tobacco: Never   Substance Use Topics    Alcohol use: Yes     Comment: occasionally      Current Outpatient Medications   Medication Sig Dispense Refill    Enzalutamide (XTANDI) 40 MG TABS Take 120 mg by mouth daily 90 tablet 5    tiotropium-olodaterol (STIOLTO RESPIMAT) 2.5-2.5 MCG/ACT AERS INHALE 2 PUFFS BY MOUTH ONCE DAILY 4 g 5    albuterol sulfate HFA (VENTOLIN HFA) 108 (90 Base) MCG/ACT inhaler Inhale 2 puffs into the lungs every 6 hours as needed for Wheezing or Shortness of Breath 1 each 1    albuterol (PROVENTIL) (2.5 MG/3ML) 0.083% nebulizer solution Take 3 mLs by nebulization 2 times daily And every 6  Hours  prn (Patient taking differently: Take 2.5 mg by nebulization 2 times daily And every 6  Hours  prn) 180 mL 11    carvedilol (COREG) 3.125 MG tablet TAKE 1 TABLET BY MOUTH TWICE DAILY WITH MEALS 180 tablet 3    finasteride (PROSCAR) 5 MG tablet Take 1 tablet by mouth once daily 90 tablet 3    doxazosin (CARDURA) 4 MG tablet Take 1 tablet by mouth nightly (Patient taking differently: 2 mg) 90 tablet 3    leuprolide (LUPRON) 45 MG injection Inject 45 mg into the muscle once       calcium carbonate-vitamin D (CALCIUM 600 + D) 600-400 MG-UNIT TABS per tab Take 1 tablet by mouth 3 times daily 90 tablet 5    denosumab (XGEVA) 120 MG/1.7ML SOLN SC injection Inject 120 mg into the skin once Every 28 days       polyethylene glycol (GLYCOLAX) 17 g packet Take 17 g by mouth once      Cholecalciferol (VITAMIN D3) 125 MCG (5000 UT) CAPS Take 1 capsule by mouth every morning (before breakfast)      Misc. Devices (ACAPELLA) MISC 1 Device by Does not apply route 4 times daily 1 each 0    Multiple Vitamins-Minerals (THERAPEUTIC MULTIVITAMIN-MINERALS) tablet Take 1 tablet by mouth daily      docusate sodium (COLACE, DULCOLAX) 100 MG CAPS Take 100 mg by mouth 2 times daily (Patient taking differently: Take 100 mg by mouth nightly)      acetaminophen (TYLENOL) 500 MG tablet Take 500 mg by mouth every 8 hours as needed for Pain      aspirin 325 MG EC tablet Take 1 tablet by mouth daily       No current facility-administered medications for this visit. No Known Allergies       Review of Systems:   Review of Systems   Pertinent review of systems noted in HPI, all other ROS negative. Objective:   Physical Exam   /73 (Site: Right Upper Arm, Position: Sitting, Cuff Size: Medium Adult)   Pulse 93   Temp 98 °F (36.7 °C) (Oral)   Resp 16   Ht 5' 7\" (1.702 m)   Wt 126 lb 12.8 oz (57.5 kg)   SpO2 94% Comment: 2 liters home 02  BMI 19.86 kg/m²    General appearance: No apparent distress, calm and cooperative. HEENT: Pupils equal, round, and reactive to light. Conjunctivae/corneas clear. Oral mucosa moist.  Neck: Supple, with full range of motion. Trachea midline. Respiratory:  Normal respiratory effort. Clear to auscultation all lung fields. On 2 lpm NC-baseline. Cardiovascular: RRR, S1/S2  Abdomen: Soft, non-tender, non-distended with active BS  Musculoskeletal: No clubbing, cyanosis or edema bilaterally. Uses wheelchair. Skin: Skin color, texture, turgor normal.  No visible rashes or lesions. Neurologic:  Neurovascularly intact without any focal sensory/motor deficits.  Cranial nerves: II-XII intact, grossly non-focal.  Psychiatric: Alert and oriented x 3, thought content appropriate, normal insight  Capillary Refill: < 3 seconds   Peripheral Pulses: +2 palpable    Baseline signs/symptoms before starting oral chemotherapy:    Chronic cough (aspirates easily)-doing swallowing exercises daily  Sob-on 2 lpm nc    Any other current/recent chemotherapy medications or radiation treatments?:  -Casodex. Casodex stopped May 2022. May resume depending on next PSA value.  -Lupron every 6 months per urology  -Arvind Moors monthly per urology      Imaging Studies and Labs:   CBC:   Lab Results   Component Value Date    WBC 5.3 10/26/2022    HGB 13.0 (L) 10/26/2022    HCT 40.2 (L) 10/26/2022     (H) 10/26/2022     10/26/2022     BMP:   Lab Results   Component Value Date/Time     10/26/2022 02:59 PM     09/28/2022 08:42 AM    K 4.2 10/26/2022 02:59 PM    K 4.3 09/28/2022 08:42 AM    K 4.0 04/02/2022 09:55 PM    CL 97 09/28/2022 08:42 AM    CO2 32 09/28/2022 08:42 AM    BUN 21 09/28/2022 08:42 AM    CREATININE 1.4 10/26/2022 02:59 PM    CREATININE 1.0 09/28/2022 08:42 AM    GLUCOSE 107 09/28/2022 08:42 AM    GLUCOSE 96 04/06/2017 12:22 PM    CALCIUM 9.7 09/28/2022 08:42 AM      LFT:   Lab Results   Component Value Date    ALT 11 09/02/2022    AST 27 09/02/2022    ALKPHOS 58 09/02/2022    BILITOT 0.6 09/02/2022             Assessment and Plan:     1. Prostate CA [facility])  Originally diagnosed in January 2016. CT Abd/Pelvis 12/17 (+) multiple bone mets with pathologic compression fracture L1-3; enlarged prostate and pelvic adenopathy. PSA December 2018 1594. Started Casodex Dec 2018. Prostate biopsy 1/2019 (+) high-grade prostate adenocarcinoma, [id number], stage IVb. Lupron every 6 mos. S/p radiation treatments to thoracic spine, completed March 2019. Casodex stopped d/t rise in PSA. Current treatment recommendations include initiation of Xtandi. 2. Encounter for monitoring androgen deprivation therapy  Formal oral teaching on Xtandi completed today. Labs today:  BMP stable. Creat 1.4. WBC 5.3. H/H 13/40. 2. Platelets 782. Ok to start [place]. Plans to start [facility].       Return in about 3 weeks (around 11/16/2022). All patient questions answered. Pt voiced understanding. Patient agreed with treatment plan. Follow up as directed. Patient instructed to call for questions or concerns. Electronically signed by   JAILYN Mckeon CNP       I spent a total of 31 minutes on the day of the visit.

## 2022-10-26 NOTE — PATIENT INSTRUCTIONS
Start Ramone Fitzgerald today  Return to clinic as scheduled to see Dr. Elia Velez with labs:  CBC, CMP

## 2022-11-02 ENCOUNTER — NURSE ONLY (OUTPATIENT)
Dept: UROLOGY | Age: 87
End: 2022-11-02
Payer: MEDICARE

## 2022-11-02 DIAGNOSIS — C79.51 SECONDARY MALIGNANT NEOPLASM OF BONE AND BONE MARROW (HCC): ICD-10-CM

## 2022-11-02 DIAGNOSIS — C61 MALIGNANT NEOPLASM OF PROSTATE (HCC): Primary | ICD-10-CM

## 2022-11-02 DIAGNOSIS — C79.52 SECONDARY MALIGNANT NEOPLASM OF BONE AND BONE MARROW (HCC): ICD-10-CM

## 2022-11-02 PROCEDURE — 96372 THER/PROPH/DIAG INJ SC/IM: CPT | Performed by: NURSE PRACTITIONER

## 2022-11-02 NOTE — PROGRESS NOTES
Patient has given me verbal consent to perform Xgeva Injection yes      Following [employer] APRN plan of care. Berry Hals 120MG/1.7ML MG GIVEN S.C left ARM  Lot Number: [id number]  Expiration Date: 10/24  [facility] #: [id number]    After Injection was given there were no reactions at injection site and patient was feeling well. Patient was notified that possible side effects from injections include: Redness, swelling and itching at the injection site. Possible side effects of androgen deprivation therapy, including hot flashes, flushing of the skin, increased weight, decreased sex drive, and difficulties with ED. Patient was instructed to inform their dental office that they are receiving Berry Hals and that major dental procedures should be avoided. Patient was advised to call our office with any further questions or concerns. Any active dental problems, infections, or pain? no    Date of last dental appointment: none    Any planned dental procedures? no    Patient supplied their own medications No    Pt LHRH therapy (Firmagon, Eligard, Lupron) first initiated on 02/26/2019.

## 2022-11-17 ENCOUNTER — OFFICE VISIT (OUTPATIENT)
Dept: ONCOLOGY | Age: 87
End: 2022-11-17
Payer: MEDICARE

## 2022-11-17 ENCOUNTER — HOSPITAL ENCOUNTER (OUTPATIENT)
Dept: INFUSION THERAPY | Age: 87
Discharge: HOME | End: 2022-11-17
Payer: MEDICARE

## 2022-11-17 VITALS
DIASTOLIC BLOOD PRESSURE: 72 MMHG | SYSTOLIC BLOOD PRESSURE: 104 MMHG | HEART RATE: 87 BPM | TEMPERATURE: 97.2 F | RESPIRATION RATE: 16 BRPM

## 2022-11-17 VITALS
OXYGEN SATURATION: 96 % | HEART RATE: 87 BPM | RESPIRATION RATE: 16 BRPM | SYSTOLIC BLOOD PRESSURE: 104 MMHG | HEIGHT: 67 IN | DIASTOLIC BLOOD PRESSURE: 72 MMHG | BODY MASS INDEX: 19.68 KG/M2 | WEIGHT: 125.4 LBS | TEMPERATURE: 97.2 F

## 2022-11-17 DIAGNOSIS — C61 PROSTATE CA (HCC): Primary | ICD-10-CM

## 2022-11-17 DIAGNOSIS — C61 PROSTATE CA (HCC): ICD-10-CM

## 2022-11-17 LAB
ABSOLUTE IMMATURE GRANULOCYTE: 0.02 THOU/MM3 (ref 0–0.07)
ALBUMIN SERPL-MCNC: 4.4 G/DL (ref 3.5–5.1)
ALP BLD-CCNC: 60 U/L (ref 38–126)
ALT SERPL-CCNC: 11 U/L (ref 11–66)
AST SERPL-CCNC: 29 U/L (ref 5–40)
BASINOPHIL, AUTOMATED: 0 % (ref 0–3)
BASOPHILS ABSOLUTE: 0 THOU/MM3 (ref 0–0.1)
BILIRUB SERPL-MCNC: 0.5 MG/DL (ref 0.3–1.2)
BILIRUBIN DIRECT: < 0.2 MG/DL (ref 0–0.3)
BUN, WHOLE BLOOD: 17 MG/DL (ref 8–26)
CHLORIDE, WHOLE BLOOD: 93 MEQ/L (ref 98–109)
CREATININE, WHOLE BLOOD: 1 MG/DL (ref 0.5–1.2)
EOSINOPHILS ABSOLUTE: 0.1 THOU/MM3 (ref 0–0.4)
EOSINOPHILS RELATIVE PERCENT: 1 % (ref 0–4)
GFR SERPL CREATININE-BSD FRML MDRD: > 60 ML/MIN/1.73M2
GLUCOSE, WHOLE BLOOD: 94 MG/DL (ref 70–108)
HCT VFR BLD CALC: 42.4 % (ref 42–52)
HEMOGLOBIN: 13.8 GM/DL (ref 14–18)
IMMATURE GRANULOCYTES: 0 %
IONIZED CALCIUM, WHOLE BLOOD: 1.19 MMOL/L (ref 1.12–1.32)
LYMPHOCYTES # BLD: 9 % (ref 15–47)
LYMPHOCYTES ABSOLUTE: 0.5 THOU/MM3 (ref 1–4.8)
MCH RBC QN AUTO: 33.4 PG (ref 26–33)
MCHC RBC AUTO-ENTMCNC: 32.5 GM/DL (ref 32.2–35.5)
MCV RBC AUTO: 103 FL (ref 80–94)
MONOCYTES ABSOLUTE: 0.6 THOU/MM3 (ref 0.4–1.3)
MONOCYTES: 10 % (ref 0–12)
PDW BLD-RTO: 12.5 % (ref 11.5–14.5)
PLATELET # BLD: 210 THOU/MM3 (ref 130–400)
PMV BLD AUTO: 9.1 FL (ref 9.4–12.4)
POTASSIUM, WHOLE BLOOD: 3.9 MEQ/L (ref 3.5–4.9)
RBC # BLD: 4.13 MILL/MM3 (ref 4.7–6.1)
SEG NEUTROPHILS: 79 % (ref 43–75)
SEGMENTED NEUTROPHILS ABSOLUTE COUNT: 4.6 THOU/MM3 (ref 1.8–7.7)
SODIUM, WHOLE BLOOD: 134 MEQ/L (ref 138–146)
TOTAL CO2, WHOLE BLOOD: 34 MEQ/L (ref 23–33)
TOTAL PROTEIN: 6.2 G/DL (ref 6.1–8)
WBC # BLD: 5.8 THOU/MM3 (ref 4.8–10.8)

## 2022-11-17 PROCEDURE — 80076 HEPATIC FUNCTION PANEL: CPT

## 2022-11-17 PROCEDURE — G8427 DOCREV CUR MEDS BY ELIG CLIN: HCPCS | Performed by: INTERNAL MEDICINE

## 2022-11-17 PROCEDURE — G8484 FLU IMMUNIZE NO ADMIN: HCPCS | Performed by: INTERNAL MEDICINE

## 2022-11-17 PROCEDURE — 99211 OFF/OP EST MAY X REQ PHY/QHP: CPT

## 2022-11-17 PROCEDURE — 1036F TOBACCO NON-USER: CPT | Performed by: INTERNAL MEDICINE

## 2022-11-17 PROCEDURE — 1123F ACP DISCUSS/DSCN MKR DOCD: CPT | Performed by: INTERNAL MEDICINE

## 2022-11-17 PROCEDURE — 80047 BASIC METABLC PNL IONIZED CA: CPT

## 2022-11-17 PROCEDURE — G8420 CALC BMI NORM PARAMETERS: HCPCS | Performed by: INTERNAL MEDICINE

## 2022-11-17 PROCEDURE — 36415 COLL VENOUS BLD VENIPUNCTURE: CPT

## 2022-11-17 PROCEDURE — 99214 OFFICE O/P EST MOD 30 MIN: CPT | Performed by: INTERNAL MEDICINE

## 2022-11-17 PROCEDURE — 85025 COMPLETE CBC W/AUTO DIFF WBC: CPT

## 2022-11-17 NOTE — PATIENT INSTRUCTIONS
Continue Xtandi 3 tablets of 120 mg daily  Follow-up with me and you the week of the 14th Dec and get labs that day  To follow weight with weekly weights.   Please get weight on patient today before he leaves

## 2022-11-17 NOTE — PROGRESS NOTES
[address] CANCER [address] [place] [address]  Dept: [phone]  Loc: [phone]   Hematology/Oncology Consult (Clinic)        11/17/22     Valentin Jeffries   1/27/1926     No ref. 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 No chief complaint on file. DIAGNOSIS:  -Prostate cancer with bone mets. TREATMENT:  -Casodex. Casodex stopped May 2022.    - EnzalutamideStan Potash) 120 mg  ( 3 tabs )(po daily ( Start date =  10/26/22 )  -Lupron every 6 months per urology  -Alberteen Durant monthly per urology    PARAMETERS:  -PSA September 2021 = 3.72            April 2022 = 9.82            July 2022 = 13.78             September 2022 = 31.34             10/26/2022 = 47.1  - bone scan 6/7/2022    SUBJECTIVE: Hugo Schneider had education regarding Ramone Fitzgerald and started taking 3 tablets on 10/26. Over the last 3 weeks since starting it he notices been more picky with his food has a very mild decrease in appetite. Minimal weight loss. Asked family to check his weight weekly. No rash no other complaints. Reports slight increase in fatigue overall. Today to monitor tolerance to the recently started Xtandi. HPI: Hugo Schneider is a delightful 69-year-old gentleman  was diagnosed with prostate cancer in January 2016. PSA in January 2017 was 25. He developed abdominal pain and bone pain and CT abdomen pelvis 12/17 states 18 showed multiple bone mets and pathologic compression fracture L1-3. He had enlarged prostate and pelvic adenopathy. PSA December 2018 was 1594. Started Casodex December 2018. TRUS prostate biopsy 1/24/2019 by Dr. Marylu Arce showed a high-grade prostate adenocarcinoma high-grade clinical stage [id number] stage IVb. Treatment has included Lupron every 6 months since diagnosis Next due May 2022.   He has been on Casodex other than a 6-month break several years ago remains on Casodex at this time. He received radiation to the T-spine T3-T9 through 1000 cGy completed 2019. PSA has been steadily rising over the last several months on current therapy. He remains at his baseline with no new bone or back pain or decline. 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 PMH:   Past Medical History:   Diagnosis Date    Blood circulation, collateral     CAD (coronary artery disease)     CAD (coronary artery disease) 2014    Cancer ([facility]     prostate to bone    CKD (chronic kidney disease) stage 2, GFR 60-89 ml/min 2018    COPD (chronic obstructive pulmonary disease) ([facility] [address]     Diverticulitis     DVT of lower extremity, bilateral ([address] 2019    GERD (gastroesophageal reflux disease)     Hiatal hernia     Hyperlipemia 2013    Hyperlipidemia     Other disorders of kidney and ureter in diseases classified elsewhere     Pleural plaque 2013    Pneumonia of both lower lobes due to infectious organism     Primary adenocarcinoma of prostate ([facility] [address] 2019    high grade    Pulmonary emboli ([address] 2019    Vitamin D deficiency 2020    COPD; O2 dependent. Quit . Coronary stents x2 no history of MI no history of congestive heart failure or arrhythmias. History of DVT and PE in 2019 just on aspirin  No history of TIA or or seizures. Social HX:   Social History     Socioeconomic History    Marital status:       Spouse name: Not on file    Number of children: 5    Years of education: Not on file    Highest education level: Not on file   Occupational History    Occupation: Retired   Tobacco Use    Smoking status: Former     Packs/day: 1.00     Types: Cigarettes     Quit date: 1966     Years since quittin.4    Smokeless tobacco: Never   Vaping Use    Vaping Use: Never used   Substance and Sexual Activity    Alcohol use: Yes     Comment: occasionally    Drug use: No    Sexual activity: Not Currently   Other Topics Concern    Not on file   Social History Narrative    - Former    Lives alone but has lots of family support that goes in and helps him    Son will spend the night most nights    Family assists with transportation    No social barriers noted    No barriers with medication affordability     Social Determinants of Health     Financial Resource Strain: Not on file   Food Insecurity: Not on file   Transportation Needs: Not on file   Physical Activity: Inactive    Days of Exercise per Week: 0 days    Minutes of Exercise per Session: 0 min   Stress: Not on file   Social Connections: Not on file   Intimate Partner Violence: Not on file   Housing Stability: Not on file        Spouse:     Phone: [phone]     [place]  [place] [address] [facility] [phone]    Employment:  [employer] [address] in [employer] .     Immunizations:  Immunization History   Administered Date(s) Administered    COVID-19, PFIZER PURPLE top, DILUTE for use, (age 15 y+), 30mcg/0.3mL 2021, 2021    Influenza 2012    Influenza Virus Vaccine 2014, 2015    Influenza, AFLURIA (age 1 yrs+), FLUZONE, (age 10 mo+), MDV, 0.5mL 10/07/2016, 2021    Influenza, FLUCELVAX, (age 10 mo+), MDCK, MDV, 0.5mL 2019    Influenza, FLUCELVAX, (age 10 mo+), MDCK, PF, 0.5mL 10/26/2017    Influenza, Quadv, 6 mo and older, IM (Fluzone, Flulaval) 10/18/2018    PPD Test 2018, 2019    Pneumococcal Conjugate 13-valent ([id number]) 2015    Pneumococcal Polysaccharide ([id number]) 2007    Tdap (Boostrix, Adacel) 2013        Health Screenings:    Prostate:   Lab Results   Component Value Date    PSA 47.10 (H) 10/26/2022    PSA 31.34 (H) 2022    PSA 23.69 (H) 2022          Health Maintenance Due   Topic Date Due Shingles vaccine (1 of 2) Never done    COVID-19 Vaccine (3 - Pfizer risk series) 04/22/2021    Flu vaccine (1) 08/01/2022        Interests:       Fam HX:   Family History   Problem Relation Age of Onset    Cancer Mother     Heart Disease Brother         Hospitalizations:   Copd exacerbation  4/22    Allergies:  No Known Allergies     Adult Illness:  Patient Active Problem List   Diagnosis    Hyperlipidemia    Pleural plaque    Coronary arteriosclerosis    Urinary bladder stone    Stage 3 severe COPD by GOLD classification ([address]    S/P exploratory laparotomy    Prostate cancer metastatic to bone ([address]    Raised prostate specific antigen    Benign prostatic hyperplasia with urinary obstruction    Trauma of urethra    Acute midline low back pain    Acute postoperative pulmonary insufficiency (HCC)    Acute on chronic respiratory failure with hypoxia (HCC)    Centrilobular emphysema (HCC)    Asthenia    Stage 2 chronic kidney disease    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    Mild bibasilar atelectasis    Pleural effusion, right    Microscopic hematuria    Nutritional marasmus ([address]    Pathological fracture of vertebra due to neoplastic disease    Acute deep vein thrombosis of lower limb (HCC)    Adenocarcinoma of prostate ([address]    DVT of lower extremity, bilateral (HCC)    Vitamin D deficiency    Pneumonia of both lower lobes due to infectious organism    Dysphagia    Moderate malnutrition (HCC)    Acute respiratory failure with hypoxia (HCC)    COPD (chronic obstructive pulmonary disease) ([address]    Chronic renal disease, stage III ([address] [[id number]]        Surgery:  Past Surgical History:   Procedure Laterality Date    ABDOMEN SURGERY      APPENDECTOMY      CARDIAC CATHETERIZATION      CYSTOSCOPY  03/17/2015    Litholapaxy-Large    EYE SURGERY      cataracts    HERNIA REPAIR      KYPHOSIS SURGERY N/A 2/13/2019    ABLATION AND KYPHOPLASTY AT T8 AND ABLATION AND KYPHOPLASTY AT T6 performed by Magen Sales MD at [place] [address] 12/17/2018    EXPLORATORY LAPAROTOMY, WITH ABDOMINAL WASHOUT [facility]        Medications:  Current Outpatient Medications   Medication Sig Dispense Refill    Enzalutamide (XTANDI) 40 MG TABS Take 120 mg by mouth daily 90 tablet 5    tiotropium-olodaterol (STIOLTO RESPIMAT) 2.5-2.5 MCG/ACT AERS INHALE 2 PUFFS BY MOUTH ONCE DAILY 4 g 5    albuterol sulfate HFA (VENTOLIN HFA) 108 (90 Base) MCG/ACT inhaler Inhale 2 puffs into the lungs every 6 hours as needed for Wheezing or Shortness of Breath 1 each 1    albuterol (PROVENTIL) (2.5 MG/3ML) 0.083% nebulizer solution Take 3 mLs by nebulization 2 times daily And every 6  Hours  prn (Patient taking differently: Take 2.5 mg by nebulization 2 times daily And every 6  Hours  prn) 180 mL 11    carvedilol (COREG) 3.125 MG tablet TAKE 1 TABLET BY MOUTH TWICE DAILY WITH MEALS 180 tablet 3    finasteride (PROSCAR) 5 MG tablet Take 1 tablet by mouth once daily 90 tablet 3    doxazosin (CARDURA) 4 MG tablet Take 1 tablet by mouth nightly (Patient taking differently: 2 mg) 90 tablet 3    leuprolide (LUPRON) 45 MG injection Inject 45 mg into the muscle once       calcium carbonate-vitamin D (CALCIUM 600 + D) 600-400 MG-UNIT TABS per tab Take 1 tablet by mouth 3 times daily 90 tablet 5    denosumab (XGEVA) 120 MG/1.7ML SOLN SC injection Inject 120 mg into the skin once Every 28 days       polyethylene glycol (GLYCOLAX) 17 g packet Take 17 g by mouth once      Cholecalciferol (VITAMIN D3) 125 MCG (5000 UT) CAPS Take 1 capsule by mouth every morning (before breakfast)      Misc.  Devices (ACAPELLA) MISC 1 Device by Does not apply route 4 times daily 1 each 0    Multiple Vitamins-Minerals (THERAPEUTIC MULTIVITAMIN-MINERALS) tablet Take 1 tablet by mouth daily      docusate sodium (COLACE, DULCOLAX) 100 MG CAPS Take 100 mg by mouth 2 times daily (Patient taking differently: Take 100 mg by mouth nightly)      acetaminophen (TYLENOL) 500 MG tablet Take 500 mg by mouth every 8 hours as needed for Pain      aspirin 325 MG EC tablet Take 1 tablet by mouth daily       No current facility-administered medications for this visit. EXAM:   vitals were not taken for this visit. Estimated body surface area is 1.65 meters squared as calculated from the following:    Height as of 10/26/22: 5' 7\" (1.702 m). Weight as of 10/26/22: 126 lb 12.8 oz (57.5 kg). ECOG: male wearing oxygen very pleasant and in no distress sitting in a wheelchair with daughter present. General: Non-ill appearing. Appears a little bit younger than his stated age sound mind and hearing is fairly good  HEENT: NC/AT,nonicteric, perrla,  Neck: normal thyroid, no masses. Nodes: No adenopathy  Lungs/chest: clear, no rales,rhonchi or wheezing, lung bases clear  CV: rrr, no rubs ,gallops or murmurs  Breasts: Not examined  Abd/Rectal: soft, non-tender,bowel sounds normal , no HSM,no masses  Back: normal curvature, No midline tenderness. flanks nontender  : Not Examined  Extremities: no cyanosis,clubbing or edema. In extremities. Skin: unremarkable  Neuro: A and O x 4, CN exam nonfocal, Motor- no deficits, Sensory- no deficits, gait-nl, speech- fluent, no ataxia.   Devices: none      DATA:    LAB:       CBC with Differential:      Lab Results   Component Value Date/Time    WBC 5.3 10/26/2022 02:59 PM    RBC 3.89 10/26/2022 02:59 PM    RBC 4.44 04/06/2017 12:22 PM    HGB 13.0 10/26/2022 02:59 PM    HCT 40.2 10/26/2022 02:59 PM     10/26/2022 02:59 PM     10/26/2022 02:59 PM    MCH 33.4 10/26/2022 02:59 PM    MCHC 32.3 10/26/2022 02:59 PM    RDW 12.5 10/26/2022 02:59 PM    NRBC 0 09/02/2022 09:38 AM    SEGSPCT 80.7 09/02/2022 09:38 AM    LYMPHOPCT 21.1 04/06/2017 12:22 PM    MONOPCT 7.7 09/02/2022 09:38 AM    EOSPCT 1.0 04/06/2017 12:22 PM    BASOPCT 0.5 04/06/2017 12:22 PM    MONOSABS 0.6 10/26/2022 02:59 PM    LYMPHSABS 0.7 10/26/2022 02:59 PM    EOSABS 0.1 10/26/2022 02:59 PM    BASOSABS 0.0 10/26/2022 02:59 PM    DIFFTYPE see below 12/18/2018 05:45 AM     Lab Results   Component Value Date/Time    SEGSABS 3.9 10/26/2022 02:59 PM       CMP:    Lab Results   Component Value Date/Time     10/26/2022 02:59 PM     09/28/2022 08:42 AM    K 4.2 10/26/2022 02:59 PM    K 4.3 09/28/2022 08:42 AM    K 4.0 04/02/2022 09:55 PM    CL 97 09/28/2022 08:42 AM    CO2 32 09/28/2022 08:42 AM    BUN 21 09/28/2022 08:42 AM    CREATININE 1.4 10/26/2022 02:59 PM    CREATININE 1.0 09/28/2022 08:42 AM    LABGLOM 46 10/26/2022 02:59 PM    GLUCOSE 107 09/28/2022 08:42 AM    GLUCOSE 96 04/06/2017 12:22 PM    PROT 5.9 10/26/2022 02:59 PM    LABALBU 4.0 10/26/2022 02:59 PM    CALCIUM 9.7 09/28/2022 08:42 AM    BILITOT 0.3 10/26/2022 02:59 PM    BILITOT NEGATIVE 12/09/2016 12:55 PM    ALKPHOS 62 10/26/2022 02:59 PM    AST 28 10/26/2022 02:59 PM    ALT 10 10/26/2022 02:59 PM       BMP:    Lab Results   Component Value Date/Time     10/26/2022 02:59 PM     09/28/2022 08:42 AM    K 4.2 10/26/2022 02:59 PM    K 4.3 09/28/2022 08:42 AM    K 4.0 04/02/2022 09:55 PM    CL 97 09/28/2022 08:42 AM    CO2 32 09/28/2022 08:42 AM    BUN 21 09/28/2022 08:42 AM    LABALBU 4.0 10/26/2022 02:59 PM    CREATININE 1.4 10/26/2022 02:59 PM    CREATININE 1.0 09/28/2022 08:42 AM    CALCIUM 9.7 09/28/2022 08:42 AM    LABGLOM 46 10/26/2022 02:59 PM    GLUCOSE 107 09/28/2022 08:42 AM    GLUCOSE 96 04/06/2017 12:22 PM       Magnesium:    Lab Results   Component Value Date/Time    MG 1.9 02/06/2021 03:17 AM     PT/INR:    Lab Results   Component Value Date/Time    INR 1.02 04/02/2022 09:55 PM     TSH:    Lab Results   Component Value Date/Time    TSH 2.130 05/22/2020 10:46 AM     VITAMIN B12: No components found for: B12  FOLATE:  No results found for: FOLATE  IRON:  No results found for: IRON  Iron Saturation:  No components found for: PERCENTFE  TIBC:  No results found for: TIBC  FERRITIN:  No results found for: FERRITIN  PSA:   Lab Results   Component Value Date/Time    PSA 47.10 10/26/2022 02:59 PM   5/26/2022 PSA = 11.6  1/25/2022 PSA = 10  September 2021 PSA = 10  April 2021 PSA = 10        IMAGING:  Bone scan 5/26/2022  Compared to 2019. CT  Multiple areas of increased activity throughout the axial and appendicular skeleton consistent with metastatic disease. Increased activity suspicious for metastatic disease at the L1 and L2 vertebral bodies S1 vertebral body multiple bilateral ribs. No prior bone scan for direct comparison. MRI thoracic spine 2/7/2019  MRI lumbar spine 2/8/2019 2/5/2019 CT chest thoracic and lumbar spine abdomen pelvis. 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 Treatment thus far Lupron ongoing. Casodex since December 2018 ongoing. Radiation to his spine as above. Asymptomatic at this point. Referred because of rising PSA on current therapy. Casodex stopped in May ;PSA is significantly climbing. Began at [facility] 10/26/2020    2) Prognosis / Disease Status: prostate cancer to bone with a slowly rising PSA; castrate resistance. 3) Work-up:    Labs: CBC, CMP PSA and magnesium to be drawn at follow-up 12/14. Imaging: Bone scan referenced above. No prior bone scan for comparison. Procedures: None   Consults: None   Other: Continue to follow-up with urology due for every 6-month Lupron this month. 4) Symptom Management: None new needed      5) Supportive care provided. Level of care is appropriate. Teaching done today.   Patient previously had chemo teaching regarding [facility] document on 10/26        6) Treatment goal: Treatment plan:     Recent trial of Casodex withdrawal.  Discontinued Casodex on 5/26  From May until now his PSA is risen from 11.6- 52  Plan - second-generation antiandrogen. Patient had chemo teaching for Xtandi per Ana Rosa Calhoun on 10/26/2022 40 mg tablets taking 3 tablets -120 mg daily. 7) Medications reviewed. Prescriptions today: None            No orders of the defined types were placed in this encounter. [place]AR[place]:  Controlled Substance Monitoring:    Acute and Chronic Pain Monitoring:   RX Monitoring 3/18/2022   Attestation -   Periodic Controlled Substance Monitoring No signs of potential drug abuse or diversion identified. 8) Research Options:   None      9) Other:         Xtandi (enzalutamide) drug information:  Standard dose 160 mg.  Given patient's age etc. starting at 120 mg. Dosage forms 40 mg tablets. Taken by mouth with and without food. Do not crush. Precautions:  Cardiovascular effects, fractures, hypersensitivity, posterior reversible encephalopathy syndrome, seizures  Adverse reactions greater than 10% include hypertension, peripheral edema, hot flashes, hyperglycemia, high magnesium, hyponatremia, weight loss, GI effects including constipation decreased appetite diarrhea nausea, decreased neutrophils 20% although grade 3 and 4 less than 1%, dizziness 10% following about 8%, fatigue less than 51%, headache 10%, arthralgias 20% asthenia less than 51% back pain 20% and shortness of breath 11%  Monitoring:  CBC with differential liver function test at baseline periodically. INR if on Coumadin. Monitor blood pressure. Monitor for signs and symptoms of ischemic heart disease, posterior reversible encephalopathy syndrome seizures evaluate for falls and fracture risk and compliance. 10) Follow Up: Follow-up 1/14 to assess tolerance to [facility]. Patient and family will monitor his weight weekly. Monitor appetite.   Check labs including CBC CMP magnesium and PSA at that time      Ashley Huston MD

## 2022-11-18 ENCOUNTER — OFFICE VISIT (OUTPATIENT)
Dept: FAMILY MEDICINE CLINIC | Age: 87
End: 2022-11-18

## 2022-11-18 VITALS
WEIGHT: 125.13 LBS | BODY MASS INDEX: 19.64 KG/M2 | HEIGHT: 67 IN | SYSTOLIC BLOOD PRESSURE: 118 MMHG | HEART RATE: 60 BPM | RESPIRATION RATE: 20 BRPM | DIASTOLIC BLOOD PRESSURE: 60 MMHG

## 2022-11-18 DIAGNOSIS — C61 PROSTATE CANCER METASTATIC TO BONE (HCC): ICD-10-CM

## 2022-11-18 DIAGNOSIS — H61.23 BILATERAL IMPACTED CERUMEN: Primary | ICD-10-CM

## 2022-11-18 DIAGNOSIS — C79.51 PROSTATE CANCER METASTATIC TO BONE (HCC): ICD-10-CM

## 2022-11-18 PROBLEM — J43.2 CENTRILOBULAR EMPHYSEMA (HCC): Status: ACTIVE | Noted: 2018-12-24

## 2022-11-18 PROBLEM — R26.2 DIFFICULTY IN WALKING, NOT ELSEWHERE CLASSIFIED: Status: ACTIVE | Noted: 2019-02-16

## 2022-11-18 PROBLEM — J44.9 STAGE 3 SEVERE COPD BY GOLD CLASSIFICATION (HCC): Status: ACTIVE | Noted: 2019-02-16

## 2022-11-18 PROBLEM — N40.1 BENIGN PROSTATIC HYPERPLASIA WITH URINARY OBSTRUCTION: Status: ACTIVE | Noted: 2019-02-16

## 2022-11-18 PROBLEM — R97.20 RAISED PROSTATE SPECIFIC ANTIGEN: Status: ACTIVE | Noted: 2020-06-17

## 2022-11-18 PROBLEM — N13.8 BENIGN PROSTATIC HYPERPLASIA WITH URINARY OBSTRUCTION: Status: ACTIVE | Noted: 2019-02-16

## 2022-11-18 PROBLEM — M62.81 MUSCLE WEAKNESS (GENERALIZED): Status: ACTIVE | Noted: 2019-02-16

## 2022-11-18 PROCEDURE — 1123F ACP DISCUSS/DSCN MKR DOCD: CPT | Performed by: FAMILY MEDICINE

## 2022-11-18 PROCEDURE — 1036F TOBACCO NON-USER: CPT | Performed by: FAMILY MEDICINE

## 2022-11-18 PROCEDURE — G8427 DOCREV CUR MEDS BY ELIG CLIN: HCPCS | Performed by: FAMILY MEDICINE

## 2022-11-18 PROCEDURE — 99213 OFFICE O/P EST LOW 20 MIN: CPT | Performed by: FAMILY MEDICINE

## 2022-11-18 PROCEDURE — G8420 CALC BMI NORM PARAMETERS: HCPCS | Performed by: FAMILY MEDICINE

## 2022-11-18 RX ORDER — TRAMADOL HYDROCHLORIDE 50 MG/1
50 TABLET ORAL EVERY 8 HOURS PRN
Qty: 90 TABLET | Refills: 0 | Status: SHIPPED | OUTPATIENT
Start: 2022-11-18 | End: 2022-12-18

## 2022-11-18 SDOH — ECONOMIC STABILITY: FOOD INSECURITY: WITHIN THE PAST 12 MONTHS, THE FOOD YOU BOUGHT JUST DIDN'T LAST AND YOU DIDN'T HAVE MONEY TO GET MORE.: NEVER TRUE

## 2022-11-18 SDOH — ECONOMIC STABILITY: FOOD INSECURITY: WITHIN THE PAST 12 MONTHS, YOU WORRIED THAT YOUR FOOD WOULD RUN OUT BEFORE YOU GOT MONEY TO BUY MORE.: NEVER TRUE

## 2022-11-18 ASSESSMENT — SOCIAL DETERMINANTS OF HEALTH (SDOH): HOW HARD IS IT FOR YOU TO PAY FOR THE VERY BASICS LIKE FOOD, HOUSING, MEDICAL CARE, AND HEATING?: NOT HARD AT ALL

## 2022-11-18 ASSESSMENT — ENCOUNTER SYMPTOMS
SINUS PRESSURE: 0
SHORTNESS OF BREATH: 0
CONSTIPATION: 0
BACK PAIN: 1

## 2022-11-18 NOTE — PROGRESS NOTES
Neurological:      General: No focal deficit present. Mental Status: He is alert. Psychiatric:         Behavior: Behavior normal.              An electronic signature was used to authenticate this note.     --Sid George MD

## 2022-11-30 ENCOUNTER — NURSE ONLY (OUTPATIENT)
Dept: UROLOGY | Age: 87
End: 2022-11-30
Payer: MEDICARE

## 2022-11-30 ENCOUNTER — TELEPHONE (OUTPATIENT)
Dept: ONCOLOGY | Age: 87
End: 2022-11-30

## 2022-11-30 DIAGNOSIS — C79.52 SECONDARY MALIGNANT NEOPLASM OF BONE AND BONE MARROW (HCC): ICD-10-CM

## 2022-11-30 DIAGNOSIS — C79.51 SECONDARY MALIGNANT NEOPLASM OF BONE AND BONE MARROW (HCC): ICD-10-CM

## 2022-11-30 DIAGNOSIS — C61 MALIGNANT NEOPLASM OF PROSTATE (HCC): Primary | ICD-10-CM

## 2022-11-30 PROCEDURE — 96372 THER/PROPH/DIAG INJ SC/IM: CPT | Performed by: NURSE PRACTITIONER

## 2022-11-30 NOTE — TELEPHONE ENCOUNTER
Received call from patient's daughter stating that lately he has been very sleepy, more weak, more forgetful and unsteady since being on the Gustavo. She has been weighing him weekly and he weighs 125lbs. She is wondering if he should cut down to 2 pills instead of the 3 40mg pills? That maybe this would help? She is wondering if it is too strong. Please advise.

## 2022-11-30 NOTE — PROGRESS NOTES
Patient has given me verbal consent to perform Xgeva Injection Yes      Following [employer] APRN  plan of care. Dia [place] 120MG/1.7ML MG GIVEN S.C Right ARM  Lot Number: [id number]  Expiration Date: 11/24  Chris Aguirre #: [id number]    After Injection was given there were no reactions at injection site and patient was feeling well. Patient was notified that possible side effects from injections include: Redness, swelling and itching at the injection site. Possible side effects of androgen deprivation therapy, including hot flashes, flushing of the skin, increased weight, decreased sex drive, and difficulties with ED. Patient was instructed to inform their dental office that they are receiving Dia [place] and that major dental procedures should be avoided. Patient was advised to call our office with any further questions or concerns. Any active dental problems, infections, or pain? no    Date of last dental appointment: unknown    Any planned dental procedures? no    Patient supplied their own medications No    Pt L[facility]H therapy (Firmagon, Eligard, Lupron) first initiated on 02/26/2019. Lenin Stoner

## 2022-12-01 NOTE — TELEPHONE ENCOUNTER
Okay to undergo a trial of 2 tablets daily. At next follow-up if he continues to have excessive fatigue and I will consider changing to apalutamide.

## 2022-12-02 NOTE — TELEPHONE ENCOUNTER
Daughter called back, relayed Dr. Renata Richards' message. She is hesitant to give her father any at all due to extreme weakness and poor appetite. Discussed stopping medication completely due to side effects. Daughter will call back Monday to update.

## 2022-12-03 DIAGNOSIS — U07.1 COVID-19 VIRUS INFECTION: Primary | ICD-10-CM

## 2022-12-03 RX ORDER — NIRMATRELVIR AND RITONAVIR 300-100 MG
KIT ORAL
Qty: 30 TABLET | Refills: 0 | Status: SHIPPED | OUTPATIENT
Start: 2022-12-03 | End: 2022-12-08

## 2022-12-05 ENCOUNTER — TELEPHONE (OUTPATIENT)
Dept: ONCOLOGY | Age: 87
End: 2022-12-05

## 2022-12-05 NOTE — TELEPHONE ENCOUNTER
Daughter calling to update, pt has stopped xtandi and still wasn't feeling great. Daughter did a home covid test which was positive. PCP has prescribed Paxlovid which will require pt to stay off xtandi for 10 days. Daughter says pt is feeling slightly better today, still poor appetite. She would like to continue to hold xtandi until f/u appt 12/15.

## 2022-12-06 ENCOUNTER — TELEMEDICINE (OUTPATIENT)
Dept: FAMILY MEDICINE CLINIC | Age: 87
End: 2022-12-06

## 2022-12-06 DIAGNOSIS — J06.9 UPPER RESPIRATORY TRACT INFECTION DUE TO COVID-19 VIRUS: Primary | ICD-10-CM

## 2022-12-06 DIAGNOSIS — U07.1 UPPER RESPIRATORY TRACT INFECTION DUE TO COVID-19 VIRUS: Primary | ICD-10-CM

## 2022-12-06 PROCEDURE — G8420 CALC BMI NORM PARAMETERS: HCPCS | Performed by: FAMILY MEDICINE

## 2022-12-06 PROCEDURE — G8427 DOCREV CUR MEDS BY ELIG CLIN: HCPCS | Performed by: FAMILY MEDICINE

## 2022-12-06 PROCEDURE — 99213 OFFICE O/P EST LOW 20 MIN: CPT | Performed by: FAMILY MEDICINE

## 2022-12-06 PROCEDURE — 1036F TOBACCO NON-USER: CPT | Performed by: FAMILY MEDICINE

## 2022-12-06 PROCEDURE — 1123F ACP DISCUSS/DSCN MKR DOCD: CPT | Performed by: FAMILY MEDICINE

## 2022-12-06 ASSESSMENT — ENCOUNTER SYMPTOMS
BACK PAIN: 0
NAUSEA: 0
SHORTNESS OF BREATH: 0
CONSTIPATION: 0
COUGH: 0
CHEST TIGHTNESS: 0
DIARRHEA: 1
BLOOD IN STOOL: 0
SORE THROAT: 0
ABDOMINAL PAIN: 0
EYE PAIN: 0
TROUBLE SWALLOWING: 0

## 2022-12-06 NOTE — PROGRESS NOTES
Oskar White agreed to Video Chat/Exam in presence of Dr Charmayne Keel and myself. Verified who was present in room with Tl Mora informed the e-mail address used to Face Time cannot be used to contact the Provider, if they are any questions or concerns they need to call the office directly. Oskar White stated understanding.

## 2022-12-06 NOTE — PROGRESS NOTES
Tasia Fuentes (:  1926) is a Established patient, here for evaluation of the following:    Assessment & Plan       ICD-10-CM    1. Upper respiratory tract infection due to COVID-19 virus  U07.1     J06.9            PLAN   Current Outpatient Medications   Medication Sig Dispense Refill    nirmatrelvir/ritonavir (PAXLOVID, 300/100,) 20 x 150 MG & 10 x 100MG TBPK Take 3 tablets (two 150 mg nirmatrelvir and one 100 mg ritonavir tablets) by mouth every 12 hours for 5 days. 30 tablet 0    traMADol (ULTRAM) 50 MG tablet Take 1 tablet by mouth every 8 hours as needed for Pain for up to 30 days.  90 tablet 0    Enzalutamide (XTANDI) 40 MG TABS Take 120 mg by mouth daily 90 tablet 5    tiotropium-olodaterol (STIOLTO RESPIMAT) 2.5-2.5 MCG/ACT AERS INHALE 2 PUFFS BY MOUTH ONCE DAILY 4 g 5    albuterol sulfate HFA (VENTOLIN HFA) 108 (90 Base) MCG/ACT inhaler Inhale 2 puffs into the lungs every 6 hours as needed for Wheezing or Shortness of Breath 1 each 1    albuterol (PROVENTIL) (2.5 MG/3ML) 0.083% nebulizer solution Take 3 mLs by nebulization 2 times daily And every 6  Hours  prn (Patient taking differently: Take 2.5 mg by nebulization 2 times daily And every 6  Hours  prn) 180 mL 11    carvedilol (COREG) 3.125 MG tablet TAKE 1 TABLET BY MOUTH TWICE DAILY WITH MEALS 180 tablet 3    finasteride (PROSCAR) 5 MG tablet Take 1 tablet by mouth once daily 90 tablet 3    doxazosin (CARDURA) 4 MG tablet Take 1 tablet by mouth nightly (Patient taking differently: 2 mg) 90 tablet 3    leuprolide (LUPRON) 45 MG injection Inject 45 mg into the muscle once       calcium carbonate-vitamin D (CALCIUM 600 + D) 600-400 MG-UNIT TABS per tab Take 1 tablet by mouth 3 times daily 90 tablet 5    denosumab (XGEVA) 120 MG/1.7ML SOLN SC injection Inject 120 mg into the skin once Every 28 days       polyethylene glycol (GLYCOLAX) 17 g packet Take 17 g by mouth once      Cholecalciferol (VITAMIN D3) 125 MCG (5000 UT) CAPS Take 1 capsule by mouth every morning (before breakfast)      Misc. Devices (ACAPELLA) MISC 1 Device by Does not apply route 4 times daily 1 each 0    Multiple Vitamins-Minerals (THERAPEUTIC MULTIVITAMIN-MINERALS) tablet Take 1 tablet by mouth daily      docusate sodium (COLACE, DULCOLAX) 100 MG CAPS Take 100 mg by mouth 2 times daily (Patient taking differently: Take 100 mg by mouth nightly)      acetaminophen (TYLENOL) 500 MG tablet Take 500 mg by mouth every 8 hours as needed for Pain      aspirin 325 MG EC tablet Take 1 tablet by mouth daily       No current facility-administered medications for this visit. Fluids and    paxlovid    to  2  bid and  do  extra  day as  nio  loose  stool         Fluids and  call if  worse   Subjective   HPI      Has  covid and  now  with  diarrhea  and  not  eating       Drink fluids well       Medium  fluids  no  fever            Hx  of  prostate  cancer             Review of Systems   Constitutional:  Positive for fatigue. Negative for fever. HENT:  Negative for congestion, ear pain, postnasal drip, sore throat and trouble swallowing. Eyes:  Negative for pain. Respiratory:  Negative for cough, chest tightness and shortness of breath. Cardiovascular:  Negative for chest pain, palpitations and leg swelling. Gastrointestinal:  Positive for diarrhea. Negative for abdominal pain, blood in stool, constipation and nausea. Poor appetitie   Genitourinary:  Negative for difficulty urinating, frequency and urgency. Musculoskeletal:  Negative for arthralgias, back pain, joint swelling and neck stiffness. Skin:  Negative for rash. Neurological:  Negative for dizziness, weakness and headaches. Hematological:  Negative for adenopathy. Does not bruise/bleed easily. Psychiatric/Behavioral:  Negative for behavioral problems, dysphoric mood and sleep disturbance.          Objective   Patient-Reported Vitals  No data recorded     Physical Exam  [INSTRUCTIONS:  \"[x]\" Indicates a positive item  \"[]\" Indicates a negative item  -- DELETE ALL ITEMS NOT EXAMINED]    Constitutional: [x] Appears well-developed and well-nourished [x] No apparent distress      [] Abnormal -     Mental status: [x] Alert and awake  [x] Oriented to person/place/time [x] Able to follow commands    [] Abnormal -     Eyes:   EOM    [x]  Normal    [] Abnormal -   Sclera  [x]  Normal    [] Abnormal -          Discharge [x]  None visible   [] Abnormal -     HENT: [x] Normocephalic, atraumatic  [] Abnormal -   [x] Mouth/Throat: Mucous membranes are moist    External Ears [x] Normal  [] Abnormal -    Neck: [x] No visualized mass [] Abnormal -     Pulmonary/Chest: [x] Respiratory effort normal   [x] No visualized signs of difficulty breathing or respiratory distress        [] Abnormal -      Musculoskeletal:   [x] Normal gait with no signs of ataxia         [x] Normal range of motion of neck        [] Abnormal -     Neurological:        [x] No Facial Asymmetry (Cranial nerve 7 motor function) (limited exam due to video visit)          [x] No gaze palsy        [] Abnormal -          Skin:        [x] No significant exanthematous lesions or discoloration noted on facial skin         [] Abnormal -            Psychiatric:       [x] Normal Affect [] Abnormal -        [x] No Hallucinations    Other pertinent observable physical exam findings:-         On this date 12/6/2022 I have spent 20 minutes reviewing previous notes, test results and face to face (virtual) with the patient discussing the diagnosis and importance of compliance with the treatment plan as well as documenting on the day of the visitSerafin Serrano, was evaluated through a synchronous (real-time) audio-video encounter. The patient (or guardian if applicable) is aware that this is a billable service, which includes applicable co-pays. This Virtual Visit was conducted with patient's (and/or legal guardian's) consent.  The visit was conducted pursuant to the emergency declaration under the [facility], [facility] authority and the [employer] General Act. Patient identification was verified, and a caregiver was present when appropriate. The patient was located at Home: [address] [address]. Provider was located at [facility] (Appt Dept): [facility],  [address] [facility].         --Laquita Torres MD

## 2022-12-09 ENCOUNTER — TELEPHONE (OUTPATIENT)
Dept: FAMILY MEDICINE CLINIC | Age: 87
End: 2022-12-09

## 2022-12-09 RX ORDER — PREDNISONE 20 MG/1
40 TABLET ORAL DAILY
Qty: 8 TABLET | Refills: 0 | Status: SHIPPED | OUTPATIENT
Start: 2022-12-09 | End: 2022-12-13

## 2022-12-09 NOTE — TELEPHONE ENCOUNTER
Pt's daughter, Rylan Velásquez, called said that her dad on oxygen at 2 liters pulse ox is around 95. She said she has a stethoscope and she doesn't really hear much but said she thinks she hears a \"rub\". She is wondering if he could have a steroid. Yaquelin Solano it always seems to get him over it\". Yaquelin Solano also whenever he goes to the hospital he gets a steroid and gets sent home with a steroid. Yaquelin Solano he is refusing to go see  or go to hospital for chest xray.     [address]    She can be reached at [phone]

## 2022-12-15 NOTE — TELEPHONE ENCOUNTER
Called and spoke with daughter, Rylan Velásquez, she states pt is still very fatigued and has a poor appetite. The diarrhea has improved slightly and pt is eating some. Daughter will keep office posted on pt condition.

## 2022-12-16 DIAGNOSIS — R06.02 SHORTNESS OF BREATH: ICD-10-CM

## 2022-12-16 DIAGNOSIS — J44.9 STAGE 3 SEVERE COPD BY GOLD CLASSIFICATION (HCC): ICD-10-CM

## 2022-12-16 RX ORDER — ALBUTEROL SULFATE 90 UG/1
AEROSOL, METERED RESPIRATORY (INHALATION)
Qty: 18 G | Refills: 1 | Status: SHIPPED | OUTPATIENT
Start: 2022-12-16

## 2022-12-16 NOTE — TELEPHONE ENCOUNTER
Date of last visit:  11/18/2022  Date of next visit:  5/18/2023    Requested Prescriptions     Pending Prescriptions Disp Refills    albuterol sulfate HFA (PROVENTIL;VENTOLIN;PROAIR) 108 (90 Base) MCG/ACT inhaler [Pharmacy Med Name: Albuterol Sulfate  (90 Base) MCG/ACT Inhalation Aerosol Solution] 18 g 0     Sig: INHALE 2 PUFFS INTO LUNGS EVERY 6 HOURS AS NEEDED FOR WHEEZING FOR SHORTNESS OF BREATH

## 2022-12-27 ENCOUNTER — NURSE ONLY (OUTPATIENT)
Dept: LAB | Age: 87
End: 2022-12-27

## 2022-12-27 DIAGNOSIS — C61 PROSTATE CA (HCC): ICD-10-CM

## 2022-12-27 LAB
ALBUMIN SERPL-MCNC: 4 G/DL (ref 3.5–5.1)
ALP BLD-CCNC: 66 U/L (ref 38–126)
ALT SERPL-CCNC: 12 U/L (ref 11–66)
ANION GAP SERPL CALCULATED.3IONS-SCNC: 10 MEQ/L (ref 8–16)
AST SERPL-CCNC: 29 U/L (ref 5–40)
BASOPHILS # BLD: 0.6 %
BASOPHILS ABSOLUTE: 0 THOU/MM3 (ref 0–0.1)
BILIRUB SERPL-MCNC: 0.4 MG/DL (ref 0.3–1.2)
BUN BLDV-MCNC: 17 MG/DL (ref 7–22)
CALCIUM SERPL-MCNC: 9.3 MG/DL (ref 8.5–10.5)
CHLORIDE BLD-SCNC: 97 MEQ/L (ref 98–111)
CO2: 31 MEQ/L (ref 23–33)
CREAT SERPL-MCNC: 0.9 MG/DL (ref 0.4–1.2)
EOSINOPHIL # BLD: 2.2 %
EOSINOPHILS ABSOLUTE: 0.1 THOU/MM3 (ref 0–0.4)
ERYTHROCYTE [DISTWIDTH] IN BLOOD BY AUTOMATED COUNT: 13 % (ref 11.5–14.5)
ERYTHROCYTE [DISTWIDTH] IN BLOOD BY AUTOMATED COUNT: 50 FL (ref 35–45)
GFR SERPL CREATININE-BSD FRML MDRD: > 60 ML/MIN/1.73M2
GLUCOSE BLD-MCNC: 69 MG/DL (ref 70–108)
HCT VFR BLD CALC: 43.1 % (ref 42–52)
HEMOGLOBIN: 14.2 GM/DL (ref 14–18)
IMMATURE GRANS (ABS): 0.02 THOU/MM3 (ref 0–0.07)
IMMATURE GRANULOCYTES: 0.4 %
LYMPHOCYTES # BLD: 12 %
LYMPHOCYTES ABSOLUTE: 0.6 THOU/MM3 (ref 1–4.8)
MAGNESIUM: 2.1 MG/DL (ref 1.6–2.4)
MCH RBC QN AUTO: 34.5 PG (ref 26–33)
MCHC RBC AUTO-ENTMCNC: 32.9 GM/DL (ref 32.2–35.5)
MCV RBC AUTO: 104.9 FL (ref 80–94)
MONOCYTES # BLD: 10.5 %
MONOCYTES ABSOLUTE: 0.5 THOU/MM3 (ref 0.4–1.3)
NUCLEATED RED BLOOD CELLS: 0 /100 WBC
PLATELET # BLD: 178 THOU/MM3 (ref 130–400)
PMV BLD AUTO: 10.1 FL (ref 9.4–12.4)
POTASSIUM SERPL-SCNC: 4.3 MEQ/L (ref 3.5–5.2)
PROSTATE SPECIFIC ANTIGEN: 34.53 NG/ML (ref 0–1)
RBC # BLD: 4.11 MILL/MM3 (ref 4.7–6.1)
SEG NEUTROPHILS: 74.3 %
SEGMENTED NEUTROPHILS ABSOLUTE COUNT: 3.5 THOU/MM3 (ref 1.8–7.7)
SODIUM BLD-SCNC: 138 MEQ/L (ref 135–145)
TOTAL PROTEIN: 6.3 G/DL (ref 6.1–8)
WBC # BLD: 4.7 THOU/MM3 (ref 4.8–10.8)

## 2022-12-28 ENCOUNTER — OFFICE VISIT (OUTPATIENT)
Dept: UROLOGY | Age: 87
End: 2022-12-28
Payer: MEDICARE

## 2022-12-28 VITALS
SYSTOLIC BLOOD PRESSURE: 100 MMHG | DIASTOLIC BLOOD PRESSURE: 62 MMHG | WEIGHT: 125 LBS | BODY MASS INDEX: 19.62 KG/M2 | HEIGHT: 67 IN

## 2022-12-28 DIAGNOSIS — R97.20 PSA ELEVATION: Primary | ICD-10-CM

## 2022-12-28 DIAGNOSIS — Z51.81 THERAPEUTIC DRUG MONITORING: ICD-10-CM

## 2022-12-28 DIAGNOSIS — C79.52 SECONDARY MALIGNANT NEOPLASM OF BONE AND BONE MARROW (HCC): ICD-10-CM

## 2022-12-28 DIAGNOSIS — C79.51 SECONDARY MALIGNANT NEOPLASM OF BONE AND BONE MARROW (HCC): ICD-10-CM

## 2022-12-28 DIAGNOSIS — C61 MALIGNANT NEOPLASM OF PROSTATE (HCC): ICD-10-CM

## 2022-12-28 PROCEDURE — 96372 THER/PROPH/DIAG INJ SC/IM: CPT | Performed by: NURSE PRACTITIONER

## 2022-12-28 PROCEDURE — G8420 CALC BMI NORM PARAMETERS: HCPCS | Performed by: NURSE PRACTITIONER

## 2022-12-28 PROCEDURE — G8427 DOCREV CUR MEDS BY ELIG CLIN: HCPCS | Performed by: NURSE PRACTITIONER

## 2022-12-28 PROCEDURE — G8484 FLU IMMUNIZE NO ADMIN: HCPCS | Performed by: NURSE PRACTITIONER

## 2022-12-28 PROCEDURE — 96402 CHEMO HORMON ANTINEOPL SQ/IM: CPT | Performed by: NURSE PRACTITIONER

## 2022-12-28 PROCEDURE — 1123F ACP DISCUSS/DSCN MKR DOCD: CPT | Performed by: NURSE PRACTITIONER

## 2022-12-28 PROCEDURE — 1036F TOBACCO NON-USER: CPT | Performed by: NURSE PRACTITIONER

## 2022-12-28 PROCEDURE — 99214 OFFICE O/P EST MOD 30 MIN: CPT | Performed by: NURSE PRACTITIONER

## 2022-12-28 RX ORDER — DOXAZOSIN 2 MG/1
2 TABLET ORAL NIGHTLY
Qty: 90 TABLET | Refills: 3 | Status: SHIPPED | OUTPATIENT
Start: 2022-12-28

## 2022-12-28 ASSESSMENT — ENCOUNTER SYMPTOMS
BACK PAIN: 0
ABDOMINAL PAIN: 0
NAUSEA: 0
VOMITING: 0

## 2022-12-28 NOTE — PROGRESS NOTES
[address]  [address]  [facility] [address]  Dept: [phone]  Loc: [phone]    Visit Date: 12/28/2022        HPI:     Latosha Anna is a 80 y.o. male who presents today for:  Chief Complaint   Patient presents with    Prostate Cancer       HPI  Pt seen in follow up for prostate cancer. Mr. Milligan has a hx of PSA 1/31/17 that was 25 at which time family was notified of risk for prostate cancer but did not wish to pursue further testing. CT of the abdomen and pelvis performed for abdominal pain 12/17/18 noted multiple skeletal sclerotic metastases with pathologic compression fractures of L1-L3, enlarged heterogeneous prostate, and pelvic adenopathy. PSA performed 12/17/18 was 1,594. Pt was started on Casodex 12/20/18. Pt recovered on TCU 12/24/19-1/10/19. He underwent TRUS prostate biopsy 1/24/19 by Dr. Malissa Iniguez with findings of high grade prostatic adenocarcinoma. Pt was scheduled for review of biopsy results 2/11/19 however pt was admitted to the hospital 2/5/19-2/15/19 and treated for acute PE and DVT and pathologic T6 and T8 fractures. He underwent T6 and T8 vertebral bone biopsy, RFA for spine metastatic lesion at the levels of T6 and T8, and vertebral augmentation (balloon kyphoplasty) for T6 and T8 on 2/13/19 by Dr. Pat Eric. He has completed palliative radiation therapy. Pt started monthly Firmagon and Xgeva 2/26/19. Firmagon switched to Lupron 4/30/19. Pt had anorexia on Casodex and this was held. Pt and daughter decided to hold casodex. Pt is on calcium carbonate and vitamin D supplementation. Last ADT injection with Lupron 45 mg 6/6/22. In December 2020 Yasir's PSA continued to trend upward to 3.17 with a testosterone <3. At office visit in December we discussed addition of Obb Pander or Casodex and possible side effects.   Pt was started on Casodex 12/15/2020 while awaiting coverage of [employer]. When pt received the [place] he was unable to swallow the pills. As such he remained on Casodex. He had increased fatigue and family started holding casodex starting 2/3/21. Pt was hospitalized and treated for aspiration pneumonia 2/5/21-2/8/21. PSA has recently continued to trend upward and family resumed casodex in June 2021. Despite resumption of casodex it continued to increase. Pt was referred to medical oncology and saw Dr. Jairo Zimmerman. Casodex stopped 5/26/22. Started E[place] recently but it was placed on hold while pt had Covid. Has not yet been resumed. Has upcoming appt with medical oncology in January. Reports he is urinating without difficulty. Has some weakened stream overall but feels he is emptying ok. Wakes 1 x per night. Continues on finasteride and doxazosin 2 mg daily.         Current Outpatient Medications   Medication Sig Dispense Refill    albuterol sulfate HFA (PROVENTIL;VENTOLIN;PROAIR) 108 (90 Base) MCG/ACT inhaler INHALE 2 PUFFS INTO LUNGS EVERY 6 HOURS AS NEEDED FOR WHEEZING FOR SHORTNESS OF BREATH 18 g 1    tiotropium-olodaterol (STIOLTO RESPIMAT) 2.5-2.5 MCG/ACT AERS INHALE 2 PUFFS BY MOUTH ONCE DAILY 4 g 5    albuterol (PROVENTIL) (2.5 MG/3ML) 0.083% nebulizer solution Take 3 mLs by nebulization 2 times daily And every 6  Hours  prn (Patient taking differently: Take 2.5 mg by nebulization 2 times daily And every 6  Hours  prn) 180 mL 11    carvedilol (COREG) 3.125 MG tablet TAKE 1 TABLET BY MOUTH TWICE DAILY WITH MEALS 180 tablet 3    finasteride (PROSCAR) 5 MG tablet Take 1 tablet by mouth once daily 90 tablet 3    doxazosin (CARDURA) 4 MG tablet Take 1 tablet by mouth nightly (Patient taking differently: 2 mg) 90 tablet 3    leuprolide (LUPRON) 45 MG injection Inject 45 mg into the muscle once       calcium carbonate-vitamin D (CALCIUM 600 + D) 600-400 MG-UNIT TABS per tab Take 1 tablet by mouth 3 times daily 90 tablet 5    denosumab (XGEVA) 120 MG/1.7ML SOLN SC Disease in his brother. Social History  Hien Maxwell  reports that he quit smoking about 56 years ago. His smoking use included cigarettes. He smoked an average of 1 pack per day. He has never used smokeless tobacco. He reports current alcohol use. He reports that he does not use drugs. Subjective:      Review of Systems   Constitutional:  Negative for activity change, appetite change, chills, diaphoresis, fatigue, fever and unexpected weight change. Gastrointestinal:  Negative for abdominal pain, nausea and vomiting. Genitourinary:  Negative for decreased urine volume, difficulty urinating, dysuria, flank pain, frequency, hematuria and urgency. Musculoskeletal:  Negative for back pain. Objective:   /62   Ht 5' 7\" (1.702 m)   Wt 125 lb (56.7 kg)   BMI 19.58 kg/m²     Physical Exam  Vitals reviewed. Constitutional:       General: He is not in acute distress. Appearance: Normal appearance. He is well-developed. He is not ill-appearing or diaphoretic. HENT:      Head: Normocephalic and atraumatic. Right Ear: External ear normal.      Left Ear: External ear normal.      Nose: Nose normal.      Mouth/Throat:      Mouth: Mucous membranes are moist.   Eyes:      General: No scleral icterus. Right eye: No discharge. Left eye: No discharge. Neck:      Vascular: No JVD. Trachea: No tracheal deviation. Cardiovascular:      Rate and Rhythm: Normal rate and regular rhythm. Pulmonary:      Effort: Pulmonary effort is normal. No respiratory distress. Comments: On oxygen therapy via [place]  Abdominal:      General: There is no distension. Tenderness: There is no abdominal tenderness. There is no right CVA tenderness or left CVA tenderness. Musculoskeletal:      Comments: Seated in wheelchair. Skin:     General: Skin is warm and dry. Neurological:      Mental Status: He is alert and oriented to person, place, and time. Mental status is at baseline. Psychiatric:         Mood and Affect: Mood normal.         Behavior: Behavior normal.         Thought Content: Thought content normal.     POC  No results found for this visit on 12/28/22. Patients recent PSA values are as follows  Lab Results   Component Value Date    PSA 34.53 (H) 12/27/2022    PSA 47.10 (H) 10/26/2022    PSA 31.34 (H) 09/28/2022        Recent BUN/Creatinine:  Lab Results   Component Value Date/Time    BUN 17 12/27/2022 11:49 AM    CREATININE 0.9 12/27/2022 11:49 AM       Assessment:   Metastatic castration resistant prostate cancer  Obstructive prostate    Plan:     Xgeva and Lupron 45 mg injection today. Follow up with medical oncology as scheduled. Timing of next PSA per medical oncology. Continue doxazosin and finasteride. Continue monthly Xgeva injection. Next Lupron injection in 6 months.

## 2022-12-28 NOTE — PROGRESS NOTES
Patient has given me verbal consent to perform Lupron Injection  Yes      Following [facility] plan of care. LUPRON 45 MG GIVEN I.M Right UOQ HIP  Lot Number: [id number]  Expiration Date: 12/04/2024  [facility] #: [id number]    After Injection was given there were no reactions at injection site and patient was feeling well. Patient was notified that possible side effects from injections include: Redness, swelling and itching at the injection site. Possible side effects of androgen deprivation therapy, including hot flashes, flushing of the skin, increased weight, decreased sex drive, and difficulties with ED. Patient was instructed to call the office with any further questions or concerns. Patient supplied their own medications No      Pt LHRH therapy (Oneda Blazer, Lupron) first initiated on 02/26/2019. Patient has given me verbal consent to perform Xgeva Injection yes      Following [facility] plan of care. XGEVA 120MG/1.7ML MG GIVEN S.C Left ARM  Lot Number: [id number]  Expiration Date: 03/31/2024  [facility] #: [id number]    After Injection was given there were no reactions at injection site and patient was feeling well. Patient was notified that possible side effects from injections include: Redness, swelling and itching at the injection site. Possible side effects of androgen deprivation therapy, including hot flashes, flushing of the skin, increased weight, decreased sex drive, and difficulties with ED. Patient was instructed to inform their dental office that they are receiving Berto Juan David and that major dental procedures should be avoided. Patient was advised to call our office with any further questions or concerns. Any active dental problems, infections, or pain? no    Date of last dental appointment: none    Any planned dental procedures? no    Patient supplied their own medications No    Pt LHRH therapy (Firmagon, Eligard, Lupron) first initiated on 2/26/2019. Hardik Herr

## 2023-01-11 ENCOUNTER — OFFICE VISIT (OUTPATIENT)
Dept: ENT CLINIC | Age: 88
End: 2023-01-11
Payer: MEDICARE

## 2023-01-11 VITALS
RESPIRATION RATE: 12 BRPM | HEART RATE: 92 BPM | BODY MASS INDEX: 19.62 KG/M2 | DIASTOLIC BLOOD PRESSURE: 72 MMHG | SYSTOLIC BLOOD PRESSURE: 118 MMHG | WEIGHT: 125 LBS | OXYGEN SATURATION: 95 % | HEIGHT: 67 IN | TEMPERATURE: 97.5 F

## 2023-01-11 DIAGNOSIS — H92.13 OTORRHEA, BILATERAL: Primary | ICD-10-CM

## 2023-01-11 DIAGNOSIS — H61.23 HEARING LOSS DUE TO CERUMEN IMPACTION, BILATERAL: ICD-10-CM

## 2023-01-11 PROCEDURE — 1123F ACP DISCUSS/DSCN MKR DOCD: CPT | Performed by: PHYSICIAN ASSISTANT

## 2023-01-11 PROCEDURE — 99203 OFFICE O/P NEW LOW 30 MIN: CPT | Performed by: PHYSICIAN ASSISTANT

## 2023-01-11 PROCEDURE — G8484 FLU IMMUNIZE NO ADMIN: HCPCS | Performed by: PHYSICIAN ASSISTANT

## 2023-01-11 PROCEDURE — G8427 DOCREV CUR MEDS BY ELIG CLIN: HCPCS | Performed by: PHYSICIAN ASSISTANT

## 2023-01-11 PROCEDURE — 69210 REMOVE IMPACTED EAR WAX UNI: CPT | Performed by: PHYSICIAN ASSISTANT

## 2023-01-11 PROCEDURE — 1036F TOBACCO NON-USER: CPT | Performed by: PHYSICIAN ASSISTANT

## 2023-01-11 PROCEDURE — G8420 CALC BMI NORM PARAMETERS: HCPCS | Performed by: PHYSICIAN ASSISTANT

## 2023-01-11 RX ORDER — OFLOXACIN 3 MG/ML
5 SOLUTION AURICULAR (OTIC) 2 TIMES DAILY
Qty: 10 ML | Refills: 1 | Status: SHIPPED | OUTPATIENT
Start: 2023-01-11

## 2023-01-11 NOTE — PROGRESS NOTES
[address] EAR, NOSE AND THROAT  [address] [place] [address]  Dept: [phone]  Dept Fax: [phone]  Loc: [phone]    Gladis Arambula is a 80 y.o. male who was referred by Alfie Huerta MD for:  Chief Complaint   Patient presents with    Cerumen Impaction     New patient here for removal of cerumen impaction, bilateral. Referred by Dr Michelle Valadez. Tanner Hargrove HPI:     Patient presents for evaluation of bilateral cerumen impaction. He is accompanied by his daughter. He was seen at his PCP for hearing loss, worse on the right. The PCP discovered bilateral cerumen impactions which were unable to be removed, referred to ENT. Patient denies otalgia, otorrhea, fevers, fevers. No other concerns reported at this time. Subjective:      REVIEW OF SYSTEMS:    A complete multi-organ review of systems was performed using a new patient questionnaire, and reviewed by me.   ENT:  negative except as noted in HPI  CONSTITUTIONAL:  negative except as noted in HPI  EYES:  negative except as noted in HPI  RESPIRATORY:  negative except as noted in HPI  CARDIOVASCULAR:  negative except as noted in HPI  GASTROINTESTINAL:  negative except as noted in HPI  GENITOURINARY:  negative except as noted in HPI  MUSCULOSKELETAL:  negative except as noted in HPI  SKIN:  negative except as noted in HPI  ENDOCRINE/METABOLIC: negative except as noted in HPI  HEMATOLOGIC/LYMPHATIC:  negative except as noted in HPI  ALLERGY/IMMUN: negative except as noted in HPI  NEUROLOGICAL:  negative except as noted in HPI  BEHAVIOR/PSYCH:  negative except as noted in HPI    Past Medical History:  Past Medical History:   Diagnosis Date    Blood circulation, collateral     CAD (coronary artery disease)     CAD (coronary artery disease) 9/2/2014    Cancer ([facility] [address]     prostate to bone    CKD (chronic kidney disease) stage 2, GFR 60-89 ml/min 12/24/2018    COPD (chronic obstructive pulmonary disease) ([facility] [address]     Diverticulitis     DVT of lower extremity, bilateral ([facility] [address] 02/2019    GERD (gastroesophageal reflux disease)     Hiatal hernia     Hyperlipemia 8/12/2013    Hyperlipidemia     Other disorders of kidney and ureter in diseases classified elsewhere     Pleural plaque 8/12/2013    Pneumonia of both lower lobes due to infectious organism     Primary adenocarcinoma of prostate ([facility] [address] 01/2019    high grade    [facility] 02/2019    Vitamin D deficiency 05/2020       Social History:    TOBACCO:   reports that he quit smoking about 56 years ago. His smoking use included cigarettes. He smoked an average of 1 pack per day. He has never used smokeless tobacco.  ETOH:   reports current alcohol use. DRUGS:   reports no history of drug use. Family History:       Problem Relation Age of Onset    Cancer Mother     Heart Disease Brother        Surgical History:  Past Surgical History:   Procedure Laterality Date    ABDOMEN SURGERY      APPENDECTOMY      CARDIAC CATHETERIZATION      CYSTOSCOPY  03/17/2015    Litholapaxy-Large    EYE SURGERY      cataracts    HERNIA REPAIR      KYPHOSIS SURGERY N/A 2/13/2019    ABLATION AND KYPHOPLASTY AT T8 AND ABLATION AND KYPHOPLASTY AT T6 performed by Dominik Agustin MD at [place] [address] 12/17/2018    EXPLORATORY LAPAROTOMY, WITH ABDOMINAL WASHOUT [facility]        Objective: This is a 80 y.o. male. Patient is alert and cooperative. Patient appears well developed, well nourished. Mood is happy with normal affect. /72 (Site: Right Upper Arm, Position: Sitting)   Pulse 92   Temp 97.5 °F (36.4 °C) (Infrared)   Resp 12   Ht 5' 7\" (1.702 m)   Wt 125 lb (56.7 kg)   SpO2 95%   BMI 19.58 kg/m²     Head:   Normocephalic, atraumatic. No obvious masses or lesions noted. Ears:  External ears: Normal: no scars, lesions or masses. Mastoid process: No erythema noted. No tenderness to palpation.   R External auditory canal: Canal completely obstructed with cerumen impaction. Majority of the cerumen was able to be removed using alligator forceps and suction under handheld magnification. Mild amount of purulent appearing drainage. There was several areas of increased irritation where cerumen lifted the skin. The areas were cleaned of discharge with suction  L External auditory canal: Canal completely obstructed with cerumen impaction. Majority of the cerumen impaction was removed using alligator forceps and suction under handheld magnification. Purulent and malodorous discharge was cultured and removed using suction. Friable appearing skin   Tympanic membranes:  R intact, translucent                                                  L intact, translucent     Assessment/Plan:     Diagnosis Orders   1. Otorrhea, bilateral  ofloxacin (FLOXIN) 0.3 % otic solution    Culture, Ear      2. Hearing loss due to cerumen impaction, bilateral            -Cerumen impactions removed bilaterally. Patient reports improved hearing. Canal irritated, but appear mostly from cerumen. however purulent appearing drainage discovered with removal of impactions. Culture obtained. Will start ofloxacin 5 drops each ear bid until follow up  -May modify drops or consider additional oral antibiotics depending on response to treatment  -Keep ears dry  -Follow up in about 2 weeks.  Contact office with ear pain, increased drainage, swelling or other concerns    (Please note that portions of this note may have been completed with a voice recognition program.  Efforts were made to edit the dictation but occasionally words are mis-transcribed.)    Electronically signed by BRANDY Alvarez

## 2023-01-12 ENCOUNTER — OFFICE VISIT (OUTPATIENT)
Dept: ONCOLOGY | Age: 88
End: 2023-01-12
Payer: MEDICARE

## 2023-01-12 ENCOUNTER — HOSPITAL ENCOUNTER (OUTPATIENT)
Dept: INFUSION THERAPY | Age: 88
Discharge: HOME | End: 2023-01-12
Payer: MEDICARE

## 2023-01-12 VITALS
DIASTOLIC BLOOD PRESSURE: 64 MMHG | TEMPERATURE: 98.3 F | HEART RATE: 96 BPM | RESPIRATION RATE: 16 BRPM | SYSTOLIC BLOOD PRESSURE: 102 MMHG

## 2023-01-12 VITALS
OXYGEN SATURATION: 96 % | BODY MASS INDEX: 19.15 KG/M2 | DIASTOLIC BLOOD PRESSURE: 64 MMHG | SYSTOLIC BLOOD PRESSURE: 102 MMHG | WEIGHT: 122 LBS | RESPIRATION RATE: 16 BRPM | HEIGHT: 67 IN | TEMPERATURE: 98.3 F | HEART RATE: 96 BPM

## 2023-01-12 DIAGNOSIS — C61 PROSTATE CA (HCC): Primary | ICD-10-CM

## 2023-01-12 DIAGNOSIS — C61 PROSTATE CA (HCC): ICD-10-CM

## 2023-01-12 LAB
ABSOLUTE IMMATURE GRANULOCYTE: 0.01 THOU/MM3 (ref 0–0.07)
ALBUMIN SERPL-MCNC: 3.8 G/DL (ref 3.5–5.1)
ALP BLD-CCNC: 57 U/L (ref 38–126)
ALT SERPL-CCNC: 10 U/L (ref 11–66)
AST SERPL-CCNC: 29 U/L (ref 5–40)
BASINOPHIL, AUTOMATED: 0 % (ref 0–3)
BASOPHILS ABSOLUTE: 0 THOU/MM3 (ref 0–0.1)
BILIRUB SERPL-MCNC: 0.4 MG/DL (ref 0.3–1.2)
BILIRUBIN DIRECT: < 0.2 MG/DL (ref 0–0.3)
BUN, WHOLE BLOOD: 18 MG/DL (ref 8–26)
CHLORIDE, WHOLE BLOOD: 97 MEQ/L (ref 98–109)
CREATININE, WHOLE BLOOD: 0.8 MG/DL (ref 0.5–1.2)
EOSINOPHILS ABSOLUTE: 0.1 THOU/MM3 (ref 0–0.4)
EOSINOPHILS RELATIVE PERCENT: 2 % (ref 0–4)
GFR SERPL CREATININE-BSD FRML MDRD: > 60 ML/MIN/1.73M2
GLUCOSE, WHOLE BLOOD: 96 MG/DL (ref 70–108)
HCT VFR BLD CALC: 39.5 % (ref 42–52)
HEMOGLOBIN: 12.9 GM/DL (ref 14–18)
IMMATURE GRANULOCYTES: 0 %
IONIZED CALCIUM, WHOLE BLOOD: 1.17 MMOL/L (ref 1.12–1.32)
LYMPHOCYTES # BLD: 9 % (ref 15–47)
LYMPHOCYTES ABSOLUTE: 0.5 THOU/MM3 (ref 1–4.8)
MCH RBC QN AUTO: 34.1 PG (ref 26–33)
MCHC RBC AUTO-ENTMCNC: 32.7 GM/DL (ref 32.2–35.5)
MCV RBC AUTO: 105 FL (ref 80–94)
MONOCYTES ABSOLUTE: 0.5 THOU/MM3 (ref 0.4–1.3)
MONOCYTES: 9 % (ref 0–12)
PDW BLD-RTO: 13.1 % (ref 11.5–14.5)
PLATELET # BLD: 199 THOU/MM3 (ref 130–400)
PMV BLD AUTO: 9 FL (ref 9.4–12.4)
POTASSIUM, WHOLE BLOOD: 3.9 MEQ/L (ref 3.5–4.9)
PROSTATE SPECIFIC ANTIGEN: 33.91 NG/ML (ref 0–1)
RBC # BLD: 3.78 MILL/MM3 (ref 4.7–6.1)
SEG NEUTROPHILS: 80 % (ref 43–75)
SEGMENTED NEUTROPHILS ABSOLUTE COUNT: 4.4 THOU/MM3 (ref 1.8–7.7)
SODIUM, WHOLE BLOOD: 138 MEQ/L (ref 138–146)
TOTAL CO2, WHOLE BLOOD: 32 MEQ/L (ref 23–33)
TOTAL PROTEIN: 6.3 G/DL (ref 6.1–8)
WBC # BLD: 5.5 THOU/MM3 (ref 4.8–10.8)

## 2023-01-12 PROCEDURE — 85025 COMPLETE CBC W/AUTO DIFF WBC: CPT

## 2023-01-12 PROCEDURE — G8420 CALC BMI NORM PARAMETERS: HCPCS | Performed by: INTERNAL MEDICINE

## 2023-01-12 PROCEDURE — 84153 ASSAY OF PSA TOTAL: CPT

## 2023-01-12 PROCEDURE — 99211 OFF/OP EST MAY X REQ PHY/QHP: CPT

## 2023-01-12 PROCEDURE — G8484 FLU IMMUNIZE NO ADMIN: HCPCS | Performed by: INTERNAL MEDICINE

## 2023-01-12 PROCEDURE — 1123F ACP DISCUSS/DSCN MKR DOCD: CPT | Performed by: INTERNAL MEDICINE

## 2023-01-12 PROCEDURE — 80047 BASIC METABLC PNL IONIZED CA: CPT

## 2023-01-12 PROCEDURE — 36415 COLL VENOUS BLD VENIPUNCTURE: CPT

## 2023-01-12 PROCEDURE — 1036F TOBACCO NON-USER: CPT | Performed by: INTERNAL MEDICINE

## 2023-01-12 PROCEDURE — 99214 OFFICE O/P EST MOD 30 MIN: CPT | Performed by: INTERNAL MEDICINE

## 2023-01-12 PROCEDURE — 80076 HEPATIC FUNCTION PANEL: CPT

## 2023-01-12 PROCEDURE — G8427 DOCREV CUR MEDS BY ELIG CLIN: HCPCS | Performed by: INTERNAL MEDICINE

## 2023-01-12 NOTE — PROGRESS NOTES
[address] CANCER [address] [place] [address]  Dept: [phone]  Loc: [phone]   Hematology/Oncology Consult (Clinic)        1/12/23    Venkata Madsen   1/27/1926     No ref. 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 Chief Complaint   Patient presents with    Follow-up     Prostate CA [facility])      DIAGNOSIS:  -Prostate cancer with bone mets. TREATMENT:  -Casodex. Casodex stopped May 2022.    - Enzalutamide( Xtandi) 120 mg  ( 3 tabs )(po daily ( Start date =  10/26/22 ) Held since 12/5/22  Plan to resume at 40 mg daily on 1/25/2023  -Lupron 45 mg every 6 months per urology; last given by Urology 12/28/22.  Bolttrice Keys monthly per urology; last done 12/28/22    PARAMETERS:  -PSA September 2021 = 3.72            April 2022 = 9.82            July 2022 = 13.78             September 2022 = 31.34             10/26/2022 = 47.1              12/27/22 = 34.53 (interrupted abbreviated trial of Xtandi)  - bone scan 6/7/2022    SUBJECTIVE:   Patient last seen by me mid November. Seen by urology on 12/28 received his 6-month Lupron and monthly Xgeva. Summary of Brooke Ceron administration since he was last here in November: Telephone consultation late November early December noted that he was having extreme weakness and poor appetite and the Brooke Ceron was placed on hold since early December. He also had COVID as an outpatient in early December. The 3 tablets of Xtandi caused fatigue decline in appetite and distaste for food. He is slowly rebuilt rebuilding. I proposed starting Brooke Osmany which is 1 tablet a day. They would like to wait 2 more weeks and started late January. History of thrombosis therefore no medication recommended. HPI: John Paul Carlson is a delightful 80-year-old gentleman  was diagnosed with prostate cancer in January 2016. PSA in January 2017 was 25. He developed abdominal pain and bone pain and CT abdomen pelvis 12/17 states 18 showed multiple bone mets and pathologic compression fracture L1-3. He had enlarged prostate and pelvic adenopathy. PSA December 2018 was 1594. Started Casodex December 2018. TRUS prostate biopsy 1/24/2019 by Dr. Arnol Bower showed a high-grade prostate adenocarcinoma high-grade clinical stage [id number] stage IVb. Treatment has included Lupron every 6 months since diagnosis Next due May 2022. He has been on Casodex other than a 6-month break several years ago remains on Casodex at this time. He received radiation to the T-spine T3-T9 through 1000 cGy completed March 2019. PSA has been steadily rising over the last several months on current therapy. He remains at his baseline with no new bone or back pain or decline. 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 PMH:   Past Medical History:   Diagnosis Date    Blood circulation, collateral     CAD (coronary artery disease)     CAD (coronary artery disease) 9/2/2014    Cancer ([address]     prostate to bone    CKD (chronic kidney disease) stage 2, GFR 60-89 ml/min 12/24/2018    COPD (chronic obstructive pulmonary disease) ([address]     Diverticulitis     DVT of lower extremity, bilateral ([address] 02/2019    GERD (gastroesophageal reflux disease)     Hiatal hernia     Hyperlipemia 8/12/2013    Hyperlipidemia     Other disorders of kidney and ureter in diseases classified elsewhere     Pleural plaque 8/12/2013    Pneumonia of both lower lobes due to infectious organism     Primary adenocarcinoma of prostate ([address] 01/2019    high grade    Pulmonary emboli ([address] 02/2019    Vitamin D deficiency 05/2020    COPD; O2 dependent. Quit 1960's. Coronary stents x2 no history of MI no history of congestive heart failure or arrhythmias.   History of DVT and PE in 2019 just on aspirin  No history of TIA or or seizures. Social HX:   Social History     Socioeconomic History    Marital status:      Spouse name: Not on file    Number of children: 5    Years of education: Not on file    Highest education level: Not on file   Occupational History    Occupation: Retired   Tobacco Use    Smoking status: Former     Packs/day: 1.00     Types: Cigarettes     Quit date: 1966     Years since quittin.5    Smokeless tobacco: Never   Vaping Use    Vaping Use: Never used   Substance and Sexual Activity    Alcohol use: Yes     Comment: occasionally    Drug use: No    Sexual activity: Not Currently   Other Topics Concern    Not on file   Social History Narrative    - Former    Lives alone but has lots of family support that goes in and helps him    Son will spend the night most nights    Family assists with transportation    No social barriers noted    No barriers with medication affordability     Social Determinants of Health     Financial Resource Strain: Low Risk     Difficulty of Paying Living Expenses: Not hard at all   Food Insecurity: No Food Insecurity    Worried About Running Out of Food in the Last Year: Never true    [address] in the Last Year: Never true   Transportation Needs: Not on file   Physical Activity: Inactive    Days of Exercise per Week: 0 days    Minutes of Exercise per Session: 0 min   Stress: Not on file   Social Connections: Not on file   Intimate Partner Violence: Not on file   Housing Stability: Not on file        Spouse:     Phone: [phone] [address] [phone]    Employment:  [employer] [address] in [facility] [employer] .     Immunizations:  Immunization History   Administered Date(s) Administered    COVID-19, PFIZER PURPLE top, DILUTE for use, (age 15 y+), 30mcg/0.3mL 2021, 2021    Influenza 2012    Influenza Virus Vaccine 09/30/2014, 11/09/2015    Influenza, AFLURIA (age 1 yrs+), FLUZONE, (age 10 mo+), MDV, 0.5mL 10/07/2016, 01/18/2021    Influenza, FLUCELVAX, (age 10 mo+), MDCK, MDV, 0.5mL 12/12/2019    Influenza, FLUCELVAX, (age 10 mo+), MDCK, PF, 0.5mL 10/26/2017    Influenza, Quadv, 6 mo and older, IM (Fluzone, Flulaval) 10/18/2018    PPD Test 12/25/2018, 01/01/2019    Pneumococcal Conjugate 13-valent ([id number]) 11/09/2015    Pneumococcal Polysaccharide ([id number]) 11/02/2007    Tdap (Boostrix, Adacel) 07/31/2013        Health Screenings:    Prostate:   Lab Results   Component Value Date    PSA 34.53 (H) 12/27/2022    PSA 47.10 (H) 10/26/2022    PSA 31.34 (H) 09/28/2022          Health Maintenance Due   Topic Date Due    Shingles vaccine (1 of 2) Never done    COVID-19 Vaccine (3 - Pfizer risk series) 04/22/2021    Flu vaccine (1) 08/01/2022        Interests:       Fam HX:   Family History   Problem Relation Age of Onset    Cancer Mother     Heart Disease Brother         Hospitalizations:   Copd exacerbation  4/22    Allergies:  No Known Allergies     Adult Illness:  Patient Active Problem List   Diagnosis    Hyperlipidemia    Pleural plaque    Coronary arteriosclerosis    Urinary bladder stone    Stage 3 severe COPD by GOLD classification ([address]    S/P exploratory laparotomy    Prostate cancer metastatic to bone ([address]    Raised prostate specific antigen    Benign prostatic hyperplasia with urinary obstruction    Trauma of urethra    Acute midline low back pain    Acute postoperative pulmonary insufficiency (HCC)    Acute on chronic respiratory failure with hypoxia (HCC)    Centrilobular emphysema (HCC)    Asthenia    Stage 2 chronic kidney disease    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    Mild bibasilar atelectasis    Pleural effusion, right    Unspecified severe protein-calorie malnutrition (HCC)    Microscopic hematuria Nutritional marasmus [facility]    Pathological fracture of vertebra due to neoplastic disease    Acute deep vein thrombosis of lower limb (HCC)    Adenocarcinoma of prostate ([facility]    DVT of lower extremity, bilateral (HCC)    Vitamin D deficiency    Pneumonia of both lower lobes due to infectious organism    Dysphagia    Moderate malnutrition (HCC)    Acute respiratory failure with hypoxia (HCC)    COPD (chronic obstructive pulmonary disease) (HCC)    Chronic renal disease, stage III (HCC) [135808]    Muscle weakness (generalized)    Difficulty in walking, not elsewhere classified        Surgery:  Past Surgical History:   Procedure Laterality Date    ABDOMEN SURGERY      APPENDECTOMY      CARDIAC CATHETERIZATION      CYSTOSCOPY  03/17/2015    Litholapaxy-Large    EYE SURGERY      cataracts    HERNIA REPAIR      KYPHOSIS SURGERY N/A 2/13/2019    ABLATION AND KYPHOPLASTY AT T8 AND ABLATION AND KYPHOPLASTY AT T6 performed by Mamadou Giang MD at [place] [id number] N/A 12/17/2018    EXPLORATORY LAPAROTOMY, WITH ABDOMINAL WASHOUT performed by Lena Fletcher MD at [address]        Medications:  Current Outpatient Medications   Medication Sig Dispense Refill    ofloxacin (FLOXIN) 0.3 % otic solution Place 5 drops in ear(s) 2 times daily 10 mL 1    doxazosin (CARDURA) 2 MG tablet Take 1 tablet by mouth nightly 90 tablet 3    albuterol sulfate HFA (PROVENTIL;VENTOLIN;PROAIR) 108 (90 Base) MCG/ACT inhaler INHALE 2 PUFFS INTO LUNGS EVERY 6 HOURS AS NEEDED FOR WHEEZING FOR SHORTNESS OF BREATH 18 g 1    tiotropium-olodaterol (STIOLTO RESPIMAT) 2.5-2.5 MCG/ACT AERS INHALE 2 PUFFS BY MOUTH ONCE DAILY 4 g 5    albuterol (PROVENTIL) (2.5 MG/3ML) 0.083% nebulizer solution Take 3 mLs by nebulization 2 times daily And every 6  Hours  prn (Patient taking differently: Take 2.5 mg by nebulization 2 times daily And every 6  Hours  prn) 180 mL 11    carvedilol (COREG) 3.125 MG tablet TAKE 1 TABLET BY MOUTH TWICE DAILY WITH MEALS 180 tablet 3    finasteride (PROSCAR) 5 MG tablet Take 1 tablet by mouth once daily 90 tablet 3    leuprolide (LUPRON) 45 MG injection Inject 45 mg into the muscle once       calcium carbonate-vitamin D (CALCIUM 600 + D) 600-400 MG-UNIT TABS per tab Take 1 tablet by mouth 3 times daily 90 tablet 5    denosumab (XGEVA) 120 MG/1.7ML SOLN SC injection Inject 120 mg into the skin once Every 28 days       polyethylene glycol (GLYCOLAX) 17 g packet Take 17 g by mouth once      Cholecalciferol (VITAMIN D3) 125 MCG (5000 UT) CAPS Take 1 capsule by mouth every morning (before breakfast)      Misc. Devices (ACAPELLA) MISC 1 Device by Does not apply route 4 times daily 1 each 0    Multiple Vitamins-Minerals (THERAPEUTIC MULTIVITAMIN-MINERALS) tablet Take 1 tablet by mouth daily      docusate sodium (COLACE, DULCOLAX) 100 MG CAPS Take 100 mg by mouth 2 times daily (Patient taking differently: Take 100 mg by mouth nightly)      acetaminophen (TYLENOL) 500 MG tablet Take 500 mg by mouth every 8 hours as needed for Pain      aspirin 325 MG EC tablet Take 1 tablet by mouth daily      Enzalutamide (XTANDI) 40 MG TABS Take 120 mg by mouth daily (Patient not taking: Reported on 1/12/2023) 90 tablet 5     No current facility-administered medications for this visit. EXAM:   height is 5' 7\" (1.702 m) and weight is 122 lb (55.3 kg). His oral temperature is 98.3 °F (36.8 °C). His blood pressure is 102/64 and his pulse is 96. His respiration is 16 and oxygen saturation is 96%. Estimated body surface area is 1.62 meters squared as calculated from the following:    Height as of this encounter: 5' 7\" (1.702 m). Weight as of this encounter: 122 lb (55.3 kg). ECOG: male wearing oxygen very pleasant and in no distress sitting in a wheelchair with daughter present. General: Non-ill appearing.   Appears a little bit younger than his stated age sound mind and hearing is fairly good  HEENT: NC/AT,nonicteric, perrla,  Neck: normal thyroid, no masses. Nodes: No adenopathy  Lungs/chest: clear, no rales,rhonchi or wheezing, lung bases clear  CV: rrr, no rubs ,gallops or murmurs  Breasts: Not examined  Abd/Rectal: soft, non-tender,bowel sounds normal , no HSM,no masses  Back: normal curvature, No midline tenderness. flanks nontender  : Not Examined  Extremities: no cyanosis,clubbing or edema. In extremities. Skin: unremarkable  Neuro: A and O x 4,  gait-not observed, speech- fluent, no ataxia.   Devices: none      DATA:    LAB:       CBC with Differential:      Lab Results   Component Value Date/Time    WBC 5.5 01/12/2023 10:38 AM    RBC 3.78 01/12/2023 10:38 AM    RBC 4.44 04/06/2017 12:22 PM    HGB 12.9 01/12/2023 10:38 AM    HCT 39.5 01/12/2023 10:38 AM     01/12/2023 10:38 AM     01/12/2023 10:38 AM    MCH 34.1 01/12/2023 10:38 AM    MCHC 32.7 01/12/2023 10:38 AM    RDW 13.1 01/12/2023 10:38 AM    NRBC 0 12/27/2022 11:49 AM    SEGSPCT 74.3 12/27/2022 11:49 AM    LYMPHOPCT 21.1 04/06/2017 12:22 PM    MONOPCT 10.5 12/27/2022 11:49 AM    EOSPCT 1.0 04/06/2017 12:22 PM    BASOPCT 0.5 04/06/2017 12:22 PM    MONOSABS 0.5 01/12/2023 10:38 AM    LYMPHSABS 0.5 01/12/2023 10:38 AM    EOSABS 0.1 01/12/2023 10:38 AM    BASOSABS 0.0 01/12/2023 10:38 AM    DIFFTYPE see below 12/18/2018 05:45 AM     Lab Results   Component Value Date/Time    SEGSABS 4.4 01/12/2023 10:38 AM       CMP:    Lab Results   Component Value Date/Time     12/27/2022 11:49 AM    K 4.3 12/27/2022 11:49 AM    K 4.0 04/02/2022 09:55 PM    CL 97 12/27/2022 11:49 AM    CO2 31 12/27/2022 11:49 AM    BUN 17 12/27/2022 11:49 AM    CREATININE 0.9 12/27/2022 11:49 AM    LABGLOM >60 12/27/2022 11:49 AM    GLUCOSE 69 12/27/2022 11:49 AM    GLUCOSE 96 04/06/2017 12:22 PM    PROT 6.3 12/27/2022 11:49 AM    LABALBU 4.0 12/27/2022 11:49 AM    CALCIUM 9.3 12/27/2022 11:49 AM    BILITOT 0.4 12/27/2022 11:49 AM    BILITOT NEGATIVE 12/09/2016 12:55 PM    ALKPHOS 66 12/27/2022 11:49 AM    AST 29 12/27/2022 11:49 AM    ALT 12 12/27/2022 11:49 AM       BMP:    Lab Results   Component Value Date/Time     12/27/2022 11:49 AM    K 4.3 12/27/2022 11:49 AM    K 4.0 04/02/2022 09:55 PM    CL 97 12/27/2022 11:49 AM    CO2 31 12/27/2022 11:49 AM    BUN 17 12/27/2022 11:49 AM    LABALBU 4.0 12/27/2022 11:49 AM    CREATININE 0.9 12/27/2022 11:49 AM    CALCIUM 9.3 12/27/2022 11:49 AM    LABGLOM >60 12/27/2022 11:49 AM    GLUCOSE 69 12/27/2022 11:49 AM    GLUCOSE 96 04/06/2017 12:22 PM       Magnesium:    Lab Results   Component Value Date/Time    MG 2.1 12/27/2022 11:49 AM     PT/INR:    Lab Results   Component Value Date/Time    INR 1.02 04/02/2022 09:55 PM     TSH:    Lab Results   Component Value Date/Time    TSH 2.130 05/22/2020 10:46 AM     VITAMIN B12: No components found for: B12  FOLATE:  No results found for: FOLATE  IRON:  No results found for: IRON  Iron Saturation:  No components found for: PERCENTFE  TIBC:  No results found for: TIBC  FERRITIN:  No results found for: FERRITIN  PSA:   Lab Results   Component Value Date/Time    PSA 34.53 12/27/2022 11:49 AM   5/26/2022 PSA = 11.6          IMAGING:  Bone scan 5/26/2022  Compared to 2019. CT  Multiple areas of increased activity throughout the axial and appendicular skeleton consistent with metastatic disease. Increased activity suspicious for metastatic disease at the L1 and L2 vertebral bodies S1 vertebral body multiple bilateral ribs. No prior bone scan for direct comparison. MRI thoracic spine 2/7/2019  MRI lumbar spine 2/8/2019 2/5/2019 CT chest thoracic and lumbar spine abdomen pelvis. 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 Treatment thus far Lupron ongoing. Casodex since December 2018 ongoing. Radiation to his spine as above. Asymptomatic at this point. Referred because of rising PSA on current therapy. Casodex stopped in May ;PSA is significantly climbing. Began at [facility] 10/26/2020. Stopped Xtandi early December because of COVID and weakness. See above. 2) Prognosis / Disease Status: prostate cancer to bone with a slowly rising PSA; castrate resistance. 3) Work-up:    Labs: CBC, CMP PSA and magnesium to be drawn late March 2023 1 week before he sees me. Imaging: Bone scan referenced above. No prior bone scan for comparison. Procedures: None   Consults: None   Other: Continue to follow-up with urology due for every 6-month Lupron this month. 4) Symptom Management: None new needed      5) Supportive care provided. Level of care is appropriate. Teaching done today. Patient previously had chemo teaching regarding [facility] document on 10/26        6) Treatment goal:      Treatment plan:     Recent trial of Casodex withdrawal.  Discontinued Casodex on 5/26  From May until now his PSA is risen from 11.6- 52  Plan - second-generation antiandrogen. Patient had chemo teaching for Xtandi per Pablo Schafer on 10/26/2022 40 mg tablets taking 3 tablets -120 mg daily. Note Xtandi on hold because of poor appetite and fatigue and weakness since early December. Despite that his PSA did come down a little bit several weeks ago. I would propose resuming Xtandi however just 1 tablet daily. Agree. He will start this 1/25/23. Harika Mcnally 7) Medications reviewed. Prescriptions today: None            No orders of the defined types were placed in this encounter. OARRS:  Controlled Substance Monitoring:    Acute and Chronic Pain Monitoring:   RX Monitoring 11/18/2022   Attestation -   Periodic Controlled Substance Monitoring No signs of potential drug abuse or diversion identified.           8) Research Options:   None      9) Other:         Xtandi (enzalutamide) drug information:  Standard dose 160 mg.  Given patient's age etc. starting at 120 mg. Dosage forms 40 mg tablets. Taken by mouth with and without food. Do not crush. Precautions:  Cardiovascular effects, fractures, hypersensitivity, posterior reversible encephalopathy syndrome, seizures  Adverse reactions greater than 10% include hypertension, peripheral edema, hot flashes, hyperglycemia, high magnesium, hyponatremia, weight loss, GI effects including constipation decreased appetite diarrhea nausea, decreased neutrophils 20% although grade 3 and 4 less than 1%, dizziness 10% following about 8%, fatigue less than 51%, headache 10%, arthralgias 20% asthenia less than 51% back pain 20% and shortness of breath 11%  Monitoring:  CBC with differential liver function test at baseline periodically. INR if on Coumadin. Monitor blood pressure. Monitor for signs and symptoms of ischemic heart disease, posterior reversible encephalopathy syndrome seizures evaluate for falls and fracture risk and compliance. 10) Follow Up: Follow-up with me early April. Follow-up before late March repeat CBC CMP and PSA.   Patient will resume dose reduced Xtandi 40 mg daily 1/25/23      Bay Carlton MD

## 2023-01-12 NOTE — PATIENT INSTRUCTIONS
2 weeks patient will resume Xtandi but just 1 tablet of 40 mg daily and can  Follow-up with me in early April and get PSA 1 week before.  Last wek in March

## 2023-01-13 LAB
AEROBIC CULTURE: NORMAL
GRAM STAIN RESULT: NORMAL

## 2023-01-18 ENCOUNTER — OFFICE VISIT (OUTPATIENT)
Dept: PULMONOLOGY | Age: 88
End: 2023-01-18
Payer: MEDICARE

## 2023-01-18 VITALS
TEMPERATURE: 98.6 F | WEIGHT: 124 LBS | OXYGEN SATURATION: 95 % | HEART RATE: 90 BPM | HEIGHT: 67 IN | DIASTOLIC BLOOD PRESSURE: 70 MMHG | SYSTOLIC BLOOD PRESSURE: 98 MMHG | BODY MASS INDEX: 19.46 KG/M2

## 2023-01-18 DIAGNOSIS — J43.2 CENTRILOBULAR EMPHYSEMA (HCC): ICD-10-CM

## 2023-01-18 DIAGNOSIS — J44.9 STAGE 3 SEVERE COPD BY GOLD CLASSIFICATION (HCC): Primary | ICD-10-CM

## 2023-01-18 PROCEDURE — G8427 DOCREV CUR MEDS BY ELIG CLIN: HCPCS | Performed by: NURSE PRACTITIONER

## 2023-01-18 PROCEDURE — G8484 FLU IMMUNIZE NO ADMIN: HCPCS | Performed by: NURSE PRACTITIONER

## 2023-01-18 PROCEDURE — 3023F SPIROM DOC REV: CPT | Performed by: NURSE PRACTITIONER

## 2023-01-18 PROCEDURE — 99213 OFFICE O/P EST LOW 20 MIN: CPT | Performed by: NURSE PRACTITIONER

## 2023-01-18 PROCEDURE — G8420 CALC BMI NORM PARAMETERS: HCPCS | Performed by: NURSE PRACTITIONER

## 2023-01-18 PROCEDURE — 1123F ACP DISCUSS/DSCN MKR DOCD: CPT | Performed by: NURSE PRACTITIONER

## 2023-01-18 PROCEDURE — 1036F TOBACCO NON-USER: CPT | Performed by: NURSE PRACTITIONER

## 2023-01-18 ASSESSMENT — ENCOUNTER SYMPTOMS
GASTROINTESTINAL NEGATIVE: 1
SHORTNESS OF BREATH: 1
DIARRHEA: 0
WHEEZING: 0
NAUSEA: 0
EYES NEGATIVE: 1
STRIDOR: 0
ALLERGIC/IMMUNOLOGIC NEGATIVE: 1
VOMITING: 0
CHEST TIGHTNESS: 0

## 2023-01-18 NOTE — PROGRESS NOTES
Patient is experiencing SOB: yes, with exertion    Patient is experiencing wheezing: No    Patient states they have had a Absent = 4 cough. Phlegm is none (pt states in throat)    Patient is coughing up blood: no    Patient has been experiencing chest pains: non-existent    Patient is currently taking the following inhaler(s): stiolto, albuterol    Patient is currently taking the following nebulizer treatment(s): albuterol    Patient is using their rescue inhaler 1 times per day. Patient is currently using 2 liters of oxygen during night. .. 2/3 during day with exertion. Patient needs refills of the following medications: needing o2 order renewed.      All refills should be sent to [facility] [place]    Other:  n/a

## 2023-01-18 NOTE — PROGRESS NOTES
[facility]    Patient: Michelle Naylor, 80 y.o.   : 1926      Subjective     Chief Complaint   Patient presents with    Follow-up     1 yr f/u        HPI  Hari Jacome is here for follow up for COPD no testing to review today  Currently with prostate cancer with mets to the bone - patient to start Salomedety Taot on 23. Following with [facility]   Lupron every 6 months per Urology   Last oxygen order patient was ordered 4LPM with activity and sleeping- patient using differently   Current inhaler use of Stiolto and Albuterol PRN     Patient is experiencing SOB: yes, with exertion     Patient is experiencing wheezing: No     Patient states they have had a Absent = 4 cough. Phlegm is none (pt states in throat)     Patient is coughing up blood: no     Patient has been experiencing chest pains: non-existent     Patient is currently taking the following inhaler(s): stiolto, albuterol     Patient is currently taking the following nebulizer treatment(s): albuterol     Patient is using their rescue inhaler 1 times per day. Patient is currently using 2 liters of oxygen during night. .. 2/3 during day with exertion. Patient needs refills of the following medications: needing o2 order renewed. All refills should be sent to [facility] [place]     Other:  n/a     Progress History:   Since last visit any new medical issues? No  New ER or hospital visits? Yes 22-22 COPD exacerbation admission   Any new or changes in medicines? No  Using inhalers? Yes   Are they helpful?  Yes   Flu vaccine- NO  Pneumonia vaccine- series completed   Covid vaccine- vaccine done x 2    Past Medical hx   PMH:  Past Medical History:   Diagnosis Date    Blood circulation, collateral     CAD (coronary artery disease)     CAD (coronary artery disease) 2014    Cancer ([facility]     prostate to bone    CKD (chronic kidney disease) stage 2, GFR 60-89 ml/min 2018    COPD (chronic obstructive pulmonary disease) (HCC)     Diverticulitis     DVT of lower extremity, bilateral ([facility] 2019    GERD (gastroesophageal reflux disease)     Hiatal hernia     Hyperlipemia 2013    Hyperlipidemia     Other disorders of kidney and ureter in diseases classified elsewhere     Pleural plaque 2013    Pneumonia of both lower lobes due to infectious organism     Primary adenocarcinoma of prostate ([facility] 2019    high grade    Pulmonary emboli [facility] 2019    Vitamin D deficiency 2020     SURGICAL HISTORY:  Past Surgical History:   Procedure Laterality Date    ABDOMEN SURGERY      APPENDECTOMY      CARDIAC CATHETERIZATION      CYSTOSCOPY  2015    Litholapaxy-Large    EYE SURGERY      cataracts    HERNIA REPAIR      KYPHOSIS SURGERY N/A 2019    ABLATION AND KYPHOPLASTY AT T8 AND ABLATION AND KYPHOPLASTY AT T6 performed by Lucy Lyman MD at [place] [address] 2018    EXPLORATORY LAPAROTOMY, WITH ABDOMINAL WASHOUT performed by Petra Juarez MD at [address] [place] [address] Road:  Social History     Tobacco Use    Smoking status: Former     Packs/day: 1.00     Types: Cigarettes     Quit date: 1966     Years since quittin.5    Smokeless tobacco: Never   Vaping Use    Vaping Use: Never used   Substance Use Topics    Alcohol use: Yes     Comment: occasionally    Drug use: No     ALLERGIES:No Known Allergies  FAMILY HISTORY:  Family History   Problem Relation Age of Onset    Cancer Mother     Heart Disease Brother      CURRENT MEDICATIONS:  Current Outpatient Medications   Medication Sig Dispense Refill    ofloxacin (FLOXIN) 0.3 % otic solution Place 5 drops in ear(s) 2 times daily 10 mL 1    doxazosin (CARDURA) 2 MG tablet Take 1 tablet by mouth nightly 90 tablet 3    albuterol sulfate HFA (PROVENTIL;VENTOLIN;PROAIR) 108 (90 Base) MCG/ACT inhaler INHALE 2 PUFFS INTO LUNGS EVERY 6 HOURS AS NEEDED FOR WHEEZING FOR SHORTNESS OF BREATH 18 g 1    Enzalutamide (XTANDI) 40 MG TABS Take 120 mg by mouth daily (Patient not taking: Reported on 1/12/2023) 90 tablet 5    tiotropium-olodaterol (STIOLTO RESPIMAT) 2.5-2.5 MCG/ACT AERS INHALE 2 PUFFS BY MOUTH ONCE DAILY 4 g 5    albuterol (PROVENTIL) (2.5 MG/3ML) 0.083% nebulizer solution Take 3 mLs by nebulization 2 times daily And every 6  Hours  prn (Patient taking differently: Take 2.5 mg by nebulization 2 times daily And every 6  Hours  prn) 180 mL 11    carvedilol (COREG) 3.125 MG tablet TAKE 1 TABLET BY MOUTH TWICE DAILY WITH MEALS 180 tablet 3    finasteride (PROSCAR) 5 MG tablet Take 1 tablet by mouth once daily 90 tablet 3    leuprolide (LUPRON) 45 MG injection Inject 45 mg into the muscle once       calcium carbonate-vitamin D (CALCIUM 600 + D) 600-400 MG-UNIT TABS per tab Take 1 tablet by mouth 3 times daily 90 tablet 5    denosumab (XGEVA) 120 MG/1.7ML SOLN SC injection Inject 120 mg into the skin once Every 28 days       polyethylene glycol (GLYCOLAX) 17 g packet Take 17 g by mouth once      Cholecalciferol (VITAMIN D3) 125 MCG (5000 UT) CAPS Take 1 capsule by mouth every morning (before breakfast)      Misc. Devices (ACAPELLA) MISC 1 Device by Does not apply route 4 times daily 1 each 0    Multiple Vitamins-Minerals (THERAPEUTIC MULTIVITAMIN-MINERALS) tablet Take 1 tablet by mouth daily      docusate sodium (COLACE, DULCOLAX) 100 MG CAPS Take 100 mg by mouth 2 times daily (Patient taking differently: Take 100 mg by mouth nightly)      acetaminophen (TYLENOL) 500 MG tablet Take 500 mg by mouth every 8 hours as needed for Pain      aspirin 325 MG EC tablet Take 1 tablet by mouth daily       No current facility-administered medications for this visit. ROS   Review of Systems   Constitutional: Negative. Negative for chills, fever and unexpected weight change. HENT: Negative. Eyes: Negative. Respiratory:  Positive for shortness of breath (w/exertion only).  Negative for chest tightness, wheezing and stridor. Cardiovascular:  Negative for chest pain and leg swelling. Gastrointestinal: Negative. Negative for diarrhea, nausea and vomiting. Endocrine: Negative. Genitourinary: Negative. Negative for dysuria. Musculoskeletal:  Positive for gait problem (use of WC). Skin: Negative. Allergic/Immunologic: Negative. Hematological: Negative. Psychiatric/Behavioral: Negative. Physical exam   BP 98/70 (Site: Left Upper Arm, Position: Sitting, Cuff Size: Medium Adult)   Pulse 90   Temp 98.6 °F (37 °C) (Infrared)   Ht 5' 7\" (1.702 m)   Wt 124 lb (56.2 kg)   SpO2 95%   BMI 19.42 kg/m²    Wt Readings from Last 3 Encounters:   01/18/23 124 lb (56.2 kg)   01/12/23 122 lb (55.3 kg)   01/11/23 125 lb (56.7 kg)       Physical Exam  Vitals and nursing note reviewed. Constitutional:       General: He is not in acute distress. Appearance: He is underweight. HENT:      Head: Normocephalic and atraumatic. Neck:      Trachea: No tracheal deviation. Cardiovascular:      Rate and Rhythm: Normal rate and regular rhythm. Heart sounds: Normal heart sounds. No murmur heard. Pulmonary:      Effort: Pulmonary effort is normal. No respiratory distress. Breath sounds: Normal breath sounds. No stridor. No wheezing or rales. Chest:      Chest wall: No tenderness. Abdominal:      General: Bowel sounds are normal. There is no distension. Palpations: Abdomen is soft. Skin:     General: Skin is warm and dry. Capillary Refill: Capillary refill takes less than 2 seconds. Neurological:      Mental Status: He is alert and oriented to person, place, and time. Motor: Weakness present. Gait: Gait abnormal.   Psychiatric:         Behavior: Behavior normal.         Thought Content:  Thought content normal.         Judgment: Judgment normal.        Results   Lung Nodule Screening     [] Qualifies    [x] Does not qualify   [] Declined    [] Completed    The USPSTF recommends annual screening for lung cancer with low-dose computed tomography (LDCT) in adults aged 48 to [de-identified] years who have a 30 pack-year smoking history and currently smoke or have quit within the past 20 years. Screening should be discontinued once a person has not smoked for 20 years or develops a health problem that substantially limits life expectancy or the ability or willingness to have curative lung surgery.     -No pulmonary testing to review today     Assessment      Diagnosis Orders   1. Stage 3 severe COPD by GOLD classification ([facility]        2.  Centrilobular emphysema (HCC)              Plan   -Continue current inhaler regimen  -Encouraged to try and increase caloric intake   -Encouraged to use oxygen as prescribed   -Advised to maintain pneumonia vaccine with PCP and to take flu vaccine this coming season.  -Advised patient to call office with any changes, questions, or concerns regarding respiratory status    Will see Sally Harada back in: 1 year    Naif Alvarado [facility]  1/18/2023

## 2023-01-24 ENCOUNTER — TELEPHONE (OUTPATIENT)
Dept: ENT CLINIC | Age: 88
End: 2023-01-24

## 2023-01-24 NOTE — TELEPHONE ENCOUNTER
Daughter called asking about rescheduling the 1/25/23 appt you ok'd and coming in 1/26/23 instead. He has another appointment in hospital on 1/26/23 at 11 and would like to coordinate the 2 appts on the 1/26/23.

## 2023-01-25 ENCOUNTER — OFFICE VISIT (OUTPATIENT)
Dept: ENT CLINIC | Age: 88
End: 2023-01-25
Payer: MEDICARE

## 2023-01-25 VITALS
SYSTOLIC BLOOD PRESSURE: 114 MMHG | TEMPERATURE: 97.7 F | HEART RATE: 86 BPM | RESPIRATION RATE: 16 BRPM | BODY MASS INDEX: 19.42 KG/M2 | DIASTOLIC BLOOD PRESSURE: 68 MMHG | WEIGHT: 124 LBS

## 2023-01-25 DIAGNOSIS — H61.23 EXCESSIVE CERUMEN IN BOTH EAR CANALS: Primary | ICD-10-CM

## 2023-01-25 PROCEDURE — G8420 CALC BMI NORM PARAMETERS: HCPCS | Performed by: PHYSICIAN ASSISTANT

## 2023-01-25 PROCEDURE — 99212 OFFICE O/P EST SF 10 MIN: CPT | Performed by: PHYSICIAN ASSISTANT

## 2023-01-25 PROCEDURE — G8427 DOCREV CUR MEDS BY ELIG CLIN: HCPCS | Performed by: PHYSICIAN ASSISTANT

## 2023-01-25 PROCEDURE — G8484 FLU IMMUNIZE NO ADMIN: HCPCS | Performed by: PHYSICIAN ASSISTANT

## 2023-01-25 PROCEDURE — 1123F ACP DISCUSS/DSCN MKR DOCD: CPT | Performed by: PHYSICIAN ASSISTANT

## 2023-01-25 PROCEDURE — 1036F TOBACCO NON-USER: CPT | Performed by: PHYSICIAN ASSISTANT

## 2023-01-25 NOTE — PROGRESS NOTES
[address] [facility] EAR, NOSE AND THROAT  [address] [place] [address]  Dept: [phone]  Dept Fax: [phone]  Loc: [phone]    Abraham Anna is a 80 y.o. male who was referred by No ref. provider found for:  Chief Complaint   Patient presents with    Follow-up     Patient here for 2 week follow up for his otorrhea, bilateral.    . HPI:     Patient presents for follow up on bilateral otorrhea. He is accompanied by his daughter. Patient reports he is doing well. No otalgia, otorrhea, edema, fevers, chills. Patient completed the entire course of ofloxacin drops a few days ago. He denies any changes to hearing. No other concerns reported at this time. Subjective:      REVIEW OF SYSTEMS:    A complete multi-organ review of systems was performed using a new patient questionnaire, and reviewed by me.   ENT:  negative except as noted in HPI  CONSTITUTIONAL:  negative except as noted in HPI  EYES:  negative except as noted in HPI  RESPIRATORY:  negative except as noted in HPI  CARDIOVASCULAR:  negative except as noted in HPI  GASTROINTESTINAL:  negative except as noted in HPI  GENITOURINARY:  negative except as noted in HPI  MUSCULOSKELETAL:  negative except as noted in HPI  SKIN:  negative except as noted in HPI  ENDOCRINE/METABOLIC: negative except as noted in HPI  HEMATOLOGIC/LYMPHATIC:  negative except as noted in HPI  ALLERGY/IMMUN: negative except as noted in HPI  NEUROLOGICAL:  negative except as noted in HPI  BEHAVIOR/PSYCH:  negative except as noted in HPI    Past Medical History:  Past Medical History:   Diagnosis Date    Blood circulation, collateral     CAD (coronary artery disease)     CAD (coronary artery disease) 9/2/2014    Cancer ([facility]     prostate to bone    CKD (chronic kidney disease) stage 2, GFR 60-89 ml/min 12/24/2018    COPD (chronic obstructive pulmonary disease) ([facility]     Diverticulitis     DVT of lower extremity, bilateral ([facility] 02/2019    GERD (gastroesophageal reflux disease)     Hiatal hernia     Hyperlipemia 8/12/2013    Hyperlipidemia     Other disorders of kidney and ureter in diseases classified elsewhere     Pleural plaque 8/12/2013    Pneumonia of both lower lobes due to infectious organism     Primary adenocarcinoma of prostate ([facility] 01/2019    high grade    Pulmonary emboli ([facility] 02/2019    Vitamin D deficiency 05/2020       Social History:    TOBACCO:   reports that he quit smoking about 56 years ago. His smoking use included cigarettes. He smoked an average of 1 pack per day. He has never used smokeless tobacco.  ETOH:   reports current alcohol use. DRUGS:   reports no history of drug use. Family History:       Problem Relation Age of Onset    Cancer Mother     Heart Disease Brother        Surgical History:  Past Surgical History:   Procedure Laterality Date    ABDOMEN SURGERY      APPENDECTOMY      CARDIAC CATHETERIZATION      CYSTOSCOPY  03/17/2015    Litholapaxy-Large    EYE SURGERY      cataracts    HERNIA REPAIR      KYPHOSIS SURGERY N/A 2/13/2019    ABLATION AND KYPHOPLASTY AT T8 AND ABLATION AND KYPHOPLASTY AT T6 performed by Jakub Cast MD at [place] [address] 12/17/2018    EXPLORATORY LAPAROTOMY, WITH ABDOMINAL WASHOUT performed by Juan Viramontes [facility] at [facility]        Objective: This is a 80 y.o. male. Patient is alert and cooperative. Patient appears well developed, well nourished. Mood is happy with normal affect. Not obviously hearing impaired. No abnormality in speech noted. /68 (Site: Left Upper Arm, Position: Sitting)   Pulse 86   Temp 97.7 °F (36.5 °C) (Infrared)   Resp 16   Wt 124 lb (56.2 kg)   BMI 19.42 kg/m²     Head:   Normocephalic, atraumatic. No obvious masses or lesions noted. Ears:  External ears: Normal: no scars, lesions or masses. Mastoid process: No erythema noted. No tenderness to palpation.   R External auditory canal: Excessive cerumen removed. Irritated areas of canal appear nearly healed. Mild canal erythema, but no edema or purulent discharge noted  L External auditory canal: excessive cerumen removed. Moisture throughout the canal, but clear appearing. No purulent discharge or edema noted. Tympanic membranes:  R intact, translucent                                                  L intact, translucent     Assessment/Plan:     Diagnosis Orders   1. Excessive cerumen in both ear canals            -No evidence of persistent infection at this time and skin of the canal appears nearly healed, patient asymptomatic. Moisture likely residue from ofloxacin  -follow up in 6 months for ear recheck.  Contact office sooner with ear pain, fever, ear drainage, worsened hearing, or other concerns    (Please note that portions of this note may have been completed with a voice recognition program.  Efforts were made to edit the dictation but occasionally words are mis-transcribed.)    Electronically signed by BRANDY Stoddard on 1/25/2023 at 9:19 PM

## 2023-01-26 ENCOUNTER — NURSE ONLY (OUTPATIENT)
Dept: UROLOGY | Age: 88
End: 2023-01-26
Payer: MEDICARE

## 2023-01-26 DIAGNOSIS — C61 MALIGNANT NEOPLASM OF PROSTATE (HCC): Primary | ICD-10-CM

## 2023-01-26 PROCEDURE — 96372 THER/PROPH/DIAG INJ SC/IM: CPT

## 2023-01-26 NOTE — PROGRESS NOTES
Patient has given me verbal consent to perform Xgeva Injection yes      Following Amanda Bernabe PA-C plan of care. Catlaina Needs 120MG/1.7ML MG GIVEN S.C left ARM  Lot Number: [id number]  Expiration Date: 03/31/2024  [facility] #: [id number]    After Injection was given there were no reactions at injection site and patient was feeling well. Patient was notified that possible side effects from injections include: Redness, swelling and itching at the injection site. Possible side effects of androgen deprivation therapy, including hot flashes, flushing of the skin, increased weight, decreased sex drive, and difficulties with ED. Patient was instructed to inform their dental office that they are receiving Catalina Needs and that major dental procedures should be avoided. Patient was advised to call our office with any further questions or concerns. Any active dental problems, infections, or pain? no    Date of last dental appointment: no    Any planned dental procedures? no    Patient supplied their own medications No    Pt LHRH therapy (Firmagon, Eligard, Lupron) first initiated on 2/26/2019. Denny Morales

## 2023-02-07 RX ORDER — FINASTERIDE 5 MG/1
TABLET, FILM COATED ORAL
Qty: 90 TABLET | Refills: 0 | Status: SHIPPED | OUTPATIENT
Start: 2023-02-07

## 2023-02-07 NOTE — TELEPHONE ENCOUNTER
Mickey Regalado called requesting a refill on the following medications:  Requested Prescriptions     Pending Prescriptions Disp Refills    finasteride (PROSCAR) 5 MG tablet [Pharmacy Med Name: Finasteride 5 MG Oral Tablet] 90 tablet 0     Sig: Take 1 tablet by mouth once daily     Pharmacy verified:  [email or web address]      Date of last visit: 12/28/2022  Date of next visit (if applicable): 7/33/5544

## 2023-02-23 ENCOUNTER — NURSE ONLY (OUTPATIENT)
Dept: LAB | Age: 88
End: 2023-02-23

## 2023-02-23 ENCOUNTER — NURSE ONLY (OUTPATIENT)
Dept: UROLOGY | Age: 88
End: 2023-02-23

## 2023-02-23 DIAGNOSIS — Z51.81 THERAPEUTIC DRUG MONITORING: ICD-10-CM

## 2023-02-23 DIAGNOSIS — Z51.81 THERAPEUTIC DRUG MONITORING: Primary | ICD-10-CM

## 2023-02-23 LAB
ANION GAP SERPL CALC-SCNC: 9 MEQ/L (ref 8–16)
BUN SERPL-MCNC: 20 MG/DL (ref 7–22)
CALCIUM SERPL-MCNC: 9.3 MG/DL (ref 8.5–10.5)
CHLORIDE SERPL-SCNC: 94 MEQ/L (ref 98–111)
CO2 SERPL-SCNC: 33 MEQ/L (ref 23–33)
CREAT SERPL-MCNC: 0.9 MG/DL (ref 0.4–1.2)
GFR SERPL CREATININE-BSD FRML MDRD: > 60 ML/MIN/1.73M2
GLUCOSE SERPL-MCNC: 139 MG/DL (ref 70–108)
POTASSIUM SERPL-SCNC: 3.8 MEQ/L (ref 3.5–5.2)
SODIUM SERPL-SCNC: 136 MEQ/L (ref 135–145)

## 2023-02-24 ENCOUNTER — NURSE ONLY (OUTPATIENT)
Dept: UROLOGY | Age: 88
End: 2023-02-24

## 2023-02-24 DIAGNOSIS — C79.51 SECONDARY MALIGNANT NEOPLASM OF BONE AND BONE MARROW (HCC): Primary | ICD-10-CM

## 2023-02-24 DIAGNOSIS — C79.52 SECONDARY MALIGNANT NEOPLASM OF BONE AND BONE MARROW (HCC): Primary | ICD-10-CM

## 2023-02-24 DIAGNOSIS — C61 MALIGNANT NEOPLASM OF PROSTATE (HCC): ICD-10-CM

## 2023-02-24 NOTE — PROGRESS NOTES
Patient has given me verbal consent to perform Xgeva Injection yes    Patient's last Calcium level was 9.3 on the date of 2/23/2022. Following Misha Law PA-C plan of care. Jazz Grieve 120MG/1.7ML MG GIVEN S.C right ARM  Lot Number: [id number]  Expiration Date: 11/30/2024  [facility] #: [id number]    After Injection was given there were no reactions at injection site and patient was feeling well. Patient was notified that possible side effects from injections include: Redness, swelling and itching at the injection site. Possible side effects of androgen deprivation therapy, including hot flashes, flushing of the skin, increased weight, decreased sex drive, and difficulties with ED. Patient was instructed to inform their dental office that they are receiving Jazz Grieve and that major dental procedures should be avoided. Patient was advised to call our office with any further questions or concerns. Any active dental problems, infections, or pain? no    Date of last dental appointment: a couple of years    Any planned dental procedures? no    Patient supplied their own medications No    Pt LHRH therapy (Michelle Lopez, Lupron) first initiated on 02/26/2019.

## 2023-03-05 DIAGNOSIS — I25.10 CORONARY ARTERY DISEASE INVOLVING NATIVE HEART WITHOUT ANGINA PECTORIS, UNSPECIFIED VESSEL OR LESION TYPE: ICD-10-CM

## 2023-03-06 RX ORDER — CARVEDILOL 3.12 MG/1
TABLET ORAL
Qty: 180 TABLET | Refills: 3 | Status: SHIPPED | OUTPATIENT
Start: 2023-03-06

## 2023-03-08 ENCOUNTER — CLINICAL DOCUMENTATION (OUTPATIENT)
Dept: CASE MANAGEMENT | Age: 88
End: 2023-03-08

## 2023-03-08 ENCOUNTER — TELEPHONE (OUTPATIENT)
Dept: CASE MANAGEMENT | Age: 88
End: 2023-03-08

## 2023-03-08 ENCOUNTER — SOCIAL WORK (OUTPATIENT)
Dept: INFUSION THERAPY | Age: 88
End: 2023-03-08

## 2023-03-08 NOTE — TELEPHONE ENCOUNTER
Jayden notified pt's dtr, Kristin Garcia, that lab orders are in EMR for pt to have drawn at a [facility] facility, 1 week prior to appt. Dtr voiced understanding.

## 2023-03-08 NOTE — PROGRESS NOTES
Jayden notified for clarification of the XTANDI. Is it to be continued? Jayden reviewed EMR. Progress note, 1/12/23, reflects Dose Reduce Xtandi to 40 mg per day, with resuming on 1/25/2023. Appetite change, fatigue & weakness complaints resulting in dose reduction for now. Jayden updated Financial Jayden so Free Drug renewal application could be initiated for 2023. Dtr had been notified by Financial Jayden of the renewal application. Dtr requested PSA draw before pt's 4/5/23 appt so ONC had results for the appt. Jayden notified Woodrow Gonzalez, Triage Rn, to assist with PSA order, pending ONC agreement.

## 2023-03-08 NOTE — PROGRESS NOTES
- This staff was asked to assist in determining the medication financial support renewal opportunity for this patient. Apparently Shanice Arthur had received financial assistance when his cancer medication was initially prescribed in Oct 2022. - Gino experienced, what his daughter (Ketty Wells) described as health related complications when taking the medication according to the original dosage. Since that time, Terell Oliveira explained that her dad is now using a reduced rate of the meds instead. (He is now taking 1-40mg pill daily as opposed to the 3-40mg prescribed. Sudheer Wiseman reported her dad had Faustino in Dec 2022 and since that time has continued to struggle with his ability to ambulate on his own. He now requires the assistance of a walker full time. He has had several falls and has shown significant weight loss. All of which she would like addressed to determine if this is med or disease related. She asked that a PSA be provided prior to Gino's April 5th appt. A call was placed to Dr. Maricruz Salter nurse to schedule the PSA. Once scheduled this staff will communicate the appt date/time with Terell Tee. - Based on the finding of the April 5th appt, Remy Cordero, and the Financial Navigator will determine the best avenue of financial assistance for his medication.

## 2023-03-12 NOTE — TELEPHONE ENCOUNTER
Department of Internal Medicine  General Internal Medicine  Attending Progress Note      SUBJECTIVE:  Pt seen and examined. MRI negative. On DAPT. Pt eager to be up and working with PT. Stroke symptoms resolved. Awaiting CTA results. CT abd with obstructing stone. Urology consulted    OBJECTIVE      Medications    Current Facility-Administered Medications: clopidogrel (PLAVIX) tablet 75 mg, 75 mg, Oral, Daily  ondansetron (ZOFRAN-ODT) disintegrating tablet 4 mg, 4 mg, Oral, Q8H PRN **OR** ondansetron (ZOFRAN) injection 4 mg, 4 mg, IntraVENous, Q6H PRN  polyethylene glycol (GLYCOLAX) packet 17 g, 17 g, Oral, Daily PRN  aspirin EC tablet 81 mg, 81 mg, Oral, Daily **OR** aspirin suppository 300 mg, 300 mg, Rectal, Daily  acetaminophen (TYLENOL) tablet 650 mg, 650 mg, Oral, Q4H PRN **OR** acetaminophen (TYLENOL) suppository 650 mg, 650 mg, Rectal, Q4H PRN  labetalol (NORMODYNE;TRANDATE) injection 10 mg, 10 mg, IntraVENous, Q10 Min PRN  hydrALAZINE (APRESOLINE) injection 10 mg, 10 mg, IntraVENous, Q30 Min PRN  lidocaine 4 % external patch 1 patch, 1 patch, TransDERmal, Daily  morphine (PF) injection 1 mg, 1 mg, IntraVENous, Q4H PRN  tamsulosin (FLOMAX) capsule 0.4 mg, 0.4 mg, Oral, Daily  lactated ringers IV soln infusion, , IntraVENous, Continuous  rosuvastatin (CRESTOR) tablet 40 mg, 40 mg, Oral, Nightly  Physical    VITALS:  BP (!) 111/43   Pulse (!) 49   Temp 98.5 °F (36.9 °C) (Oral)   Resp 13   Ht 5' 7\" (1.702 m)   Wt 167 lb (75.8 kg)   SpO2 96%   BMI 26.16 kg/m²   Constitutional: Awake and alert in no acute distress.  Sitting in chair comfortably  Head: Normocephalic, atraumatic  Eyes: EOMI, PERRLA  ENT: moist mucous membranes  Neck: neck supple, trachea midline  Lungs: non labored  Heart: RRR, normal S1 and S2  GI: non-distended  MSK: Full ROM bilaterally, 5/5 strength bilaterally, no edema noted  Skin: warm, dry  Neuro: Intact motor and sensory, no focal deficits  Psych: appropriate affect   Data    CBC: Patient scheduled to see Dr Gisell Smiley on Wed Sept 18 @ 2:20pm  Dr Balta Branham address is [place] [address] [address] Lab Results   Component Value Date/Time    WBC 10.8 03/12/2023 04:30 AM    RBC 4.24 03/12/2023 04:30 AM    HGB 13.7 03/12/2023 04:30 AM    HCT 39.6 03/12/2023 04:30 AM    MCV 93.5 03/12/2023 04:30 AM    MCH 32.4 03/12/2023 04:30 AM    MCHC 34.7 03/12/2023 04:30 AM    RDW 13.6 03/12/2023 04:30 AM     03/12/2023 04:30 AM     CMP:    Lab Results   Component Value Date/Time     03/12/2023 04:30 AM    K 4.3 03/12/2023 04:30 AM     03/12/2023 04:30 AM    CO2 24 03/12/2023 04:30 AM    BUN 18 03/12/2023 04:30 AM    CREATININE 0.98 03/12/2023 04:30 AM    LABGLOM >60.0 03/12/2023 04:30 AM    GLUCOSE 94 03/12/2023 04:30 AM    PROT 6.7 03/11/2023 12:52 PM    LABALBU 4.3 03/11/2023 12:52 PM    CALCIUM 9.1 03/12/2023 04:30 AM    BILITOT 0.7 03/11/2023 12:52 PM    ALKPHOS 104 03/11/2023 12:52 PM    AST 27 03/11/2023 12:52 PM    ALT 18 03/11/2023 12:52 PM       ASSESSMENT AND PLAN         # CVA sp TNK- symptoms resolved  - presented to [facility] ED with progressing R sided weakness  - CT head negative for hemorrhage  - neuro contacted and recommended TNK which was given  - pt transferred to [id number] [facility] ICU for closer monitoring  - neuro and intensivist consulted  - MRI negative, CTA today pending  - DAPT to start today per neuro  - PT/OT, SLP - advance diet as tolerated  - BP control with hydralazine, labetalol  - tele     # CAD, HLD  - cont statin, DAPT started today per neuro recs    # L obstructing uretal stone  - CT abd with 7mm obstructing stone with mild hydro  - pt refuses surgical intervention  - cont fluids  - urology consulted for recs     Disposition: CVA sp TNKase administration. PT/OT. Haley Sterling for transfer to the floor later today. Possible discharge tomorrow if stable and ok from neuro stand.       Julián Rodriguez DO  Internal Medicine   Hospitalist    >45 minutes in total care time

## 2023-03-27 ENCOUNTER — NURSE ONLY (OUTPATIENT)
Dept: UROLOGY | Age: 88
End: 2023-03-27

## 2023-03-27 DIAGNOSIS — C61 PROSTATE CA (HCC): ICD-10-CM

## 2023-03-27 LAB
ALBUMIN SERPL BCG-MCNC: 4.5 G/DL (ref 3.5–5.1)
ALP SERPL-CCNC: 69 U/L (ref 38–126)
ALT SERPL W/O P-5'-P-CCNC: 10 U/L (ref 11–66)
ANION GAP SERPL CALC-SCNC: 15 MEQ/L (ref 8–16)
AST SERPL-CCNC: 26 U/L (ref 5–40)
BASOPHILS ABSOLUTE: 0 THOU/MM3 (ref 0–0.1)
BASOPHILS NFR BLD AUTO: 0.5 %
BILIRUB SERPL-MCNC: 0.5 MG/DL (ref 0.3–1.2)
BUN SERPL-MCNC: 17 MG/DL (ref 7–22)
CALCIUM SERPL-MCNC: 9.2 MG/DL (ref 8.5–10.5)
CHLORIDE SERPL-SCNC: 91 MEQ/L (ref 98–111)
CO2 SERPL-SCNC: 29 MEQ/L (ref 23–33)
CREAT SERPL-MCNC: 1 MG/DL (ref 0.4–1.2)
DEPRECATED RDW RBC AUTO: 48.8 FL (ref 35–45)
EOSINOPHIL NFR BLD AUTO: 2.3 %
EOSINOPHILS ABSOLUTE: 0.1 THOU/MM3 (ref 0–0.4)
ERYTHROCYTE [DISTWIDTH] IN BLOOD BY AUTOMATED COUNT: 12.5 % (ref 11.5–14.5)
GFR SERPL CREATININE-BSD FRML MDRD: > 60 ML/MIN/1.73M2
GLUCOSE SERPL-MCNC: 133 MG/DL (ref 70–108)
HCT VFR BLD AUTO: 42.5 % (ref 42–52)
HGB BLD-MCNC: 13.9 GM/DL (ref 14–18)
IMM GRANULOCYTES # BLD AUTO: 0.01 THOU/MM3 (ref 0–0.07)
IMM GRANULOCYTES NFR BLD AUTO: 0.2 %
LYMPHOCYTES ABSOLUTE: 1 THOU/MM3 (ref 1–4.8)
LYMPHOCYTES NFR BLD AUTO: 16 %
MCH RBC QN AUTO: 34.3 PG (ref 26–33)
MCHC RBC AUTO-ENTMCNC: 32.7 GM/DL (ref 32.2–35.5)
MCV RBC AUTO: 104.9 FL (ref 80–94)
MONOCYTES ABSOLUTE: 0.5 THOU/MM3 (ref 0.4–1.3)
MONOCYTES NFR BLD AUTO: 8.3 %
NEUTROPHILS NFR BLD AUTO: 72.7 %
NRBC BLD AUTO-RTO: 0 /100 WBC
PLATELET # BLD AUTO: 241 THOU/MM3 (ref 130–400)
PMV BLD AUTO: 9.3 FL (ref 9.4–12.4)
POTASSIUM SERPL-SCNC: 3.9 MEQ/L (ref 3.5–5.2)
PROT SERPL-MCNC: 6.6 G/DL (ref 6.1–8)
PSA SERPL-MCNC: 48.31 NG/ML (ref 0–1)
RBC # BLD AUTO: 4.05 MILL/MM3 (ref 4.7–6.1)
SEGMENTED NEUTROPHILS ABSOLUTE COUNT: 4.4 THOU/MM3 (ref 1.8–7.7)
SODIUM SERPL-SCNC: 135 MEQ/L (ref 135–145)
WBC # BLD AUTO: 6.1 THOU/MM3 (ref 4.8–10.8)

## 2023-03-27 NOTE — PROGRESS NOTES
I have personally verified, reviewed, released, signed, authenticated, authorized, confirmed,finalized, and approved the actions of the [facility]. Patient has given me verbal consent to perform Xgeva Injection yes    Patient's last Calcium level was 9.3 on the date of 2/23/23. Following Dr. Hernandez Delacruz of care. Donnamarie Caitlin 120MG/1.7ML MG GIVEN S.C left ARM  Lot Number: [id number]  Expiration Date: 02/28/2025  [facility] #: [id number]    After Injection was given there were no reactions at injection site and patient was feeling well. Patient was notified that possible side effects from injections include: Redness, swelling and itching at the injection site. Possible side effects of androgen deprivation therapy, including hot flashes, flushing of the skin, increased weight, decreased sex drive, and difficulties with ED. Patient was instructed to inform their dental office that they are receiving Donnamarie Caitlin and that major dental procedures should be avoided. Patient was advised to call our office with any further questions or concerns. Any active dental problems, infections, or pain? no    Date of last dental appointment: a couple of years ago    Any planned dental procedures? no    Patient supplied their own medications No    Pt LHRH therapy (Darcy Huerta) first initiated on 02/26/2019.

## 2023-04-04 NOTE — PROGRESS NOTES
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    Mild bibasilar atelectasis    Pleural effusion, right    Unspecified severe protein-calorie malnutrition (HCC)    Microscopic hematuria    Nutritional marasmus ([facility]    Pathological fracture of vertebra due to neoplastic disease    Acute deep vein thrombosis of lower limb ([facility] [address]    Malignant neoplasm of prostate (HCC)    DVT of lower extremity, bilateral (HCC)    Vitamin D deficiency    Pneumonia of both lower lobes due to infectious organism    Dysphagia    Moderate malnutrition (HCC)    Acute respiratory failure with hypoxia (HCC)    COPD (chronic obstructive pulmonary disease) (HCC)    Chronic renal disease, stage III (HCC) [076253]    Muscle weakness (generalized)    Difficulty in walking, not elsewhere classified        Surgery:  Past Surgical History:   Procedure Laterality Date    ABDOMEN SURGERY      APPENDECTOMY      CARDIAC CATHETERIZATION      CYSTOSCOPY  03/17/2015    Litholapaxy-Large    EYE SURGERY      cataracts    HERNIA REPAIR      KYPHOSIS SURGERY N/A 2/13/2019    ABLATION AND KYPHOPLASTY AT T8 AND ABLATION AND KYPHOPLASTY AT T6 performed by Kristy Small MD at [place] [address] 12/17/2018    EXPLORATORY LAPAROTOMY, WITH ABDOMINAL WASHOUT performed by Kal Fuentes MD at [address]        Medications:  Current Outpatient Medications   Medication Sig Dispense Refill    carvedilol (COREG) 3.125 MG tablet TAKE 1 TABLET BY MOUTH TWICE DAILY WITH MEALS 180 tablet 3    finasteride (PROSCAR) 5 MG tablet Take 1 tablet by mouth once daily 90 tablet 0    doxazosin (CARDURA) 2 MG tablet Take 1 tablet by mouth nightly 90 tablet 3    albuterol sulfate HFA (PROVENTIL;VENTOLIN;PROAIR) 108 (90 Base) MCG/ACT inhaler INHALE 2 PUFFS INTO LUNGS EVERY 6 HOURS AS NEEDED FOR WHEEZING FOR SHORTNESS OF BREATH 18 g 1    Enzalutamide (XTANDI) 40 MG TABS Take 120 mg by mouth daily

## 2023-04-05 ENCOUNTER — HOSPITAL ENCOUNTER (OUTPATIENT)
Dept: INFUSION THERAPY | Age: 88
Discharge: HOME | End: 2023-04-05
Payer: MEDICARE

## 2023-04-05 ENCOUNTER — OFFICE VISIT (OUTPATIENT)
Dept: ONCOLOGY | Age: 88
End: 2023-04-05
Payer: MEDICARE

## 2023-04-05 VITALS
TEMPERATURE: 98 F | WEIGHT: 123.6 LBS | DIASTOLIC BLOOD PRESSURE: 60 MMHG | HEART RATE: 102 BPM | SYSTOLIC BLOOD PRESSURE: 122 MMHG | BODY MASS INDEX: 19.4 KG/M2 | HEIGHT: 67 IN | OXYGEN SATURATION: 94 %

## 2023-04-05 VITALS
DIASTOLIC BLOOD PRESSURE: 60 MMHG | HEART RATE: 102 BPM | SYSTOLIC BLOOD PRESSURE: 122 MMHG | TEMPERATURE: 98 F | OXYGEN SATURATION: 94 %

## 2023-04-05 DIAGNOSIS — Z79.818 ENCOUNTER FOR MONITORING ANDROGEN DEPRIVATION THERAPY: Primary | ICD-10-CM

## 2023-04-05 PROCEDURE — 99211 OFF/OP EST MAY X REQ PHY/QHP: CPT

## 2023-04-05 PROCEDURE — G8427 DOCREV CUR MEDS BY ELIG CLIN: HCPCS | Performed by: INTERNAL MEDICINE

## 2023-04-05 PROCEDURE — 1123F ACP DISCUSS/DSCN MKR DOCD: CPT | Performed by: INTERNAL MEDICINE

## 2023-04-05 PROCEDURE — 99213 OFFICE O/P EST LOW 20 MIN: CPT | Performed by: INTERNAL MEDICINE

## 2023-04-05 PROCEDURE — G8420 CALC BMI NORM PARAMETERS: HCPCS | Performed by: INTERNAL MEDICINE

## 2023-04-05 PROCEDURE — 1036F TOBACCO NON-USER: CPT | Performed by: INTERNAL MEDICINE

## 2023-04-18 NOTE — TELEPHONE ENCOUNTER
Shannan Lopez called [facility] refill for  Invisible Puppy.     [facility]

## 2023-04-24 ENCOUNTER — NURSE ONLY (OUTPATIENT)
Dept: UROLOGY | Age: 88
End: 2023-04-24
Payer: MEDICARE

## 2023-04-24 DIAGNOSIS — C79.52 SECONDARY MALIGNANT NEOPLASM OF BONE AND BONE MARROW (HCC): Primary | ICD-10-CM

## 2023-04-24 DIAGNOSIS — C61 MALIGNANT NEOPLASM OF PROSTATE (HCC): ICD-10-CM

## 2023-04-24 DIAGNOSIS — C79.51 SECONDARY MALIGNANT NEOPLASM OF BONE AND BONE MARROW (HCC): Primary | ICD-10-CM

## 2023-04-24 PROCEDURE — 96372 THER/PROPH/DIAG INJ SC/IM: CPT

## 2023-04-24 NOTE — PROGRESS NOTES
Patient has given me verbal consent to perform Xgeva Injection yes    Patient's last Calcium level was 9.2 on the date of 3/27/23. Following Maria T Harper PA-C plan of care. Berry Hals 120MG/1.7ML MG GIVEN S.C right ARM  Lot Number: [id number]  Expiration Date: 02/28/2025  [facility] #: [id number]    After Injection was given there were no reactions at injection site and patient was feeling well. Patient was notified that possible side effects from injections include: Redness, swelling and itching at the injection site. Possible side effects of androgen deprivation therapy, including hot flashes, flushing of the skin, increased weight, decreased sex drive, and difficulties with ED. Patient was instructed to inform their dental office that they are receiving Berry Hals and that major dental procedures should be avoided. Patient was advised to call our office with any further questions or concerns. Any active dental problems, infections, or pain? no    Date of last dental appointment: over a year    Any planned dental procedures? no    Patient supplied their own medications No    Pt LHRH therapy (Darcy Rose) first initiated on 02/26/2019.

## 2023-05-09 RX ORDER — FINASTERIDE 5 MG/1
TABLET, FILM COATED ORAL
Qty: 90 TABLET | Refills: 0 | Status: SHIPPED | OUTPATIENT
Start: 2023-05-09 | End: 2023-06-28 | Stop reason: SDUPTHER

## 2023-05-09 NOTE — PROGRESS NOTES
[address] CANCER [address] [place] [address]  Dept: [phone]  Loc: [phone]   Hematology/Oncology Consult (Clinic)        5/10//23    Tosha Prima   1/27/1926     No ref. 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 Chief Complaint   Patient presents with    Follow-up     Prostate CA [facility])      DIAGNOSIS:  -Prostate cancer with bone mets. TREATMENT:  -Casodex. Casodex stopped May 2022.    - Enzalutamide( Xtandi) 120 mg  ( 3 tabs )po daily . ( Start date =  10/26/22 ) Held since 12/5/22  Resumed at 40 mg daily on 1/25/2023; escalate dose if tolerated. Due late June. -Lupron 45 mg every 6 months per urology; last given by Urology 12/28/22.  Nani Canal monthly per urology; last done 12/28/22, per urology    PARAMETERS:  -PSA September 2021 = 3.72            April 2022 = 9.82            July 2022 = 13.78             September 2022 = 31.34             10/26/2022 = 47.1             12/27/22 = 34.53 (interrupted / abbreviated trial of Callicoon Feil)              1/12/23 = 34               3/27/23 = 48               5/10/2023-pending  - bone scan 6/7/2022    SUBJECTIVE: 81 yo male. Patient last seen by me mid January. Seen by urology on 12/28 received his 6-month Lupron and monthly Xgeva. Summary of Indio Feil administration since he was last here in November: Telephone consultation late November early December noted that he was having extreme weakness and poor appetite and the Callicoon Feil was placed on hold since early December. He also had COVID as an outpatient in early December. The 3 tablets of Xtandi caused fatigue decline in appetite and distaste for food. He is slowly rebuilding. I proposed starting Callicoon Feil which is 1 tablet a day. They would like to wait 2 more weeks and started late January.   History of thrombosis therefore

## 2023-05-10 ENCOUNTER — OFFICE VISIT (OUTPATIENT)
Dept: ONCOLOGY | Age: 88
End: 2023-05-10
Payer: MEDICARE

## 2023-05-10 ENCOUNTER — HOSPITAL ENCOUNTER (OUTPATIENT)
Dept: INFUSION THERAPY | Age: 88
Discharge: HOME | End: 2023-05-10
Payer: MEDICARE

## 2023-05-10 VITALS
TEMPERATURE: 97.7 F | DIASTOLIC BLOOD PRESSURE: 70 MMHG | OXYGEN SATURATION: 97 % | SYSTOLIC BLOOD PRESSURE: 129 MMHG | HEART RATE: 99 BPM

## 2023-05-10 DIAGNOSIS — C61 PROSTATE CA (HCC): Primary | ICD-10-CM

## 2023-05-10 DIAGNOSIS — C61 PROSTATE CANCER METASTATIC TO BONE (HCC): ICD-10-CM

## 2023-05-10 DIAGNOSIS — C79.51 PROSTATE CANCER METASTATIC TO BONE (HCC): ICD-10-CM

## 2023-05-10 DIAGNOSIS — Z79.818 ENCOUNTER FOR MONITORING ANDROGEN DEPRIVATION THERAPY: ICD-10-CM

## 2023-05-10 LAB
ABSOLUTE IMMATURE GRANULOCYTE: 0.01 THOU/MM3 (ref 0–0.07)
ALBUMIN SERPL BCG-MCNC: 4 G/DL (ref 3.5–5.1)
ALP SERPL-CCNC: 62 U/L (ref 38–126)
ALT SERPL W/O P-5'-P-CCNC: 11 U/L (ref 11–66)
AST SERPL-CCNC: 26 U/L (ref 5–40)
BASOPHILS ABSOLUTE: 0 THOU/MM3 (ref 0–0.1)
BASOPHILS NFR BLD AUTO: 0 % (ref 0–3)
BILIRUB CONJ SERPL-MCNC: < 0.2 MG/DL (ref 0–0.3)
BILIRUB SERPL-MCNC: 0.5 MG/DL (ref 0.3–1.2)
BUN BLDP-MCNC: 20 MG/DL (ref 8–26)
CHLORIDE BLD-SCNC: 92 MEQ/L (ref 98–109)
CREAT BLD-MCNC: 0.9 MG/DL (ref 0.5–1.2)
EOSINOPHIL NFR BLD AUTO: 1 % (ref 0–4)
EOSINOPHILS ABSOLUTE: 0 THOU/MM3 (ref 0–0.4)
ERYTHROCYTE [DISTWIDTH] IN BLOOD BY AUTOMATED COUNT: 12.3 % (ref 11.5–14.5)
GFR SERPL CREATININE-BSD FRML MDRD: > 60 ML/MIN/1.73M2
GLUCOSE BLD-MCNC: 106 MG/DL (ref 70–108)
HCT VFR BLD AUTO: 40.8 % (ref 42–52)
HGB BLD-MCNC: 13.3 GM/DL (ref 14–18)
IMMATURE GRANULOCYTES: 0 %
IONIZED CALCIUM, WHOLE BLOOD: 1.18 MMOL/L (ref 1.12–1.32)
LYMPHOCYTES ABSOLUTE: 0.5 THOU/MM3 (ref 1–4.8)
LYMPHOCYTES NFR BLD AUTO: 8 % (ref 15–47)
MCH RBC QN AUTO: 33.7 PG (ref 26–33)
MCHC RBC AUTO-ENTMCNC: 32.6 GM/DL (ref 32.2–35.5)
MCV RBC AUTO: 103 FL (ref 80–94)
MONOCYTES ABSOLUTE: 0.6 THOU/MM3 (ref 0.4–1.3)
MONOCYTES NFR BLD AUTO: 9 % (ref 0–12)
NEUTROPHILS NFR BLD AUTO: 82 % (ref 43–75)
PLATELET # BLD AUTO: 231 THOU/MM3 (ref 130–400)
PMV BLD AUTO: 9.1 FL (ref 9.4–12.4)
POTASSIUM BLD-SCNC: 4.3 MEQ/L (ref 3.5–4.9)
PROT SERPL-MCNC: 6.5 G/DL (ref 6.1–8)
PSA SERPL-MCNC: 55.83 NG/ML (ref 0–1)
RBC # BLD AUTO: 3.95 MILL/MM3 (ref 4.7–6.1)
SEGMENTED NEUTROPHILS ABSOLUTE COUNT: 5.3 THOU/MM3 (ref 1.8–7.7)
SODIUM BLD-SCNC: 134 MEQ/L (ref 138–146)
TOTAL CO2, WHOLE BLOOD: 36 MEQ/L (ref 23–33)
WBC # BLD AUTO: 6.5 THOU/MM3 (ref 4.8–10.8)

## 2023-05-10 PROCEDURE — 99214 OFFICE O/P EST MOD 30 MIN: CPT | Performed by: INTERNAL MEDICINE

## 2023-05-10 PROCEDURE — 1036F TOBACCO NON-USER: CPT | Performed by: INTERNAL MEDICINE

## 2023-05-10 PROCEDURE — G8427 DOCREV CUR MEDS BY ELIG CLIN: HCPCS | Performed by: INTERNAL MEDICINE

## 2023-05-10 PROCEDURE — 80076 HEPATIC FUNCTION PANEL: CPT

## 2023-05-10 PROCEDURE — 1123F ACP DISCUSS/DSCN MKR DOCD: CPT | Performed by: INTERNAL MEDICINE

## 2023-05-10 PROCEDURE — 36415 COLL VENOUS BLD VENIPUNCTURE: CPT

## 2023-05-10 PROCEDURE — 80047 BASIC METABLC PNL IONIZED CA: CPT

## 2023-05-10 PROCEDURE — 99211 OFF/OP EST MAY X REQ PHY/QHP: CPT

## 2023-05-10 PROCEDURE — G8420 CALC BMI NORM PARAMETERS: HCPCS | Performed by: INTERNAL MEDICINE

## 2023-05-10 PROCEDURE — 84153 ASSAY OF PSA TOTAL: CPT

## 2023-05-10 PROCEDURE — 85025 COMPLETE CBC W/AUTO DIFF WBC: CPT

## 2023-05-10 NOTE — PATIENT INSTRUCTIONS
Discontinue Xtandi  E prescription for apalutamide 60 mg tablets started at 3 p.o. daily #90 refill x5  Patient will notify us by phone call once the medication arrives so that we given a start date  Follow-up with me in 6 weeks  Labs including PSA drawn today.

## 2023-05-11 ENCOUNTER — TELEPHONE (OUTPATIENT)
Dept: ONCOLOGY | Age: 88
End: 2023-05-11

## 2023-05-11 ENCOUNTER — TELEPHONE (OUTPATIENT)
Dept: INFUSION THERAPY | Age: 88
End: 2023-05-11

## 2023-05-11 NOTE — TELEPHONE ENCOUNTER
The daughter called back and wants to wait on starting Erleada  until he sees the primary Doctor etc.

## 2023-05-11 NOTE — TELEPHONE ENCOUNTER
I left a VM to return my call to discuss signatures for the Financial assistance application on Erleada.

## 2023-05-11 NOTE — TELEPHONE ENCOUNTER
Daughter called in and said patient wants to put starting Apalutamide on hold until he sees his PCP on 5/18/23.

## 2023-05-12 ENCOUNTER — CLINICAL DOCUMENTATION (OUTPATIENT)
Dept: INFUSION THERAPY | Age: 88
End: 2023-05-12

## 2023-05-12 NOTE — PROGRESS NOTES
New chemotherapy validation note:    Diagnosis for chemotherapy: prostate cancer metastatic to bone    Regimen ordered:   Apalutamide 60 MG TABS [[id number]]     Order Details  Dose: 180 mg Route: Oral Frequency: DAILY   Dispense Quantity: 90 tablet Refills: 5          Sig: Take 180 mg by mouth daily     Reference or literature used for validation: NCCN      Date literature or guideline last updated 11/2022     Deviation from literature or guideline used: Dose (240mg vs. 180mg)     Summary of any verbal or telephone information obtained: Dose reduction given advanced age for tolerability and side effect prevention     Page Vasques [facility], PharmD, [facility]  5/12/2023 12:39 PM

## 2023-05-30 ENCOUNTER — NURSE ONLY (OUTPATIENT)
Dept: UROLOGY | Age: 88
End: 2023-05-30
Payer: MEDICARE

## 2023-05-30 DIAGNOSIS — C79.51 SECONDARY MALIGNANT NEOPLASM OF BONE AND BONE MARROW (HCC): Primary | ICD-10-CM

## 2023-05-30 DIAGNOSIS — C79.52 SECONDARY MALIGNANT NEOPLASM OF BONE AND BONE MARROW (HCC): Primary | ICD-10-CM

## 2023-05-30 PROCEDURE — 96401 CHEMO ANTI-NEOPL SQ/IM: CPT

## 2023-05-30 NOTE — PROGRESS NOTES
Patient has given me verbal consent to perform Xgeva Injection yes    Patient's last Calcium level was 9.2 on the date of 3/27/23. Following Robinson Goldmann, PA-C plan of care. Ardyth Bear 120MG/1.7ML MG GIVEN S.C left ARM  Lot Number: [id number]  Expiration Date: 02/28/25  [facility] #: [id number]    After Injection was given there were no reactions at injection site and patient was feeling well. Patient was notified that possible side effects from injections include: Redness, swelling and itching at the injection site. Possible side effects of androgen deprivation therapy, including hot flashes, flushing of the skin, increased weight, decreased sex drive, and difficulties with ED. Patient was instructed to inform their dental office that they are receiving Ardyth Bear and that major dental procedures should be avoided. Patient was advised to call our office with any further questions or concerns. Any active dental problems, infections, or pain? no    Date of last dental appointment: over a year    Any planned dental procedures? no    Patient supplied their own medications No    Pt LHRH therapy (Darcy Escobar) first initiated on 02/26/2019.

## 2023-05-30 NOTE — PROGRESS NOTES
Patient has given me verbal consent to perform Xgeva Injection yes    Patient's last Calcium level was 9.2 on the date of 3/27/23. Following Victorino Lux PA-C plan of care. Marcianne Glance 120MG/1.7ML MG GIVEN S.C left ARM  Lot Number: [id number]  Expiration Date: 02/28/25  [facility] #: [id number]    After Injection was given there were no reactions at injection site and patient was feeling well. Patient was notified that possible side effects from injections include: Redness, swelling and itching at the injection site. Possible side effects of androgen deprivation therapy, including hot flashes, flushing of the skin, increased weight, decreased sex drive, and difficulties with ED. Patient was instructed to inform their dental office that they are receiving Marcianne Glance and that major dental procedures should be avoided. Patient was advised to call our office with any further questions or concerns. Any active dental problems, infections, or pain? no    Date of last dental appointment: can't remember    Any planned dental procedures? no    Patient supplied their own medications No    Pt LHRH therapy (Firmagon, Eligard, Lupron) first initiated on 02/26/2019.

## 2023-06-08 ENCOUNTER — TELEPHONE (OUTPATIENT)
Dept: INFUSION THERAPY | Age: 88
End: 2023-06-08

## 2023-06-08 ENCOUNTER — TELEPHONE (OUTPATIENT)
Dept: ONCOLOGY | Age: 88
End: 2023-06-08

## 2023-06-08 ENCOUNTER — OFFICE VISIT (OUTPATIENT)
Dept: FAMILY MEDICINE CLINIC | Age: 88
End: 2023-06-08

## 2023-06-08 VITALS
SYSTOLIC BLOOD PRESSURE: 90 MMHG | HEIGHT: 67 IN | HEART RATE: 60 BPM | WEIGHT: 121.38 LBS | DIASTOLIC BLOOD PRESSURE: 60 MMHG | BODY MASS INDEX: 19.05 KG/M2 | RESPIRATION RATE: 24 BRPM

## 2023-06-08 DIAGNOSIS — C79.51 PROSTATE CANCER METASTATIC TO BONE (HCC): ICD-10-CM

## 2023-06-08 DIAGNOSIS — E44.0 MODERATE MALNUTRITION (HCC): ICD-10-CM

## 2023-06-08 DIAGNOSIS — C61 PROSTATE CANCER METASTATIC TO BONE (HCC): ICD-10-CM

## 2023-06-08 PROBLEM — I82.409 ACUTE DEEP VEIN THROMBOSIS OF LOWER LIMB (HCC): Status: RESOLVED | Noted: 2019-02-01 | Resolved: 2023-06-08

## 2023-06-08 PROBLEM — N18.30 CHRONIC RENAL DISEASE, STAGE III (HCC): Status: RESOLVED | Noted: 2022-07-25 | Resolved: 2023-06-08

## 2023-06-08 PROBLEM — I82.403 DVT OF LOWER EXTREMITY, BILATERAL (HCC): Status: RESOLVED | Noted: 2019-02-01 | Resolved: 2023-06-08

## 2023-06-08 RX ORDER — TRAMADOL HYDROCHLORIDE 50 MG/1
50 TABLET ORAL EVERY 8 HOURS PRN
Qty: 90 TABLET | Refills: 0 | Status: SHIPPED | OUTPATIENT
Start: 2023-06-08 | End: 2023-07-08

## 2023-06-08 SDOH — ECONOMIC STABILITY: HOUSING INSECURITY
IN THE LAST 12 MONTHS, WAS THERE A TIME WHEN YOU DID NOT HAVE A STEADY PLACE TO SLEEP OR SLEPT IN A SHELTER (INCLUDING NOW)?: NO

## 2023-06-08 SDOH — ECONOMIC STABILITY: FOOD INSECURITY: WITHIN THE PAST 12 MONTHS, THE FOOD YOU BOUGHT JUST DIDN'T LAST AND YOU DIDN'T HAVE MONEY TO GET MORE.: NEVER TRUE

## 2023-06-08 SDOH — ECONOMIC STABILITY: FOOD INSECURITY: WITHIN THE PAST 12 MONTHS, YOU WORRIED THAT YOUR FOOD WOULD RUN OUT BEFORE YOU GOT MONEY TO BUY MORE.: NEVER TRUE

## 2023-06-08 SDOH — ECONOMIC STABILITY: INCOME INSECURITY: HOW HARD IS IT FOR YOU TO PAY FOR THE VERY BASICS LIKE FOOD, HOUSING, MEDICAL CARE, AND HEATING?: NOT HARD AT ALL

## 2023-06-08 ASSESSMENT — PATIENT HEALTH QUESTIONNAIRE - PHQ9
SUM OF ALL RESPONSES TO PHQ QUESTIONS 1-9: 0
1. LITTLE INTEREST OR PLEASURE IN DOING THINGS: 0
SUM OF ALL RESPONSES TO PHQ QUESTIONS 1-9: 0
SUM OF ALL RESPONSES TO PHQ9 QUESTIONS 1 & 2: 0
2. FEELING DOWN, DEPRESSED OR HOPELESS: 0

## 2023-06-08 ASSESSMENT — ENCOUNTER SYMPTOMS
SINUS PRESSURE: 0
BACK PAIN: 1
SHORTNESS OF BREATH: 0
CONSTIPATION: 0

## 2023-06-08 ASSESSMENT — LIFESTYLE VARIABLES
HOW MANY STANDARD DRINKS CONTAINING ALCOHOL DO YOU HAVE ON A TYPICAL DAY: PATIENT DOES NOT DRINK
HOW OFTEN DO YOU HAVE A DRINK CONTAINING ALCOHOL: NEVER

## 2023-06-08 NOTE — PROGRESS NOTES
Venkata Madsen (:  1926) is a 80 y.o. male,Established patient, here for evaluation of the following chief complaint(s):  Medicare AWV         ASSESSMENT/PLAN:   Diagnosis Orders   1. Prostate cancer metastatic to bone (HCC)  traMADol (ULTRAM) 50 MG tablet      2. Moderate malnutrition ([address]               Consider some type of cushioning insert between the brace and the skin  See me in early November     Subjective   SUBJECTIVE/OBJECTIVE:  HPI  We discussed the different chemos he's had and that there is another one coming. I suggested that he continue trying the meds because the pain of metastatic prostatic cancer is so bad  The tramadol helps the cancer pain  Review of Systems   Constitutional:  Positive for fatigue. HENT:  Negative for sinus pressure. Eyes:  Negative for visual disturbance. Respiratory:  Negative for shortness of breath. Cardiovascular:  Negative for chest pain. Gastrointestinal:  Negative for constipation. Genitourinary: Negative. Musculoskeletal:  Positive for arthralgias, back pain, gait problem and myalgias. Skin:  Negative for rash. Neurological:  Positive for weakness. Negative for headaches. The patient's medications, allergies, past medical problems, surgical, social, and family histories were reviewed and updated as needed. Objective   Physical Exam  Constitutional:       Appearance: Normal appearance. He is well-developed. HENT:      Head: Normocephalic and atraumatic. Eyes:      General: No scleral icterus. Conjunctiva/sclera: Conjunctivae normal.   Neck:      Trachea: No tracheal deviation. Cardiovascular:      Rate and Rhythm: Normal rate and regular rhythm. Heart sounds: Normal heart sounds. Pulmonary:      Effort: Pulmonary effort is normal.      Breath sounds: Normal breath sounds. Skin:     General: Skin is warm and dry.       Comments: Some redness along the lumbar spinous processes   Neurological:      General: No

## 2023-06-08 NOTE — TELEPHONE ENCOUNTER
I called his daughter about the details for patient assistance forms. She will stop by the office on 6/9/23 to sign and provide proof of income.

## 2023-06-16 ENCOUNTER — HOSPITAL ENCOUNTER (OUTPATIENT)
Dept: INFUSION THERAPY | Age: 88
Discharge: HOME | End: 2023-06-16
Payer: MEDICARE

## 2023-06-16 VITALS
HEART RATE: 90 BPM | SYSTOLIC BLOOD PRESSURE: 112 MMHG | OXYGEN SATURATION: 96 % | RESPIRATION RATE: 18 BRPM | DIASTOLIC BLOOD PRESSURE: 62 MMHG | TEMPERATURE: 97.9 F

## 2023-06-16 DIAGNOSIS — C61 PROSTATE CA (HCC): ICD-10-CM

## 2023-06-16 DIAGNOSIS — C61 PROSTATE CA (HCC): Primary | ICD-10-CM

## 2023-06-16 LAB
ABSOLUTE IMMATURE GRANULOCYTE: 0.01 THOU/MM3 (ref 0–0.07)
ALBUMIN SERPL BCG-MCNC: 4.4 G/DL (ref 3.5–5.1)
ALP SERPL-CCNC: 69 U/L (ref 38–126)
ALT SERPL W/O P-5'-P-CCNC: 10 U/L (ref 11–66)
AST SERPL-CCNC: 30 U/L (ref 5–40)
BASOPHILS ABSOLUTE: 0 THOU/MM3 (ref 0–0.1)
BASOPHILS NFR BLD AUTO: 1 % (ref 0–3)
BILIRUB CONJ SERPL-MCNC: < 0.2 MG/DL (ref 0–0.3)
BILIRUB SERPL-MCNC: 0.3 MG/DL (ref 0.3–1.2)
BUN BLDP-MCNC: 19 MG/DL (ref 8–26)
CHLORIDE BLD-SCNC: 95 MEQ/L (ref 98–109)
CREAT BLD-MCNC: 1 MG/DL (ref 0.5–1.2)
EOSINOPHIL NFR BLD AUTO: 2 % (ref 0–4)
EOSINOPHILS ABSOLUTE: 0.1 THOU/MM3 (ref 0–0.4)
ERYTHROCYTE [DISTWIDTH] IN BLOOD BY AUTOMATED COUNT: 12.3 % (ref 11.5–14.5)
GFR SERPL CREATININE-BSD FRML MDRD: > 60 ML/MIN/1.73M2
GLUCOSE BLD-MCNC: 82 MG/DL (ref 70–108)
HCT VFR BLD AUTO: 39 % (ref 42–52)
HGB BLD-MCNC: 12.5 GM/DL (ref 14–18)
IMMATURE GRANULOCYTES: 0 %
IONIZED CALCIUM, WHOLE BLOOD: 1.19 MMOL/L (ref 1.12–1.32)
LYMPHOCYTES ABSOLUTE: 0.6 THOU/MM3 (ref 1–4.8)
LYMPHOCYTES NFR BLD AUTO: 13 % (ref 15–47)
MCH RBC QN AUTO: 33.6 PG (ref 26–33)
MCHC RBC AUTO-ENTMCNC: 32.1 GM/DL (ref 32.2–35.5)
MCV RBC AUTO: 105 FL (ref 80–94)
MONOCYTES ABSOLUTE: 0.5 THOU/MM3 (ref 0.4–1.3)
MONOCYTES NFR BLD AUTO: 10 % (ref 0–12)
NEUTROPHILS NFR BLD AUTO: 74 % (ref 43–75)
PLATELET # BLD AUTO: 247 THOU/MM3 (ref 130–400)
PMV BLD AUTO: 8.8 FL (ref 9.4–12.4)
POTASSIUM BLD-SCNC: 4.4 MEQ/L (ref 3.5–4.9)
PROT SERPL-MCNC: 6.8 G/DL (ref 6.1–8)
PSA SERPL-MCNC: 60.26 NG/ML (ref 0–1)
RBC # BLD AUTO: 3.72 MILL/MM3 (ref 4.7–6.1)
SEGMENTED NEUTROPHILS ABSOLUTE COUNT: 3.4 THOU/MM3 (ref 1.8–7.7)
SODIUM BLD-SCNC: 134 MEQ/L (ref 138–146)
TOTAL CO2, WHOLE BLOOD: 37 MEQ/L (ref 23–33)
WBC # BLD AUTO: 4.6 THOU/MM3 (ref 4.8–10.8)

## 2023-06-16 PROCEDURE — 99211 OFF/OP EST MAY X REQ PHY/QHP: CPT

## 2023-06-16 PROCEDURE — 80047 BASIC METABLC PNL IONIZED CA: CPT

## 2023-06-16 PROCEDURE — 36415 COLL VENOUS BLD VENIPUNCTURE: CPT

## 2023-06-16 PROCEDURE — 84153 ASSAY OF PSA TOTAL: CPT

## 2023-06-16 PROCEDURE — 80076 HEPATIC FUNCTION PANEL: CPT

## 2023-06-16 PROCEDURE — 85025 COMPLETE CBC W/AUTO DIFF WBC: CPT

## 2023-06-23 DIAGNOSIS — C79.51 PROSTATE CANCER METASTATIC TO BONE (HCC): Primary | ICD-10-CM

## 2023-06-23 DIAGNOSIS — C61 PROSTATE CANCER METASTATIC TO BONE (HCC): Primary | ICD-10-CM

## 2023-06-26 ENCOUNTER — NURSE ONLY (OUTPATIENT)
Dept: LAB | Age: 88
End: 2023-06-26

## 2023-06-26 DIAGNOSIS — C79.52 SECONDARY MALIGNANT NEOPLASM OF BONE AND BONE MARROW (HCC): ICD-10-CM

## 2023-06-26 DIAGNOSIS — C79.51 SECONDARY MALIGNANT NEOPLASM OF BONE AND BONE MARROW (HCC): ICD-10-CM

## 2023-06-26 LAB — CALCIUM SERPL-MCNC: 9.6 MG/DL (ref 8.5–10.5)

## 2023-06-28 ENCOUNTER — OFFICE VISIT (OUTPATIENT)
Dept: UROLOGY | Age: 88
End: 2023-06-28
Payer: MEDICARE

## 2023-06-28 VITALS
WEIGHT: 117 LBS | DIASTOLIC BLOOD PRESSURE: 70 MMHG | SYSTOLIC BLOOD PRESSURE: 118 MMHG | BODY MASS INDEX: 18.36 KG/M2 | HEIGHT: 67 IN

## 2023-06-28 DIAGNOSIS — C79.52 SECONDARY MALIGNANT NEOPLASM OF BONE AND BONE MARROW (HCC): ICD-10-CM

## 2023-06-28 DIAGNOSIS — E55.9 VITAMIN D DEFICIENCY, UNSPECIFIED: ICD-10-CM

## 2023-06-28 DIAGNOSIS — C61 PROSTATE CANCER (HCC): Primary | ICD-10-CM

## 2023-06-28 DIAGNOSIS — C79.51 SECONDARY MALIGNANT NEOPLASM OF BONE AND BONE MARROW (HCC): ICD-10-CM

## 2023-06-28 LAB
BILIRUBIN URINE: NEGATIVE
BLOOD URINE, POC: NORMAL
CHARACTER, URINE: CLEAR
COLOR, URINE: YELLOW
GLUCOSE URINE: NEGATIVE MG/DL
KETONES, URINE: NEGATIVE
LEUKOCYTE CLUMPS, URINE: NEGATIVE
NITRITE, URINE: NEGATIVE
PH, URINE: 7.5 (ref 5–9)
PROTEIN, URINE: NEGATIVE MG/DL
SPECIFIC GRAVITY, URINE: 1.02 (ref 1–1.03)
UROBILINOGEN, URINE: 0.2 EU/DL (ref 0–1)

## 2023-06-28 PROCEDURE — G8427 DOCREV CUR MEDS BY ELIG CLIN: HCPCS | Performed by: NURSE PRACTITIONER

## 2023-06-28 PROCEDURE — 96372 THER/PROPH/DIAG INJ SC/IM: CPT | Performed by: NURSE PRACTITIONER

## 2023-06-28 PROCEDURE — 1123F ACP DISCUSS/DSCN MKR DOCD: CPT | Performed by: NURSE PRACTITIONER

## 2023-06-28 PROCEDURE — G8419 CALC BMI OUT NRM PARAM NOF/U: HCPCS | Performed by: NURSE PRACTITIONER

## 2023-06-28 PROCEDURE — 99214 OFFICE O/P EST MOD 30 MIN: CPT | Performed by: NURSE PRACTITIONER

## 2023-06-28 PROCEDURE — 96402 CHEMO HORMON ANTINEOPL SQ/IM: CPT | Performed by: NURSE PRACTITIONER

## 2023-06-28 PROCEDURE — 81003 URINALYSIS AUTO W/O SCOPE: CPT | Performed by: NURSE PRACTITIONER

## 2023-06-28 PROCEDURE — 1036F TOBACCO NON-USER: CPT | Performed by: NURSE PRACTITIONER

## 2023-06-28 RX ORDER — FINASTERIDE 5 MG/1
5 TABLET, FILM COATED ORAL DAILY
Qty: 90 TABLET | Refills: 3 | Status: SHIPPED | OUTPATIENT
Start: 2023-06-28

## 2023-06-28 RX ORDER — DOXAZOSIN 2 MG/1
2 TABLET ORAL NIGHTLY
Qty: 90 TABLET | Refills: 3 | Status: SHIPPED | OUTPATIENT
Start: 2023-06-28

## 2023-07-05 ENCOUNTER — OFFICE VISIT (OUTPATIENT)
Dept: ENT CLINIC | Age: 88
End: 2023-07-05
Payer: MEDICARE

## 2023-07-05 VITALS
DIASTOLIC BLOOD PRESSURE: 78 MMHG | HEART RATE: 96 BPM | OXYGEN SATURATION: 97 % | SYSTOLIC BLOOD PRESSURE: 122 MMHG | TEMPERATURE: 97.3 F | BODY MASS INDEX: 18.52 KG/M2 | HEIGHT: 67 IN | WEIGHT: 118 LBS

## 2023-07-05 DIAGNOSIS — H92.12 OTORRHEA, LEFT: Primary | ICD-10-CM

## 2023-07-05 DIAGNOSIS — H61.23 IMPACTED CERUMEN, BILATERAL: ICD-10-CM

## 2023-07-05 PROCEDURE — G8427 DOCREV CUR MEDS BY ELIG CLIN: HCPCS | Performed by: PHYSICIAN ASSISTANT

## 2023-07-05 PROCEDURE — 1036F TOBACCO NON-USER: CPT | Performed by: PHYSICIAN ASSISTANT

## 2023-07-05 PROCEDURE — 1123F ACP DISCUSS/DSCN MKR DOCD: CPT | Performed by: PHYSICIAN ASSISTANT

## 2023-07-05 PROCEDURE — 99213 OFFICE O/P EST LOW 20 MIN: CPT | Performed by: PHYSICIAN ASSISTANT

## 2023-07-05 PROCEDURE — G8419 CALC BMI OUT NRM PARAM NOF/U: HCPCS | Performed by: PHYSICIAN ASSISTANT

## 2023-07-05 PROCEDURE — 69210 REMOVE IMPACTED EAR WAX UNI: CPT | Performed by: PHYSICIAN ASSISTANT

## 2023-07-05 RX ORDER — OFLOXACIN 3 MG/ML
4 SOLUTION/ DROPS OPHTHALMIC 2 TIMES DAILY
Qty: 10 ML | Refills: 2 | Status: SHIPPED | OUTPATIENT
Start: 2023-07-05 | End: 2023-07-12

## 2023-07-05 NOTE — PROGRESS NOTES
[facility] EAR, NOSE AND THROAT  [address] [address]  Dept: [phone]  Dept Fax: [phone]  Loc: [phone]    Moriah Gillis is a 80 y.o. male who was referred by No ref. provider found for:  Chief Complaint   Patient presents with    Otalgia    Follow-up     Patient here for follow up, ear pain, and recent hearing loss. Ghislaine Encinas HPI:     Moriah Gillis presents today for ear check. He is accompanied by his daughter who is a primary historian today. Patient began to report decreased hearing approximately 3 weeks ago and family have noticed a decline in hearing as well. He denies any pain presently and recently. Daughter reports he has not reported pain as well. No otorrhea, tinnitus, fevers, chills. He has known history of metastatic prostate cancer to bone. Patient was started on apalutamide which seem to be around the time that the family noticed decreased hearing. This is reportedly not on the side effect profile. Subjective:      REVIEW OF SYSTEMS:    Pertinent positives as noted in the HPI. All other systems reviewed and negative. ALLERGIES:  Patient has no known allergies.     Past Medical History:  Past Medical History:   Diagnosis Date    Blood circulation, collateral     CAD (coronary artery disease)     CAD (coronary artery disease) 9/2/2014    Cancer ([address])     prostate to bone    CKD (chronic kidney disease) stage 2, GFR 60-89 ml/min 12/24/2018    COPD (chronic obstructive pulmonary disease) ([address])     Diverticulitis     DVT of lower extremity, bilateral ([address]) 02/2019    GERD (gastroesophageal reflux disease)     Hiatal hernia     Hyperlipemia 8/12/2013    Hyperlipidemia     Other disorders of kidney and ureter in diseases classified elsewhere     Pleural plaque 8/12/2013    Pneumonia of both lower lobes due to infectious organism     Primary adenocarcinoma of prostate ([address]) 01/2019    high grade

## 2023-07-07 ENCOUNTER — HOSPITAL ENCOUNTER (OUTPATIENT)
Dept: INFUSION THERAPY | Age: 88
Discharge: HOME | End: 2023-07-07
Payer: MEDICARE

## 2023-07-07 ENCOUNTER — OFFICE VISIT (OUTPATIENT)
Dept: ONCOLOGY | Age: 88
End: 2023-07-07

## 2023-07-07 VITALS
DIASTOLIC BLOOD PRESSURE: 58 MMHG | RESPIRATION RATE: 20 BRPM | BODY MASS INDEX: 18.55 KG/M2 | TEMPERATURE: 97.2 F | WEIGHT: 118.2 LBS | HEIGHT: 67 IN | SYSTOLIC BLOOD PRESSURE: 101 MMHG | HEART RATE: 85 BPM | OXYGEN SATURATION: 93 %

## 2023-07-07 VITALS
RESPIRATION RATE: 20 BRPM | WEIGHT: 118.2 LBS | HEART RATE: 85 BPM | SYSTOLIC BLOOD PRESSURE: 101 MMHG | OXYGEN SATURATION: 93 % | BODY MASS INDEX: 18.55 KG/M2 | TEMPERATURE: 97.2 F | HEIGHT: 67 IN | DIASTOLIC BLOOD PRESSURE: 58 MMHG

## 2023-07-07 DIAGNOSIS — Z51.11 ENCOUNTER FOR CHEMOTHERAPY MANAGEMENT: ICD-10-CM

## 2023-07-07 DIAGNOSIS — C61 PROSTATE CANCER METASTATIC TO BONE (HCC): Primary | ICD-10-CM

## 2023-07-07 DIAGNOSIS — C79.51 PROSTATE CANCER METASTATIC TO BONE (HCC): ICD-10-CM

## 2023-07-07 DIAGNOSIS — C79.51 PROSTATE CANCER METASTATIC TO BONE (HCC): Primary | ICD-10-CM

## 2023-07-07 DIAGNOSIS — C61 PROSTATE CANCER METASTATIC TO BONE (HCC): ICD-10-CM

## 2023-07-07 DIAGNOSIS — D64.9 ANEMIA, UNSPECIFIED TYPE: ICD-10-CM

## 2023-07-07 LAB
ABSOLUTE IMMATURE GRANULOCYTE: 0.02 THOU/MM3 (ref 0–0.07)
ALBUMIN SERPL BCG-MCNC: 4.1 G/DL (ref 3.5–5.1)
ALP SERPL-CCNC: 66 U/L (ref 38–126)
ALT SERPL W/O P-5'-P-CCNC: 10 U/L (ref 11–66)
AST SERPL-CCNC: 28 U/L (ref 5–40)
BACTERIA SPEC AEROBE CULT: NORMAL
BASOPHILS ABSOLUTE: 0 THOU/MM3 (ref 0–0.1)
BASOPHILS NFR BLD AUTO: 1 % (ref 0–3)
BILIRUB CONJ SERPL-MCNC: < 0.2 MG/DL (ref 0–0.3)
BILIRUB SERPL-MCNC: 0.3 MG/DL (ref 0.3–1.2)
BUN BLDP-MCNC: 21 MG/DL (ref 8–26)
CHLORIDE BLD-SCNC: 93 MEQ/L (ref 98–109)
CREAT BLD-MCNC: 1 MG/DL (ref 0.5–1.2)
EOSINOPHIL NFR BLD AUTO: 5 % (ref 0–4)
EOSINOPHILS ABSOLUTE: 0.3 THOU/MM3 (ref 0–0.4)
ERYTHROCYTE [DISTWIDTH] IN BLOOD BY AUTOMATED COUNT: 12.6 % (ref 11.5–14.5)
GFR SERPL CREATININE-BSD FRML MDRD: > 60 ML/MIN/1.73M2
GLUCOSE BLD-MCNC: 141 MG/DL (ref 70–108)
GRAM STN SPEC: NORMAL
HCT VFR BLD AUTO: 39.8 % (ref 42–52)
HGB BLD-MCNC: 12.8 GM/DL (ref 14–18)
IMMATURE GRANULOCYTES: 0 %
IONIZED CALCIUM, WHOLE BLOOD: 1.19 MMOL/L (ref 1.12–1.32)
LYMPHOCYTES ABSOLUTE: 0.5 THOU/MM3 (ref 1–4.8)
LYMPHOCYTES NFR BLD AUTO: 10 % (ref 15–47)
MCH RBC QN AUTO: 33.6 PG (ref 26–33)
MCHC RBC AUTO-ENTMCNC: 32.2 GM/DL (ref 32.2–35.5)
MCV RBC AUTO: 105 FL (ref 80–94)
MONOCYTES ABSOLUTE: 0.5 THOU/MM3 (ref 0.4–1.3)
MONOCYTES NFR BLD AUTO: 9 % (ref 0–12)
NEUTROPHILS NFR BLD AUTO: 75 % (ref 43–75)
PLATELET # BLD AUTO: 244 THOU/MM3 (ref 130–400)
PMV BLD AUTO: 9.3 FL (ref 9.4–12.4)
POTASSIUM BLD-SCNC: 4.2 MEQ/L (ref 3.5–4.9)
PROT SERPL-MCNC: 6.6 G/DL (ref 6.1–8)
RBC # BLD AUTO: 3.81 MILL/MM3 (ref 4.7–6.1)
SEGMENTED NEUTROPHILS ABSOLUTE COUNT: 3.7 THOU/MM3 (ref 1.8–7.7)
SODIUM BLD-SCNC: 137 MEQ/L (ref 138–146)
TOTAL CO2, WHOLE BLOOD: 37 MEQ/L (ref 23–33)
WBC # BLD AUTO: 4.9 THOU/MM3 (ref 4.8–10.8)

## 2023-07-07 PROCEDURE — 80047 BASIC METABLC PNL IONIZED CA: CPT

## 2023-07-07 PROCEDURE — 80076 HEPATIC FUNCTION PANEL: CPT

## 2023-07-07 PROCEDURE — 85025 COMPLETE CBC W/AUTO DIFF WBC: CPT

## 2023-07-07 PROCEDURE — 99211 OFF/OP EST MAY X REQ PHY/QHP: CPT

## 2023-07-07 PROCEDURE — 36415 COLL VENOUS BLD VENIPUNCTURE: CPT

## 2023-07-07 NOTE — PROGRESS NOTES
[facility] [address] [place] [address] Fred Charles 101 200  [address] [facility] [address] [address]  Dept: [phone]  Dept Fax: [phone]: [phone]      Visit Date:7/7/2023     Hannah Parr is a 80 y.o. male who presents today for:   Chief Complaint   Patient presents with    Follow-up     Prostate cancer metastatic to bone [facility])        HPI:   Hannah Parr is a 80 y.o. male  who follows in our office with Dr. Con Kelly with metastatic prostate cancer. HPI per prior note in our office:   Mily Valle is a delightful 80-year-old gentleman  was diagnosed with prostate cancer in January 2016. PSA in January 2017 was 25. He developed abdominal pain and bone pain and CT abdomen pelvis 12/17 states 18 showed multiple bone mets and pathologic compression fracture L1-3. He had enlarged prostate and pelvic adenopathy. PSA December 2018 was 1594. Started Casodex December 2018. TRUS prostate biopsy 1/24/2019 by Dr. Willie Huang showed a high-grade prostate adenocarcinoma high-grade clinical stage [id number] stage IVb. Treatment has included Lupron every 6 months since diagnosis Next due May 2022. He has been on Casodex other than a 6-month break several years ago remains on Casodex at this time. He received radiation to the T-spine T3-T9 through 1000 cGy completed March 2019. PSA has been steadily rising over the last several months on current therapy. He remains at his baseline with no new bone or back pain or decline. He is in a wheelchair and oxygen dependent from COPD but quite active mentally alert and in good spirits     DIAGNOSIS:  -Prostate cancer with bone mets. TREATMENT:  -Casodex. Casodex stopped May 2022.    - Enzalutamide( Xtandi) 120 mg  ( 3 tabs )po daily . ( Start date =  10/26/22 ) Held since 12/5/22  Resumed at 40 mg daily on 1/25/2023; escalate dose if tolerated. Due late June.   -Lupron 45 mg every 6 months per urology; last given by Urology 12/28/22.  Alejo Leroy monthly per urology; last

## 2023-07-14 ENCOUNTER — OFFICE VISIT (OUTPATIENT)
Dept: ENT CLINIC | Age: 88
End: 2023-07-14
Payer: MEDICARE

## 2023-07-14 ENCOUNTER — TELEPHONE (OUTPATIENT)
Dept: FAMILY MEDICINE CLINIC | Age: 88
End: 2023-07-14

## 2023-07-14 VITALS
HEIGHT: 67 IN | SYSTOLIC BLOOD PRESSURE: 116 MMHG | RESPIRATION RATE: 24 BRPM | TEMPERATURE: 97.1 F | WEIGHT: 118 LBS | BODY MASS INDEX: 18.52 KG/M2 | OXYGEN SATURATION: 95 % | HEART RATE: 61 BPM | DIASTOLIC BLOOD PRESSURE: 62 MMHG

## 2023-07-14 DIAGNOSIS — L92.9 GRANULATION TISSUE OF EAR CANAL: ICD-10-CM

## 2023-07-14 DIAGNOSIS — H61.23 IMPACTED CERUMEN, BILATERAL: Primary | ICD-10-CM

## 2023-07-14 PROCEDURE — G8419 CALC BMI OUT NRM PARAM NOF/U: HCPCS | Performed by: PHYSICIAN ASSISTANT

## 2023-07-14 PROCEDURE — 1123F ACP DISCUSS/DSCN MKR DOCD: CPT | Performed by: PHYSICIAN ASSISTANT

## 2023-07-14 PROCEDURE — 99213 OFFICE O/P EST LOW 20 MIN: CPT | Performed by: PHYSICIAN ASSISTANT

## 2023-07-14 PROCEDURE — 1036F TOBACCO NON-USER: CPT | Performed by: PHYSICIAN ASSISTANT

## 2023-07-14 PROCEDURE — G8427 DOCREV CUR MEDS BY ELIG CLIN: HCPCS | Performed by: PHYSICIAN ASSISTANT

## 2023-07-14 PROCEDURE — 4130F TOPICAL PREP RX AOE: CPT | Performed by: PHYSICIAN ASSISTANT

## 2023-07-14 RX ORDER — CIPROFLOXACIN AND DEXAMETHASONE 3; 1 MG/ML; MG/ML
4 SUSPENSION/ DROPS AURICULAR (OTIC) DAILY
Qty: 7.5 ML | Refills: 0 | Status: SHIPPED | OUTPATIENT
Start: 2023-07-14 | End: 2023-07-28

## 2023-07-14 RX ORDER — OFLOXACIN 3 MG/ML
SOLUTION AURICULAR (OTIC)
COMMUNITY
Start: 2023-07-08

## 2023-07-14 NOTE — PROGRESS NOTES
[facility] EAR, NOSE AND THROAT  [address] [address]  Dept: [phone]  Dept Fax: [phone]  Loc: [phone]    Marlinda Holter is a 80 y.o. male who was referred by No ref. provider found for:  Chief Complaint   Patient presents with    Follow-up     Patient here for a 1 week f/u. Maria Isabel Fears HPI:     Current visit documentation 07/14/2023: Marlinda Holter presents today for one week follow-up to recheck his ears. He notes in the interim that he had utilized the prescribed Ofloxacin drops over the last week and denies any otalgia or otorrhea. Child present in room does note that he didn't go to [employer] this last weekend because he stated 'I can't hear the service anyway'. Denies fevers/chills or changes to his overall health since last visit. Aerobic culture obtained from left ear came back negative. Previous visit documentation 7/5/23: Marlinda Holter presents today for ear check. He is accompanied by his daughter who is a primary historian today. Patient began to report decreased hearing approximately 3 weeks ago and family have noticed a decline in hearing as well. He denies any pain presently and recently. Daughter reports he has not reported pain as well. No otorrhea, tinnitus, fevers, chills. He has known history of metastatic prostate cancer to bone. Patient was started on apalutamide which seem to be around the time that the family noticed decreased hearing. This is reportedly not on the side effect profile. Previous visit documentation 1/25/23: Patient presents for follow up on bilateral otorrhea. He is accompanied by his daughter. Patient reports he is doing well. No otalgia, otorrhea, edema, fevers, chills. Patient completed the entire course of ofloxacin drops a few days ago. He denies any changes to hearing. No other concerns reported at this time.        Subjective:      REVIEW OF SYSTEMS:

## 2023-07-14 NOTE — TELEPHONE ENCOUNTER
Pts daughter came into request new script for handicap placard for pt. Call pt when its ready to be picked up.

## 2023-07-20 ENCOUNTER — TELEPHONE (OUTPATIENT)
Dept: ONCOLOGY | Age: 88
End: 2023-07-20

## 2023-07-20 NOTE — TELEPHONE ENCOUNTER
Called patient's daughter to relay Dr. Juan Avery' suggestion of taking 1 tablet per day and keeping follow up as scheduled. Encouraged weekly to every other week weight checks. Daughter stated understanding and appreciated the call.

## 2023-07-20 NOTE — TELEPHONE ENCOUNTER
Patient's daughter calling in to let us know that her father has been extremely fatigued and appetite continues to decline since starting the apalutamide. She is wondering if the dose can be lowered somehow, he is currently taking 3 tablets a day and wants to know if he can just take one and if that would be of any benefit?

## 2023-07-20 NOTE — TELEPHONE ENCOUNTER
Okay to just take 1 tablet a day and we will see how he does with this. Keep regular follow-up.   Please ask him to monitor his weight every 2 weeks

## 2023-07-27 DIAGNOSIS — C79.51 PROSTATE CANCER METASTATIC TO BONE (HCC): Primary | ICD-10-CM

## 2023-07-27 DIAGNOSIS — C61 PROSTATE CANCER METASTATIC TO BONE (HCC): Primary | ICD-10-CM

## 2023-07-29 DIAGNOSIS — J44.9 STAGE 3 SEVERE COPD BY GOLD CLASSIFICATION (HCC): ICD-10-CM

## 2023-07-29 DIAGNOSIS — R06.02 SHORTNESS OF BREATH: ICD-10-CM

## 2023-07-31 RX ORDER — ALBUTEROL SULFATE 90 UG/1
AEROSOL, METERED RESPIRATORY (INHALATION)
Qty: 18 G | Refills: 3 | Status: SHIPPED | OUTPATIENT
Start: 2023-07-31

## 2023-07-31 NOTE — TELEPHONE ENCOUNTER
Date of last visit:  6/8/2023  Date of next visit:  11/10/2023    Requested Prescriptions     Pending Prescriptions Disp Refills    albuterol sulfate HFA (PROVENTIL;VENTOLIN;PROAIR) 108 (90 Base) MCG/ACT inhaler [Pharmacy Med Name: Albuterol Sulfate  (90 Base) MCG/ACT Inhalation Aerosol Solution] 18 g 0     Sig: INHALE 2 PUFFS BY MOUTH EVERY 6 HOURS AS NEEDED FOR WHEEZING FOR SHORTNESS OF BREATH

## 2023-08-01 ENCOUNTER — NURSE ONLY (OUTPATIENT)
Dept: LAB | Age: 88
End: 2023-08-01

## 2023-08-01 ENCOUNTER — OFFICE VISIT (OUTPATIENT)
Dept: ENT CLINIC | Age: 88
End: 2023-08-01
Payer: MEDICARE

## 2023-08-01 VITALS
RESPIRATION RATE: 20 BRPM | HEIGHT: 67 IN | HEART RATE: 76 BPM | WEIGHT: 116.4 LBS | BODY MASS INDEX: 18.27 KG/M2 | DIASTOLIC BLOOD PRESSURE: 76 MMHG | SYSTOLIC BLOOD PRESSURE: 116 MMHG | TEMPERATURE: 97 F

## 2023-08-01 DIAGNOSIS — C79.51 PROSTATE CANCER METASTATIC TO BONE (HCC): ICD-10-CM

## 2023-08-01 DIAGNOSIS — C79.52 SECONDARY MALIGNANT NEOPLASM OF BONE AND BONE MARROW (HCC): ICD-10-CM

## 2023-08-01 DIAGNOSIS — E55.9 VITAMIN D DEFICIENCY, UNSPECIFIED: ICD-10-CM

## 2023-08-01 DIAGNOSIS — H74.03 TYMPANOSCLEROSIS OF BOTH EARS: Primary | ICD-10-CM

## 2023-08-01 DIAGNOSIS — C61 PROSTATE CANCER METASTATIC TO BONE (HCC): ICD-10-CM

## 2023-08-01 DIAGNOSIS — C61 PROSTATE CANCER (HCC): ICD-10-CM

## 2023-08-01 DIAGNOSIS — C79.51 SECONDARY MALIGNANT NEOPLASM OF BONE AND BONE MARROW (HCC): ICD-10-CM

## 2023-08-01 LAB
ALBUMIN SERPL BCG-MCNC: 4 G/DL (ref 3.5–5.1)
ALP SERPL-CCNC: 70 U/L (ref 38–126)
ALT SERPL W/O P-5'-P-CCNC: 17 U/L (ref 11–66)
ANION GAP SERPL CALC-SCNC: 14 MEQ/L (ref 8–16)
AST SERPL-CCNC: 30 U/L (ref 5–40)
BASOPHILS ABSOLUTE: 0 THOU/MM3 (ref 0–0.1)
BASOPHILS NFR BLD AUTO: 0.2 %
BILIRUB CONJ SERPL-MCNC: < 0.2 MG/DL (ref 0–0.3)
BILIRUB SERPL-MCNC: 0.4 MG/DL (ref 0.3–1.2)
BUN SERPL-MCNC: 17 MG/DL (ref 7–22)
CALCIUM SERPL-MCNC: 9.7 MG/DL (ref 8.5–10.5)
CHLORIDE SERPL-SCNC: 93 MEQ/L (ref 98–111)
CO2 SERPL-SCNC: 32 MEQ/L (ref 23–33)
CREAT SERPL-MCNC: 0.9 MG/DL (ref 0.4–1.2)
DEPRECATED RDW RBC AUTO: 51 FL (ref 35–45)
EOSINOPHIL NFR BLD AUTO: 2.5 %
EOSINOPHILS ABSOLUTE: 0.1 THOU/MM3 (ref 0–0.4)
ERYTHROCYTE [DISTWIDTH] IN BLOOD BY AUTOMATED COUNT: 12.9 % (ref 11.5–14.5)
GFR SERPL CREATININE-BSD FRML MDRD: > 60 ML/MIN/1.73M2
GLUCOSE SERPL-MCNC: 142 MG/DL (ref 70–108)
HCT VFR BLD AUTO: 41.8 % (ref 42–52)
HGB BLD-MCNC: 13.3 GM/DL (ref 14–18)
IMM GRANULOCYTES # BLD AUTO: 0.02 THOU/MM3 (ref 0–0.07)
IMM GRANULOCYTES NFR BLD AUTO: 0.4 %
LYMPHOCYTES ABSOLUTE: 0.8 THOU/MM3 (ref 1–4.8)
LYMPHOCYTES NFR BLD AUTO: 13.5 %
MCH RBC QN AUTO: 34.1 PG (ref 26–33)
MCHC RBC AUTO-ENTMCNC: 31.8 GM/DL (ref 32.2–35.5)
MCV RBC AUTO: 107.2 FL (ref 80–94)
MONOCYTES ABSOLUTE: 0.4 THOU/MM3 (ref 0.4–1.3)
MONOCYTES NFR BLD AUTO: 7.7 %
NEUTROPHILS NFR BLD AUTO: 75.7 %
NRBC BLD AUTO-RTO: 0 /100 WBC
PLATELET # BLD AUTO: 237 THOU/MM3 (ref 130–400)
PMV BLD AUTO: 9.6 FL (ref 9.4–12.4)
POTASSIUM SERPL-SCNC: 4 MEQ/L (ref 3.5–5.2)
PROT SERPL-MCNC: 6.6 G/DL (ref 6.1–8)
PSA SERPL-MCNC: 75.61 NG/ML (ref 0–1)
RBC # BLD AUTO: 3.9 MILL/MM3 (ref 4.7–6.1)
SEGMENTED NEUTROPHILS ABSOLUTE COUNT: 4.2 THOU/MM3 (ref 1.8–7.7)
SODIUM SERPL-SCNC: 139 MEQ/L (ref 135–145)
WBC # BLD AUTO: 5.6 THOU/MM3 (ref 4.8–10.8)

## 2023-08-01 PROCEDURE — G8419 CALC BMI OUT NRM PARAM NOF/U: HCPCS | Performed by: PHYSICIAN ASSISTANT

## 2023-08-01 PROCEDURE — G8427 DOCREV CUR MEDS BY ELIG CLIN: HCPCS | Performed by: PHYSICIAN ASSISTANT

## 2023-08-01 PROCEDURE — 1036F TOBACCO NON-USER: CPT | Performed by: PHYSICIAN ASSISTANT

## 2023-08-01 PROCEDURE — 1123F ACP DISCUSS/DSCN MKR DOCD: CPT | Performed by: PHYSICIAN ASSISTANT

## 2023-08-01 PROCEDURE — 99212 OFFICE O/P EST SF 10 MIN: CPT | Performed by: PHYSICIAN ASSISTANT

## 2023-08-01 NOTE — PROGRESS NOTES
Problem Relation Age of Onset    Cancer Mother     Heart Disease Brother        Surgical History:  Past Surgical History:   Procedure Laterality Date    ABDOMEN SURGERY      APPENDECTOMY      CARDIAC CATHETERIZATION      CYSTOSCOPY  03/17/2015    Litholapaxy-Large    EYE SURGERY      cataracts    HERNIA REPAIR      KYPHOSIS SURGERY N/A 2/13/2019    ABLATION AND KYPHOPLASTY AT T8 AND ABLATION AND KYPHOPLASTY AT T6 performed by Gerda Diaz MD at [address] 12/17/2018    EXPLORATORY LAPAROTOMY, WITH ABDOMINAL WASHOUT performed by Evans Moore MD at [address]:  Current Outpatient Medications   Medication Sig Dispense Refill    albuterol sulfate HFA (PROVENTIL;VENTOLIN;PROAIR) 108 (90 Base) MCG/ACT inhaler INHALE 2 PUFFS BY MOUTH EVERY 6 HOURS AS NEEDED FOR WHEEZING FOR SHORTNESS OF BREATH 18 g 3    ofloxacin (FLOXIN) 0.3 % otic solution       doxazosin (CARDURA) 2 MG tablet Take 1 tablet by mouth nightly 90 tablet 3    finasteride (PROSCAR) 5 MG tablet Take 1 tablet by mouth daily 90 tablet 3    Apalutamide 60 MG TABS Take 180 mg by mouth daily 90 tablet 5    tiotropium-olodaterol (STIOLTO RESPIMAT) 2.5-2.5 MCG/ACT AERS INHALE 2 PUFFS BY MOUTH ONCE DAILY 4 g 5    carvedilol (COREG) 3.125 MG tablet TAKE 1 TABLET BY MOUTH TWICE DAILY WITH MEALS 180 tablet 3    albuterol (PROVENTIL) (2.5 MG/3ML) 0.083% nebulizer solution Take 3 mLs by nebulization 2 times daily And every 6  Hours  prn (Patient taking differently: Take 3 mLs by nebulization 2 times daily And every 6  Hours  prn) 180 mL 11    calcium carbonate-vitamin D (CALCIUM 600 + D) 600-400 MG-UNIT TABS per tab Take 1 tablet by mouth 3 times daily 90 tablet 5    denosumab (XGEVA) 120 MG/1.7ML SOLN SC injection Inject 1.7 mLs into the skin once Every 28 days      polyethylene glycol (GLYCOLAX) 17 g packet Take 1 packet by mouth once      Cholecalciferol (VITAMIN D3) 125 MCG (5000 UT) CAPS Take 1 capsule by mouth every

## 2023-08-02 ENCOUNTER — NURSE ONLY (OUTPATIENT)
Dept: UROLOGY | Age: 88
End: 2023-08-02
Payer: MEDICARE

## 2023-08-02 DIAGNOSIS — C61 MALIGNANT NEOPLASM OF PROSTATE (HCC): Primary | ICD-10-CM

## 2023-08-02 PROCEDURE — 96372 THER/PROPH/DIAG INJ SC/IM: CPT | Performed by: NURSE PRACTITIONER

## 2023-08-02 NOTE — PROGRESS NOTES
Patient has given me verbal consent to perform Xgeva Injection ***    Patient's last Calcium level was *** on the date of ***. Following Dr. Fiordaliza Valles plan of care. XGEVA 120MG/1.7ML MG GIVEN S.C *** ARM  Lot Number: ***  Expiration Date: ***  [facility] #: [id number]    After Injection was given there were no reactions at injection site and patient was feeling well. Patient was notified that possible side effects from injections include: Redness, swelling and itching at the injection site. Possible side effects of androgen deprivation therapy, including hot flashes, flushing of the skin, increased weight, decreased sex drive, and difficulties with ED. Patient was instructed to inform their dental office that they are receiving Amina Oxford and that major dental procedures should be avoided. Patient was advised to call our office with any further questions or concerns. Any active dental problems, infections, or pain? ***    Date of last dental appointment: ***    Any planned dental procedures? ***    Patient supplied their own medications {YES / [id number]}    Pt LHRH therapy (Firmagon, Eligard, Lupron) first initiated on ***.

## 2023-08-02 NOTE — PROGRESS NOTES
Patient has given me verbal consent to perform Xgeva Injection yes    Patient's last Calcium level was 9.7 on the date of 8/1/2023. Following CHAY Meza plan of care. Barry Alcaraz 120MG/1.7ML MG GIVEN S.C left ARM  Lot Number: [id number]  Expiration Date: 03/31/2025  [facility] #: [id number]    After Injection was given there were no reactions at injection site and patient was feeling well. Patient was notified that possible side effects from injections include: Redness, swelling and itching at the injection site. Possible side effects of androgen deprivation therapy, including hot flashes, flushing of the skin, increased weight, decreased sex drive, and difficulties with ED. Patient was instructed to inform their dental office that they are receiving Melony Alcaraz and that major dental procedures should be avoided. Patient was advised to call our office with any further questions or concerns. Any active dental problems, infections, or pain? no    Date of last dental appointment: doesn't remember    Any planned dental procedures? no    Patient supplied their own medications No    Pt LHRH therapy (Firmagon, Eligard, Lupron) first initiated on 2/26/2019.

## 2023-08-03 LAB — CA-I SERPL ISE-SCNC: NORMAL MMOL/L

## 2023-08-21 NOTE — PROGRESS NOTES
diversion identified. 8) Research Options:   None      9) Other:         Xtandi (enzalutamide) drug information:  Standard dose 160 mg.  Given patient's age etc. starting at 120 mg. Dosage forms 40 mg tablets. Taken by mouth with and without food. Do not crush. Precautions:  Cardiovascular effects, fractures, hypersensitivity, posterior reversible encephalopathy syndrome, seizures  Adverse reactions greater than 10% include hypertension, peripheral edema, hot flashes, hyperglycemia, high magnesium, hyponatremia, weight loss, GI effects including constipation decreased appetite diarrhea nausea, decreased neutrophils 20% although grade 3 and 4 less than 1%, dizziness 10% following about 8%, fatigue less than 51%, headache 10%, arthralgias 20% asthenia less than 51% back pain 20% and shortness of breath 11%  Monitoring:  CBC with differential liver function test at baseline periodically. INR if on Coumadin. Monitor blood pressure. Monitor for signs and symptoms of ischemic heart disease, posterior reversible encephalopathy syndrome seizures evaluate for falls and fracture risk and compliance. 10) Follow Up: Follow-up with me 10 October and 1 week before ,check labs including PSA.       Danie Chauhan MD

## 2023-08-22 ENCOUNTER — OFFICE VISIT (OUTPATIENT)
Dept: ONCOLOGY | Age: 88
End: 2023-08-22
Payer: MEDICARE

## 2023-08-22 ENCOUNTER — HOSPITAL ENCOUNTER (OUTPATIENT)
Dept: INFUSION THERAPY | Age: 88
Discharge: HOME | End: 2023-08-22
Payer: MEDICARE

## 2023-08-22 VITALS
SYSTOLIC BLOOD PRESSURE: 90 MMHG | TEMPERATURE: 98.1 F | RESPIRATION RATE: 16 BRPM | OXYGEN SATURATION: 94 % | DIASTOLIC BLOOD PRESSURE: 52 MMHG | HEART RATE: 105 BPM

## 2023-08-22 VITALS
HEART RATE: 105 BPM | TEMPERATURE: 98.1 F | HEIGHT: 67 IN | OXYGEN SATURATION: 94 % | DIASTOLIC BLOOD PRESSURE: 52 MMHG | RESPIRATION RATE: 16 BRPM | BODY MASS INDEX: 18.36 KG/M2 | WEIGHT: 117 LBS | SYSTOLIC BLOOD PRESSURE: 90 MMHG

## 2023-08-22 DIAGNOSIS — C61 PROSTATE CANCER METASTATIC TO BONE (HCC): Primary | ICD-10-CM

## 2023-08-22 DIAGNOSIS — C79.51 PROSTATE CANCER METASTATIC TO BONE (HCC): Primary | ICD-10-CM

## 2023-08-22 PROCEDURE — 1036F TOBACCO NON-USER: CPT | Performed by: INTERNAL MEDICINE

## 2023-08-22 PROCEDURE — G8427 DOCREV CUR MEDS BY ELIG CLIN: HCPCS | Performed by: INTERNAL MEDICINE

## 2023-08-22 PROCEDURE — 1123F ACP DISCUSS/DSCN MKR DOCD: CPT | Performed by: INTERNAL MEDICINE

## 2023-08-22 PROCEDURE — 99213 OFFICE O/P EST LOW 20 MIN: CPT | Performed by: INTERNAL MEDICINE

## 2023-08-22 PROCEDURE — G8419 CALC BMI OUT NRM PARAM NOF/U: HCPCS | Performed by: INTERNAL MEDICINE

## 2023-08-22 PROCEDURE — 99211 OFF/OP EST MAY X REQ PHY/QHP: CPT

## 2023-08-22 NOTE — PATIENT INSTRUCTIONS
Increase apalutamide to 2 tablets for dose of 120 mg p.o. daily  Follow-up with me in 2 months and check PSA 1 week before  10/3- lab and fu w me 10/10.

## 2023-09-06 ENCOUNTER — NURSE ONLY (OUTPATIENT)
Dept: UROLOGY | Age: 88
End: 2023-09-06
Payer: MEDICARE

## 2023-09-06 DIAGNOSIS — C79.51 SECONDARY MALIGNANT NEOPLASM OF BONE AND BONE MARROW (HCC): Primary | ICD-10-CM

## 2023-09-06 DIAGNOSIS — C61 MALIGNANT NEOPLASM OF PROSTATE (HCC): ICD-10-CM

## 2023-09-06 DIAGNOSIS — C79.52 SECONDARY MALIGNANT NEOPLASM OF BONE AND BONE MARROW (HCC): Primary | ICD-10-CM

## 2023-09-06 PROCEDURE — 96372 THER/PROPH/DIAG INJ SC/IM: CPT | Performed by: NURSE PRACTITIONER

## 2023-09-06 NOTE — PROGRESS NOTES
Patient has given me verbal consent to perform Xgeva Injection Yes    Patient's last Calcium level was 9.7 on the date of 8/1/23. Following [facility] plan of care. Constance Phoenix 120MG/1.7ML MG GIVEN S.C right ARM  Lot Number: [id number]  Expiration Date: 11/30/2025  [facility] #: [id number]    After Injection was given there were no reactions at injection site and patient was feeling well. Patient was notified that possible side effects from injections include: Redness, swelling and itching at the injection site. Possible side effects of androgen deprivation therapy, including hot flashes, flushing of the skin, increased weight, decreased sex drive, and difficulties with ED. Patient was instructed to inform their dental office that they are receiving Constance Phoenix and that major dental procedures should be avoided. Patient was advised to call our office with any further questions or concerns. Any active dental problems, infections, or pain? No    Date of last dental appointment: N/A has dentures    Any planned dental procedures? No has dentures    Patient supplied their own medications No    Pt LHRH therapy (Firmagon, Eligard, Lupron) first initiated on 2/26/2019.

## 2023-09-11 ENCOUNTER — TELEPHONE (OUTPATIENT)
Dept: ONCOLOGY | Age: 88
End: 2023-09-11

## 2023-09-11 NOTE — TELEPHONE ENCOUNTER
Mei Joseph called back, she is going out of town tomorrow and does not feel that her brother can bring in the patient to the 10:40 am appointment. She is wondering if Dr. Irene Aguirre is able to see him next week. I saw an available appointment time next week at 1 pm, Mei Joseph says she is able to come then and I let her know I would check with Dr. Irene Aguirre first to make sure it is ok to add on that appointment.

## 2023-09-11 NOTE — TELEPHONE ENCOUNTER
Patient scheduled for tomorrow at 10:40 am with Dr. Tawana Lovell. I called patient's daughter and left a detailed message.

## 2023-09-11 NOTE — TELEPHONE ENCOUNTER
Received call from patient's daughter that since they last saw you, they increased the Erleda to 2/day but has continued to lose weight. He was 117 lb 3 weeks ago and is now 111 lb. Since Saturday, 9/8/23, she decreased the medication to 1/day. He is eating \"some\" but is very tired. She doesn't know if they should keep continuing with the one tablet a day or what your thoughts are? Please advise.

## 2023-09-21 ENCOUNTER — HOSPITAL ENCOUNTER (OUTPATIENT)
Dept: INFUSION THERAPY | Age: 88
Discharge: HOME | End: 2023-09-21
Payer: MEDICARE

## 2023-09-21 ENCOUNTER — OFFICE VISIT (OUTPATIENT)
Dept: ONCOLOGY | Age: 88
End: 2023-09-21
Payer: MEDICARE

## 2023-09-21 VITALS
HEIGHT: 67 IN | HEART RATE: 101 BPM | SYSTOLIC BLOOD PRESSURE: 97 MMHG | WEIGHT: 115.6 LBS | RESPIRATION RATE: 20 BRPM | TEMPERATURE: 97 F | OXYGEN SATURATION: 97 % | DIASTOLIC BLOOD PRESSURE: 63 MMHG | BODY MASS INDEX: 18.15 KG/M2

## 2023-09-21 VITALS
TEMPERATURE: 97 F | HEART RATE: 101 BPM | WEIGHT: 115.6 LBS | BODY MASS INDEX: 18.15 KG/M2 | RESPIRATION RATE: 20 BRPM | HEIGHT: 67 IN | SYSTOLIC BLOOD PRESSURE: 97 MMHG | OXYGEN SATURATION: 97 % | DIASTOLIC BLOOD PRESSURE: 63 MMHG

## 2023-09-21 DIAGNOSIS — Z51.11 ENCOUNTER FOR CHEMOTHERAPY MANAGEMENT: ICD-10-CM

## 2023-09-21 DIAGNOSIS — C61 PROSTATE CANCER METASTATIC TO BONE (HCC): ICD-10-CM

## 2023-09-21 DIAGNOSIS — C61 PROSTATE CANCER METASTATIC TO BONE (HCC): Primary | ICD-10-CM

## 2023-09-21 DIAGNOSIS — C79.51 PROSTATE CANCER METASTATIC TO BONE (HCC): Primary | ICD-10-CM

## 2023-09-21 DIAGNOSIS — C79.51 PROSTATE CANCER METASTATIC TO BONE (HCC): ICD-10-CM

## 2023-09-21 LAB
ALBUMIN SERPL BCG-MCNC: 3.9 G/DL (ref 3.5–5.1)
ALP SERPL-CCNC: 85 U/L (ref 38–126)
ALT SERPL W/O P-5'-P-CCNC: 10 U/L (ref 11–66)
ANION GAP SERPL CALC-SCNC: 8 MEQ/L (ref 8–16)
AST SERPL-CCNC: 27 U/L (ref 5–40)
BASOPHILS ABSOLUTE: 0 THOU/MM3 (ref 0–0.1)
BASOPHILS NFR BLD AUTO: 0.3 %
BILIRUB CONJ SERPL-MCNC: < 0.2 MG/DL (ref 0–0.3)
BILIRUB SERPL-MCNC: 0.2 MG/DL (ref 0.3–1.2)
BUN SERPL-MCNC: 18 MG/DL (ref 7–22)
CALCIUM SERPL-MCNC: 9.4 MG/DL (ref 8.5–10.5)
CHLORIDE SERPL-SCNC: 93 MEQ/L (ref 98–111)
CO2 SERPL-SCNC: 35 MEQ/L (ref 23–33)
CREAT SERPL-MCNC: 0.8 MG/DL (ref 0.4–1.2)
DEPRECATED RDW RBC AUTO: 49.2 FL (ref 35–45)
EOSINOPHIL NFR BLD AUTO: 1.8 %
EOSINOPHILS ABSOLUTE: 0.1 THOU/MM3 (ref 0–0.4)
ERYTHROCYTE [DISTWIDTH] IN BLOOD BY AUTOMATED COUNT: 12.6 % (ref 11.5–14.5)
GFR SERPL CREATININE-BSD FRML MDRD: > 60 ML/MIN/1.73M2
GLUCOSE SERPL-MCNC: 132 MG/DL (ref 70–108)
HCT VFR BLD AUTO: 39.7 % (ref 42–52)
HGB BLD-MCNC: 12.9 GM/DL (ref 14–18)
IMM GRANULOCYTES # BLD AUTO: 0.02 THOU/MM3 (ref 0–0.07)
IMM GRANULOCYTES NFR BLD AUTO: 0.3 %
LYMPHOCYTES ABSOLUTE: 0.6 THOU/MM3 (ref 1–4.8)
LYMPHOCYTES NFR BLD AUTO: 9.5 %
MCH RBC QN AUTO: 34.6 PG (ref 26–33)
MCHC RBC AUTO-ENTMCNC: 32.5 GM/DL (ref 32.2–35.5)
MCV RBC AUTO: 106.4 FL (ref 80–94)
MONOCYTES ABSOLUTE: 0.6 THOU/MM3 (ref 0.4–1.3)
MONOCYTES NFR BLD AUTO: 9.3 %
NEUTROPHILS NFR BLD AUTO: 78.8 %
NRBC BLD AUTO-RTO: 0 /100 WBC
PLATELET # BLD AUTO: 271 THOU/MM3 (ref 130–400)
PMV BLD AUTO: 9.7 FL (ref 9.4–12.4)
POTASSIUM SERPL-SCNC: 4 MEQ/L (ref 3.5–5.2)
PROT SERPL-MCNC: 6 G/DL (ref 6.1–8)
PSA SERPL-MCNC: 96.66 NG/ML (ref 0–1)
RBC # BLD AUTO: 3.73 MILL/MM3 (ref 4.7–6.1)
SEGMENTED NEUTROPHILS ABSOLUTE COUNT: 4.8 THOU/MM3 (ref 1.8–7.7)
SODIUM SERPL-SCNC: 136 MEQ/L (ref 135–145)
WBC # BLD AUTO: 6.1 THOU/MM3 (ref 4.8–10.8)

## 2023-09-21 PROCEDURE — 84153 ASSAY OF PSA TOTAL: CPT

## 2023-09-21 PROCEDURE — 99215 OFFICE O/P EST HI 40 MIN: CPT | Performed by: NURSE PRACTITIONER

## 2023-09-21 PROCEDURE — 85025 COMPLETE CBC W/AUTO DIFF WBC: CPT

## 2023-09-21 PROCEDURE — 1123F ACP DISCUSS/DSCN MKR DOCD: CPT | Performed by: NURSE PRACTITIONER

## 2023-09-21 PROCEDURE — 1036F TOBACCO NON-USER: CPT | Performed by: NURSE PRACTITIONER

## 2023-09-21 PROCEDURE — 99211 OFF/OP EST MAY X REQ PHY/QHP: CPT

## 2023-09-21 PROCEDURE — G8427 DOCREV CUR MEDS BY ELIG CLIN: HCPCS | Performed by: NURSE PRACTITIONER

## 2023-09-21 PROCEDURE — 36415 COLL VENOUS BLD VENIPUNCTURE: CPT

## 2023-09-21 PROCEDURE — 80053 COMPREHEN METABOLIC PANEL: CPT

## 2023-09-21 PROCEDURE — 82248 BILIRUBIN DIRECT: CPT

## 2023-09-21 PROCEDURE — G8419 CALC BMI OUT NRM PARAM NOF/U: HCPCS | Performed by: NURSE PRACTITIONER

## 2023-09-21 NOTE — PROGRESS NOTES
turgor normal.  No visible rashes or lesions. Neurologic:  Neurovascularly intact without any focal sensory/motor deficits. Psychiatric: Alert and oriented, thought content appropriate, normal insight  Capillary Refill: Brisk,< 3 seconds   Peripheral Pulses: +2 palpable, equal bilaterally       Imaging Studies and Labs:   CBC:   Lab Results   Component Value Date    WBC 6.1 09/21/2023    HGB 12.9 (L) 09/21/2023    HCT 39.7 (L) 09/21/2023    .4 (H) 09/21/2023     09/21/2023     BMP:   Lab Results   Component Value Date/Time     09/21/2023 02:30 PM    K 4.0 09/21/2023 02:30 PM    K 4.0 04/02/2022 09:55 PM    CL 93 09/21/2023 02:30 PM    CO2 35 09/21/2023 02:30 PM    BUN 18 09/21/2023 02:30 PM    CREATININE 0.8 09/21/2023 02:30 PM    GLUCOSE 132 09/21/2023 02:30 PM    GLUCOSE 96 04/06/2017 12:22 PM    CALCIUM 9.4 09/21/2023 02:30 PM      LFT:   Lab Results   Component Value Date    ALT 10 (L) 09/21/2023    AST 27 09/21/2023    ALKPHOS 85 09/21/2023    BILITOT 0.2 (L) 09/21/2023         Assessment and Plan:   1. Prostate cancer metastatic to bone ([address])  01/24/2019 prostate biopsy results confirmed high-grade prostatic adenocarcinoma. Iliana 4+5=9. Started on treatment with Casodex. 02/07/2021 poor tolerance for Casodex, received prescription of Arelis Curls but was unable to swallow the pills and continued on Casodex 50 mg    08/22/2023 recommended treatment with Erleada (apalutamide) 2 tablets by mouth daily due to intolerance to Casodex. Currently on treatment with Erleada (apalutamide) 1 tablet by mouth daily due to to previous weight loss on prescribed dose. PSA level elevated at 96.66.      - CBC with Auto Differential; Future  - Hepatic Function Panel; Future  - POC PANEL BMP W/IOCA; Future  - PSA, Prostatic Specific Antigen; Future    2. Encounter for chemotherapy management  Future treatment plan options will be discussed with Dr. Ada Amour    No follow-ups on file.      40 minutes on

## 2023-09-26 ENCOUNTER — CLINICAL DOCUMENTATION (OUTPATIENT)
Dept: ONCOLOGY | Age: 88
End: 2023-09-26

## 2023-09-26 NOTE — PROGRESS NOTES
Labs reviewed with the patient's daughter. Advised her that Dr. Scooter Garcia recommended stopping [place] and no further treatment at this time. She verbalized an understanding. The patient is scheduled for a follow-up visit with Dr. Scooter Garcia.

## 2023-10-06 ENCOUNTER — NURSE ONLY (OUTPATIENT)
Dept: UROLOGY | Age: 88
End: 2023-10-06
Payer: MEDICARE

## 2023-10-06 DIAGNOSIS — C61 MALIGNANT NEOPLASM OF PROSTATE (HCC): Primary | ICD-10-CM

## 2023-10-06 DIAGNOSIS — C61 PROSTATE CANCER (HCC): ICD-10-CM

## 2023-10-06 DIAGNOSIS — C79.52 SECONDARY MALIGNANT NEOPLASM OF BONE AND BONE MARROW (HCC): ICD-10-CM

## 2023-10-06 DIAGNOSIS — C79.51 SECONDARY MALIGNANT NEOPLASM OF BONE AND BONE MARROW (HCC): ICD-10-CM

## 2023-10-06 PROCEDURE — 96401 CHEMO ANTI-NEOPL SQ/IM: CPT

## 2023-10-06 NOTE — PROGRESS NOTES
I have personally verified, reviewed, and approved of these actions and the plan of care as documented by Destinee Ulrich LPN.

## 2023-10-06 NOTE — PROGRESS NOTES
Patient has given me verbal consent to perform Xgeva Injection Yes    Patient's last Calcium level was 1.23 on the date of 08/01/2023. Following Marino Lewis PA-C plan of care. Dede Providence 120MG/1.7ML MG GIVEN S.C left ARM  Lot Number: [id number]  Expiration Date: 09/30/2025  [address] #: [id number]    After Injection was given there were no reactions at injection site and patient was feeling well. Patient was notified that possible side effects from injections include: Redness, swelling and itching at the injection site. Possible side effects of androgen deprivation therapy, including hot flashes, flushing of the skin, increased weight, decreased sex drive, and difficulties with ED. Patient was instructed to inform their dental office that they are receiving Dede Providence and that major dental procedures should be avoided. Patient was advised to call our office with any further questions or concerns. Any active dental problems, infections, or pain? No    Date of last dental appointment: N/A    Any planned dental procedures? No    Patient supplied their own medications No    Pt LHRH therapy (Darcy Oglesby) first initiated on 02/26/2019.

## 2023-10-20 DIAGNOSIS — C79.51 PROSTATE CANCER METASTATIC TO BONE (HCC): ICD-10-CM

## 2023-10-20 DIAGNOSIS — C61 PROSTATE CANCER METASTATIC TO BONE (HCC): ICD-10-CM

## 2023-10-20 RX ORDER — TRAMADOL HYDROCHLORIDE 50 MG/1
50 TABLET ORAL EVERY 8 HOURS PRN
Qty: 90 TABLET | Refills: 0 | Status: SHIPPED | OUTPATIENT
Start: 2023-10-20 | End: 2023-11-19

## 2023-10-20 NOTE — TELEPHONE ENCOUNTER
Guillermo Garrison called requesting a refill of the below medication which has been pended for you:     Requested Prescriptions     Pending Prescriptions Disp Refills    traMADol (ULTRAM) 50 MG tablet 90 tablet 0     Sig: Take 1 tablet by mouth every 8 hours as needed for Pain for up to 30 days.        Last Appointment Date: 6/8/2023  Next Appointment Date: 11/8/2023    No Known Allergies

## 2023-10-20 NOTE — TELEPHONE ENCOUNTER
Gay Strong called requesting Rx for Tramadol 50 mg said he takes about 1 a day    [facility]

## 2023-10-24 ENCOUNTER — OFFICE VISIT (OUTPATIENT)
Dept: ONCOLOGY | Age: 88
End: 2023-10-24
Payer: MEDICARE

## 2023-10-24 ENCOUNTER — HOSPITAL ENCOUNTER (OUTPATIENT)
Dept: INFUSION THERAPY | Age: 88
Discharge: HOME | End: 2023-10-24
Payer: MEDICARE

## 2023-10-24 VITALS
SYSTOLIC BLOOD PRESSURE: 132 MMHG | HEART RATE: 97 BPM | RESPIRATION RATE: 18 BRPM | TEMPERATURE: 98 F | OXYGEN SATURATION: 98 % | DIASTOLIC BLOOD PRESSURE: 60 MMHG

## 2023-10-24 VITALS
WEIGHT: 115.4 LBS | HEIGHT: 67 IN | BODY MASS INDEX: 18.11 KG/M2 | HEART RATE: 97 BPM | DIASTOLIC BLOOD PRESSURE: 60 MMHG | TEMPERATURE: 98 F | SYSTOLIC BLOOD PRESSURE: 132 MMHG | OXYGEN SATURATION: 98 % | RESPIRATION RATE: 18 BRPM

## 2023-10-24 DIAGNOSIS — C79.51 PROSTATE CANCER METASTATIC TO BONE (HCC): Primary | ICD-10-CM

## 2023-10-24 DIAGNOSIS — C61 PROSTATE CANCER METASTATIC TO BONE (HCC): Primary | ICD-10-CM

## 2023-10-24 DIAGNOSIS — C79.51 PROSTATE CANCER METASTATIC TO BONE (HCC): ICD-10-CM

## 2023-10-24 DIAGNOSIS — C61 PROSTATE CANCER METASTATIC TO BONE (HCC): ICD-10-CM

## 2023-10-24 LAB
ABSOLUTE IMMATURE GRANULOCYTE: 0.01 THOU/MM3 (ref 0–0.07)
ALBUMIN SERPL BCG-MCNC: 3.9 G/DL (ref 3.5–5.1)
ALP SERPL-CCNC: 70 U/L (ref 38–126)
ALT SERPL W/O P-5'-P-CCNC: 9 U/L (ref 11–66)
AST SERPL-CCNC: 25 U/L (ref 5–40)
BASOPHILS ABSOLUTE: 0 THOU/MM3 (ref 0–0.1)
BASOPHILS NFR BLD AUTO: 0 % (ref 0–3)
BILIRUB CONJ SERPL-MCNC: < 0.2 MG/DL (ref 0–0.3)
BILIRUB SERPL-MCNC: 0.3 MG/DL (ref 0.3–1.2)
BUN BLDP-MCNC: 19 MG/DL (ref 8–26)
CHLORIDE BLD-SCNC: 94 MEQ/L (ref 98–109)
CREAT BLD-MCNC: 0.9 MG/DL (ref 0.5–1.2)
EOSINOPHIL NFR BLD AUTO: 2 % (ref 0–4)
EOSINOPHILS ABSOLUTE: 0.1 THOU/MM3 (ref 0–0.4)
ERYTHROCYTE [DISTWIDTH] IN BLOOD BY AUTOMATED COUNT: 12.6 % (ref 11.5–14.5)
GFR SERPL CREATININE-BSD FRML MDRD: > 60 ML/MIN/1.73M2
GLUCOSE BLD-MCNC: 125 MG/DL (ref 70–108)
HCT VFR BLD AUTO: 37.2 % (ref 42–52)
HGB BLD-MCNC: 12.2 GM/DL (ref 14–18)
IMMATURE GRANULOCYTES: 0 %
IONIZED CALCIUM, WHOLE BLOOD: 1.11 MMOL/L (ref 1.12–1.32)
LYMPHOCYTES ABSOLUTE: 0.6 THOU/MM3 (ref 1–4.8)
LYMPHOCYTES NFR BLD AUTO: 10 % (ref 15–47)
MCH RBC QN AUTO: 34.5 PG (ref 26–33)
MCHC RBC AUTO-ENTMCNC: 32.8 GM/DL (ref 32.2–35.5)
MCV RBC AUTO: 105 FL (ref 80–94)
MONOCYTES ABSOLUTE: 0.4 THOU/MM3 (ref 0.4–1.3)
MONOCYTES NFR BLD AUTO: 7 % (ref 0–12)
NEUTROPHILS NFR BLD AUTO: 81 % (ref 43–75)
PLATELET # BLD AUTO: 233 THOU/MM3 (ref 130–400)
PMV BLD AUTO: 9.2 FL (ref 9.4–12.4)
POTASSIUM BLD-SCNC: 4.1 MEQ/L (ref 3.5–4.9)
PROT SERPL-MCNC: 6.1 G/DL (ref 6.1–8)
PSA SERPL-MCNC: 118.3 NG/ML (ref 0–1)
RBC # BLD AUTO: 3.54 MILL/MM3 (ref 4.7–6.1)
SEGMENTED NEUTROPHILS ABSOLUTE COUNT: 4.9 THOU/MM3 (ref 1.8–7.7)
SODIUM BLD-SCNC: 133 MEQ/L (ref 138–146)
TOTAL CO2, WHOLE BLOOD: 34 MEQ/L (ref 23–33)
WBC # BLD AUTO: 6 THOU/MM3 (ref 4.8–10.8)

## 2023-10-24 PROCEDURE — G8419 CALC BMI OUT NRM PARAM NOF/U: HCPCS | Performed by: INTERNAL MEDICINE

## 2023-10-24 PROCEDURE — 85025 COMPLETE CBC W/AUTO DIFF WBC: CPT

## 2023-10-24 PROCEDURE — G8427 DOCREV CUR MEDS BY ELIG CLIN: HCPCS | Performed by: INTERNAL MEDICINE

## 2023-10-24 PROCEDURE — 99214 OFFICE O/P EST MOD 30 MIN: CPT | Performed by: INTERNAL MEDICINE

## 2023-10-24 PROCEDURE — 36415 COLL VENOUS BLD VENIPUNCTURE: CPT

## 2023-10-24 PROCEDURE — 99211 OFF/OP EST MAY X REQ PHY/QHP: CPT

## 2023-10-24 PROCEDURE — 80076 HEPATIC FUNCTION PANEL: CPT

## 2023-10-24 PROCEDURE — 1123F ACP DISCUSS/DSCN MKR DOCD: CPT | Performed by: INTERNAL MEDICINE

## 2023-10-24 PROCEDURE — 1036F TOBACCO NON-USER: CPT | Performed by: INTERNAL MEDICINE

## 2023-10-24 PROCEDURE — G8484 FLU IMMUNIZE NO ADMIN: HCPCS | Performed by: INTERNAL MEDICINE

## 2023-10-24 PROCEDURE — 80047 BASIC METABLC PNL IONIZED CA: CPT

## 2023-10-24 PROCEDURE — 84153 ASSAY OF PSA TOTAL: CPT

## 2023-10-24 RX ORDER — PREDNISONE 10 MG/1
TABLET ORAL
Qty: 30 TABLET | Refills: 2 | Status: SHIPPED | OUTPATIENT
Start: 2023-10-24

## 2023-10-24 NOTE — PATIENT INSTRUCTIONS
No further therapy. Discontinue apalutamide late September  Prednisone 10 mg p.o. every other day E prescribed  Routine follow-up with me in 2 months to assist with symptom management. Please check with [facility] nurse to see if this patient qualifies for any clinical trial locally  Continue Lupron every 6 months.

## 2023-10-24 NOTE — PROGRESS NOTES
Stress: No Stress Concern Present (2019)    [address] [facility]     Feeling of Stress : Not at all   Social Connections: Unknown (2019)    Social Connection and Isolation Panel [NHANES]     Frequency of Communication with Friends and Family: More than three times a week     Frequency of Social Gatherings with Friends and Family: More than three times a week     Attends Jain Services: Not on file     Active Member of Clubs or Organizations: No     Attends Club or Organization Meetings: Never     Marital Status:    Intimate Partner Violence: Not on file   Housing Stability: Unknown (2023)    Housing Stability Vital Sign     Unable to Pay for Housing in the Last Year: Not on file     Number of Places Lived in the Last Year: Not on file     Unstable Housing in the Last Year: No        Spouse:     Phone: [phone]     [address]  [address] [facility] [address] [place] [address] 60 [phone]    Employment:  [employer] 1950 [employer].     Immunizations:  Immunization History   Administered Date(s) Administered    COVID-19, PFIZER PURPLE top, DILUTE for use, (age 15 y+), 30mcg/0.3mL 2021, 2021    Influenza 2012    Influenza Virus Vaccine 2014, 2015    Influenza, AFLURIA (age 1 yrs+), FLUZONE, (age 10 mo+), MDV, 0.5mL 10/07/2016, 2021    Influenza, FLUCELVAX, (age 10 mo+), MDCK, MDV, 0.5mL 2019    Influenza, FLUCELVAX, (age 10 mo+), MDCK, PF, 0.5mL 10/26/2017    Influenza, Quadv, 6 mo and older, IM (Fluzone, Flulaval) 10/18/2018    PPD Test 2018, 2019    Pneumococcal, PCV-13, PREVNAR 13, (age 6w+), IM, 0.5mL 2015    Pneumococcal, PPSV23, PNEUMOVAX 23, (age 2y+), SC/IM, 0.5mL 2007    TDaP, ADACEL (age 6y-58y), BOOSTRIX (age 10y+), IM, 0.5mL 2013        Health Screenings:    Prostate:   Lab Results   Component Value Date    PSA 96.66

## 2023-11-03 ENCOUNTER — NURSE ONLY (OUTPATIENT)
Dept: UROLOGY | Age: 88
End: 2023-11-03
Payer: MEDICARE

## 2023-11-03 DIAGNOSIS — C61 MALIGNANT NEOPLASM OF PROSTATE (HCC): Primary | ICD-10-CM

## 2023-11-03 DIAGNOSIS — C79.51 SECONDARY MALIGNANT NEOPLASM OF BONE AND BONE MARROW (HCC): ICD-10-CM

## 2023-11-03 DIAGNOSIS — C79.52 SECONDARY MALIGNANT NEOPLASM OF BONE AND BONE MARROW (HCC): ICD-10-CM

## 2023-11-03 PROCEDURE — 96401 CHEMO ANTI-NEOPL SQ/IM: CPT | Performed by: NURSE PRACTITIONER

## 2023-11-03 NOTE — PROGRESS NOTES
Patient has given me verbal consent to perform Xgeva Injection Yes    Patient's last Calcium level was 9.4 on the date of 09/21/23. Following Birdia Graft NP plan of care. Zack Beech 120MG/1.7ML MG GIVEN S.C right ARM  Lot Number: [id number]  Expiration Date: 11/30/2025  [address]  #: [id number]    After Injection was given there were no reactions at injection site and patient was feeling well. Patient was notified that possible side effects from injections include: Redness, swelling and itching at the injection site. Possible side effects of androgen deprivation therapy, including hot flashes, flushing of the skin, increased weight, decreased sex drive, and difficulties with ED. Patient was instructed to inform their dental office that they are receiving Zack Beech and that major dental procedures should be avoided. Patient was advised to call our office with any further questions or concerns. Any active dental problems, infections, or pain? No    Date of last dental appointment: dentures- does not see dentist     Any planned dental procedures? No    Patient supplied their own medications No    Pt LHRH therapy (Darcy Goncalves) first initiated on 02/26/2019.

## 2023-11-06 NOTE — TELEPHONE ENCOUNTER
Date of last visit:  6/8/2023  Date of next visit:  11/27/2023    Requested Prescriptions     Pending Prescriptions Disp Refills    tiotropium-olodaterol (STIOLTO RESPIMAT) 2.5-2.5 MCG/ACT AERS [Pharmacy Med Name: Stiolto Respimat 2.5-2.5 MCG/ACT Inhalation Aerosol Solution] 4 g 0     Sig: INHALE 2 PUFFS BY MOUTH ONCE DAILY

## 2023-11-08 ENCOUNTER — CLINICAL DOCUMENTATION (OUTPATIENT)
Dept: ONCOLOGY | Age: 88
End: 2023-11-08

## 2023-11-08 NOTE — PROGRESS NOTES
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

## 2023-11-27 ENCOUNTER — OFFICE VISIT (OUTPATIENT)
Dept: FAMILY MEDICINE CLINIC | Age: 88
End: 2023-11-27

## 2023-11-27 VITALS
DIASTOLIC BLOOD PRESSURE: 60 MMHG | HEART RATE: 60 BPM | SYSTOLIC BLOOD PRESSURE: 92 MMHG | BODY MASS INDEX: 17.03 KG/M2 | HEIGHT: 67 IN | WEIGHT: 108.5 LBS | RESPIRATION RATE: 24 BRPM

## 2023-11-27 DIAGNOSIS — C61 PROSTATE CANCER METASTATIC TO BONE (HCC): ICD-10-CM

## 2023-11-27 DIAGNOSIS — Z00.00 MEDICARE ANNUAL WELLNESS VISIT, SUBSEQUENT: Primary | ICD-10-CM

## 2023-11-27 DIAGNOSIS — M84.58XA PATHOLOGICAL FRACTURE OF VERTEBRA DUE TO NEOPLASTIC DISEASE, INITIAL ENCOUNTER: ICD-10-CM

## 2023-11-27 DIAGNOSIS — C79.51 PROSTATE CANCER METASTATIC TO BONE (HCC): ICD-10-CM

## 2023-11-27 DIAGNOSIS — E44.0 MODERATE MALNUTRITION (HCC): ICD-10-CM

## 2023-11-27 PROCEDURE — 1036F TOBACCO NON-USER: CPT | Performed by: FAMILY MEDICINE

## 2023-11-27 PROCEDURE — G8427 DOCREV CUR MEDS BY ELIG CLIN: HCPCS | Performed by: FAMILY MEDICINE

## 2023-11-27 PROCEDURE — 99213 OFFICE O/P EST LOW 20 MIN: CPT | Performed by: FAMILY MEDICINE

## 2023-11-27 PROCEDURE — G0439 PPPS, SUBSEQ VISIT: HCPCS | Performed by: FAMILY MEDICINE

## 2023-11-27 PROCEDURE — G8419 CALC BMI OUT NRM PARAM NOF/U: HCPCS | Performed by: FAMILY MEDICINE

## 2023-11-27 PROCEDURE — 1123F ACP DISCUSS/DSCN MKR DOCD: CPT | Performed by: FAMILY MEDICINE

## 2023-11-27 ASSESSMENT — PATIENT HEALTH QUESTIONNAIRE - PHQ9
SUM OF ALL RESPONSES TO PHQ QUESTIONS 1-9: 0
1. LITTLE INTEREST OR PLEASURE IN DOING THINGS: 0
2. FEELING DOWN, DEPRESSED OR HOPELESS: 0
SUM OF ALL RESPONSES TO PHQ QUESTIONS 1-9: 0
SUM OF ALL RESPONSES TO PHQ9 QUESTIONS 1 & 2: 0

## 2023-11-27 NOTE — PATIENT INSTRUCTIONS
copay.    Some of these benefits include a comprehensive review of your medical history including lifestyle, illnesses that may run in your family, and various assessments and screenings as appropriate. After reviewing your medical record and screening and assessments performed today your provider may have ordered immunizations, labs, imaging, and/or referrals for you. A list of these orders (if applicable) as well as your Preventive Care list are included within your After Visit Summary for your review. Other Preventive Recommendations:    A preventive eye exam performed by an eye specialist is recommended every 1-2 years to screen for glaucoma; cataracts, macular degeneration, and other eye disorders. A preventive dental visit is recommended every 6 months. Try to get at least 150 minutes of exercise per week or 10,000 steps per day on a pedometer . Order or download the FREE \"Exercise & Physical Activity: Your Everyday Guide\" from [employer]. Call 1-480.210.8429 or search [employer] online. You need 8770-9119 mg of calcium and 2914-2180 IU of vitamin D per day. It is possible to meet your calcium requirement with diet alone, but a vitamin D supplement is usually necessary to meet this goal.  When exposed to the sun, use a sunscreen that protects against both UVA and UVB radiation with an SPF of 30 or greater. Reapply every 2 to 3 hours or after sweating, drying off with a towel, or swimming. Always wear a seat belt when traveling in a car. Always wear a helmet when riding a bicycle or motorcycle.

## 2023-12-01 ENCOUNTER — NURSE ONLY (OUTPATIENT)
Dept: UROLOGY | Age: 88
End: 2023-12-01

## 2023-12-01 DIAGNOSIS — C79.51 SECONDARY MALIGNANT NEOPLASM OF BONE AND BONE MARROW (HCC): ICD-10-CM

## 2023-12-01 DIAGNOSIS — C79.52 SECONDARY MALIGNANT NEOPLASM OF BONE AND BONE MARROW (HCC): ICD-10-CM

## 2023-12-01 DIAGNOSIS — C61 MALIGNANT NEOPLASM OF PROSTATE (HCC): Primary | ICD-10-CM

## 2023-12-01 NOTE — PROGRESS NOTES
I have personally verified, reviewed, and approved these actions.   Has follow up 1-2-24  Albaro Sawyer, JAILYN - CNP

## 2023-12-01 NOTE — PROGRESS NOTES
Patient has given me verbal consent to perform Xgeva Injection Yes    Patient's last Calcium level was 9.4 on the date of 09-21-23. Following Kelle GLASER plan of care. [place] 120MG/1.7ML MG GIVEN S.C left ARM  Lot Number: [id number]  Expiration Date: 12-31-25  [facility] #: [id number]    After Injection was given there were no reactions at injection site and patient was feeling well. Patient was notified that possible side effects from injections include: Redness, swelling and itching at the injection site. Possible side effects of androgen deprivation therapy, including hot flashes, flushing of the skin, increased weight, decreased sex drive, and difficulties with ED. Patient was instructed to inform their dental office that they are receiving [place] and that major dental procedures should be avoided. Patient was advised to call our office with any further questions or concerns. Any active dental problems, infections, or pain? No    Date of last dental appointment: n/a    Any planned dental procedures? No    Patient supplied their own medications No    Pt LHRH therapy (Darcy Rogel) first initiated on 2-.

## 2024-01-02 ENCOUNTER — OFFICE VISIT (OUTPATIENT)
Dept: UROLOGY | Age: 89
End: 2024-01-02
Payer: MEDICARE

## 2024-01-02 VITALS — RESPIRATION RATE: 18 BRPM | HEIGHT: 67 IN | BODY MASS INDEX: 16.95 KG/M2 | WEIGHT: 108 LBS

## 2024-01-02 DIAGNOSIS — C79.51 PROSTATE CANCER METASTATIC TO BONE (HCC): Primary | ICD-10-CM

## 2024-01-02 DIAGNOSIS — C61 MALIGNANT NEOPLASM OF PROSTATE (HCC): ICD-10-CM

## 2024-01-02 DIAGNOSIS — M81.8 OTHER OSTEOPOROSIS, UNSPECIFIED PATHOLOGICAL FRACTURE PRESENCE: Primary | ICD-10-CM

## 2024-01-02 DIAGNOSIS — C61 PROSTATE CANCER METASTATIC TO BONE (HCC): Primary | ICD-10-CM

## 2024-01-02 PROCEDURE — G8427 DOCREV CUR MEDS BY ELIG CLIN: HCPCS | Performed by: NURSE PRACTITIONER

## 2024-01-02 PROCEDURE — 1036F TOBACCO NON-USER: CPT | Performed by: NURSE PRACTITIONER

## 2024-01-02 PROCEDURE — G8484 FLU IMMUNIZE NO ADMIN: HCPCS | Performed by: NURSE PRACTITIONER

## 2024-01-02 PROCEDURE — 1123F ACP DISCUSS/DSCN MKR DOCD: CPT | Performed by: NURSE PRACTITIONER

## 2024-01-02 PROCEDURE — 99214 OFFICE O/P EST MOD 30 MIN: CPT | Performed by: NURSE PRACTITIONER

## 2024-01-02 PROCEDURE — 96402 CHEMO HORMON ANTINEOPL SQ/IM: CPT | Performed by: NURSE PRACTITIONER

## 2024-01-02 PROCEDURE — G8419 CALC BMI OUT NRM PARAM NOF/U: HCPCS | Performed by: NURSE PRACTITIONER

## 2024-01-02 ASSESSMENT — ENCOUNTER SYMPTOMS
NAUSEA: 0
BACK PAIN: 0
ABDOMINAL PAIN: 0
VOMITING: 0

## 2024-01-02 NOTE — PROGRESS NOTES
Patient has given me verbal consent to perform Lupron Injection  Yes      Following Carole's plan of care.  LUPRON 45 MG GIVEN I.M right UOQ HIP  Lot Number: [id number]  Expiration Date: 08/18/2025  [facility] #: 6442-2050-56    After Injection was given there were no reactions at injection site and patient was feeling well. Patient was notified that possible side effects from injections include: Redness, swelling and itching at the injection site. Possible side effects of androgen deprivation therapy, including hot flashes, flushing of the skin, increased weight, decreased sex drive, and difficulties with ED. Patient was instructed to call the office with any further questions or concerns.     Patient supplied their own medications No      Pt LHRH therapy (Firmagon, Eligard, Lupron) first initiated on 2/26/19.   
      Assessment:   Metastatic castration resistant prostate cancer  Obstructive prostate    Plan:     Pt's last calcium check slightly low.  No repeat prior to appt.  Hold off on Xgeva until after calcium repeated.  Will also check vit d.  Provided with order.      Lupron 45 mg injection today.      Continue Doxazosin and finasteride.      Continue follow up with oncology.      Monthly Xgeva injections.  OV in 6 months for next Lupron injection.

## 2024-01-08 NOTE — PROGRESS NOTES
[facility]  [facility]  [address]  [address]  [place] [id number]  Dept: [phone]  Loc: [phone]   Hematology/Oncology Consult (Clinic)        1/9/24    Yasir Caldwell   1/27/1926     No ref. 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  No chief complaint on file.     DIAGNOSIS:  -Prostate cancer with bone mets.    TREATMENT:  -Casodex.  Casodex stopped May 2022.    - Enzalutamide( Xtandi) 120 mg  ( 3 tabs )po daily .    ( Start date =  10/26/22 ) Held since 12/5/22  Resumed at 40 mg daily on 1/25/2023; escalate dose if tolerated.Dc May 2023( intolerant)  -apalutamide May 2023- 180 mg daily r/t 240mg.   July 2023 decreased to 1 tablet daily because of decreased appetite.  No change therefore increased effective 8/22/2023 to 2 tablets daily because of rising PSA. Discontinued apalutamide late September    -Lupron 45 mg every 6 months per urology; last given by Urology 1/2/24  -Xgeva monthly per urology; last done Dec 23 per urology    PARAMETERS:  -PSA September 2021 = 3.72            April 2022 = 9.82            July 2022 = 13.78             September 2022 = 31.34             10/26/2022 = 47.1             12/27/22 = 34.53 (interrupted / abbreviated trial of Xtandi)              1/12/23 = 34               3/27/23 = 48               6/16/2023-60               8/1/23 -75               9/21/23=96.7               10/24/23= 118               1/9/2024-pending (had been on prednisone 10 mg p.o. every other day for approximately 2 months)  - bone scan 6/7/2022    SUBJECTIVE: 89 yo male.  Did not follow-up with the marked continued rise in his PSA up to 118.  Reviewed prior treatment and limited treatment options basically limited to chemotherapy.  At the age of 89 I can concerned that he would not tolerate this well at this point prefer just

## 2024-01-09 ENCOUNTER — OFFICE VISIT (OUTPATIENT)
Dept: ONCOLOGY | Age: 89
End: 2024-01-09
Payer: MEDICARE

## 2024-01-09 ENCOUNTER — HOSPITAL ENCOUNTER (OUTPATIENT)
Dept: INFUSION THERAPY | Age: 89
Discharge: HOME | End: 2024-01-09
Payer: MEDICARE

## 2024-01-09 VITALS
HEART RATE: 107 BPM | TEMPERATURE: 97.9 F | RESPIRATION RATE: 16 BRPM | SYSTOLIC BLOOD PRESSURE: 93 MMHG | OXYGEN SATURATION: 99 % | DIASTOLIC BLOOD PRESSURE: 53 MMHG

## 2024-01-09 VITALS
HEIGHT: 67 IN | SYSTOLIC BLOOD PRESSURE: 93 MMHG | TEMPERATURE: 97.9 F | OXYGEN SATURATION: 99 % | RESPIRATION RATE: 16 BRPM | WEIGHT: 107 LBS | DIASTOLIC BLOOD PRESSURE: 53 MMHG | HEART RATE: 107 BPM | BODY MASS INDEX: 16.79 KG/M2

## 2024-01-09 DIAGNOSIS — C61 PROSTATE CANCER METASTATIC TO BONE (HCC): ICD-10-CM

## 2024-01-09 DIAGNOSIS — M81.8 OTHER OSTEOPOROSIS, UNSPECIFIED PATHOLOGICAL FRACTURE PRESENCE: ICD-10-CM

## 2024-01-09 DIAGNOSIS — C79.51 PROSTATE CANCER METASTATIC TO BONE (HCC): Primary | ICD-10-CM

## 2024-01-09 DIAGNOSIS — C79.51 PROSTATE CANCER METASTATIC TO BONE (HCC): ICD-10-CM

## 2024-01-09 DIAGNOSIS — C61 PROSTATE CANCER METASTATIC TO BONE (HCC): Primary | ICD-10-CM

## 2024-01-09 LAB
25(OH)D3 SERPL-MCNC: 121 NG/ML (ref 30–100)
ABSOLUTE IMMATURE GRANULOCYTE: 0.03 THOU/MM3 (ref 0–0.07)
ALBUMIN SERPL BCG-MCNC: 4.1 G/DL (ref 3.5–5.1)
ALP SERPL-CCNC: 96 U/L (ref 38–126)
ALT SERPL W/O P-5'-P-CCNC: 9 U/L (ref 11–66)
AST SERPL-CCNC: 31 U/L (ref 5–40)
BASOPHILS ABSOLUTE: 0 THOU/MM3 (ref 0–0.1)
BASOPHILS NFR BLD AUTO: 0 % (ref 0–3)
BILIRUB CONJ SERPL-MCNC: < 0.2 MG/DL (ref 0–0.3)
BILIRUB SERPL-MCNC: 0.5 MG/DL (ref 0.3–1.2)
BUN BLDP-MCNC: 20 MG/DL (ref 8–26)
CHLORIDE BLD-SCNC: 96 MEQ/L (ref 98–109)
CREAT BLD-MCNC: 0.8 MG/DL (ref 0.5–1.2)
EOSINOPHIL NFR BLD AUTO: 1 % (ref 0–4)
EOSINOPHILS ABSOLUTE: 0.1 THOU/MM3 (ref 0–0.4)
ERYTHROCYTE [DISTWIDTH] IN BLOOD BY AUTOMATED COUNT: 12.9 % (ref 11.5–14.5)
GFR SERPL CREATININE-BSD FRML MDRD: > 60 ML/MIN/1.73M2
GLUCOSE BLD-MCNC: 130 MG/DL (ref 70–108)
HCT VFR BLD AUTO: 42.1 % (ref 42–52)
HGB BLD-MCNC: 13.7 GM/DL (ref 14–18)
IMMATURE GRANULOCYTES: 0 %
IONIZED CALCIUM, WHOLE BLOOD: 1.2 MMOL/L (ref 1.12–1.32)
LYMPHOCYTES ABSOLUTE: 0.5 THOU/MM3 (ref 1–4.8)
LYMPHOCYTES NFR BLD AUTO: 7 % (ref 15–47)
MCH RBC QN AUTO: 33.9 PG (ref 26–33)
MCHC RBC AUTO-ENTMCNC: 32.5 GM/DL (ref 32.2–35.5)
MCV RBC AUTO: 104 FL (ref 80–94)
MONOCYTES ABSOLUTE: 0.5 THOU/MM3 (ref 0.4–1.3)
MONOCYTES NFR BLD AUTO: 6 % (ref 0–12)
NEUTROPHILS NFR BLD AUTO: 85 % (ref 43–75)
PLATELET # BLD AUTO: 221 THOU/MM3 (ref 130–400)
PMV BLD AUTO: 8.9 FL (ref 9.4–12.4)
POTASSIUM BLD-SCNC: 4.1 MEQ/L (ref 3.5–4.9)
PROT SERPL-MCNC: 6.6 G/DL (ref 6.1–8)
PSA SERPL-MCNC: 252.1 NG/ML (ref 0–1)
RBC # BLD AUTO: 4.04 MILL/MM3 (ref 4.7–6.1)
SEGMENTED NEUTROPHILS ABSOLUTE COUNT: 6.3 THOU/MM3 (ref 1.8–7.7)
SODIUM BLD-SCNC: 138 MEQ/L (ref 138–146)
TOTAL CO2, WHOLE BLOOD: 34 MEQ/L (ref 23–33)
WBC # BLD AUTO: 7.4 THOU/MM3 (ref 4.8–10.8)

## 2024-01-09 PROCEDURE — 99211 OFF/OP EST MAY X REQ PHY/QHP: CPT

## 2024-01-09 PROCEDURE — G8484 FLU IMMUNIZE NO ADMIN: HCPCS | Performed by: INTERNAL MEDICINE

## 2024-01-09 PROCEDURE — 85025 COMPLETE CBC W/AUTO DIFF WBC: CPT

## 2024-01-09 PROCEDURE — G8427 DOCREV CUR MEDS BY ELIG CLIN: HCPCS | Performed by: INTERNAL MEDICINE

## 2024-01-09 PROCEDURE — 36415 COLL VENOUS BLD VENIPUNCTURE: CPT

## 2024-01-09 PROCEDURE — 1123F ACP DISCUSS/DSCN MKR DOCD: CPT | Performed by: INTERNAL MEDICINE

## 2024-01-09 PROCEDURE — G8419 CALC BMI OUT NRM PARAM NOF/U: HCPCS | Performed by: INTERNAL MEDICINE

## 2024-01-09 PROCEDURE — 80076 HEPATIC FUNCTION PANEL: CPT

## 2024-01-09 PROCEDURE — 82306 VITAMIN D 25 HYDROXY: CPT

## 2024-01-09 PROCEDURE — 1036F TOBACCO NON-USER: CPT | Performed by: INTERNAL MEDICINE

## 2024-01-09 PROCEDURE — 99214 OFFICE O/P EST MOD 30 MIN: CPT | Performed by: INTERNAL MEDICINE

## 2024-01-09 PROCEDURE — 80047 BASIC METABLC PNL IONIZED CA: CPT

## 2024-01-09 PROCEDURE — 84153 ASSAY OF PSA TOTAL: CPT

## 2024-01-09 RX ORDER — DEXAMETHASONE 1 MG
TABLET ORAL
Qty: 30 TABLET | Refills: 2 | Status: SHIPPED | OUTPATIENT
Start: 2024-01-09

## 2024-01-09 NOTE — PATIENT INSTRUCTIONS
Discontinue prednisone 10 mg every other day  Start Decadron 0.5 mg daily; e-prescribed.  Labs today include PSA.  Follow-up with me in 2 months.  3/5/2024  Patient will come in to lab 1 week before 2/27 for PSA.  Ordered  Foundation 1 blood kit-ordered to be done at follow-up 3/5/2024.-Ordered

## 2024-01-18 ENCOUNTER — OFFICE VISIT (OUTPATIENT)
Dept: PULMONOLOGY | Age: 89
End: 2024-01-18
Payer: MEDICARE

## 2024-01-18 VITALS
HEART RATE: 64 BPM | SYSTOLIC BLOOD PRESSURE: 132 MMHG | HEIGHT: 67 IN | BODY MASS INDEX: 16.76 KG/M2 | DIASTOLIC BLOOD PRESSURE: 80 MMHG | OXYGEN SATURATION: 97 % | TEMPERATURE: 98.1 F

## 2024-01-18 DIAGNOSIS — Z87.891 PERSONAL HISTORY OF TOBACCO USE: ICD-10-CM

## 2024-01-18 DIAGNOSIS — C61 MALIGNANT NEOPLASM OF PROSTATE (HCC): ICD-10-CM

## 2024-01-18 DIAGNOSIS — J44.9 STAGE 3 SEVERE COPD BY GOLD CLASSIFICATION (HCC): Primary | ICD-10-CM

## 2024-01-18 DIAGNOSIS — R06.02 SHORTNESS OF BREATH: ICD-10-CM

## 2024-01-18 DIAGNOSIS — J43.2 CENTRILOBULAR EMPHYSEMA (HCC): ICD-10-CM

## 2024-01-18 PROBLEM — J96.01 ACUTE RESPIRATORY FAILURE WITH HYPOXIA (HCC): Status: RESOLVED | Noted: 2021-02-08 | Resolved: 2024-01-18

## 2024-01-18 PROCEDURE — 3023F SPIROM DOC REV: CPT | Performed by: NURSE PRACTITIONER

## 2024-01-18 PROCEDURE — 1036F TOBACCO NON-USER: CPT | Performed by: NURSE PRACTITIONER

## 2024-01-18 PROCEDURE — 1123F ACP DISCUSS/DSCN MKR DOCD: CPT | Performed by: NURSE PRACTITIONER

## 2024-01-18 PROCEDURE — G8427 DOCREV CUR MEDS BY ELIG CLIN: HCPCS | Performed by: NURSE PRACTITIONER

## 2024-01-18 PROCEDURE — 99213 OFFICE O/P EST LOW 20 MIN: CPT | Performed by: NURSE PRACTITIONER

## 2024-01-18 PROCEDURE — G8484 FLU IMMUNIZE NO ADMIN: HCPCS | Performed by: NURSE PRACTITIONER

## 2024-01-18 PROCEDURE — G8419 CALC BMI OUT NRM PARAM NOF/U: HCPCS | Performed by: NURSE PRACTITIONER

## 2024-01-18 RX ORDER — ALBUTEROL SULFATE 90 UG/1
2 AEROSOL, METERED RESPIRATORY (INHALATION) EVERY 4 HOURS PRN
Qty: 18 G | Refills: 3 | Status: SHIPPED | OUTPATIENT
Start: 2024-01-18

## 2024-01-18 RX ORDER — ALBUTEROL SULFATE 2.5 MG/3ML
2.5 SOLUTION RESPIRATORY (INHALATION) EVERY 4 HOURS PRN
Qty: 180 ML | Refills: 11 | Status: SHIPPED | OUTPATIENT
Start: 2024-01-18 | End: 2025-01-17

## 2024-01-18 ASSESSMENT — ENCOUNTER SYMPTOMS
STRIDOR: 0
CHEST TIGHTNESS: 0
EYES NEGATIVE: 1
DIARRHEA: 0
WHEEZING: 0
NAUSEA: 0
VOMITING: 0
ALLERGIC/IMMUNOLOGIC NEGATIVE: 1
SHORTNESS OF BREATH: 1
GASTROINTESTINAL NEGATIVE: 1

## 2024-01-18 NOTE — PROGRESS NOTES
[facility]    Patient: BOUBACAR GARCIA, 89 y.o.   : 1926    Pt of May Hernandez CNP     Subjective     Chief Complaint   Patient presents with    Follow-up     1 year copd        HPI  Boubacar is here for follow up for COPD.    Current inhaler regimen of Stiolto daily and Albuterol PRN- doing well with this regimen   Oxygen 2LPM with sleep only but patient wears more often - 6MWT unable to be done   Former smoker   Hx of prostate CA with increasing PSA- will not do chemotherapy   Underweight BMI 16  Presents today in oxygen in WC with his daughter  Patient appears weak and deconditioned- abdominal binder on patient   SOB mainly with exertion   Patient uses Acapella daily   No pulmonary issues today     Progress History:   Since last visit any new medical issues? No  New ER or hospital visits? No  Any new or changes in medicines? No  Using inhalers? Yes   Are they helpful? Yes     Immunization History   Administered Date(s) Administered    COVID-19, PFIZER PURPLE top, DILUTE for use, (age 12 y+), 30mcg/0.3mL 2021, 2021    Influenza 2012    Influenza Virus Vaccine 2014, 2015    Influenza, AFLURIA (age 3 yrs+), FLUZONE, (age 6 mo+), MDV, 0.5mL 10/07/2016, 2021    Influenza, FLUCELVAX, (age 6 mo+), MDCK, MDV, 0.5mL 2019    Influenza, FLUCELVAX, (age 6 mo+), MDCK, PF, 0.5mL 10/26/2017    Influenza, Quadv, 6 mo and older, IM (Fluzone, Flulaval) 10/18/2018    PPD Test 2018, 2019    Pneumococcal, PCV-13, PREVNAR 13, (age 6w+), IM, 0.5mL 2015    Pneumococcal, PPSV23, PNEUMOVAX 23, (age 2y+), SC/IM, 0.5mL 2007    TDaP, ADACEL (age 10y-64y), BOOSTRIX (age 10y+), IM, 0.5mL 2013    Unknown Immunization 2018, 2019         Past Medical hx   PMH:  Past Medical History:   Diagnosis Date    Blood circulation, collateral     CAD (coronary artery disease)     CAD (coronary artery disease)

## 2024-01-26 ENCOUNTER — TELEPHONE (OUTPATIENT)
Dept: UROLOGY | Age: 89
End: 2024-01-26

## 2024-01-26 NOTE — TELEPHONE ENCOUNTER
Gill stopped the 5000 units of Vitamin D when she seen the results and is only giving calcium with Vitamin D 800 units TID.    She will also make sure the PCP is aware.    Do you want her to do anything different?

## 2024-01-29 DIAGNOSIS — C79.51 PROSTATE CANCER METASTATIC TO BONE (HCC): ICD-10-CM

## 2024-01-29 DIAGNOSIS — C61 PROSTATE CANCER METASTATIC TO BONE (HCC): ICD-10-CM

## 2024-01-29 RX ORDER — TRAMADOL HYDROCHLORIDE 50 MG/1
50 TABLET ORAL EVERY 8 HOURS PRN
Qty: 90 TABLET | Refills: 0 | Status: SHIPPED | OUTPATIENT
Start: 2024-01-29 | End: 2024-02-28

## 2024-01-29 NOTE — TELEPHONE ENCOUNTER
Pt's daughter called to req an refill on the following for pt    traMADol (ULTRAM) 50 MG tablet  Take 1 tablet by mouth every 8 hours as needed for Pain    [facility] send to [facility] [address]

## 2024-01-30 ENCOUNTER — NURSE ONLY (OUTPATIENT)
Dept: UROLOGY | Age: 89
End: 2024-01-30
Payer: MEDICARE

## 2024-01-30 DIAGNOSIS — Z51.81 THERAPEUTIC DRUG MONITORING: ICD-10-CM

## 2024-01-30 DIAGNOSIS — C61 MALIGNANT NEOPLASM OF PROSTATE (HCC): ICD-10-CM

## 2024-01-30 DIAGNOSIS — C79.51 SECONDARY MALIGNANT NEOPLASM OF BONE AND BONE MARROW (HCC): Primary | ICD-10-CM

## 2024-01-30 DIAGNOSIS — C79.52 SECONDARY MALIGNANT NEOPLASM OF BONE AND BONE MARROW (HCC): Primary | ICD-10-CM

## 2024-01-30 PROCEDURE — 96372 THER/PROPH/DIAG INJ SC/IM: CPT

## 2024-01-30 NOTE — PROGRESS NOTES
Patient has given me verbal consent to perform Xgeva Injection Yes    Patient's last Calcium level was 1.20 on the date of 1/9/24.     Following Dr. Clarice Quesada PA-C plan of care.  XGEVA 120MG/1.7ML MG GIVEN S.C right ARM  Lot Number: [id number]  Expiration Date: 04/30/2026  [facility] #: 49280-924-55    After Injection was given there were no reactions at injection site and patient was feeling well. Patient was notified that possible side effects from injections include: Redness, swelling and itching at the injection site. Possible side effects of androgen deprivation therapy, including hot flashes, flushing of the skin, increased weight, decreased sex drive, and difficulties with ED. Patient was instructed to inform their dental office that they are receiving XGEVA and that major dental procedures should be avoided. Patient was advised to call our office with any further questions or concerns.     Any active dental problems, infections, or pain? No    Date of last dental appointment: has dentures    Any planned dental procedures? No    Patient supplied their own medications No    Pt LHRH therapy (Firmagon, Eligard, Lupron) first initiated on 02/26/2019.

## 2024-01-31 NOTE — PROGRESS NOTES
I have personally verified, reviewed, and approved these actions.    F/u as scheduled for repeat Xgeva.     F/u as scheduled with Carole for OV and Lupron injection

## 2024-02-01 NOTE — PROGRESS NOTES
Patient has given me verbal consent to perform Xgeva Injection Yes      Following Morenita Feliz APRN-CNP plan of care. Araya Later 120MG/1.7ML MG GIVEN S.C left ARM  Lot Number: [id number]  Expiration Date: 11/01/2022  Chris Aguirre #: [id number]    After Injection was given there were no reactions at injection site and patient was feeling well. Patient was notified that possible side effects from injections include: Redness, swelling and itching at the injection site. Possible side effects of androgen deprivation therapy, including hot flashes, flushing of the skin, increased weight, decreased sex drive, and difficulties with ED. Patient was instructed to inform their dental office that they are receiving Araya Later and that major dental procedures should be avoided. Patient was advised to call our office with any further questions or concerns. Any active dental problems, infections, or pain? No    Date of last dental appointment: 06/2021    Any planned dental procedures? No, has dentures    Last Bone Scan was performed on N/A    Last Calcium level drawn on 07/30/2021 and Calcium Value was 1.22. Calcium needs to be checked periodically. Ask provider when Calcium needs checked again. Patient supplied their own medications No    Pt [address] therapy first initiated on 02/26/2019. 30-Jun-2026

## 2024-02-28 ENCOUNTER — NURSE ONLY (OUTPATIENT)
Dept: UROLOGY | Age: 89
End: 2024-02-28
Payer: MEDICARE

## 2024-02-28 ENCOUNTER — NURSE ONLY (OUTPATIENT)
Dept: LAB | Age: 89
End: 2024-02-28

## 2024-02-28 DIAGNOSIS — C61 PROSTATE CANCER METASTATIC TO BONE (HCC): ICD-10-CM

## 2024-02-28 DIAGNOSIS — C79.51 PROSTATE CANCER METASTATIC TO BONE (HCC): ICD-10-CM

## 2024-02-28 DIAGNOSIS — C79.52 SECONDARY MALIGNANT NEOPLASM OF BONE AND BONE MARROW (HCC): Primary | ICD-10-CM

## 2024-02-28 DIAGNOSIS — C61 MALIGNANT NEOPLASM OF PROSTATE (HCC): ICD-10-CM

## 2024-02-28 DIAGNOSIS — C79.51 SECONDARY MALIGNANT NEOPLASM OF BONE AND BONE MARROW (HCC): Primary | ICD-10-CM

## 2024-02-28 DIAGNOSIS — C61 PROSTATE CANCER (HCC): ICD-10-CM

## 2024-02-28 LAB — PSA SERPL-MCNC: 113 NG/ML (ref 0–1)

## 2024-02-28 PROCEDURE — 96372 THER/PROPH/DIAG INJ SC/IM: CPT | Performed by: NURSE PRACTITIONER

## 2024-02-28 NOTE — PROGRESS NOTES
Patient has given me verbal consent to perform Xgeva Injection Yes    Patient's last Calcium level was 1.20 on the date of 1/9/24.     Following [facility] plan of care.  XGEVA 120MG/1.7ML MG GIVEN S.C left ARM  Lot Number: [id number]  Expiration Date: 04/30/2026  NDC #: 02070-020-98    After Injection was given there were no reactions at injection site and patient was feeling well. Patient was notified that possible side effects from injections include: Redness, swelling and itching at the injection site. Possible side effects of androgen deprivation therapy, including hot flashes, flushing of the skin, increased weight, decreased sex drive, and difficulties with ED. Patient was instructed to inform their dental office that they are receiving XGEVA and that major dental procedures should be avoided. Patient was advised to call our office with any further questions or concerns.     Any active dental problems, infections, or pain? No    Date of last dental appointment: has dentures    Any planned dental procedures? Yes    Patient supplied their own medications No    Pt [facility] therapy (Firmagon, Eligard, Lupron) first initiated on 02/26/2019.

## 2024-03-04 DIAGNOSIS — I25.10 CORONARY ARTERY DISEASE INVOLVING NATIVE HEART WITHOUT ANGINA PECTORIS, UNSPECIFIED VESSEL OR LESION TYPE: ICD-10-CM

## 2024-03-04 RX ORDER — CARVEDILOL 3.12 MG/1
TABLET ORAL
Qty: 180 TABLET | Refills: 3 | Status: SHIPPED | OUTPATIENT
Start: 2024-03-04

## 2024-03-04 NOTE — TELEPHONE ENCOUNTER
Date of last visit:  11/27/2023  Date of next visit:  5/31/2024    Requested Prescriptions     Pending Prescriptions Disp Refills    carvedilol (COREG) 3.125 MG tablet [Pharmacy Med Name: Carvedilol 3.125 MG Oral Tablet] 180 tablet 1     Sig: TAKE 1 TABLET BY MOUTH TWICE DAILY WITH MEALS

## 2024-03-05 ENCOUNTER — TELEPHONE (OUTPATIENT)
Dept: ONCOLOGY | Age: 89
End: 2024-03-05

## 2024-03-05 NOTE — PROGRESS NOTES
Lab Results   Component Value Date/Time    INR 1.02 04/02/2022 09:55 PM     TSH:    Lab Results   Component Value Date/Time    TSH 2.130 05/22/2020 10:46 AM     VITAMIN B12: No components found for: \"B12\"  FOLATE:  No results found for: \"FOLATE\"  IRON:  No results found for: \"IRON\"  Iron Saturation:  No components found for: \"PERCENTFE\"  TIBC:  No results found for: \"TIBC\"  FERRITIN:  No results found for: \"FERRITIN\"  PSA:   Lab Results   Component Value Date/Time    .00 02/28/2024 10:56 AM             IMAGING:  Bone scan 5/26/2022  Compared to 2019.  CT  Multiple areas of increased activity throughout the axial and appendicular skeleton consistent with metastatic disease.  Increased activity suspicious for metastatic disease at the L1 and L2 vertebral bodies S1 vertebral body multiple bilateral ribs.  No prior bone scan for direct comparison.    MRI thoracic spine 2/7/2019  MRI lumbar spine 2/8/2019 2/5/2019 CT chest thoracic and lumbar spine abdomen pelvis.    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  Treatment thus far Lupron ongoing.  Casodex since December 2018 ongoing.  Radiation to his spine as above.  Asymptomatic at this point.  Referred because of rising PSA on current therapy.  Casodex stopped in May ;PSA is significantly climbing.  Began at [facility] 10/26/2020.  Stopped Xtandi early December because of COVID and weakness.  See above.Erleada stopped 9/23 w rising PSA.    2) Prognosis / Disease Status: prostate cancer to bone with a slowly rising PSA; castrate resistance.    3) Work-up:    Labs:   PSA = 118 on 10/24/23.Foundation1 blood kit-check if done as I do not see results.   Imaging: Bone scan referenced above.  No prior bone scan for comparison.   Procedures:

## 2024-03-05 NOTE — TELEPHONE ENCOUNTER
Pt's daughter called today and left voicemail informing us that the pt got his lab work done but also wanted to know if he should have his vitamin D rechecked when he comes in tomorrow for his appointment.

## 2024-03-06 ENCOUNTER — HOSPITAL ENCOUNTER (OUTPATIENT)
Dept: INFUSION THERAPY | Age: 89
Discharge: HOME | End: 2024-03-06
Payer: MEDICARE

## 2024-03-06 ENCOUNTER — TELEPHONE (OUTPATIENT)
Dept: UROLOGY | Age: 89
End: 2024-03-06

## 2024-03-06 ENCOUNTER — OFFICE VISIT (OUTPATIENT)
Dept: ONCOLOGY | Age: 89
End: 2024-03-06
Payer: MEDICARE

## 2024-03-06 ENCOUNTER — CLINICAL DOCUMENTATION (OUTPATIENT)
Dept: CASE MANAGEMENT | Age: 89
End: 2024-03-06

## 2024-03-06 VITALS
HEIGHT: 67 IN | DIASTOLIC BLOOD PRESSURE: 53 MMHG | SYSTOLIC BLOOD PRESSURE: 91 MMHG | WEIGHT: 105 LBS | OXYGEN SATURATION: 93 % | RESPIRATION RATE: 16 BRPM | TEMPERATURE: 97.7 F | BODY MASS INDEX: 16.48 KG/M2 | HEART RATE: 110 BPM

## 2024-03-06 VITALS
HEART RATE: 110 BPM | DIASTOLIC BLOOD PRESSURE: 53 MMHG | OXYGEN SATURATION: 93 % | SYSTOLIC BLOOD PRESSURE: 91 MMHG | RESPIRATION RATE: 16 BRPM | TEMPERATURE: 97.7 F

## 2024-03-06 DIAGNOSIS — C61 PROSTATE CANCER METASTATIC TO BONE (HCC): Primary | ICD-10-CM

## 2024-03-06 DIAGNOSIS — C79.51 PROSTATE CANCER METASTATIC TO BONE (HCC): Primary | ICD-10-CM

## 2024-03-06 PROCEDURE — 99214 OFFICE O/P EST MOD 30 MIN: CPT | Performed by: INTERNAL MEDICINE

## 2024-03-06 PROCEDURE — 1123F ACP DISCUSS/DSCN MKR DOCD: CPT | Performed by: INTERNAL MEDICINE

## 2024-03-06 PROCEDURE — 99211 OFF/OP EST MAY X REQ PHY/QHP: CPT

## 2024-03-06 PROCEDURE — G8427 DOCREV CUR MEDS BY ELIG CLIN: HCPCS | Performed by: INTERNAL MEDICINE

## 2024-03-06 PROCEDURE — 1036F TOBACCO NON-USER: CPT | Performed by: INTERNAL MEDICINE

## 2024-03-06 PROCEDURE — G8484 FLU IMMUNIZE NO ADMIN: HCPCS | Performed by: INTERNAL MEDICINE

## 2024-03-06 PROCEDURE — G8419 CALC BMI OUT NRM PARAM NOF/U: HCPCS | Performed by: INTERNAL MEDICINE

## 2024-03-06 NOTE — PATIENT INSTRUCTIONS
Cont Decadron 0.5 mg daily.  Continue Lupron every 6 months by urology  In my opinion, okay to stop the Xgeva monthly by urology; difficulty with transportation and we can  Follow-up with me in 2 months.  Check PSA same day May 8, 2024     Foundation 1 blood kit-ordered (1/9/2024 ) to be done at follow-up 3/5/2024.-Ordered

## 2024-03-06 NOTE — TELEPHONE ENCOUNTER
Pt called the office and stated that Dr Santiago said to  Continue Lupron every 6 months by urology.    Okay for urology to stop the Xgeva as patient has difficulty getting out of the house because of his advanced age.     Please see recent progress office note for Dr Santiago

## 2024-03-06 NOTE — PROGRESS NOTES
Name: Yasir Caldwell  : 1926  MRN: [id number]    Oncology Navigation Follow-Up Note    Contact Type:  Medical Oncology    Subjective: doing ok; dtr reports muscle weakness    Objective:     ONC POC:  -Cont with decadron therapy  -Foundation One today-->email to Maureen William  -Cont Lupron through urology  -ONC ok with pt stopping XGEVA, if pt desires- \"too much with monthly appt for it\"--per dtr.  -Return to see Onc in 2 months, ; labwork can be drawn at the appt, to avoid add transport to get the blood drawn.    Assistance Needed: denies    Receptive to Advanced Care Planning / Palliative Care:  deferred    Referrals: N/A    Education: POC reiterated    Notes: Jayden following to assist as needed.  Encouraged nutritional supplement, & the do \"marching\" from his chair for leg strengthening as pt tolerates.     Electronically signed by Diandra Franco RN on 3/6/2024 at 3:53 PM

## 2024-05-07 NOTE — PROGRESS NOTES
18 09/21/2023 02:30 PM    CREATININE 0.8 05/08/2024 11:17 AM    CREATININE 0.8 09/21/2023 02:30 PM    CALCIUM 9.4 09/21/2023 02:30 PM    LABGLOM 80 05/08/2024 11:17 AM    LABGLOM >60 01/09/2024 10:31 AM    GLUCOSE 132 09/21/2023 02:30 PM    GLUCOSE 96 04/06/2017 12:22 PM       Magnesium:    Lab Results   Component Value Date/Time    MG 2.1 12/27/2022 11:49 AM     PT/INR:    Lab Results   Component Value Date/Time    INR 1.02 04/02/2022 09:55 PM     TSH:    Lab Results   Component Value Date/Time    TSH 2.130 05/22/2020 10:46 AM     VITAMIN B12: No components found for: \"B12\"  FOLATE:  No results found for: \"FOLATE\"  IRON:  No results found for: \"IRON\"  Iron Saturation:  No components found for: \"PERCENTFE\"  TIBC:  No results found for: \"TIBC\"  FERRITIN:  No results found for: \"FERRITIN\"  PSA:   Lab Results   Component Value Date/Time    .00 02/28/2024 10:56 AM             IMAGING:  Bone scan 5/26/2022  Compared to 2019.  CT  Multiple areas of increased activity throughout the axial and appendicular skeleton consistent with metastatic disease.  Increased activity suspicious for metastatic disease at the L1 and L2 vertebral bodies S1 vertebral body multiple bilateral ribs.  No prior bone scan for direct comparison.    MRI thoracic spine 2/7/2019  MRI lumbar spine 2/8/2019 2/5/2019 CT chest thoracic and lumbar spine abdomen pelvis.    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    3) Work-up:    Labs:   PSA = 118 on 10/24/23.see above.  Decent response to low-dose Decadron daily.   Imaging: Bone scan referenced above.  No prior bone scan for comparison.   Procedures: None   Consults:

## 2024-05-08 ENCOUNTER — HOSPITAL ENCOUNTER (OUTPATIENT)
Dept: INFUSION THERAPY | Age: 89
Discharge: HOME | End: 2024-05-08
Payer: MEDICARE

## 2024-05-08 ENCOUNTER — OFFICE VISIT (OUTPATIENT)
Dept: ONCOLOGY | Age: 89
End: 2024-05-08
Payer: MEDICARE

## 2024-05-08 VITALS
HEART RATE: 91 BPM | BODY MASS INDEX: 15.54 KG/M2 | WEIGHT: 99 LBS | DIASTOLIC BLOOD PRESSURE: 51 MMHG | OXYGEN SATURATION: 91 % | RESPIRATION RATE: 16 BRPM | TEMPERATURE: 97.7 F | SYSTOLIC BLOOD PRESSURE: 82 MMHG | HEIGHT: 67 IN

## 2024-05-08 VITALS
HEART RATE: 91 BPM | RESPIRATION RATE: 16 BRPM | SYSTOLIC BLOOD PRESSURE: 82 MMHG | TEMPERATURE: 97.7 F | OXYGEN SATURATION: 91 % | DIASTOLIC BLOOD PRESSURE: 51 MMHG

## 2024-05-08 DIAGNOSIS — C61 PROSTATE CANCER METASTATIC TO BONE (HCC): ICD-10-CM

## 2024-05-08 DIAGNOSIS — C79.51 PROSTATE CANCER METASTATIC TO BONE (HCC): ICD-10-CM

## 2024-05-08 DIAGNOSIS — C79.51 PROSTATE CANCER METASTATIC TO BONE (HCC): Primary | ICD-10-CM

## 2024-05-08 DIAGNOSIS — C61 PROSTATE CANCER METASTATIC TO BONE (HCC): Primary | ICD-10-CM

## 2024-05-08 LAB
BASOPHILS ABSOLUTE: 0 THOU/MM3 (ref 0–0.1)
BASOPHILS NFR BLD AUTO: 0 % (ref 0–3)
BUN BLDP-MCNC: 23 MG/DL (ref 8–26)
CHLORIDE BLD-SCNC: 93 MEQ/L (ref 98–109)
CREAT BLD-MCNC: 0.8 MG/DL (ref 0.5–1.2)
EOSINOPHIL NFR BLD AUTO: 1 % (ref 0–4)
EOSINOPHILS ABSOLUTE: 0.1 THOU/MM3 (ref 0–0.4)
ERYTHROCYTE [DISTWIDTH] IN BLOOD BY AUTOMATED COUNT: 14 % (ref 11.5–14.5)
GFR SERPL CREATININE-BSD FRML MDRD: 80 ML/MIN/1.73M2
GLUCOSE BLD-MCNC: 143 MG/DL (ref 70–108)
HCT VFR BLD AUTO: 37.9 % (ref 42–52)
HGB BLD-MCNC: 12.3 GM/DL (ref 14–18)
IMMATURE GRANULOCYTES %: 0 %
IMMATURE GRANULOCYTES ABSOLUTE: 0.05 THOU/MM3 (ref 0–0.07)
IONIZED CALCIUM, WHOLE BLOOD: 1.08 MMOL/L (ref 1.12–1.32)
LYMPHOCYTES ABSOLUTE: 0.6 THOU/MM3 (ref 1–4.8)
LYMPHOCYTES NFR BLD AUTO: 4 % (ref 15–47)
MCH RBC QN AUTO: 33.4 PG (ref 26–33)
MCHC RBC AUTO-ENTMCNC: 32.5 GM/DL (ref 32.2–35.5)
MCV RBC AUTO: 103 FL (ref 80–94)
MONOCYTES ABSOLUTE: 0.6 THOU/MM3 (ref 0.4–1.3)
MONOCYTES NFR BLD AUTO: 4 % (ref 0–12)
NEUTROPHILS ABSOLUTE: 12.3 THOU/MM3 (ref 1.8–7.7)
NEUTROPHILS NFR BLD AUTO: 90 % (ref 43–75)
PLATELET # BLD AUTO: 272 THOU/MM3 (ref 130–400)
PMV BLD AUTO: 8.7 FL (ref 9.4–12.4)
POTASSIUM BLD-SCNC: 4.1 MEQ/L (ref 3.5–4.9)
PSA SERPL-MCNC: 291.1 NG/ML (ref 0–1)
RBC # BLD AUTO: 3.68 MILL/MM3 (ref 4.7–6.1)
SODIUM BLD-SCNC: 132 MEQ/L (ref 138–146)
TOTAL CO2, WHOLE BLOOD: 30 MEQ/L (ref 23–33)
WBC # BLD AUTO: 13.6 THOU/MM3 (ref 4.8–10.8)

## 2024-05-08 PROCEDURE — G8419 CALC BMI OUT NRM PARAM NOF/U: HCPCS | Performed by: INTERNAL MEDICINE

## 2024-05-08 PROCEDURE — 1036F TOBACCO NON-USER: CPT | Performed by: INTERNAL MEDICINE

## 2024-05-08 PROCEDURE — G8427 DOCREV CUR MEDS BY ELIG CLIN: HCPCS | Performed by: INTERNAL MEDICINE

## 2024-05-08 PROCEDURE — 36415 COLL VENOUS BLD VENIPUNCTURE: CPT

## 2024-05-08 PROCEDURE — 99211 OFF/OP EST MAY X REQ PHY/QHP: CPT

## 2024-05-08 PROCEDURE — 85025 COMPLETE CBC W/AUTO DIFF WBC: CPT

## 2024-05-08 PROCEDURE — 99213 OFFICE O/P EST LOW 20 MIN: CPT | Performed by: INTERNAL MEDICINE

## 2024-05-08 PROCEDURE — 1123F ACP DISCUSS/DSCN MKR DOCD: CPT | Performed by: INTERNAL MEDICINE

## 2024-05-08 PROCEDURE — 80047 BASIC METABLC PNL IONIZED CA: CPT

## 2024-05-08 PROCEDURE — 84153 ASSAY OF PSA TOTAL: CPT

## 2024-05-08 NOTE — PATIENT INSTRUCTIONS
Continue the Decadron 1/2 mg daily  Recommend continue Lupron every 6 months next due in July.  No need for follow-up.  Patient's daughter Gill will call if she needs any new prescriptions or assistance.

## 2024-05-24 DIAGNOSIS — C79.51 PROSTATE CANCER METASTATIC TO BONE (HCC): ICD-10-CM

## 2024-05-24 DIAGNOSIS — C61 PROSTATE CANCER METASTATIC TO BONE (HCC): ICD-10-CM

## 2024-05-24 NOTE — TELEPHONE ENCOUNTER
Date of last visit:  11/27/2023  Date of next visit:  5/31/2024    Requested Prescriptions     Pending Prescriptions Disp Refills    traMADol (ULTRAM) 50 MG tablet 90 tablet 0     Sig: Take 1 tablet by mouth every 8 hours as needed for Pain for up to 30 days.

## 2024-05-26 RX ORDER — TRAMADOL HYDROCHLORIDE 50 MG/1
50 TABLET ORAL EVERY 8 HOURS PRN
Qty: 90 TABLET | Refills: 0 | Status: SHIPPED | OUTPATIENT
Start: 2024-05-26 | End: 2024-06-25

## 2024-05-31 ENCOUNTER — TELEMEDICINE (OUTPATIENT)
Dept: FAMILY MEDICINE CLINIC | Age: 89
End: 2024-05-31

## 2024-05-31 DIAGNOSIS — M84.58XA PATHOLOGICAL FRACTURE OF VERTEBRA DUE TO NEOPLASTIC DISEASE, INITIAL ENCOUNTER: Primary | ICD-10-CM

## 2024-05-31 DIAGNOSIS — E44.0 MODERATE MALNUTRITION (HCC): ICD-10-CM

## 2024-05-31 ASSESSMENT — ENCOUNTER SYMPTOMS
SINUS PRESSURE: 0
CONSTIPATION: 0
SHORTNESS OF BREATH: 0

## 2024-05-31 ASSESSMENT — PATIENT HEALTH QUESTIONNAIRE - PHQ9
SUM OF ALL RESPONSES TO PHQ QUESTIONS 1-9: 2
SUM OF ALL RESPONSES TO PHQ9 QUESTIONS 1 & 2: 2
1. LITTLE INTEREST OR PLEASURE IN DOING THINGS: SEVERAL DAYS
SUM OF ALL RESPONSES TO PHQ QUESTIONS 1-9: 2
2. FEELING DOWN, DEPRESSED OR HOPELESS: SEVERAL DAYS

## 2024-05-31 NOTE — PROGRESS NOTES
Yasir Caldwell, was evaluated through a synchronous (real-time) audio-video encounter. The patient (or guardian if applicable) is aware that this is a billable service, which includes applicable co-pays. This Virtual Visit was conducted with patient's (and/or legal guardian's) consent. Patient identification was verified, and a caregiver was present when appropriate.   The patient was located at Home: [address] [place] [id number]  Provider was located at Facility (Appt Dept): [address] [place] [id number]  Confirm you are appropriately licensed, registered, or certified to deliver care in the state where the patient is located as indicated above. If you are not or unsure, please re-schedule the visit: Yes, I confirm.     Yasir Caldwell (:  1926) is a Established patient, presenting virtually for evaluation of the following:    Assessment & Plan   Below is the assessment and plan developed based on review of pertinent history, physical exam, labs, studies, and medications.   Diagnosis Orders   1. Pathological fracture of vertebra due to neoplastic disease, initial encounter        2. Moderate malnutrition (HCC)          We discussed how I may need to do a house call in 6 months.       Subjective   HPI  He's doing ok and getting weaker  He's too weak to come to the office  Doesn't want to eat or drink  Uses a wheelchair and commode  The tramadol continues to be helpful.  Review of Systems   Constitutional:  Negative for fatigue.   HENT:  Negative for sinus pressure.    Eyes:  Negative for visual disturbance.   Respiratory:  Negative for shortness of breath.    Cardiovascular:  Negative for chest pain.   Gastrointestinal:  Negative for constipation.   Genitourinary: Negative.    Musculoskeletal:  Negative for arthralgias.   Skin:  Negative for rash.   Neurological:  Negative for headaches.    The patient's medications, allergies, past medical problems, surgical, social, and family

## 2024-06-03 ENCOUNTER — TELEPHONE (OUTPATIENT)
Dept: ONCOLOGY | Age: 89
End: 2024-06-03

## 2024-06-03 NOTE — TELEPHONE ENCOUNTER
Patient's daughter is calling in with concerns, she says that patient is significantly weaker, having a decrease in urine output and an increase in fecal incontinence and softer stools. His coccyx is becoming raw from wiping since every time they check him he has stool there and they are having to change him quite often. He is sleeping more and not taking in enough fluids, I let Gill know that this a normal expectation when his fluid intake has decreased. As far as the increased fecal incontinence, I let her know that his anal sphincter could be weakening and thus exacerbating his incontinence. She is wondering if these symptoms are  cause of his continued, chronic use of dexamethasone. I explained that the dose of dexamethasone he is taking is not likely to cause these issues and that because these symptoms are new it is also not likely from the dexamethasone because he has been on it for months. Gill explained that Dr. Santiago ordered the dexamethasone initially for an increase in appetite and energy but then later told her it was to help decrease the PSA level. She is wanting to see if the dexamethasone can be stopped for a few days to see if it may help with these urinary and fecal issues. She mentioned that a friend of hers who is a nurse said that maybe if the dexamethasone is stopped and restarted, that may be \"the boost that's needed to decrease the PSA\". She is wondering if Dr. Santiago has any advice.

## 2024-06-04 NOTE — TELEPHONE ENCOUNTER
Returned call to patient's daughter, passed along the message that Dr. Santiago does not feel his symptoms are likely from the dexamethasone. Advised that patient could take dexamethasone every other day for the next week and call back if she feels that symptoms have progressed. Encouraged her to let us know if and when she needs a hospice referral placed, daughter stated understanding and will call in the future as needed.

## 2025-04-11 NOTE — PROGRESS NOTES
[address] [facility]  [address]  [facility] [address]  Dept: [phone]  Loc: [phone]    Visit Date: 7/12/2022        HPI:     Gladis Arambula is a 80 y.o. male who presents today for:  Chief Complaint   Patient presents with    Follow-up     labs prior    Prostate Cancer     xgeva injection       HPI   Pt seen in follow up for prostate cancer. Mr. Milligan has a hx of PSA 1/31/17 that was 25 at which time family was notified of risk for prostate cancer but did not wish to pursue further testing.  CT of the abdomen and pelvis performed for abdominal pain 12/17/18 noted multiple skeletal sclerotic metastases with pathologic compression fractures of L1-L3, enlarged hterogeneous prostate, and pelvic adenopathy.  PSA performed 12/17/18 was 1,594.  Pt was started on Casodex 12/20/18.  Pt recovered on TCU 12/24/19-1/10/19. [facility] underwent TRUS prostate biopsy 1/24/19 by Dr. Ulisses Argueta with findings of high grade prostatic adenocarcinoma.  Pt was scheduled for review of biopsy results 2/11/19 however pt was admitted to the hospital 2/5/19-2/15/19 and treated for acute PE and DVT and pathologic T6 and T8 fractures.  He underwent T6 and T8 vertebral bone biopsy, RFA for spine metastatic lesion at the levels of T6 and T8, and vertebral augmentation (balloon kyphoplasty) for T6 and T8 on 2/13/19 by Dr. William Alvarezr has completed palliative radiation therapy.      Pt started monthly Firmagon and Zelphia Polka 2/26/19.  Firmagon switched to Lupron 4/30/19.  Pt had anorexia on Casodex and this was held. Mark Ribera and daughter decided to hold casodex.  Pt is on calcium carbonate and vitamin D supplementation.  Last ADT injection with Lupron 45 mg 6/6/22.         In December 2020 Yasir's PSA continued to trend upward to 3.17 with a testosterone <3.  At office visit in [place] discussed addition of Cerda Salcido or Casodex and possible side effects.  Pt was started on Casodex 12/15/2020 while awaiting coverage of Theone Cones pt received the [facility] he was unable to swallow the pills.  As such he remained on Casodex. Mik Maldonado had increased fatigue and family started holding casodex starting 2/3/21.  Pt was hospitalized and treated for aspiration pneumonia 2/5/21-2/8/21. PSA has recently continued to trend upward and family resumed casodex in June 2021. Despite resumption of casodex it has continued to increase. Pt was referred to medical oncology and saw Dr. Mara Bertrand. Casodex stopped 5/26/22. Has appt this month to review PSA. PSA 7/7/22 13.42.  (technically 26.8 when corrected for finasteride)    Reports he is urinating without difficulty. Has some weakened stream overall but feels he is emptying ok. Wakes 1 x per night. Continues on finasteride and doxazosin 2 mg daily.       Current Outpatient Medications   Medication Sig Dispense Refill    albuterol sulfate HFA (VENTOLIN HFA) 108 (90 Base) MCG/ACT inhaler Inhale 2 puffs into the lungs every 6 hours as needed for Wheezing or Shortness of Breath 1 each 1    albuterol (PROVENTIL) (2.5 MG/3ML) 0.083% nebulizer solution Take 3 mLs by nebulization 2 times daily And every 6  Hours  prn 180 mL 11    carvedilol (COREG) 3.125 MG tablet TAKE 1 TABLET BY MOUTH TWICE DAILY WITH MEALS 180 tablet 3    finasteride (PROSCAR) 5 MG tablet Take 1 tablet by mouth once daily 90 tablet 3    tiotropium-olodaterol (STIOLTO RESPIMAT) 2.5-2.5 MCG/ACT AERS INHALE 2 PUFFS BY MOUTH ONCE DAILY 4 g 5    doxazosin (CARDURA) 4 MG tablet Take 1 tablet by mouth nightly (Patient taking differently: 2 mg ) 90 tablet 3    leuprolide (LUPRON) 45 MG injection Inject 45 mg into the muscle once       calcium carbonate-vitamin D (CALCIUM 600 + D) 600-400 MG-UNIT TABS per tab Take 1 tablet by mouth 3 times daily 90 tablet 5    denosumab (XGEVA) 120 MG/1.7ML SOLN SC injection Inject 120 mg into the skin once Every 28 days       polyethylene glycol (MIRALAX) 17 g packet Take 17 g by mouth once      Cholecalciferol (VITAMIN D3) 125 MCG (5000 UT) CAPS Take 1 capsule by mouth every morning (before breakfast)      Misc. Devices (ACAPELLA) MISC 1 Device by Does not apply route 4 times daily 1 each 0    Multiple Vitamins-Minerals (THERAPEUTIC MULTIVITAMIN-MINERALS) tablet Take 1 tablet by mouth daily      docusate sodium (COLACE, DULCOLAX) 100 MG CAPS Take 100 mg by mouth 2 times daily (Patient taking differently: Take 100 mg by mouth nightly )      acetaminophen (TYLENOL) 500 MG tablet Take 500 mg by mouth every 8 hours as needed for Pain      aspirin 325 MG EC tablet Take 1 tablet by mouth daily      bicalutamide (CASODEX) 50 MG chemo tablet Take 1 tablet by mouth daily (Patient not taking: Reported on 7/12/2022) 90 tablet 3     No current facility-administered medications for this visit. Past Medical History  Clark Vasquez  has a past medical history of Blood circulation, collateral, CAD (coronary artery disease), CAD (coronary artery disease), Cancer ([address] CKD (chronic kidney disease) stage 2, GFR 60-89 ml/min, COPD (chronic obstructive pulmonary disease) ([address] Diverticulitis, DVT of lower extremity, bilateral ([address] GERD (gastroesophageal reflux disease), Hiatal hernia, Hyperlipemia, Hyperlipidemia, Other disorders of kidney and ureter in diseases classified elsewhere, Pleural plaque, Pneumonia of both lower lobes due to infectious organism, Primary adenocarcinoma of prostate ([address] Pulmonary emboli ([address] and Vitamin D deficiency. Past Surgical History  The patient  has a past surgical history that includes hernia repair; Appendectomy; Cardiac catheterization; eye surgery; Cystocopy (03/17/2015); LAPAROTOMY EXPLORATORY (N/A, 12/17/2018); Abdomen surgery; and Kyphosis surgery (N/A, 2/13/2019). Family History  This patient's family history includes Cancer in his mother; Heart Disease in his brother.     Social History  Clark Vasquez  reports that he quit smoking PSA values are as follows  Lab Results   Component Value Date    PSA 13.42 (H) 07/07/2022    PSA 11.57 (H) 05/26/2022    PSA 9.82 (H) 04/28/2022      Recent BUN/Creatinine:  Lab Results   Component Value Date/Time    BUN 18 07/07/2022 09:55 AM    CREATININE 1.1 07/07/2022 09:55 AM     Assessment:   Metastatic castration resistant prostate cancer  Obstructive prostate  Plan:     Pt is doing well. Now only wearing O2 with exertion. Urinating without difficulty. [address] [place] today. Next Lupron 45 mg due in 5 months. Continue doxazosin and finasteride. Lab/ma monthly for Xgeva. OV in 2 months. 2

## (undated) DEVICE — LINER SUCT CANSTR 1500CC SEMI RIG W/ POR HYDROPHOBIC SHUT

## (undated) DEVICE — SOLUTION IV IRRIG POUR BRL 0.9% SODIUM CHL 2F7124

## (undated) DEVICE — SOLUTION IV 500ML 0.9% SOD CHL PH 5 INJ USP VIAFLX PLAS

## (undated) DEVICE — Z DISCONTINUED BY MEDLINE USE 2711682 TRAY SKIN PREP DRY W/ PREM GLV

## (undated) DEVICE — GARMENT,MEDLINE,DVT,INT,CALF,MED, GEN2: Brand: MEDLINE

## (undated) DEVICE — 4-PORT MANIFOLD: Brand: NEPTUNE 2

## (undated) DEVICE — DRESSING TRNSPAR W8XL12IN FLM SURESITE 123

## (undated) DEVICE — KIT OCN002 OSTEOCOOL BONE ACCESS 10G 090: Brand: OSTEOCOOL™ BONE ACCESS KIT

## (undated) DEVICE — COUNTER NDL 40 COUNT HLD 70 FOAM BLK ADH W/ MAG

## (undated) DEVICE — GLOVE ORANGE PI 8 1/2   MSG9085

## (undated) DEVICE — KIT KEX152EB-CDS- 15/2 FF WITH CDS: Brand: KYPHPAK® FIRST FRACTURE TRAY

## (undated) DEVICE — Device

## (undated) DEVICE — INTENDED FOR TISSUE SEPARATION, AND OTHER PROCEDURES THAT REQUIRE A SHARP SURGICAL BLADE TO PUNCTURE OR CUT.: Brand: BARD-PARKER ® CARBON RIB-BACK BLADES

## (undated) DEVICE — PRESSURE MONITORING SET: Brand: TRUWAVE

## (undated) DEVICE — DRAPE C ARM W36XL30IN RECTANG BND BG AND TAPE

## (undated) DEVICE — TOWEL,OR,DSP,ST,WHITE,DLX,XR,4/PK,20PK/C: Brand: MEDLINE

## (undated) DEVICE — SUTURE PERMA-HAND SZ 3-0 L30IN NONABSORBABLE BLK L60MM KS 622H

## (undated) DEVICE — PATIENT RETURN ELECTRODE, SINGLE-USE, CONTACT QUALITY MONITORING, ADULT, WITH 9FT CORD, FOR PATIENTS WEIGING OVER 33LBS. (15KG): Brand: MEGADYNE

## (undated) DEVICE — SET CATH 20GA L1.75IN RAD ART POLYUR RADPQ W/ INTEGR

## (undated) DEVICE — PAD,NON-ADHERENT,3X8,STERILE,LF,1/PK: Brand: MEDLINE

## (undated) DEVICE — GLOVE SURG SZ 65 THK91MIL LTX FREE SYN POLYISOPRENE

## (undated) DEVICE — GOWN,SIRUS,NONRNF,SETINSLV,XL,20/CS: Brand: MEDLINE

## (undated) DEVICE — PAD,O.R.,TABLE,CONV FOAM,2X20X72: Brand: MEDLINE

## (undated) DEVICE — SET CATH 20GA L1.5IN RAD ART POLYUR RADPQ W/ INTEGR 0.018IN

## (undated) DEVICE — GLOVE ORANGE PI 7   MSG9070

## (undated) DEVICE — SOLUTION IV 1000ML 0.9% SOD CHL PH 5 INJ USP VIAFLX PLAS

## (undated) DEVICE — 3M™ WARMING BLANKET, UPPER BODY, 10 PER CASE, 42268: Brand: BAIR HUGGER™

## (undated) DEVICE — COVER,LIGHT HANDLE,FLX,2/PK: Brand: MEDLINE INDUSTRIES, INC.

## (undated) DEVICE — SYRINGE A08E KIS INFLATION HP: Brand: KYPHON®  INFLATION SYRINGE

## (undated) DEVICE — SUTURE V-LOC 180 SZ 3-0 L9IN ABSRB GRN L26MM V-20 1/2 CIR VLOCL0644

## (undated) DEVICE — TUBING PRSS 36 M F

## (undated) DEVICE — Z CONVERTED USE 2715898 SWABSTICK MEDICATED W1.75XL6.5IN 1.6ML 3.15% CHG 70% ISO

## (undated) DEVICE — TAPE SURG W4INXL11YD 2IN PERF LINERLESS NONWOVEN MEDFIX EZ

## (undated) DEVICE — YANKAUER,POOLE TIP,STERILE,50/CS: Brand: MEDLINE

## (undated) DEVICE — KIT CATHETER 18GA L445CM NDL 20GA 0025IN RAD ART W WNG CLP

## (undated) DEVICE — 450 ML BOTTLE OF 0.05% CHLORHEXIDINE GLUCONATE IN 99.95% STERILE WATER FOR IRRIGATION, USP AND APPLICATOR.: Brand: IRRISEPT ANTIMICROBIAL WOUND LAVAGE

## (undated) DEVICE — RESERVOIR,SUCTION,100CC,SILICONE: Brand: MEDLINE

## (undated) DEVICE — AEGIS 1" DISK 4MM HOLE, PEEL OPEN: Brand: MEDLINE

## (undated) DEVICE — GOWN,SIRUS,NON REINFRCD,LARGE,SET IN SL: Brand: MEDLINE

## (undated) DEVICE — BONE TAMP KIT KEX152NB AF E2 15/2: Brand: KYPHON EXPRESS II KYPHOPAK TRAY

## (undated) DEVICE — HEAD POSITIONER, SURGICAL: Brand: DEROYAL

## (undated) DEVICE — PROBE OCP210 OSTEOCOOL RF 17G 10MMX2: Brand: OSTEOCOOL™ RF ABLATION SYSTEM

## (undated) DEVICE — 1010 S-DRAPE TOWEL DRAPE 10/BX: Brand: STERI-DRAPE™

## (undated) DEVICE — ELECTROSURGICAL PENCIL BUTTON SWITCH E-Z CLEAN COATED BLADE ELECTRODE 10 FT (3 M) CORD HOLSTER: Brand: MEGADYNE

## (undated) DEVICE — 3M™ IOBAN™ 2 ANTIMICROBIAL INCISE DRAPE 6650EZ: Brand: IOBAN™ 2

## (undated) DEVICE — TOTAL TRAY, DB, 100% SILI FOLEY, 16FR 10: Brand: MEDLINE

## (undated) DEVICE — SPLINT ARMBRD W3XL10.5IN POLYFOAM DLX A LN

## (undated) DEVICE — SPONGE GZ W4XL4IN COT 12 PLY TYP VII WVN C FLD DSGN

## (undated) DEVICE — 3M™ STERI-STRIP™ COMPOUND BENZOIN TINCTURE 40 BAGS/CARTON 4 CARTONS/CASE C1544: Brand: 3M™ STERI-STRIP™

## (undated) DEVICE — GLOVE ORANGE PI 7 1/2   MSG9075

## (undated) DEVICE — COVER ARMBRD W13XL28.5IN IMPERV BLU FOR OP RM

## (undated) DEVICE — SPONGE DRN W4XL4IN RAYON/POLYESTER 6 PLY NONWOVEN PRECUT

## (undated) DEVICE — SPONGE LAP W18XL18IN WHT COT 4 PLY FLD STRUNG RADPQ DISP ST

## (undated) DEVICE — DRESSING FOAM W6XL6.75IN SHAL GRANULATING WND SKIN TEAR

## (undated) DEVICE — BONE BIOPSY DEVICE F07A TAPERED SIZE 2: Brand: MEDTRONIC REUSABLE INSTRUMENTS

## (undated) DEVICE — BLADE CLIPPER GEN PURP NS

## (undated) DEVICE — GLOVE ORANGE PI 8   MSG9080

## (undated) DEVICE — BREAST HERNIA PACK: Brand: MEDLINE INDUSTRIES, INC.

## (undated) DEVICE — PAD,ABDOMINAL,5"X9",ST,LF,25/BX: Brand: MEDLINE INDUSTRIES, INC.

## (undated) DEVICE — 3M™ BAIR HUGGER® MULTI ACCESS BLANKET, PEDIATRIC, FULL BODY, 10 PER CASE 31000: Brand: BAIR HUGGER™

## (undated) DEVICE — GAUZE,SPONGE,8"X4",12PLY,XRAY,STRL,LF: Brand: MEDLINE

## (undated) DEVICE — GAUZE,SPONGE,4"X4",12PLY,STERILE,LF,2'S: Brand: MEDLINE

## (undated) DEVICE — 500ML,PRESSURE INFUSER W/STOPCOCK: Brand: MEDLINE

## (undated) DEVICE — CHLORAPREP 26ML CLEAR

## (undated) DEVICE — STRIP,CLOSURE,WOUND,MEDI-STRIP,1/2X4: Brand: MEDLINE

## (undated) DEVICE — CONTAINER,SPECIMEN,PNEU TUBE,4OZ,OR STRL: Brand: MEDLINE

## (undated) DEVICE — MARKER,SKIN,WI/RULER AND LABELS: Brand: MEDLINE